# Patient Record
Sex: FEMALE | Race: WHITE | NOT HISPANIC OR LATINO | URBAN - METROPOLITAN AREA
[De-identification: names, ages, dates, MRNs, and addresses within clinical notes are randomized per-mention and may not be internally consistent; named-entity substitution may affect disease eponyms.]

---

## 2018-04-03 ENCOUNTER — EMERGENCY (EMERGENCY)
Facility: HOSPITAL | Age: 83
LOS: 1 days | Discharge: HOME | End: 2018-04-03
Attending: EMERGENCY MEDICINE
Payer: COMMERCIAL

## 2018-04-03 VITALS
HEART RATE: 107 BPM | SYSTOLIC BLOOD PRESSURE: 179 MMHG | OXYGEN SATURATION: 96 % | WEIGHT: 145.95 LBS | DIASTOLIC BLOOD PRESSURE: 82 MMHG | RESPIRATION RATE: 18 BRPM | TEMPERATURE: 98 F

## 2018-04-03 DIAGNOSIS — Z96.643 PRESENCE OF ARTIFICIAL HIP JOINT, BILATERAL: Chronic | ICD-10-CM

## 2018-04-03 PROCEDURE — 99284 EMERGENCY DEPT VISIT MOD MDM: CPT | Mod: 25

## 2018-04-03 RX ORDER — ACETAMINOPHEN 500 MG
975 TABLET ORAL ONCE
Qty: 0 | Refills: 0 | Status: COMPLETED | OUTPATIENT
Start: 2018-04-03 | End: 2018-04-03

## 2018-04-03 NOTE — ED PROVIDER NOTE - ENMT, MLM
+small bump posterior scalp; Airway patent, Nasal mucosa clear. Mouth with normal mucosa. Throat has no vesicles, no oropharyngeal exudates and uvula is midline.

## 2018-04-03 NOTE — ED ADULT TRIAGE NOTE - CHIEF COMPLAINT QUOTE
patient biba from home states that she was closing the door in the bathroom when she slipped on the floor and fell, states that she landed on her right side, has pain to her right arm no obvious deformities, patient denies any LOC or syncopizing. states that she did hit her head not on any blood thinners, patient alert and oriented x4

## 2018-04-03 NOTE — ED PROVIDER NOTE - OBJECTIVE STATEMENT
89 yo F s/p fall onto the right side c/o right arm pain.  was attempting to close the bathroom door and slipped and fell onto her right side.  +head trauma but denies LOC, dizziness, change in vision, chest pain, sob.  s/p bilateral hip replacements but denies hip pain.  arm pain is throbbing/spastic, intermittent pain that worsens with movement.

## 2018-04-03 NOTE — ED ADULT NURSE NOTE - OBJECTIVE STATEMENT
pt reports slip and fall upon entering the bathroom. pt reports "I missed a step and fell." pt denies dizziness, cp, sob, palpitations before fall. reports she was generally felling fine before fall. - loc. - blood thinners. pt reports fell onto rt side, + head trauma. - obvious external signs of trauma. reports rt upper arm pain, occiput of head and neck. a and o x3. ang, pain worse to rt arm with movement. breathing even and unlabored. sitting calm in bed. will continue to monitor.

## 2018-04-03 NOTE — ED PROVIDER NOTE - MUSCULOSKELETAL, MLM
Spine appears normal, range of motion is not limited, +right bicep tenderness and pain with resistant elbow flexion

## 2018-04-04 VITALS
DIASTOLIC BLOOD PRESSURE: 98 MMHG | RESPIRATION RATE: 20 BRPM | SYSTOLIC BLOOD PRESSURE: 176 MMHG | HEART RATE: 94 BPM | OXYGEN SATURATION: 96 % | TEMPERATURE: 98 F

## 2018-04-04 PROCEDURE — 82962 GLUCOSE BLOOD TEST: CPT

## 2018-04-04 PROCEDURE — 72170 X-RAY EXAM OF PELVIS: CPT

## 2018-04-04 PROCEDURE — 73060 X-RAY EXAM OF HUMERUS: CPT | Mod: 26,RT

## 2018-04-04 PROCEDURE — 70450 CT HEAD/BRAIN W/O DYE: CPT | Mod: 26

## 2018-04-04 PROCEDURE — 73060 X-RAY EXAM OF HUMERUS: CPT

## 2018-04-04 PROCEDURE — 70450 CT HEAD/BRAIN W/O DYE: CPT

## 2018-04-04 PROCEDURE — 99284 EMERGENCY DEPT VISIT MOD MDM: CPT | Mod: 25

## 2018-04-04 PROCEDURE — 72170 X-RAY EXAM OF PELVIS: CPT | Mod: 26

## 2018-04-04 RX ADMIN — Medication 975 MILLIGRAM(S): at 00:21

## 2018-08-27 ENCOUNTER — EMERGENCY (EMERGENCY)
Facility: HOSPITAL | Age: 83
LOS: 1 days | Discharge: HOME | End: 2018-08-27
Attending: EMERGENCY MEDICINE
Payer: COMMERCIAL

## 2018-08-27 VITALS
WEIGHT: 145.06 LBS | DIASTOLIC BLOOD PRESSURE: 78 MMHG | RESPIRATION RATE: 16 BRPM | TEMPERATURE: 98 F | HEART RATE: 102 BPM | OXYGEN SATURATION: 99 % | HEIGHT: 62 IN | SYSTOLIC BLOOD PRESSURE: 178 MMHG

## 2018-08-27 DIAGNOSIS — Z96.643 PRESENCE OF ARTIFICIAL HIP JOINT, BILATERAL: Chronic | ICD-10-CM

## 2018-08-27 PROBLEM — I10 ESSENTIAL (PRIMARY) HYPERTENSION: Chronic | Status: ACTIVE | Noted: 2018-04-03

## 2018-08-27 PROCEDURE — 99283 EMERGENCY DEPT VISIT LOW MDM: CPT

## 2018-08-27 PROCEDURE — 99284 EMERGENCY DEPT VISIT MOD MDM: CPT

## 2018-08-27 PROCEDURE — 73502 X-RAY EXAM HIP UNI 2-3 VIEWS: CPT

## 2018-08-27 PROCEDURE — 72100 X-RAY EXAM L-S SPINE 2/3 VWS: CPT | Mod: 26

## 2018-08-27 PROCEDURE — 73502 X-RAY EXAM HIP UNI 2-3 VIEWS: CPT | Mod: 26,RT

## 2018-08-27 PROCEDURE — 72100 X-RAY EXAM L-S SPINE 2/3 VWS: CPT

## 2018-08-27 RX ORDER — CYCLOBENZAPRINE HYDROCHLORIDE 10 MG/1
1 TABLET, FILM COATED ORAL
Qty: 15 | Refills: 0 | OUTPATIENT
Start: 2018-08-27 | End: 2018-08-31

## 2018-08-27 RX ORDER — CYCLOBENZAPRINE HYDROCHLORIDE 10 MG/1
5 TABLET, FILM COATED ORAL ONCE
Qty: 0 | Refills: 0 | Status: COMPLETED | OUTPATIENT
Start: 2018-08-27 | End: 2018-08-27

## 2018-08-27 RX ORDER — IBUPROFEN 200 MG
1 TABLET ORAL
Qty: 28 | Refills: 0 | OUTPATIENT
Start: 2018-08-27 | End: 2018-09-02

## 2018-08-27 RX ORDER — KETOROLAC TROMETHAMINE 30 MG/ML
15 SYRINGE (ML) INJECTION ONCE
Qty: 0 | Refills: 0 | Status: DISCONTINUED | OUTPATIENT
Start: 2018-08-27 | End: 2018-08-27

## 2018-08-27 RX ORDER — LIDOCAINE 4 G/100G
1 CREAM TOPICAL ONCE
Qty: 0 | Refills: 0 | Status: COMPLETED | OUTPATIENT
Start: 2018-08-27 | End: 2018-08-27

## 2018-08-27 RX ADMIN — LIDOCAINE 1 PATCH: 4 CREAM TOPICAL at 19:14

## 2018-08-27 RX ADMIN — CYCLOBENZAPRINE HYDROCHLORIDE 5 MILLIGRAM(S): 10 TABLET, FILM COATED ORAL at 19:13

## 2018-08-27 NOTE — ED ADULT TRIAGE NOTE - CHIEF COMPLAINT QUOTE
Patient is awake and oriented times 3, complains of hip pain s/p bending over to pick something up off the floor

## 2018-08-27 NOTE — ED PROVIDER NOTE - OBJECTIVE STATEMENT
89 year old female with PMH sciatica, HTn, DM presents with R hip and back pain. Pt states that she bent over to pick something up and he had a sudden onset in her R back, buttock, and posterior hip. No numbness, tingling, weakness, bowel/bladder retention/incontinence. She was unable to stand up due to pain so she called 911. She never fell, and reports now that the pain has improved.

## 2018-09-07 ENCOUNTER — INPATIENT (INPATIENT)
Facility: HOSPITAL | Age: 83
LOS: 11 days | Discharge: NURSING, REHAB OR LONG-TERM CARE | DRG: 62 | End: 2018-09-19
Attending: HOSPITALIST | Admitting: INTERNAL MEDICINE
Payer: COMMERCIAL

## 2018-09-07 VITALS
RESPIRATION RATE: 30 BRPM | OXYGEN SATURATION: 97 % | HEART RATE: 91 BPM | SYSTOLIC BLOOD PRESSURE: 168 MMHG | DIASTOLIC BLOOD PRESSURE: 84 MMHG

## 2018-09-07 DIAGNOSIS — I63.9 CEREBRAL INFARCTION, UNSPECIFIED: ICD-10-CM

## 2018-09-07 DIAGNOSIS — R79.89 OTHER SPECIFIED ABNORMAL FINDINGS OF BLOOD CHEMISTRY: ICD-10-CM

## 2018-09-07 DIAGNOSIS — M19.90 UNSPECIFIED OSTEOARTHRITIS, UNSPECIFIED SITE: ICD-10-CM

## 2018-09-07 DIAGNOSIS — Z96.643 PRESENCE OF ARTIFICIAL HIP JOINT, BILATERAL: Chronic | ICD-10-CM

## 2018-09-07 DIAGNOSIS — N17.9 ACUTE KIDNEY FAILURE, UNSPECIFIED: ICD-10-CM

## 2018-09-07 DIAGNOSIS — R74.8 ABNORMAL LEVELS OF OTHER SERUM ENZYMES: ICD-10-CM

## 2018-09-07 DIAGNOSIS — I10 ESSENTIAL (PRIMARY) HYPERTENSION: ICD-10-CM

## 2018-09-07 DIAGNOSIS — E11.9 TYPE 2 DIABETES MELLITUS WITHOUT COMPLICATIONS: ICD-10-CM

## 2018-09-07 DIAGNOSIS — M54.30 SCIATICA, UNSPECIFIED SIDE: ICD-10-CM

## 2018-09-07 DIAGNOSIS — Z29.9 ENCOUNTER FOR PROPHYLACTIC MEASURES, UNSPECIFIED: ICD-10-CM

## 2018-09-07 LAB
ALBUMIN SERPL ELPH-MCNC: 2.9 G/DL — LOW (ref 3.3–5)
ALP SERPL-CCNC: 78 U/L — SIGNIFICANT CHANGE UP (ref 40–120)
ALT FLD-CCNC: 19 U/L DA — SIGNIFICANT CHANGE UP (ref 10–45)
ANION GAP SERPL CALC-SCNC: 8 MMOL/L — SIGNIFICANT CHANGE UP (ref 5–17)
APTT BLD: 30.4 SEC — SIGNIFICANT CHANGE UP (ref 27.5–37.4)
AST SERPL-CCNC: 24 U/L — SIGNIFICANT CHANGE UP (ref 10–40)
BASOPHILS # BLD AUTO: 0.1 K/UL — SIGNIFICANT CHANGE UP (ref 0–0.2)
BASOPHILS NFR BLD AUTO: 1.4 % — SIGNIFICANT CHANGE UP (ref 0–2)
BILIRUB SERPL-MCNC: 1.2 MG/DL — SIGNIFICANT CHANGE UP (ref 0.2–1.2)
BUN SERPL-MCNC: 28 MG/DL — HIGH (ref 7–23)
CALCIUM SERPL-MCNC: 9.7 MG/DL — SIGNIFICANT CHANGE UP (ref 8.4–10.5)
CHLORIDE SERPL-SCNC: 106 MMOL/L — SIGNIFICANT CHANGE UP (ref 96–108)
CO2 SERPL-SCNC: 28 MMOL/L — SIGNIFICANT CHANGE UP (ref 22–31)
CREAT SERPL-MCNC: 1.52 MG/DL — HIGH (ref 0.5–1.3)
EOSINOPHIL # BLD AUTO: 0.3 K/UL — SIGNIFICANT CHANGE UP (ref 0–0.5)
EOSINOPHIL NFR BLD AUTO: 3.6 % — SIGNIFICANT CHANGE UP (ref 0–6)
GLUCOSE BLDC GLUCOMTR-MCNC: 110 MG/DL — HIGH (ref 70–99)
GLUCOSE BLDC GLUCOMTR-MCNC: 113 MG/DL — HIGH (ref 70–99)
GLUCOSE SERPL-MCNC: 201 MG/DL — HIGH (ref 70–99)
HCT VFR BLD CALC: 45.5 % — HIGH (ref 34.5–45)
HGB BLD-MCNC: 15.1 G/DL — SIGNIFICANT CHANGE UP (ref 11.5–15.5)
INR BLD: 1.03 RATIO — SIGNIFICANT CHANGE UP (ref 0.88–1.16)
LYMPHOCYTES # BLD AUTO: 2.2 K/UL — SIGNIFICANT CHANGE UP (ref 1–3.3)
LYMPHOCYTES # BLD AUTO: 30.4 % — SIGNIFICANT CHANGE UP (ref 13–44)
MCHC RBC-ENTMCNC: 30.3 PG — SIGNIFICANT CHANGE UP (ref 27–34)
MCHC RBC-ENTMCNC: 33.2 GM/DL — SIGNIFICANT CHANGE UP (ref 32–36)
MCV RBC AUTO: 91.3 FL — SIGNIFICANT CHANGE UP (ref 80–100)
MONOCYTES # BLD AUTO: 0.6 K/UL — SIGNIFICANT CHANGE UP (ref 0–0.9)
MONOCYTES NFR BLD AUTO: 9 % — SIGNIFICANT CHANGE UP (ref 1–9)
NEUTROPHILS # BLD AUTO: 4 K/UL — SIGNIFICANT CHANGE UP (ref 1.8–7.4)
NEUTROPHILS NFR BLD AUTO: 55.6 % — SIGNIFICANT CHANGE UP (ref 43–77)
PLATELET # BLD AUTO: 186 K/UL — SIGNIFICANT CHANGE UP (ref 150–400)
POTASSIUM SERPL-MCNC: 4.2 MMOL/L — SIGNIFICANT CHANGE UP (ref 3.5–5.3)
POTASSIUM SERPL-SCNC: 4.2 MMOL/L — SIGNIFICANT CHANGE UP (ref 3.5–5.3)
PROT SERPL-MCNC: 8.2 G/DL — SIGNIFICANT CHANGE UP (ref 6–8.3)
PROTHROM AB SERPL-ACNC: 11.4 SEC — SIGNIFICANT CHANGE UP (ref 9.8–12.7)
RBC # BLD: 4.99 M/UL — SIGNIFICANT CHANGE UP (ref 3.8–5.2)
RBC # FLD: 12.1 % — SIGNIFICANT CHANGE UP (ref 10.3–14.5)
SODIUM SERPL-SCNC: 142 MMOL/L — SIGNIFICANT CHANGE UP (ref 135–145)
TROPONIN I SERPL-MCNC: 0.19 NG/ML — HIGH (ref 0.02–0.06)
TROPONIN I SERPL-MCNC: 0.21 NG/ML — HIGH (ref 0.02–0.06)
WBC # BLD: 7.1 K/UL — SIGNIFICANT CHANGE UP (ref 3.8–10.5)
WBC # FLD AUTO: 7.1 K/UL — SIGNIFICANT CHANGE UP (ref 3.8–10.5)

## 2018-09-07 PROCEDURE — 70450 CT HEAD/BRAIN W/O DYE: CPT | Mod: 26

## 2018-09-07 PROCEDURE — 99291 CRITICAL CARE FIRST HOUR: CPT

## 2018-09-07 PROCEDURE — 71045 X-RAY EXAM CHEST 1 VIEW: CPT | Mod: 26

## 2018-09-07 PROCEDURE — 93010 ELECTROCARDIOGRAM REPORT: CPT

## 2018-09-07 RX ORDER — HYDRALAZINE HCL 50 MG
10 TABLET ORAL EVERY 6 HOURS
Qty: 0 | Refills: 0 | Status: DISCONTINUED | OUTPATIENT
Start: 2018-09-07 | End: 2018-09-09

## 2018-09-07 RX ORDER — DEXTROSE 50 % IN WATER 50 %
25 SYRINGE (ML) INTRAVENOUS ONCE
Qty: 0 | Refills: 0 | Status: DISCONTINUED | OUTPATIENT
Start: 2018-09-07 | End: 2018-09-12

## 2018-09-07 RX ORDER — PANTOPRAZOLE SODIUM 20 MG/1
40 TABLET, DELAYED RELEASE ORAL DAILY
Qty: 0 | Refills: 0 | Status: DISCONTINUED | OUTPATIENT
Start: 2018-09-07 | End: 2018-09-09

## 2018-09-07 RX ORDER — INFLUENZA VIRUS VACCINE 15; 15; 15; 15 UG/.5ML; UG/.5ML; UG/.5ML; UG/.5ML
0.5 SUSPENSION INTRAMUSCULAR ONCE
Qty: 0 | Refills: 0 | Status: COMPLETED | OUTPATIENT
Start: 2018-09-07 | End: 2018-09-07

## 2018-09-07 RX ORDER — DEXTROSE 50 % IN WATER 50 %
15 SYRINGE (ML) INTRAVENOUS ONCE
Qty: 0 | Refills: 0 | Status: DISCONTINUED | OUTPATIENT
Start: 2018-09-07 | End: 2018-09-12

## 2018-09-07 RX ORDER — DEXTROSE 50 % IN WATER 50 %
12.5 SYRINGE (ML) INTRAVENOUS ONCE
Qty: 0 | Refills: 0 | Status: DISCONTINUED | OUTPATIENT
Start: 2018-09-07 | End: 2018-09-12

## 2018-09-07 RX ORDER — ALTEPLASE 100 MG
54 KIT INTRAVENOUS ONCE
Qty: 0 | Refills: 0 | Status: COMPLETED | OUTPATIENT
Start: 2018-09-07 | End: 2018-09-07

## 2018-09-07 RX ORDER — ALTEPLASE 100 MG
6 KIT INTRAVENOUS ONCE
Qty: 0 | Refills: 0 | Status: COMPLETED | OUTPATIENT
Start: 2018-09-07 | End: 2018-09-07

## 2018-09-07 RX ORDER — SODIUM CHLORIDE 9 MG/ML
1000 INJECTION, SOLUTION INTRAVENOUS
Qty: 0 | Refills: 0 | Status: DISCONTINUED | OUTPATIENT
Start: 2018-09-07 | End: 2018-09-09

## 2018-09-07 RX ORDER — INSULIN LISPRO 100/ML
VIAL (ML) SUBCUTANEOUS EVERY 6 HOURS
Qty: 0 | Refills: 0 | Status: DISCONTINUED | OUTPATIENT
Start: 2018-09-07 | End: 2018-09-12

## 2018-09-07 RX ORDER — GLUCAGON INJECTION, SOLUTION 0.5 MG/.1ML
1 INJECTION, SOLUTION SUBCUTANEOUS ONCE
Qty: 0 | Refills: 0 | Status: DISCONTINUED | OUTPATIENT
Start: 2018-09-07 | End: 2018-09-12

## 2018-09-07 RX ORDER — SODIUM CHLORIDE 9 MG/ML
1000 INJECTION, SOLUTION INTRAVENOUS
Qty: 0 | Refills: 0 | Status: DISCONTINUED | OUTPATIENT
Start: 2018-09-07 | End: 2018-09-12

## 2018-09-07 RX ADMIN — ALTEPLASE 54 MILLIGRAM(S): KIT at 15:09

## 2018-09-07 RX ADMIN — ALTEPLASE 360 MILLIGRAM(S): KIT at 15:08

## 2018-09-07 RX ADMIN — ALTEPLASE 6 MILLIGRAM(S): KIT at 15:09

## 2018-09-07 NOTE — H&P ADULT - PROBLEM SELECTOR PLAN 2
Uncertain cause, EKG sinus with no ST changes and good R wave progression.  Trend troponin, will check echocardiogram in AM, serial EKG's.  At this time no ASA or statin therapies due to TPA and NPO status, will try to maintain SBP >140 so no beta blocker at this time.

## 2018-09-07 NOTE — H&P ADULT - HISTORY OF PRESENT ILLNESS
89 year old female with PMH HTN, DM2, dyslipidemia, OA had been in her usual state of health, was getting into the car when noted by family to suddenly become aphasic with pronounced weakness on the right side.  Brought immediately to ED at [facility], CT head negative for acute hemorrhage given TPA and transferred to ICU for further care.   Denies CP, SOB, abdominal pain, lightheadedness, dizziness, visual changes, syncope, headache, tremors, paresthesias anesthesias.

## 2018-09-07 NOTE — ED PROVIDER NOTE - PROGRESS NOTE DETAILS
Jaguar: TeleStroke evaluated pt Dr. Rai- will give TPA because the benefits out way the risks. Spoke with TeleICU and IVANA beard will come down and evaluate the pt to go to ICU.

## 2018-09-07 NOTE — H&P ADULT - PROBLEM SELECTOR PLAN 3
Uncertain if CKD due to diabetic nephropathy or new finding.  Gentle hydration with LR, monitor urine output, electrolytes and creatinine.

## 2018-09-07 NOTE — ED ADULT NURSE NOTE - NSIMPLEMENTINTERV_GEN_ALL_ED
Implemented All Fall with Harm Risk Interventions:  [place] to call system. Call bell, personal items and telephone within reach. Instruct patient to call for assistance. Room bathroom lighting operational. Non-slip footwear when patient is off stretcher. Physically safe environment: no spills, clutter or unnecessary equipment. Stretcher in lowest position, wheels locked, appropriate side rails in place. Provide visual cue, wrist band, yellow gown, etc. Monitor gait and stability. Monitor for mental status changes and reorient to person, place, and time. Review medications for side effects contributing to fall risk. Reinforce activity limits and safety measures with patient and family. Provide visual clues: red socks.

## 2018-09-07 NOTE — ED PROVIDER NOTE - CRANIAL NERVE AND PUPILLARY EXAM
extra-ocular movements intact/tongue is midline/peripheral vision intact/right upper and lower ext drift. right facial droop

## 2018-09-07 NOTE — ED PROVIDER NOTE - CRITICAL CARE PROVIDED
interpretation of diagnostic studies/consultation with other physicians/direct patient care (not related to procedure)/documentation/consult w/ pt's family directly relating to pts condition/additional history taking

## 2018-09-07 NOTE — ED PROVIDER NOTE - ATTENDING CONTRIBUTION TO CARE
Dr. Childs: I performed a face to face bedside interview with patient regarding history of present illness, review of symptoms and past medical history. I completed an independent physical exam.  I have discussed patient's plan of care with PA.   I agree with note as stated above, having amended the EMR as needed to reflect my findings.   This includes HISTORY OF PRESENT ILLNESS, HIV, PAST MEDICAL/SURGICAL/FAMILY/SOCIAL HISTORY, ALLERGIES AND HOME MEDICATIONS, REVIEW OF SYSTEMS, PHYSICAL EXAM, and any PROGRESS NOTES during the time I functioned as the attending physician for this patient.    89F h/o HTN and DM p/w 15 mins of aphasia witnessed by family. No h/o CVA. Pt's aphasia started improving in the ED but still had a degree of expressive aphasia. No other complaints.    On exam pt had RUE drift, flattening of right nasolabial fold and expressive aphasia, rest of exam nml.    Had detailed discussion with family, neuro Dr. Libman and tPA given. ICU IVANA Matos at bedside.

## 2018-09-07 NOTE — ED PROVIDER NOTE - OBJECTIVE STATEMENT
pt 89 yo f hx DM was getting into her car 15 min PTA at 1410 and pt became aphasic and weak falling to the right side. witnessed by her family

## 2018-09-07 NOTE — STROKE CODE NOTE - SUBJECTIVE
89-year-old right-handed white lady first evaluated by telestroke at [facility] on 9/7/18 with acute aphasia. On 9/7/18, she was last known normal at around 2:10 PM when she had a witnessed onset of inability to generate any speech. There might have been associated weakness as she slumped over to one side but this is uncertain. At baseline, she lives with her daughter, but is largely independent in basic activities of daily living, and has only mild memory deficits. ROS otherwise negative. Exam. NIHSS = 5. Alert and attentive; speech is markedly hypophonic; overall fluent but with frequent word finding hesitancy; mild right facial palsy; ? mild dysarthria; right side-drift of arm and leg with decreased range of motion at right shoulder (chronic shoulder pathology); remainder of neurologic exam was nonfocal. CT head (9/7/18) to my eye showed moderate-severe periventricular chronic ischemic changes and was otherwise unremarkable.

## 2018-09-07 NOTE — H&P ADULT - NSHPSOCIALHISTORY_GEN_ALL_CORE
Denies past and present tobacco use  Occasionally drinks 1 scotch, only at family holidays  No history of substance abuse or dependence  Lives with daughter, uses walker and cane to ambulate

## 2018-09-07 NOTE — PROGRESS NOTE ADULT - PROBLEM SELECTOR PLAN 1
Etiology unclear at this time, although there is report that patient's BP has been difficult to control recently. Now s/p tPA. q 1 hour neuro checks for now. Will continue to keep SBP < 180, IV hydralazine PRN ordered. NPO for 12 hours- will look to start high dose statin and outpatient antihypertensive regimen when cleared for oral intake. TTE and carotid dopplers ordered. Needs repeat CT head in 24 hours. Start ASA and DVT prophylaxis if scan is without ICH. Will check HgBA1c and lipid panel with morning labs. PT consult placed.

## 2018-09-07 NOTE — ED PROVIDER NOTE - PHYSICAL EXAMINATION
on arrival pt was not able to speak or follow commands. after CT pt was starting to speak and follow commands

## 2018-09-07 NOTE — PATIENT PROFILE ADULT. - VISION (WITH CORRECTIVE LENSES IF THE PATIENT USUALLY WEARS THEM):
Partially impaired: cannot see medication labels or newsprint, but can see obstacles in path, and the surrounding layout; can count fingers at arm's length/B/L Cataract surgery in past

## 2018-09-07 NOTE — H&P ADULT - PROBLEM SELECTOR PLAN 8
SCD's only after TPA for DVT ppx  Protonix IV for GI ppx while NPO  NPO until speech and swallow evaluation  Bedrest  Case discussed with Dr. Duval, OttoU

## 2018-09-07 NOTE — PATIENT PROFILE ADULT. - NSNUTRITIONRISK_GI_A_CORE
Protocol For Photochemotherapy: Triamcinolone Ointment And Nbuvb: The patient received Photochemotherapy: Triamcinolone and NBUVB (triamcinolone ointment applied to all lesions prior to phototherapy). Protocol For Photochemotherapy For Severe Photoresponsive Dermatoses: Puva: The patient received Photochemotherapy for severe photoresponsive dermatoses: PUVA requiring at least 4 to 8 hours of care under direct physician supervision. Protocol: Photochemotherapy: Baby Oil and NBUVB Protocol For Photochemotherapy: Petrolatum And Broad Band Uvb: The patient received Photochemotherapy: Petrolatum and Broad Band UVB. Name Of Supervising Technician: kavita Protocol For Protocol For Photochemotherapy For Severe Photoresponsive Dermatoses: Bath Puva: The patient received Photochemotherapy for severe photoresponsive dermatoses: Bath PUVA requiring at least 4 to 8 hours of care under direct physician supervision. Protocol For Photochemotherapy For Severe Photoresponsive Dermatoses: Tar And Nbuvb (Goeckerman Treatment): The patient received Photochemotherapy for severe photoresponsive dermatoses: Tar and NBUVB (Goeckerman treatment) requiring at least 4 to 8 hours of care under direct physician supervision. Protocol For Photochemotherapy For Severe Photoresponsive Dermatoses: Petrolatum And Nbuvb: The patient received Photochemotherapy for severe photoresponsive dermatoses: Petrolatum and NBUVB requiring at least 4 to 8 hours of care under direct physician supervision. Protocol For Bath Puva: The patient received Bath PUVA. Protocol For Broad Band Uvb: The patient received Broad Band UVB. Protocol For Nb Uva: The patient received NB UVA. Total Body Energy: 2020mj Comments On Previous Treatment: Increase 50mj hold dose @2500mj. Protocol For Photochemotherapy For Severe Photoresponsive Dermatoses: Petrolatum And Broad Band Uvb: The patient received Photochemotherapyfor severe photoresponsive dermatoses: Petrolatum and Broad Band UVB requiring at least 4 to 8 hours of care under direct physician supervision. Total Body Time: 3:30sec Protocol For Photochemotherapy: Tar And Nbuvb (Goeckerman Treatment): The patient received Photochemotherapy: Tar and NBUVB (Goeckerman treatment). Protocol For Uva: The patient received UVA. Protocol For Photochemotherapy: Mineral Oil And Broad Band Uvb: The patient received Photochemotherapy: Mineral Oil and Broad Band UVB. Post-Care Instructions: I reviewed with the patient in detail post-care instructions. Patient is to wear sun protection. Patients may expect sunburn like redness, discomfort and scabbing. Protocol For Nbuvb: The patient received NBUVB. Consent: Written consent obtained. The risks were reviewed with the patient including but not limited to: burn, pigmentary changes, pain, blistering, scabbing, redness, increased risk of skin cancers, and the remote possibility of scarring. Treatment Number: [id number] [place] Protocol For Photochemotherapy: Baby Oil And Nbuvb: The patient received Photochemotherapy: Baby Oil and NBUVB (baby oil applied to all lesions prior to phototherapy). Protocol For Photochemotherapy: Tar And Broad Band Uvb (Goeckerman Treatment): The patient received Photochemotherapy: Tar and Broad Band UVB (Goeckerman treatment). Detail Level: Generalized Protocol For Photochemotherapy: Mineral Oil And Nbuvb: The patient received Photochemotherapy: Mineral Oil and NBUVB (mineral oil applied to all lesions prior to phototherapy). Protocol For Photochemotherapy: Petrolatum And Nbuvb: The patient received Photochemotherapy: Petrolatum and NBUVB (petrolatum applied to all lesions prior to phototherapy). Protocol For Photochemotherapy For Severe Photoresponsive Dermatoses: Tar And Broad Band Uvb (Goeckerman Treatment): The patient received Photochemotherapy for severe photoresponsive dermatoses: Tar and Broad Band UVB (Goeckerman treatment) requiring at least 4 to 8 hours of care under direct physician supervision. Skin Type: III Protocol For Puva: The patient received PUVA. Protocol For Uva1: The patient received UVA1. No indicators present

## 2018-09-07 NOTE — H&P ADULT - PROBLEM SELECTOR PLAN 1
With no evidence of hemorrhage on CT head, status post TPA.  Continue close neuro monitoring in ICU, for echocardiogram and carotid sonogram in AM.  Target -180.  Check lipid profile and hemoglobin A1c in AM, follow up CT in 24 hours.  Maintain NPO until speech and swallow evaluation, start evaluation for PT/OT in AM.  Start ASA and statin therapy when tolerating PO.

## 2018-09-07 NOTE — STROKE CODE NOTE - OBJECTIVE
Impression. On 9/7/18 she developed sudden probable severe motor dysphasia, consistent with left hemispheric dysfunction, probably infarction of unknown cause. She has significantly improved, but with residual mild aphasia and mild right-sided motor deficits, which together could possibly result in some degree of increased disability for her. Despite rapid improvement, therefore, I believe that the benefits still outweigh the risks of IV TPA. I discussed these considerations with her daughter and she agrees to proceed with treatment. Her deficits are too mild for endovascular thrombectomy. Suggest. IV TPA; further management as per Edson Tanner neurology.

## 2018-09-07 NOTE — H&P ADULT - NSHPLABSRESULTS_GEN_ALL_CORE
EXAM:  CT BRAIN STROKE PROTOCOL    PROCEDURE DATE:  09/07/2018        INTERPRETATION:  CT HEAD WITHOUT CONTRAST  INDICATION: 89 years old. Female. unable to speak. aphasia  COMPARISON: No prior study is available for comparison.  TECHNIQUE:Noncontrast axial CT head was obtained from the skull base to   vertex.    FINDINGS:  No acute intracranial hemorrhage, mass effect or midline shift.  No CT evidence of acute large territory vascular infarct.  The ventricles and cortical sulci are prominent reflecting parenchymal   volume loss.  Patchy hypodensities in the periventricular and subcortical white matter   are nonspecific, but likely sequela of small vessel ischemic disease.  Intracranial atherosclerotic calcifications are present.    Multiple age-indeterminate lacunar infarcts in the bilateral basal   ganglia, thalami, and caudate nuclei.    Small old infarct in the right superior cerebellar hemisphere.    Visualized paranasal sinuses are well-aerated. Partial opacification of   the bilateral mastoid air cells, right more than left. The native ocular   lenses are surgically absent.  No displaced calvarial fracture.    IMPRESSION:  No acute intracranial hemorrhage or mass effect.   Multiple age-indeterminate lacunar infarcts in the   bilateral basal ganglia, thalami, and caudate nuclei.    Further evaluation with MRI may be performed as clinically indicated.    Findings were discussed with Dr. Childs 9/7/2018 2:48 PM with readback.    ASHWINI ALBERTO M.D., ATTENDING RADIOLOGIST  This document has been electronically signed. Sep  7 2018  2:55PM

## 2018-09-07 NOTE — H&P ADULT - ASSESSMENT
89 year old female with PMH HTN, DM2, dyslipidemia, OA with acute weakness and aphasia suspect of acute CVA status post TPA infusion, noted to also have elevated troponin and creatinine.

## 2018-09-07 NOTE — H&P ADULT - NSHPPHYSICALEXAM_GEN_ALL_CORE
Vital Signs Last 24 Hrs  T(F): 99.1 (07 Sep 2018 16:30), Max: 99.1 (07 Sep 2018 16:30)  HR: 92 (07 Sep 2018 16:30) (80 - 92)  BP: 150/82 (07 Sep 2018 16:30) (124/107 - 168/84)  BP(mean): 103 (07 Sep 2018 16:30) (103 - 103)  RR: 26 (07 Sep 2018 16:30) (16 - 30)  SpO2: 96% RA (07 Sep 2018 16:30) (95% - 98%)    Physical Exam  Gen:  WN/WD Female resting in bed, NAD  ENT:  Atraumatic with flattening of right nasolabial fold.  Pupils surgical, EOM's intact.  Thorax:  Symmetric, no retractions  Lung:  CTA b/l  CV:  S1, S2. RRR  Abd:  Soft, NT/ND.  BS normoactive, no masses to palp  Extrem:  No C/C/E, DP/radial pulses +2  Neuro:  Nod appropriately to questioning.  Moderate expressive aphasia noted.  Strength 4/5 in RUE, otherwise 5/5 throughout.  Right pronator drift noted.  Point to point intact bilaterally.

## 2018-09-07 NOTE — PROGRESS NOTE ADULT - SUBJECTIVE AND OBJECTIVE BOX
Patient is a 89y old  Female who presents with a chief complaint of Aphasia and weakness (07 Sep 2018 16:36)      BRIEF HOSPITAL COURSE: 89 y/o F with a h/o HTN, DM2, dyslipidemia, OA had been in her usual state of health, was getting into the car when noted by family to suddenly become aphasic with pronounced weakness on the right side.  Brought immediately to ED at [facility], CT head negative for acute hemorrhage, given TPA and transferred to ICU for further care. Hemodynamically stable.    Events last 24 hours: Some improvement in neuro exam, still has moderate expressive aphasia which causes frustration. Right sided extremities somewhat stronger, drift improving. BP remains stable.    PAST MEDICAL & SURGICAL HISTORY:  Dyslipidemia  Osteoarthritis  Sciatica  Diabetes: Type 2, &quot;many years&quot;  HTN (hypertension)  H/O bilateral hip replacements: 2000&#x27;s at [facility], no complications      Review of Systems:  Unable to obtain secondary to expressive aphasia.      Medications:    hydrALAZINE Injectable 10 milliGRAM(s) IV Push every 6 hours PRN  pantoprazole  Injectable 40 milliGRAM(s) IV Push daily  dextrose 40% Gel 15 Gram(s) Oral once PRN  dextrose 50% Injectable 12.5 Gram(s) IV Push once  dextrose 50% Injectable 25 Gram(s) IV Push once  dextrose 50% Injectable 25 Gram(s) IV Push once  glucagon  Injectable 1 milliGRAM(s) IntraMuscular once PRN  insulin lispro (HumaLOG) corrective regimen sliding scale   SubCutaneous every 6 hours  dextrose 5%. 1000 milliLiter(s) IV Continuous <Continuous>  lactated ringers. 1000 milliLiter(s) IV Continuous <Continuous>        ICU Vital Signs Last 24 Hrs  T(C): 37.3 (07 Sep 2018 16:30), Max: 37.3 (07 Sep 2018 16:30)  T(F): 99.1 (07 Sep 2018 16:30), Max: 99.1 (07 Sep 2018 16:30)  HR: 84 (07 Sep 2018 18:30) (76 - 92)  BP: 162/79 (07 Sep 2018 18:30) (124/107 - 168/84)  BP(mean): 103 (07 Sep 2018 18:30) (102 - 109)  ABP: --  ABP(mean): --  RR: 16 (07 Sep 2018 18:30) (16 - 30)  SpO2: 98% (07 Sep 2018 18:30) (95% - 99%)          I&O's Detail    07 Sep 2018 07:01  -  07 Sep 2018 20:42  --------------------------------------------------------  IN:    lactated ringers.: 150 mL  Total IN: 150 mL    OUT:    Voided: 150 mL  Total OUT: 150 mL    Total NET: 0 mL            LABS:                        15.1   7.1   )-----------( 186      ( 07 Sep 2018 14:30 )             45.5     09-07    142  |  106  |  28<H>  ----------------------------<  201<H>  4.2   |  28  |  1.52<H>    Ca    9.7      07 Sep 2018 14:30    TPro  8.2  /  Alb  2.9<L>  /  TBili  1.2  /  DBili  x   /  AST  24  /  ALT  19  /  AlkPhos  78  09-07      CARDIAC MARKERS ( 07 Sep 2018 14:30 )  .208 ng/mL / x     / x     / x     / x          CAPILLARY BLOOD GLUCOSE      POCT Blood Glucose.: 113 mg/dL (07 Sep 2018 16:55)    PT/INR - ( 07 Sep 2018 14:30 )   PT: 11.4 sec;   INR: 1.03 ratio         PTT - ( 07 Sep 2018 14:30 )  PTT:30.4 sec    CULTURES:      Physical Examination:    General: No acute distress.  Alert, interactive, frustrated    HEENT: Pupils surgical. EOMI,  Symmetric.    PULM: Clear to auscultation bilaterally, no significant sputum production    CVS: Regular rate and rhythm, no murmurs, rubs, or gallops    ABD: Soft, nondistended, nontender, normoactive bowel sounds, no masses    EXT: No edema, nontender    SKIN: Warm and well perfused, no rashes noted.    NEURO: nods/shakes head appropriately to questions, able to say some single words, follows commands, strength 4/5 RUE, 5/5 all other extremities, right flattened nasolabial fold      RADIOLOGY:     < from: CT Brain Stroke Protocol (09.07.18 @ 14:32) >  FINDINGS:  No acute intracranial hemorrhage, mass effect or midline shift.  No CT evidence of acute large territory vascular infarct.  The ventricles and cortical sulci are prominent reflecting parenchymal   volume loss.  Patchy hypodensities in the periventricular and subcortical white matter   are nonspecific, but likely sequela of small vessel ischemic disease.  Intracranial atherosclerotic calcifications are present.    Multiple age-indeterminate lacunar infarcts in the bilateral basal   ganglia, thalami, and caudate nuclei.    Small old infarct in the right superior cerebellar hemisphere.    Visualized paranasal sinuses are well-aerated. Partial opacification of   the bilateral mastoid air cells, right more than left. The native ocular   lenses are surgically absent.  No displaced calvarial fracture.    IMPRESSION:  No acute intracranial hemorrhage or mass effect.     Multiple age-indeterminate lacunar infarcts in the bilateral basal   ganglia, thalami, and caudate nuclei.        CRITICAL CARE TIME SPENT: 37 mins  Evaluating/treating patient, reviewing data/labs/imaging, discussing case with multidisciplinary team, discussing plan/goals of care with patient/family. Non-inclusive of procedure time. Patient is a 89y old  Female who presents with a chief complaint of Aphasia and weakness (07 Sep 2018 16:36)      BRIEF HOSPITAL COURSE: 89 y/o F with a h/o HTN, DM2, dyslipidemia, OA, had been in her usual state of health, was getting into the car when noted by family to suddenly become aphasic with pronounced weakness on the right side.  Brought immediately to ED at [facility], CT head negative for acute hemorrhage, given TPA and transferred to ICU for further care. Hemodynamically stable.    Events last 24 hours: Some improvement in neuro exam, still has moderate expressive aphasia which causes frustration. Right sided extremities somewhat stronger, drift improving. BP remains stable.    PAST MEDICAL & SURGICAL HISTORY:  Dyslipidemia  Osteoarthritis  Sciatica  Diabetes: Type 2, &quot;many years&quot;  HTN (hypertension)  H/O bilateral hip replacements: 2000&#x27;s at [facility], no complications      Review of Systems:  Unable to obtain secondary to expressive aphasia.      Medications:    hydrALAZINE Injectable 10 milliGRAM(s) IV Push every 6 hours PRN  pantoprazole  Injectable 40 milliGRAM(s) IV Push daily  dextrose 40% Gel 15 Gram(s) Oral once PRN  dextrose 50% Injectable 12.5 Gram(s) IV Push once  dextrose 50% Injectable 25 Gram(s) IV Push once  dextrose 50% Injectable 25 Gram(s) IV Push once  glucagon  Injectable 1 milliGRAM(s) IntraMuscular once PRN  insulin lispro (HumaLOG) corrective regimen sliding scale   SubCutaneous every 6 hours  dextrose 5%. 1000 milliLiter(s) IV Continuous <Continuous>  lactated ringers. 1000 milliLiter(s) IV Continuous <Continuous>        ICU Vital Signs Last 24 Hrs  T(C): 37.3 (07 Sep 2018 16:30), Max: 37.3 (07 Sep 2018 16:30)  T(F): 99.1 (07 Sep 2018 16:30), Max: 99.1 (07 Sep 2018 16:30)  HR: 84 (07 Sep 2018 18:30) (76 - 92)  BP: 162/79 (07 Sep 2018 18:30) (124/107 - 168/84)  BP(mean): 103 (07 Sep 2018 18:30) (102 - 109)  ABP: --  ABP(mean): --  RR: 16 (07 Sep 2018 18:30) (16 - 30)  SpO2: 98% (07 Sep 2018 18:30) (95% - 99%)          I&O's Detail    07 Sep 2018 07:01  -  07 Sep 2018 20:42  --------------------------------------------------------  IN:    lactated ringers.: 150 mL  Total IN: 150 mL    OUT:    Voided: 150 mL  Total OUT: 150 mL    Total NET: 0 mL            LABS:                        15.1   7.1   )-----------( 186      ( 07 Sep 2018 14:30 )             45.5     09-07    142  |  106  |  28<H>  ----------------------------<  201<H>  4.2   |  28  |  1.52<H>    Ca    9.7      07 Sep 2018 14:30    TPro  8.2  /  Alb  2.9<L>  /  TBili  1.2  /  DBili  x   /  AST  24  /  ALT  19  /  AlkPhos  78  09-07      CARDIAC MARKERS ( 07 Sep 2018 14:30 )  .208 ng/mL / x     / x     / x     / x          CAPILLARY BLOOD GLUCOSE      POCT Blood Glucose.: 113 mg/dL (07 Sep 2018 16:55)    PT/INR - ( 07 Sep 2018 14:30 )   PT: 11.4 sec;   INR: 1.03 ratio         PTT - ( 07 Sep 2018 14:30 )  PTT:30.4 sec    CULTURES:      Physical Examination:    General: No acute distress.  Alert, interactive, frustrated    HEENT: Pupils surgical. EOMI,  Symmetric.    PULM: Clear to auscultation bilaterally, no significant sputum production    CVS: Regular rate and rhythm, no murmurs, rubs, or gallops    ABD: Soft, nondistended, nontender, normoactive bowel sounds, no masses    EXT: No edema, nontender    SKIN: Warm and well perfused, no rashes noted.    NEURO: nods/shakes head appropriately to questions, able to say some single words, follows commands, strength 4/5 RUE, 5/5 all other extremities, right flattened nasolabial fold      RADIOLOGY:     < from: CT Brain Stroke Protocol (09.07.18 @ 14:32) >  FINDINGS:  No acute intracranial hemorrhage, mass effect or midline shift.  No CT evidence of acute large territory vascular infarct.  The ventricles and cortical sulci are prominent reflecting parenchymal   volume loss.  Patchy hypodensities in the periventricular and subcortical white matter   are nonspecific, but likely sequela of small vessel ischemic disease.  Intracranial atherosclerotic calcifications are present.    Multiple age-indeterminate lacunar infarcts in the bilateral basal   ganglia, thalami, and caudate nuclei.    Small old infarct in the right superior cerebellar hemisphere.    Visualized paranasal sinuses are well-aerated. Partial opacification of   the bilateral mastoid air cells, right more than left. The native ocular   lenses are surgically absent.  No displaced calvarial fracture.    IMPRESSION:  No acute intracranial hemorrhage or mass effect.     Multiple age-indeterminate lacunar infarcts in the bilateral basal   ganglia, thalami, and caudate nuclei.        CRITICAL CARE TIME SPENT: 37 mins  Evaluating/treating patient, reviewing data/labs/imaging, discussing case with multidisciplinary team, discussing plan/goals of care with patient/family. Non-inclusive of procedure time.

## 2018-09-08 DIAGNOSIS — E11.9 TYPE 2 DIABETES MELLITUS WITHOUT COMPLICATIONS: ICD-10-CM

## 2018-09-08 LAB
ANION GAP SERPL CALC-SCNC: 7 MMOL/L — SIGNIFICANT CHANGE UP (ref 5–17)
BUN SERPL-MCNC: 20 MG/DL — SIGNIFICANT CHANGE UP (ref 7–23)
CALCIUM SERPL-MCNC: 9 MG/DL — SIGNIFICANT CHANGE UP (ref 8.4–10.5)
CHLORIDE SERPL-SCNC: 108 MMOL/L — SIGNIFICANT CHANGE UP (ref 96–108)
CHOLEST SERPL-MCNC: 161 MG/DL — SIGNIFICANT CHANGE UP (ref 10–199)
CO2 SERPL-SCNC: 29 MMOL/L — SIGNIFICANT CHANGE UP (ref 22–31)
CREAT SERPL-MCNC: 1.19 MG/DL — SIGNIFICANT CHANGE UP (ref 0.5–1.3)
GLUCOSE BLDC GLUCOMTR-MCNC: 105 MG/DL — HIGH (ref 70–99)
GLUCOSE BLDC GLUCOMTR-MCNC: 125 MG/DL — HIGH (ref 70–99)
GLUCOSE BLDC GLUCOMTR-MCNC: 155 MG/DL — HIGH (ref 70–99)
GLUCOSE SERPL-MCNC: 102 MG/DL — HIGH (ref 70–99)
HBA1C BLD-MCNC: 6.3 % — HIGH (ref 4–5.6)
HCT VFR BLD CALC: 40.9 % — SIGNIFICANT CHANGE UP (ref 34.5–45)
HDLC SERPL-MCNC: 33 MG/DL — LOW
HGB BLD-MCNC: 14.4 G/DL — SIGNIFICANT CHANGE UP (ref 11.5–15.5)
LIPID PNL WITH DIRECT LDL SERPL: 99 MG/DL — SIGNIFICANT CHANGE UP
MAGNESIUM SERPL-MCNC: 1.9 MG/DL — SIGNIFICANT CHANGE UP (ref 1.6–2.6)
MCHC RBC-ENTMCNC: 31.7 PG — SIGNIFICANT CHANGE UP (ref 27–34)
MCHC RBC-ENTMCNC: 35.2 GM/DL — SIGNIFICANT CHANGE UP (ref 32–36)
MCV RBC AUTO: 89.9 FL — SIGNIFICANT CHANGE UP (ref 80–100)
PHOSPHATE SERPL-MCNC: 3.5 MG/DL — SIGNIFICANT CHANGE UP (ref 2.5–4.5)
PLATELET # BLD AUTO: 161 K/UL — SIGNIFICANT CHANGE UP (ref 150–400)
POTASSIUM SERPL-MCNC: 3.9 MMOL/L — SIGNIFICANT CHANGE UP (ref 3.5–5.3)
POTASSIUM SERPL-SCNC: 3.9 MMOL/L — SIGNIFICANT CHANGE UP (ref 3.5–5.3)
RBC # BLD: 4.54 M/UL — SIGNIFICANT CHANGE UP (ref 3.8–5.2)
RBC # FLD: 11.7 % — SIGNIFICANT CHANGE UP (ref 10.3–14.5)
SODIUM SERPL-SCNC: 144 MMOL/L — SIGNIFICANT CHANGE UP (ref 135–145)
TOTAL CHOLESTEROL/HDL RATIO MEASUREMENT: 4.9 RATIO — SIGNIFICANT CHANGE UP (ref 3.3–7.1)
TRIGL SERPL-MCNC: 147 MG/DL — SIGNIFICANT CHANGE UP (ref 10–149)
TROPONIN I SERPL-MCNC: 0.17 NG/ML — HIGH (ref 0.02–0.06)
WBC # BLD: 7 K/UL — SIGNIFICANT CHANGE UP (ref 3.8–10.5)
WBC # FLD AUTO: 7 K/UL — SIGNIFICANT CHANGE UP (ref 3.8–10.5)

## 2018-09-08 PROCEDURE — 70450 CT HEAD/BRAIN W/O DYE: CPT | Mod: 26,76

## 2018-09-08 PROCEDURE — 93880 EXTRACRANIAL BILAT STUDY: CPT | Mod: 26

## 2018-09-08 PROCEDURE — 93306 TTE W/DOPPLER COMPLETE: CPT | Mod: 26

## 2018-09-08 RX ORDER — ENOXAPARIN SODIUM 100 MG/ML
30 INJECTION SUBCUTANEOUS DAILY
Qty: 0 | Refills: 0 | Status: DISCONTINUED | OUTPATIENT
Start: 2018-09-08 | End: 2018-09-10

## 2018-09-08 RX ORDER — ASPIRIN/CALCIUM CARB/MAGNESIUM 324 MG
81 TABLET ORAL DAILY
Qty: 0 | Refills: 0 | Status: DISCONTINUED | OUTPATIENT
Start: 2018-09-08 | End: 2018-09-08

## 2018-09-08 RX ORDER — ONDANSETRON 8 MG/1
4 TABLET, FILM COATED ORAL EVERY 6 HOURS
Qty: 0 | Refills: 0 | Status: DISCONTINUED | OUTPATIENT
Start: 2018-09-08 | End: 2018-09-19

## 2018-09-08 RX ORDER — ATORVASTATIN CALCIUM 80 MG/1
40 TABLET, FILM COATED ORAL AT BEDTIME
Qty: 0 | Refills: 0 | Status: DISCONTINUED | OUTPATIENT
Start: 2018-09-08 | End: 2018-09-08

## 2018-09-08 RX ADMIN — SODIUM CHLORIDE 50 MILLILITER(S): 9 INJECTION, SOLUTION INTRAVENOUS at 17:46

## 2018-09-08 RX ADMIN — PANTOPRAZOLE SODIUM 40 MILLIGRAM(S): 20 TABLET, DELAYED RELEASE ORAL at 11:25

## 2018-09-08 RX ADMIN — Medication 2: at 11:31

## 2018-09-08 RX ADMIN — ONDANSETRON 4 MILLIGRAM(S): 8 TABLET, FILM COATED ORAL at 12:54

## 2018-09-08 NOTE — DIETITIAN INITIAL EVALUATION ADULT. - OTHER INFO
Pt seen for ICU length of stay initial assessment. Pt currently NPO, reports appetite and requests food. Pt with dentures, denies difficulty chewing/swallowing. Pt denies nausea, vomiting, diarrhea, or constipation, reports last BM PTA. Denies weight changes PTA, states  pounds "forever". Weight as per flow sheets (09/08) 144.8 pounds - weight likely to be stable at this time.

## 2018-09-08 NOTE — PROGRESS NOTE ADULT - PROBLEM SELECTOR PLAN 1
Etiology unclear at this time, although there is report that patient's BP has been difficult to control recently. Now s/p tPA. Routine neuro checks, no longer requiring q 1 hour. TTE and carotid dopplers appreciated- mostly unremarkable. 24 hour repeat CT head unremarkable- will start ASA and DVT prophylaxis. HgBA1c and lipid panel appreciated. Will start statin. Pureed diet started by daytime team.

## 2018-09-08 NOTE — SWALLOW BEDSIDE ASSESSMENT ADULT - H & P REVIEW
yes/89 year old female with PMH HTN, DM2, dyslipidemia, OA had been in her usual state of health, was getting into the car when noted by family to suddenly become aphasic with pronounced weakness on the right side.  Brought immediately to ED at [facility], CT head negative for acute hemorrhage given TPA and transferred to ICU for further care.   Denies CP, SOB, abdominal pain, lightheadedness, dizziness, visual changes, syncope, headache, tremors, paresthesias anesthesias.

## 2018-09-08 NOTE — SWALLOW BEDSIDE ASSESSMENT ADULT - COMMENTS
Chest XRay: unremarkable WBC: 7  Head CT: No acute intracranial hemorrhage or mass effect. Multiple age-indeterminate lacunar infarcts in the bilateral basal ganglia, thalami, and caudate nuclei. Further evaluation with MRI may be performed as clinically indicated. Adequate acceptance, slight bolus hold, suspected premature spillage/delayed swallow, laryngeal elevation and excursion upon palpation, increased work of breathing when following solid trials. Limited oral cavity opening, weak utensil stripping, increased time for bolus manipulation, laryngeal movement upon palpation, increased work of breathing following subsequent trials. Required cues for mastication, essentially no mastication upon palpation, laryngeal movement upon palpation, increased work of breathing following trials.

## 2018-09-08 NOTE — SWALLOW BEDSIDE ASSESSMENT ADULT - SWALLOW EVAL: FUNCTIONAL LEVEL AT TIME OF EVAL
Patient essentially required cues to manage bolus, suspected premature spillage across all liquids, increased work of breathing following all trials toward end of evaluation.  Suspect PO is too premature at this time in the setting of suspected acute CVA.  SLP will continue to follow

## 2018-09-08 NOTE — PROGRESS NOTE ADULT - SUBJECTIVE AND OBJECTIVE BOX
Follow-up ICU Progress Note    Still aphasic but has better strength in extremities.     Medications:  MEDICATIONS  (STANDING):  dextrose 5%. 1000 milliLiter(s) (50 mL/Hr) IV Continuous <Continuous>  dextrose 50% Injectable 12.5 Gram(s) IV Push once  dextrose 50% Injectable 25 Gram(s) IV Push once  dextrose 50% Injectable 25 Gram(s) IV Push once  insulin lispro (HumaLOG) corrective regimen sliding scale   SubCutaneous every 6 hours  lactated ringers. 1000 milliLiter(s) (50 mL/Hr) IV Continuous <Continuous>  pantoprazole  Injectable 40 milliGRAM(s) IV Push daily    MEDICATIONS  (PRN):  dextrose 40% Gel 15 Gram(s) Oral once PRN Blood Glucose LESS THAN 70 milliGRAM(s)/deciliter  glucagon  Injectable 1 milliGRAM(s) IntraMuscular once PRN Glucose LESS THAN 70 milligrams/deciliter  hydrALAZINE Injectable 10 milliGRAM(s) IV Push every 6 hours PRN SBP > 180    Vital Signs Last 24 Hrs  T(C): 36.7 (08 Sep 2018 06:00), Max: 37.3 (07 Sep 2018 16:30)  T(F): 98.1 (08 Sep 2018 06:00), Max: 99.1 (07 Sep 2018 16:30)  HR: 80 (08 Sep 2018 08:00) (68 - 103)  BP: 160/69 (08 Sep 2018 08:00) (117/60 - 170/82)  BP(mean): 96 (08 Sep 2018 08:00) (79 - 120)  RR: 19 (08 Sep 2018 08:00) (16 - 30)  SpO2: 98% (08 Sep 2018 08:00) (95% - 99%)    09-07 @ 07:01  -  09-08 @ 07:00  --------------------------------------------------------  IN: 400 mL / OUT: 950 mL / NET: -550 mL    LABS:                        14.4   7.0   )-----------( 161      ( 08 Sep 2018 05:30 )             40.9     09-08    144  |  108  |  20  ----------------------------<  102<H>  3.9   |  29  |  1.19    Ca    9.0      08 Sep 2018 05:30  Phos  3.5     09-08  Mg     1.9     09-08    TPro  8.2  /  Alb  2.9<L>  /  TBili  1.2  /  DBili  x   /  AST  24  /  ALT  19  /  AlkPhos  78  09-07      CARDIAC MARKERS ( 08 Sep 2018 07:00 )  .167 ng/mL / x     / x     / x     / x      CARDIAC MARKERS ( 07 Sep 2018 21:32 )  .194 ng/mL / x     / x     / x     / x      CARDIAC MARKERS ( 07 Sep 2018 14:30 )  .208 ng/mL / x     / x     / x     / x        CAPILLARY BLOOD GLUCOSE      POCT Blood Glucose.: 105 mg/dL (08 Sep 2018 06:54)    PT/INR - ( 07 Sep 2018 14:30 )   PT: 11.4 sec;   INR: 1.03 ratio         PTT - ( 07 Sep 2018 14:30 )  PTT:30.4 sec    CULTURES: (if applicable)    Physical Examination:  PULM: Clear to auscultation bilaterally, no significant sputum production  CVS: S1, S2 heard    RADIOLOGY REVIEWED  CXR:     CT chest:    TTE:

## 2018-09-08 NOTE — PROGRESS NOTE ADULT - ASSESSMENT
89 y/o F with a h/o HTN, DM2, dyslipidemia, OA, with acute CVA, LENNIE, elevated trops. 89 y/o F with a h/o HTN, DM2, dyslipidemia, OA, with acute CVA, LENNIE, elevated trops.    ***UPDATE***  Patient developed worsening AMS and RUE hemiplegia. Hemodynamically stable. Will send for stat CT head to r/o ICH in stroke bed. Diet changed to NPO. PO meds d/c'd.

## 2018-09-08 NOTE — SWALLOW BEDSIDE ASSESSMENT ADULT - NS SPL SWALLOW CLINIC TRIAL FT
Adequate acceptance, anterior loss of bolus from right side of oral cavity, slight bolus hold, suspected pharyngeal swallow delay, consistent audible swallow, laryngeal movement upon palpation appeared decreased, wet vocal quality following.

## 2018-09-08 NOTE — SWALLOW BEDSIDE ASSESSMENT ADULT - ADDITIONAL RECOMMENDATIONS
1. NPO, consider short term alternate means of nutrition   2. Aspiration precautions   3. Meds crushed in applesauce if no alternate means available   4. SLP will continue to follow

## 2018-09-08 NOTE — DIETITIAN INITIAL EVALUATION ADULT. - NS AS NUTRI INTERV MEALS SNACK
Recommend Consistent Carbohydrate + DASH/TLC diet when medically feasible. Defer diet/fluid consistencies to medical team/SLP recommendations. Recommend Consistent Carbohydrate no snacks + DASH/TLC diet when medically feasible. Defer diet/fluid consistencies to medical team/SLP recommendations.

## 2018-09-08 NOTE — PROGRESS NOTE ADULT - ATTENDING COMMENTS
Analgesia/Sedation:  HOB elevation: yes  Nutrition: s/s eval  DVT prophylaxis: PAS  Stress Ulcer prophylaxis: protonix  Glycemic Control:  Restraints were deemed necessary to prevent removal of life-sustaining devices [   ] YES   [    ]  NO    LINES/TUBES/CATHETERS    [place]                           Indication  CVC      Day                Location                      Indication    Activity bedrest  Disposition full code/ICU monitoring    Care during this interval was provided by me. I have reviewed this patient's   available data, including medical history, events of note, physical examination and test  results, and have overseen the activities of other members of the care team under my direct  supervision (e.g. physician assistants, nurse practitioners).

## 2018-09-08 NOTE — DIETITIAN INITIAL EVALUATION ADULT. - NS AS NUTRI INTERV ED CONTENT
Provided brief education on T2DM nutrition therapy. Stressed the importance of a balanced meal to maintain blood glucose. Encouraged vegetables consumption. Discussed how to manage hypoglycemia. Pt and daughter knowledgeable about nutrition therapy and amenable for education. Dietitian remains available.

## 2018-09-08 NOTE — SWALLOW BEDSIDE ASSESSMENT ADULT - SLP GENERAL OBSERVATIONS
Patient sitting upright in bed, sleeping but easily aroused to auditory stimulation and able to participate in evaluation.

## 2018-09-08 NOTE — DIETITIAN INITIAL EVALUATION ADULT. - ADHERENCE
good/Pt reports not following any type of diet or restriction at home. Reports taking vitamin E PTA. Reports monitoring BG 1xday with ranges between  mg/dl and reports taking Glimepiride PTA. HbA1c as per chart (09/08) 6.3%.

## 2018-09-08 NOTE — SWALLOW BEDSIDE ASSESSMENT ADULT - PHARYNGEAL PHASE
Wet vocal quality post oral intake/Delayed pharyngeal swallow/suspected delayed pharyngeal swallow suspected delayed swallow Within functional limits increased work of breathing following

## 2018-09-08 NOTE — PROGRESS NOTE ADULT - SUBJECTIVE AND OBJECTIVE BOX
Patient is a 89y old  Female who presents with a chief complaint of Aphasia and weakness (08 Sep 2018 09:28)      BRIEF HOSPITAL COURSE: 89 y/o F with a h/o HTN, DM2, dyslipidemia, OA, had been in her usual state of health, was getting into the car when noted by family to suddenly become aphasic with pronounced weakness on the right side.  Brought immediately to ED at [facility], CT head negative for acute hemorrhage, given TPA and transferred to ICU for further care. Hemodynamically stable.    Events last 24 hours: Remains with expressive aphasia and associated frustration. Right sided extremities' strength improving. Hemodynamically stable.      PAST MEDICAL & SURGICAL HISTORY:  Dyslipidemia  Osteoarthritis  Sciatica  Diabetes: Type 2, &quot;many years&quot;  HTN (hypertension)  HTN (hypertension)  DM (diabetes mellitus)  H/O bilateral hip replacements: 2000&#x27;s at [facility], no complications  S/P hip replacement, bilateral      Review of Systems:  CONSTITUTIONAL: No fever, chills, or fatigue  EYES: No eye pain, visual disturbances, or discharge  ENMT:  No difficulty hearing, tinnitus, vertigo; No sinus or throat pain  NECK: No pain or stiffness  RESPIRATORY: No cough, wheezing, chills or hemoptysis; No shortness of breath  CARDIOVASCULAR: No chest pain, palpitations, dizziness, or leg swelling  GASTROINTESTINAL: No abdominal or epigastric pain. No nausea, vomiting, or hematemesis; No diarrhea or constipation. No melena or hematochezia.  GENITOURINARY: No dysuria, frequency, hematuria, or incontinence  NEUROLOGICAL: No headaches, memory loss, loss of strength, numbness, or tremors  SKIN: No itching, burning, rashes, or lesions   MUSCULOSKELETAL: No joint pain or swelling; No muscle, back, or extremity pain  PSYCHIATRIC: No depression, anxiety, mood swings, or difficulty sleeping      Medications:    hydrALAZINE Injectable 10 milliGRAM(s) IV Push every 6 hours PRN  ondansetron Injectable 4 milliGRAM(s) IV Push every 6 hours PRN  pantoprazole  Injectable 40 milliGRAM(s) IV Push daily  dextrose 40% Gel 15 Gram(s) Oral once PRN  dextrose 50% Injectable 12.5 Gram(s) IV Push once  dextrose 50% Injectable 25 Gram(s) IV Push once  dextrose 50% Injectable 25 Gram(s) IV Push once  glucagon  Injectable 1 milliGRAM(s) IntraMuscular once PRN  insulin lispro (HumaLOG) corrective regimen sliding scale   SubCutaneous every 6 hours  dextrose 5%. 1000 milliLiter(s) IV Continuous <Continuous>  lactated ringers. 1000 milliLiter(s) IV Continuous <Continuous>      ICU Vital Signs Last 24 Hrs  T(C): 36.8 (08 Sep 2018 17:00), Max: 36.9 (07 Sep 2018 21:00)  T(F): 98.3 (08 Sep 2018 17:00), Max: 98.4 (07 Sep 2018 21:00)  HR: 95 (08 Sep 2018 18:00) (68 - 103)  BP: 146/98 (08 Sep 2018 18:00) (106/71 - 170/82)  BP(mean): 115 (08 Sep 2018 18:00) (76 - 120)  ABP: --  ABP(mean): --  RR: 18 (08 Sep 2018 18:00) (13 - 28)  SpO2: 94% (08 Sep 2018 18:00) (94% - 100%)          I&O's Detail    07 Sep 2018 07:01  -  08 Sep 2018 07:00  --------------------------------------------------------  IN:    lactated ringers.: 400 mL  Total IN: 400 mL    OUT:    Voided: 950 mL  Total OUT: 950 mL    Total NET: -550 mL      08 Sep 2018 07:01  -  08 Sep 2018 20:00  --------------------------------------------------------  IN:    lactated ringers.: 550 mL  Total IN: 550 mL    OUT:    Voided: 975 mL  Total OUT: 975 mL    Total NET: -425 mL            LABS:                        14.4   7.0   )-----------( 161      ( 08 Sep 2018 05:30 )             40.9     09-08    144  |  108  |  20  ----------------------------<  102<H>  3.9   |  29  |  1.19    Ca    9.0      08 Sep 2018 05:30  Phos  3.5     09-08  Mg     1.9     09-08    TPro  8.2  /  Alb  2.9<L>  /  TBili  1.2  /  DBili  x   /  AST  24  /  ALT  19  /  AlkPhos  78  09-07      CARDIAC MARKERS ( 08 Sep 2018 07:00 )  .167 ng/mL / x     / x     / x     / x      CARDIAC MARKERS ( 07 Sep 2018 21:32 )  .194 ng/mL / x     / x     / x     / x      CARDIAC MARKERS ( 07 Sep 2018 14:30 )  .208 ng/mL / x     / x     / x     / x          CAPILLARY BLOOD GLUCOSE      POCT Blood Glucose.: 125 mg/dL (08 Sep 2018 17:44)    PT/INR - ( 07 Sep 2018 14:30 )   PT: 11.4 sec;   INR: 1.03 ratio         PTT - ( 07 Sep 2018 14:30 )  PTT:30.4 sec    CULTURES:      Physical Examination:    General: No acute distress.  Alert, interactive, frustrated    HEENT: Pupils surgical. EOMI,  Symmetric.    PULM: Clear to auscultation bilaterally, no significant sputum production    CVS: Regular rate and rhythm, no murmurs, rubs, or gallops    ABD: Soft, nondistended, nontender, normoactive bowel sounds, no masses    EXT: No edema, nontender    SKIN: Warm and well perfused, no rashes noted.    NEURO: nods/shakes head appropriately to questions, able to say some single words, follows commands, strength 4/5 RUE, 5/5 all other extremities, right flattened nasolabial fold      RADIOLOGY:     < from: CT Head No Cont (09.08.18 @ 14:36) >   FINDINGS:      No significant change is noted. Right cerebellar and bilateral basal   ganglia lacunar infarcts are redemonstrated along with moderate   periventricular white matter low density that may be related to chronic   small vessel ischemic change. There is no evidence of any mass effect or   extra-axial collection. No abnormal intracranial calcifications are seen.    Bony calvarium, visualized mastoids, sinuses and orbits are unremarkable.      IMPRESSION:     No evidence of intracranial hemorrhage or any acute territorial infarct   at this time.      < from: US Duplex Carotid Arteries Complete, Bilateral (09.08.18 @ 15:54) >    IMPRESSION:     No evidence of a stenotic lesion.

## 2018-09-08 NOTE — DIETITIAN INITIAL EVALUATION ADULT. - ENERGY NEEDS
Ht: 62 inches Wt: 144.8 pounds BMI: 26.4 kg/m2 IBW: 110 (+/-10%) 131.6 %IBW  Pertinent information: Pt 89 y/o F with PMH: HTN, T2DM, HLD, OA, admitted with aphasia, weakness, suspected CVA, LENNIE.   No noted edema as per flow sheets. Skin: no noted pressure injuries as per documentation.

## 2018-09-09 LAB
ANION GAP SERPL CALC-SCNC: 10 MMOL/L — SIGNIFICANT CHANGE UP (ref 5–17)
BUN SERPL-MCNC: 20 MG/DL — SIGNIFICANT CHANGE UP (ref 7–23)
CALCIUM SERPL-MCNC: 9.3 MG/DL — SIGNIFICANT CHANGE UP (ref 8.4–10.5)
CHLORIDE SERPL-SCNC: 105 MMOL/L — SIGNIFICANT CHANGE UP (ref 96–108)
CO2 SERPL-SCNC: 25 MMOL/L — SIGNIFICANT CHANGE UP (ref 22–31)
CREAT SERPL-MCNC: 1.23 MG/DL — SIGNIFICANT CHANGE UP (ref 0.5–1.3)
GLUCOSE BLDC GLUCOMTR-MCNC: 107 MG/DL — HIGH (ref 70–99)
GLUCOSE BLDC GLUCOMTR-MCNC: 112 MG/DL — HIGH (ref 70–99)
GLUCOSE BLDC GLUCOMTR-MCNC: 119 MG/DL — HIGH (ref 70–99)
GLUCOSE BLDC GLUCOMTR-MCNC: 136 MG/DL — HIGH (ref 70–99)
GLUCOSE SERPL-MCNC: 103 MG/DL — HIGH (ref 70–99)
HCT VFR BLD CALC: 41.9 % — SIGNIFICANT CHANGE UP (ref 34.5–45)
HGB BLD-MCNC: 14 G/DL — SIGNIFICANT CHANGE UP (ref 11.5–15.5)
MAGNESIUM SERPL-MCNC: 1.7 MG/DL — SIGNIFICANT CHANGE UP (ref 1.6–2.6)
MCHC RBC-ENTMCNC: 30.6 PG — SIGNIFICANT CHANGE UP (ref 27–34)
MCHC RBC-ENTMCNC: 33.3 GM/DL — SIGNIFICANT CHANGE UP (ref 32–36)
MCV RBC AUTO: 91.8 FL — SIGNIFICANT CHANGE UP (ref 80–100)
PHOSPHATE SERPL-MCNC: 3.6 MG/DL — SIGNIFICANT CHANGE UP (ref 2.5–4.5)
PLATELET # BLD AUTO: 168 K/UL — SIGNIFICANT CHANGE UP (ref 150–400)
POTASSIUM SERPL-MCNC: 3.6 MMOL/L — SIGNIFICANT CHANGE UP (ref 3.5–5.3)
POTASSIUM SERPL-SCNC: 3.6 MMOL/L — SIGNIFICANT CHANGE UP (ref 3.5–5.3)
RBC # BLD: 4.56 M/UL — SIGNIFICANT CHANGE UP (ref 3.8–5.2)
RBC # FLD: 11.8 % — SIGNIFICANT CHANGE UP (ref 10.3–14.5)
SODIUM SERPL-SCNC: 140 MMOL/L — SIGNIFICANT CHANGE UP (ref 135–145)
TROPONIN I SERPL-MCNC: 0.19 NG/ML — HIGH (ref 0.02–0.06)
WBC # BLD: 7.4 K/UL — SIGNIFICANT CHANGE UP (ref 3.8–10.5)
WBC # FLD AUTO: 7.4 K/UL — SIGNIFICANT CHANGE UP (ref 3.8–10.5)

## 2018-09-09 PROCEDURE — 71045 X-RAY EXAM CHEST 1 VIEW: CPT | Mod: 26

## 2018-09-09 PROCEDURE — 70450 CT HEAD/BRAIN W/O DYE: CPT | Mod: 26

## 2018-09-09 RX ORDER — POTASSIUM CHLORIDE 20 MEQ
10 PACKET (EA) ORAL
Qty: 0 | Refills: 0 | Status: COMPLETED | OUTPATIENT
Start: 2018-09-09 | End: 2018-09-09

## 2018-09-09 RX ORDER — ASPIRIN/CALCIUM CARB/MAGNESIUM 324 MG
325 TABLET ORAL DAILY
Qty: 0 | Refills: 0 | Status: DISCONTINUED | OUTPATIENT
Start: 2018-09-09 | End: 2018-09-09

## 2018-09-09 RX ORDER — LISINOPRIL 2.5 MG/1
20 TABLET ORAL DAILY
Qty: 0 | Refills: 0 | Status: DISCONTINUED | OUTPATIENT
Start: 2018-09-09 | End: 2018-09-14

## 2018-09-09 RX ORDER — OXYBUTYNIN CHLORIDE 5 MG
5 TABLET ORAL
Qty: 0 | Refills: 0 | Status: DISCONTINUED | OUTPATIENT
Start: 2018-09-09 | End: 2018-09-19

## 2018-09-09 RX ORDER — ASPIRIN/CALCIUM CARB/MAGNESIUM 324 MG
81 TABLET ORAL DAILY
Qty: 0 | Refills: 0 | Status: DISCONTINUED | OUTPATIENT
Start: 2018-09-09 | End: 2018-09-09

## 2018-09-09 RX ORDER — ATORVASTATIN CALCIUM 80 MG/1
40 TABLET, FILM COATED ORAL AT BEDTIME
Qty: 0 | Refills: 0 | Status: DISCONTINUED | OUTPATIENT
Start: 2018-09-09 | End: 2018-09-19

## 2018-09-09 RX ORDER — SODIUM CHLORIDE 9 MG/ML
1000 INJECTION, SOLUTION INTRAVENOUS
Qty: 0 | Refills: 0 | Status: DISCONTINUED | OUTPATIENT
Start: 2018-09-09 | End: 2018-09-10

## 2018-09-09 RX ORDER — METOPROLOL TARTRATE 50 MG
25 TABLET ORAL EVERY 12 HOURS
Qty: 0 | Refills: 0 | Status: DISCONTINUED | OUTPATIENT
Start: 2018-09-09 | End: 2018-09-19

## 2018-09-09 RX ORDER — MAGNESIUM SULFATE 500 MG/ML
2 VIAL (ML) INJECTION ONCE
Qty: 0 | Refills: 0 | Status: DISCONTINUED | OUTPATIENT
Start: 2018-09-09 | End: 2018-09-09

## 2018-09-09 RX ORDER — ASPIRIN/CALCIUM CARB/MAGNESIUM 324 MG
325 TABLET ORAL DAILY
Qty: 0 | Refills: 0 | Status: DISCONTINUED | OUTPATIENT
Start: 2018-09-09 | End: 2018-09-19

## 2018-09-09 RX ORDER — PANTOPRAZOLE SODIUM 20 MG/1
40 TABLET, DELAYED RELEASE ORAL DAILY
Qty: 0 | Refills: 0 | Status: DISCONTINUED | OUTPATIENT
Start: 2018-09-09 | End: 2018-09-19

## 2018-09-09 RX ORDER — MAGNESIUM SULFATE 500 MG/ML
1 VIAL (ML) INJECTION
Qty: 0 | Refills: 0 | Status: COMPLETED | OUTPATIENT
Start: 2018-09-09 | End: 2018-09-09

## 2018-09-09 RX ORDER — AMLODIPINE BESYLATE 2.5 MG/1
5 TABLET ORAL DAILY
Qty: 0 | Refills: 0 | Status: DISCONTINUED | OUTPATIENT
Start: 2018-09-09 | End: 2018-09-19

## 2018-09-09 RX ADMIN — ATORVASTATIN CALCIUM 40 MILLIGRAM(S): 80 TABLET, FILM COATED ORAL at 21:07

## 2018-09-09 RX ADMIN — PANTOPRAZOLE SODIUM 40 MILLIGRAM(S): 20 TABLET, DELAYED RELEASE ORAL at 11:57

## 2018-09-09 RX ADMIN — Medication 100 MILLIEQUIVALENT(S): at 12:14

## 2018-09-09 RX ADMIN — Medication 100 GRAM(S): at 10:02

## 2018-09-09 RX ADMIN — Medication 325 MILLIGRAM(S): at 17:29

## 2018-09-09 RX ADMIN — Medication 100 MILLIEQUIVALENT(S): at 10:02

## 2018-09-09 RX ADMIN — ENOXAPARIN SODIUM 30 MILLIGRAM(S): 100 INJECTION SUBCUTANEOUS at 11:57

## 2018-09-09 RX ADMIN — Medication 100 GRAM(S): at 11:13

## 2018-09-09 RX ADMIN — Medication 25 MILLIGRAM(S): at 17:29

## 2018-09-09 RX ADMIN — SODIUM CHLORIDE 50 MILLILITER(S): 9 INJECTION, SOLUTION INTRAVENOUS at 21:07

## 2018-09-09 RX ADMIN — Medication 100 MILLIEQUIVALENT(S): at 11:10

## 2018-09-09 RX ADMIN — Medication 81 MILLIGRAM(S): at 14:41

## 2018-09-09 NOTE — PROGRESS NOTE ADULT - SUBJECTIVE AND OBJECTIVE BOX
Follow-up ICU Progress Note    New weakness that has recurred. New CT head- no new change.     Medications:  MEDICATIONS  (STANDING):  dextrose 5%. 1000 milliLiter(s) (50 mL/Hr) IV Continuous <Continuous>  dextrose 50% Injectable 12.5 Gram(s) IV Push once  dextrose 50% Injectable 25 Gram(s) IV Push once  dextrose 50% Injectable 25 Gram(s) IV Push once  enoxaparin Injectable 30 milliGRAM(s) SubCutaneous daily  insulin lispro (HumaLOG) corrective regimen sliding scale   SubCutaneous every 6 hours  lactated ringers. 1000 milliLiter(s) (50 mL/Hr) IV Continuous <Continuous>  pantoprazole  Injectable 40 milliGRAM(s) IV Push daily    MEDICATIONS  (PRN):  dextrose 40% Gel 15 Gram(s) Oral once PRN Blood Glucose LESS THAN 70 milliGRAM(s)/deciliter  glucagon  Injectable 1 milliGRAM(s) IntraMuscular once PRN Glucose LESS THAN 70 milligrams/deciliter  hydrALAZINE Injectable 10 milliGRAM(s) IV Push every 6 hours PRN SBP > 180  ondansetron Injectable 4 milliGRAM(s) IV Push every 6 hours PRN Nausea and/or Vomiting    Vital Signs Last 24 Hrs  T(C): 36.6 (09 Sep 2018 06:00), Max: 37.2 (08 Sep 2018 22:00)  T(F): 97.8 (09 Sep 2018 06:00), Max: 98.9 (08 Sep 2018 22:00)  HR: 76 (09 Sep 2018 08:00) (71 - 95)  BP: 134/64 (09 Sep 2018 08:00) (106/71 - 170/73)  BP(mean): 86 (09 Sep 2018 08:00) (69 - 140)  RR: 15 (09 Sep 2018 08:00) (13 - 28)  SpO2: 99% (09 Sep 2018 08:00) (93% - 100%)    09-08 @ 07:01  -  09-09 @ 07:00  --------------------------------------------------------  IN: 1200 mL / OUT: 1125 mL / NET: 75 mL    LABS:                        14.0   7.4   )-----------( 168      ( 09 Sep 2018 05:30 )             41.9     09-09    140  |  105  |  20  ----------------------------<  103<H>  3.6   |  25  |  1.23    Ca    9.3      09 Sep 2018 05:30  Phos  3.6     09-09  Mg     1.7     09-09    TPro  8.2  /  Alb  2.9<L>  /  TBili  1.2  /  DBili  x   /  AST  24  /  ALT  19  /  AlkPhos  78  09-07      CARDIAC MARKERS ( 09 Sep 2018 05:30 )  .194 ng/mL / x     / x     / x     / x      CARDIAC MARKERS ( 08 Sep 2018 07:00 )  .167 ng/mL / x     / x     / x     / x      CARDIAC MARKERS ( 07 Sep 2018 21:32 )  .194 ng/mL / x     / x     / x     / x      CARDIAC MARKERS ( 07 Sep 2018 14:30 )  .208 ng/mL / x     / x     / x     / x        CAPILLARY BLOOD GLUCOSE    POCT Blood Glucose.: 107 mg/dL (09 Sep 2018 05:24)    PT/INR - ( 07 Sep 2018 14:30 )   PT: 11.4 sec;   INR: 1.03 ratio         PTT - ( 07 Sep 2018 14:30 )  PTT:30.4 sec      CULTURES: (if applicable)    Physical Examination:  PULM: Clear to auscultation bilaterally, no significant sputum production  CVS: S1, S2 heard    RADIOLOGY REVIEWED  CXR:     CT chest:    TTE:

## 2018-09-09 NOTE — PROGRESS NOTE ADULT - ATTENDING COMMENTS
Analgesia/Sedation:  HOB elevation: yes  Nutrition: NGT  DVT prophylaxis: PAS  Stress Ulcer prophylaxis: protonix  Glycemic Control:  Restraints were deemed necessary to prevent removal of life-sustaining devices [   ] YES   [    ]  NO    LINES/TUBES/CATHETERS    [place]                           Indication  CVC      Day                Location                      Indication    Activity bedrest  Disposition DNR/ICU monitoring    Care during this interval was provided by me. I have reviewed this patient's   available data, including medical history, events of note, physical examination and test  results, and have overseen the activities of other members of the care team under my direct  supervision (e.g. physician assistants, nurse practitioners).

## 2018-09-09 NOTE — CONSULT NOTE ADULT - ASSESSMENT
89 year old woman admitted 9/7/18 with sudden onset of inability to speak and walk. Patient was with family when got into a car. While in the car suddenly was unable to speak. She went immediately with family to the ER and was unable to get out of the car. In the ER, Telestroke was consulted and recommendation by Dr. Libman for TPA which was given. She initially slightly improved with regard to speaking 1 word and slightly increased strength on the right. Today she was noted to have decreased strength on the right.     1) Agree with  mg daily  2) Repeat CT Head today evaluate hemorrhage low suspicion  3) Carotid Duplex without stenosis  4) Echocardiogram without thrombus  5) MRI Head and MRA Head and Neck evaluate cerebral infarct and stenosis.  6 As per Critical Care .    Discussed with IVANA [place].

## 2018-09-09 NOTE — CONSULT NOTE ADULT - SUBJECTIVE AND OBJECTIVE BOX
Patient is a 89 year old woman admitted 9/7/18 with sudden onset of inability to speak and walk. Patient was with family when got into a car. While in the car suddenly was unable to speak. She went immediately with family to the ER and was unable to get out of the car. In the ER, Telestroke was consulted and recommendation by Dr. Libman for TPA which was given. She initially slightly improved with regard to speaking 1 word and slightly increased strength on the right. Today she was noted to have decreased strength on the right.     PMH: HTN, DM, Hyperlipidemia, S/P bilateral hip replacements, OA    SH: Allergy-none. No smoking history. ETOH-occasional    PFMH: Unknown    Exam: Awake, turns head to examiner, attempts to vocalize to questions but no sounds, follows simple commands           Pupils 2mm reactive, EOM intact,            CN II-XII intact except right VII           Motor tone and strength-right upper extremity- 0/5                                                 right lower extremity- 3+/5                                                  left upper and lower extremity 5/5          Uncooperative with rest of exam                          14.0   7.4   )-----------( 168      ( 09 Sep 2018 05:30 )             41.9     Prothrombin Time and INR, Plasma (09.07.18 @ 14:30)    Prothrombin Time, Plasma: 11.4 sec    INR: 1.03     PTT 30.4    Troponin I, Serum: .194: The new reference range for Troponin-I performed on the Siemens EXL  system is 0.017-0.056 ng/mL which includes the 99th percentile of a  healthy reference population. Studies have shown that elevated troponin  levels above the cutoff are associated with an increased risk for adverse  cardiac events, with the risk increasing as troponin levels increase.  As  per a joint committee of the American College of Cardiology and   Society of Cardiology, diagnosis of classic MI is based upon the  detection of a rise or fall of cardiac troponin values, with at least one  value above the 99th percentile upper reference limit, in the appropriate  clinical context.  · Troponin I (ng/mL) Interpretation  · 0.017-0.056  Normal range (includes the 99th percentile of a healthy  reference population)  · >0.056  Elevated troponin level indicating increased risk  · Note: Troponin-I and Troponin T cannot be used interchangeably in  serial measurements.  Minimally elevated Troponin results should be  interpreted in the context of clinical findings and risk factors. ng/mL (09.09.18 @ 05:30)                09-09    140  |  105  |  20  ----------------------------<  103<H>  3.6   |  25  |  1.23    Ca    9.3      09 Sep 2018 05:30  Phos  3.6     09-09  Mg     1.7     09-09  Lipid Profile in AM (09.08.18 @ 05:30)    Total Cholesterol/HDL Ratio Measurement: 4.9 RATIO    Cholesterol, Serum: 161 mg/dL    Triglycerides, Serum: 147 mg/dL    HDL Cholesterol, Serum: 33: HDL Levels >/= 60 mg/dL are considered beneficial and a "negative" risk  factor.  Effective 08/15/2018: New reference range and interpretive comment. mg/dL    Direct LDL: 99: LDL Cholesterol --- Interpretive Comment (for adults 18 and over)  Optimal LDL Level may vary based on clinical situation  Below 70                  Ideal for people at very high risk of heart  disease  Below 100                Ideal for people at risk of heart disease  100 - 129                   [place]  130 - 159                   Borderline high  160 - 189                   High  190 and Above          Very high mg/dL        < from: CT Brain Stroke Protocol (09.07.18 @ 14:32) >    FINDINGS:  No acute intracranial hemorrhage, mass effect or midline shift.  No CT evidence of acute large territory vascular infarct.  The ventricles and cortical sulci are prominent reflecting parenchymal   volume loss.  Patchy hypodensities in the periventricular and subcortical white matter   are nonspecific, but likely sequela of small vessel ischemic disease.  Intracranial atherosclerotic calcifications are present.    Multiple age-indeterminate lacunar infarcts in the bilateral basal   ganglia, thalami, and caudate nuclei.    Small old infarct in the right superior cerebellar hemisphere.    Visualized paranasal sinuses are well-aerated. Partial opacification of   the bilateral mastoid air cells, right more than left. The native ocular   lenses are surgically absent.  No displaced calvarial fracture.    IMPRESSION:  No acute intracranial hemorrhage or mass effect.     Multiple age-indeterminate lacunar infarcts in the bilateral basal   ganglia, thalami, and caudate nuclei.    Further evaluation with MRI may be performed as clinically indicated.    Findings were discussed with Dr. Childs 9/7/2018 2:48 PM with readback.        < from: CT Head No Cont (09.08.18 @ 22:36) >     FINDINGS:      No significant change can be identified in comparison to the prior   study. Right cerebellar, bilateral basal ganglia lacunar infarcts and   moderate probable white matter chronic small vessel ischemic change are   again visualized. Ventricles and cortical sulci are unremarkable for   patient's age. There is no evidence of any mass effect or extra-axial   collection. No abnormal intracranial calcifications are seen. Bony   calvarium, visualized mastoids, sinuses and orbits are unremarkable.     IMPRESSION:     No evidence of intracranial hemorrhage or any acute territorial infarct   at this time.      < from: US Duplex Carotid Arteries Complete, Bilateral (09.08.18 @ 15:54) >     FINDINGS:      There is evidence of mild to moderate atherosclerotic plaque involving   the mid right internal carotid and bilateral carotid bifurcation. No   abnormal flow velocities or velocity ratios however are demonstrated to  suggest any possibility of a hemodynamically significant stenosis. The   direction of flow in vertebral arteries bilaterally is normal.    A table of spectral Doppler velocities and velocity ratios is included   with sonographic images.      IMPRESSION:     No evidence of a stenotic lesion.    < from: TTE Echo Complete w/Doppler (09.08.18 @ 09:00) >    Summary:   1. Left ventricular ejection fraction, by visual estimation, is55 to   60%.   2. Normal global left ventricular systolic function.   3. Spectral Doppler shows pseudonormal pattern of left ventricular   myocardial filling (Grade II diastolic dysfunction).   4. There is mild septal left ventricular hypertrophy.   5. Normal right ventricular size and function.   6. There is no evidence of pericardial effusion.   7. Mild mitral annular calcification.   8. Mild tricuspid regurgitation.   9. Moderate aortic valve stenosis.  10. Structurally normal pulmonic valve, with normal leaflet excursion.  11. LA volume Index is 37.6 ml/m² ml/m2.      < end of copied text >  )

## 2018-09-09 NOTE — PROGRESS NOTE ADULT - SUBJECTIVE AND OBJECTIVE BOX
Patient is a 89y old  Female who presents with a chief complaint of Aphasia and weakness (09 Sep 2018 08:56)      BRIEF HOSPITAL COURSE: 89 y/o F with a h/o HTN, DM2, dyslipidemia, OA, had been in her usual state of health, was getting into the car when noted by family to suddenly become aphasic with pronounced weakness on the right side.  Brought immediately to ED at [facility], CT head negative for acute hemorrhage, given TPA and transferred to ICU for further care. Hemodynamically stable.    Events last 24 hours: Now with worsened mental status and RUE hemiplegia. Expressive aphasia persists- can manage one word here and there. CT head neg for acute events x3. NGT placed today for feeds/meds.     PAST MEDICAL & SURGICAL HISTORY:  Dyslipidemia  Osteoarthritis  Sciatica  Diabetes: Type 2, &quot;many years&quot;  HTN (hypertension)  HTN (hypertension)  DM (diabetes mellitus)  H/O bilateral hip replacements: 2000&#x27;s at [facility], no complications  S/P hip replacement, bilateral      Review of Systems:  Unable to obtain secondary to AMS and aphasia.      Medications:    amLODIPine   Tablet 5 milliGRAM(s) Oral daily  lisinopril 20 milliGRAM(s) Oral daily  metoprolol tartrate 25 milliGRAM(s) Oral every 12 hours  aspirin 325 milliGRAM(s) Oral daily  ondansetron Injectable 4 milliGRAM(s) IV Push every 6 hours PRN  enoxaparin Injectable 30 milliGRAM(s) SubCutaneous daily  pantoprazole   Suspension 40 milliGRAM(s) Oral daily  oxybutynin 5 milliGRAM(s) Oral two times a day  atorvastatin 40 milliGRAM(s) Oral at bedtime  dextrose 40% Gel 15 Gram(s) Oral once PRN  dextrose 50% Injectable 12.5 Gram(s) IV Push once  dextrose 50% Injectable 25 Gram(s) IV Push once  dextrose 50% Injectable 25 Gram(s) IV Push once  glucagon  Injectable 1 milliGRAM(s) IntraMuscular once PRN  insulin lispro (HumaLOG) corrective regimen sliding scale   SubCutaneous every 6 hours  dextrose 5%. 1000 milliLiter(s) IV Continuous <Continuous>        ICU Vital Signs Last 24 Hrs  T(C): 36.9 (09 Sep 2018 18:00), Max: 37.2 (08 Sep 2018 22:00)  T(F): 98.5 (09 Sep 2018 18:00), Max: 98.9 (08 Sep 2018 22:00)  HR: 84 (09 Sep 2018 19:00) (73 - 110)  BP: 158/71 (09 Sep 2018 19:00) (110/62 - 168/69)  BP(mean): 98 (09 Sep 2018 19:00) (69 - 140)  ABP: --  ABP(mean): --  RR: 23 (09 Sep 2018 19:00) (14 - 24)  SpO2: 92% (09 Sep 2018 19:00) (92% - 100%)          I&O's Detail    08 Sep 2018 07:01  -  09 Sep 2018 07:00  --------------------------------------------------------  IN:    lactated ringers.: 1200 mL  Total IN: 1200 mL    OUT:    Voided: 1125 mL  Total OUT: 1125 mL    Total NET: 75 mL      09 Sep 2018 07:01  -  09 Sep 2018 19:26  --------------------------------------------------------  IN:    Glucerna: 120 mL    lactated ringers.: 350 mL    Solution: 200 mL    Solution: 300 mL  Total IN: 970 mL    OUT:  Total OUT: 0 mL    Total NET: 970 mL            LABS:                        14.0   7.4   )-----------( 168      ( 09 Sep 2018 05:30 )             41.9     09-09    140  |  105  |  20  ----------------------------<  103<H>  3.6   |  25  |  1.23    Ca    9.3      09 Sep 2018 05:30  Phos  3.6     09-09  Mg     1.7     09-09        CARDIAC MARKERS ( 09 Sep 2018 05:30 )  .194 ng/mL / x     / x     / x     / x      CARDIAC MARKERS ( 08 Sep 2018 07:00 )  .167 ng/mL / x     / x     / x     / x      CARDIAC MARKERS ( 07 Sep 2018 21:32 )  .194 ng/mL / x     / x     / x     / x          CAPILLARY BLOOD GLUCOSE      POCT Blood Glucose.: 119 mg/dL (09 Sep 2018 17:22)        CULTURES:      Physical Examination:    General: No acute distress.  Alert, lethargic    HEENT: Pupils surgical. EOMI,  Symmetric.    PULM: Clear to auscultation bilaterally, no significant sputum production    CVS: Regular rate and rhythm, no murmurs, rubs, or gallops    ABD: Soft, nondistended, nontender, normoactive bowel sounds, no masses    EXT: No edema, nontender    SKIN: Warm and well perfused, no rashes noted.    NEURO: expressive aphasia- one word here and there, lethargy, follows commands, RUE hemiplegia, 5/5 strength all other extremities, right flattened nasolabial fold      RADIOLOGY:     < from: CT Head No Cont (09.09.18 @ 18:20) >  FINDINGS:    Again noted are chronic lacunar infarcts on the right cerebellum,   bilateral basal ganglia and thalami.    There is volume loss and atherosclerosis. White matter hypodensities   noted compatible with at least moderate chronic microvascular ischemic   change in this age group. There is no midline shift, mass effect, extra   axial collection, intracranial hemorrhage, or ventriculomegaly.    The calvarium is intact. The visualized paranasal sinuses are aerated.   The mastoid air cells are clear aside from a trace right mastoid tip   effusion.    IMPRESSION:    No acute hemorrhage or mass effect. Chronic changes noted.

## 2018-09-09 NOTE — PROGRESS NOTE ADULT - PROBLEM SELECTOR PLAN 1
Etiology unclear at this time, although there is report that patient's BP has been difficult to control recently. Now s/p tPA. Routine neuro checks. TTE and carotid dopplers appreciated- mostly unremarkable. Continue ASA, statin, and DVT prophylaxis. Now with worsened mental status and RUE hemiplegia, should have MRI. NGT placed today as patient unable to swallow.

## 2018-09-10 PROBLEM — M54.30 SCIATICA, UNSPECIFIED SIDE: Chronic | Status: ACTIVE | Noted: 2018-09-07

## 2018-09-10 PROBLEM — M19.90 UNSPECIFIED OSTEOARTHRITIS, UNSPECIFIED SITE: Chronic | Status: ACTIVE | Noted: 2018-09-07

## 2018-09-10 PROBLEM — E78.5 HYPERLIPIDEMIA, UNSPECIFIED: Chronic | Status: ACTIVE | Noted: 2018-09-07

## 2018-09-10 PROBLEM — I10 ESSENTIAL (PRIMARY) HYPERTENSION: Chronic | Status: ACTIVE | Noted: 2018-09-07

## 2018-09-10 PROBLEM — Z00.00 ENCOUNTER FOR PREVENTIVE HEALTH EXAMINATION: Status: ACTIVE | Noted: 2018-09-10

## 2018-09-10 LAB
ANION GAP SERPL CALC-SCNC: 9 MMOL/L — SIGNIFICANT CHANGE UP (ref 5–17)
BUN SERPL-MCNC: 17 MG/DL — SIGNIFICANT CHANGE UP (ref 7–23)
CALCIUM SERPL-MCNC: 9.1 MG/DL — SIGNIFICANT CHANGE UP (ref 8.4–10.5)
CHLORIDE SERPL-SCNC: 102 MMOL/L — SIGNIFICANT CHANGE UP (ref 96–108)
CO2 SERPL-SCNC: 27 MMOL/L — SIGNIFICANT CHANGE UP (ref 22–31)
CREAT SERPL-MCNC: 1.14 MG/DL — SIGNIFICANT CHANGE UP (ref 0.5–1.3)
GLUCOSE BLDC GLUCOMTR-MCNC: 113 MG/DL — HIGH (ref 70–99)
GLUCOSE BLDC GLUCOMTR-MCNC: 124 MG/DL — HIGH (ref 70–99)
GLUCOSE BLDC GLUCOMTR-MCNC: 124 MG/DL — HIGH (ref 70–99)
GLUCOSE BLDC GLUCOMTR-MCNC: 126 MG/DL — HIGH (ref 70–99)
GLUCOSE BLDC GLUCOMTR-MCNC: 127 MG/DL — HIGH (ref 70–99)
GLUCOSE SERPL-MCNC: 126 MG/DL — HIGH (ref 70–99)
HCT VFR BLD CALC: 39.6 % — SIGNIFICANT CHANGE UP (ref 34.5–45)
HGB BLD-MCNC: 13.8 G/DL — SIGNIFICANT CHANGE UP (ref 11.5–15.5)
MAGNESIUM SERPL-MCNC: 1.9 MG/DL — SIGNIFICANT CHANGE UP (ref 1.6–2.6)
MCHC RBC-ENTMCNC: 31.9 PG — SIGNIFICANT CHANGE UP (ref 27–34)
MCHC RBC-ENTMCNC: 35 GM/DL — SIGNIFICANT CHANGE UP (ref 32–36)
MCV RBC AUTO: 91.2 FL — SIGNIFICANT CHANGE UP (ref 80–100)
PHOSPHATE SERPL-MCNC: 2.3 MG/DL — LOW (ref 2.5–4.5)
PLATELET # BLD AUTO: 149 K/UL — LOW (ref 150–400)
POTASSIUM SERPL-MCNC: 3.6 MMOL/L — SIGNIFICANT CHANGE UP (ref 3.5–5.3)
POTASSIUM SERPL-SCNC: 3.6 MMOL/L — SIGNIFICANT CHANGE UP (ref 3.5–5.3)
RBC # BLD: 4.34 M/UL — SIGNIFICANT CHANGE UP (ref 3.8–5.2)
RBC # FLD: 11.4 % — SIGNIFICANT CHANGE UP (ref 10.3–14.5)
SODIUM SERPL-SCNC: 138 MMOL/L — SIGNIFICANT CHANGE UP (ref 135–145)
WBC # BLD: 7.7 K/UL — SIGNIFICANT CHANGE UP (ref 3.8–10.5)
WBC # FLD AUTO: 7.7 K/UL — SIGNIFICANT CHANGE UP (ref 3.8–10.5)

## 2018-09-10 RX ORDER — HALOPERIDOL DECANOATE 100 MG/ML
2.5 INJECTION INTRAMUSCULAR ONCE
Qty: 0 | Refills: 0 | Status: COMPLETED | OUTPATIENT
Start: 2018-09-10 | End: 2018-09-10

## 2018-09-10 RX ORDER — SODIUM CHLORIDE 9 MG/ML
1000 INJECTION, SOLUTION INTRAVENOUS
Qty: 0 | Refills: 0 | Status: DISCONTINUED | OUTPATIENT
Start: 2018-09-10 | End: 2018-09-19

## 2018-09-10 RX ORDER — ENOXAPARIN SODIUM 100 MG/ML
40 INJECTION SUBCUTANEOUS DAILY
Qty: 0 | Refills: 0 | Status: DISCONTINUED | OUTPATIENT
Start: 2018-09-10 | End: 2018-09-11

## 2018-09-10 RX ADMIN — LISINOPRIL 20 MILLIGRAM(S): 2.5 TABLET ORAL at 05:13

## 2018-09-10 RX ADMIN — AMLODIPINE BESYLATE 5 MILLIGRAM(S): 2.5 TABLET ORAL at 05:14

## 2018-09-10 RX ADMIN — Medication 5 MILLIGRAM(S): at 17:37

## 2018-09-10 RX ADMIN — Medication 25 MILLIGRAM(S): at 17:37

## 2018-09-10 RX ADMIN — HALOPERIDOL DECANOATE 2.5 MILLIGRAM(S): 100 INJECTION INTRAMUSCULAR at 04:05

## 2018-09-10 RX ADMIN — Medication 325 MILLIGRAM(S): at 17:36

## 2018-09-10 RX ADMIN — ENOXAPARIN SODIUM 40 MILLIGRAM(S): 100 INJECTION SUBCUTANEOUS at 11:51

## 2018-09-10 RX ADMIN — Medication 5 MILLIGRAM(S): at 05:13

## 2018-09-10 RX ADMIN — Medication 25 MILLIGRAM(S): at 05:13

## 2018-09-10 NOTE — PHYSICAL THERAPY INITIAL EVALUATION ADULT - ADDITIONAL COMMENTS
Pt lives in house with her daughter, no stairs.  Pt uses SAC or RW at home and is independent with all ADLs and ambulation.

## 2018-09-10 NOTE — PROGRESS NOTE ADULT - SUBJECTIVE AND OBJECTIVE BOX
Patient is a 89 year old woman admitted 9/7/18 with sudden onset of inability to speak and walk. Patient was with family when got into a car. While in the car suddenly was unable to speak. She went immediately with family to the ER and was unable to get out of the car. In the ER, Telestroke was consulted and recommendation by Dr. Libman for TPA which was given. She initially slightly improved with regard to speaking 1 word and slightly increased strength on the right. Yesterday she was noted to have decreased strength on the right. Daughter and nurse state she was awake most of the evening attentive to family. She fell asleep late this morning. She was able to say 1 word, today, no.    PMH: HTN, DM, Hyperlipidemia, S/P bilateral hip replacements, OA          Exam: Laying in bed with eyes closed. To prompting and voice opens eyes, Only vocalizes groans. Follows simple commands.           Pupils 2mm reactive, EOM intact,            CN II-XII intact except right VII           Motor tone and strength-right upper extremity- 0/5                                                 right lower extremity- elevates above the bed                                                  left upper and lower extremity moves well and elevates above the bed          Uncooperative with rest of exam      < from: CT Head No Cont (09.09.18 @ 18:20) >    FINDINGS:    Again noted are chronic lacunar infarcts on the right cerebellum,   bilateral basal ganglia and thalami.    There is volume loss and atherosclerosis. White matter hypodensities   noted compatible with at least moderate chronic microvascular ischemic   change in this age group. There is no midline shift, mass effect, extra   axial collection, intracranial hemorrhage, or ventriculomegaly.    The calvarium is intact. The visualized paranasal sinuses are aerated.   The mastoid air cells are clear aside from a trace right mastoid tip   effusion.    IMPRESSION:    No acute hemorrhage or mass effect. Chronic changes noted    MRI would more sensitive for the detection of an acute infarct on this   background if there are no contraindications.

## 2018-09-10 NOTE — PROGRESS NOTE ADULT - ASSESSMENT
89 year old woman admitted 9/7/18 with sudden onset of inability to speak and walk. Patient was with family when got into a car. While in the car suddenly was unable to speak. She went immediately with family to the ER and was unable to get out of the car. In the ER, Telestroke was consulted and recommendation by Dr. Libman for TPA which was given. She initially slightly improved with regard to speaking 1 word and slightly increased strength on the right. Yesterday she was noted to have decreased strength on the right. She was noted today to say 1 word.     1) Continue  mg daily  2) Repeat CT Head yesterday did not show  hemorrhage , chronic small vessel ischemic changes noted  3) MRI Head and MRA Head and Neck evaluate cerebral infarct and stenosis.  4) As per Critical Care .    Discussed with IVANA Camarillo.

## 2018-09-10 NOTE — PROGRESS NOTE ADULT - ATTENDING COMMENTS
89 y/o F with a h/o HTN, DM2, dyslipidemia, OA, with acute CVA, LENNIE, elevated troponins. At this time patient has residual aphasia and right upper extremity weakness.     - Cont. Aspirin and Statin  - PT/OT eval  - Aspiration precautions  - Repeat swallow eval today  - CT head, Carotid dopplers and Echo noted  - Cont BP meds, as BP is acceptable at this time  - Glucose control with fingerstick monitoring   - Gentle IV hydration while NPO as LENNIE is resolving  - DVT prophylaxis  - Code status DNR    Care during this interval was provided by me. I have reviewed this patient's available data, including medical history, events of note, physical examination and test results, and have overseen the activities of other members of the care team under my direct supervision (e.g. physician assistants, nurse practitioners).

## 2018-09-10 NOTE — PHYSICAL THERAPY INITIAL EVALUATION ADULT - RANGE OF MOTION EXAMINATION, REHAB EVAL
right UE PROM WNL/Left LE ROM was WNL (within normal limits)/Left UE ROM was WNL (within normal limits)/Right LE ROM was WNL (within normal limits)

## 2018-09-10 NOTE — PROGRESS NOTE ADULT - SUBJECTIVE AND OBJECTIVE BOX
Follow-up Critical Care Progress Note  Chief Complaint : Cerebral infarction    Remains aphasic. NGT pulled out by patient yesterday. Still with right upper extremity hemiparesis and right facial droop.      Allergies :No Known Allergies      PAST MEDICAL & SURGICAL HISTORY:  Dyslipidemia  Osteoarthritis  Sciatica  Diabetes: Type 2, &quot;many years&quot;  HTN (hypertension)  HTN (hypertension)  DM (diabetes mellitus)  H/O bilateral hip replacements: [facility]&#x27;s at [facility], no complications  S/P hip replacement, bilateral      Medications:  MEDICATIONS  (STANDING):  amLODIPine   Tablet 5 milliGRAM(s) Oral daily  aspirin 325 milliGRAM(s) Oral daily  atorvastatin 40 milliGRAM(s) Oral at bedtime  dextrose 5%. 1000 milliLiter(s) (50 mL/Hr) IV Continuous <Continuous>  dextrose 50% Injectable 12.5 Gram(s) IV Push once  dextrose 50% Injectable 25 Gram(s) IV Push once  dextrose 50% Injectable 25 Gram(s) IV Push once  enoxaparin Injectable 40 milliGRAM(s) SubCutaneous daily  insulin lispro (HumaLOG) corrective regimen sliding scale   SubCutaneous every 6 hours  lactated ringers. 1000 milliLiter(s) (50 mL/Hr) IV Continuous <Continuous>  lisinopril 20 milliGRAM(s) Oral daily  metoprolol tartrate 25 milliGRAM(s) Oral every 12 hours  oxybutynin 5 milliGRAM(s) Oral two times a day  pantoprazole   Suspension 40 milliGRAM(s) Oral daily    MEDICATIONS  (PRN):  dextrose 40% Gel 15 Gram(s) Oral once PRN Blood Glucose LESS THAN 70 milliGRAM(s)/deciliter  glucagon  Injectable 1 milliGRAM(s) IntraMuscular once PRN Glucose LESS THAN 70 milligrams/deciliter  ondansetron Injectable 4 milliGRAM(s) IV Push every 6 hours PRN Nausea and/or Vomiting      LABS:                        13.8   7.7   )-----------( 149      ( 10 Sep 2018 05:20 )             39.6     09-10    138  |  102  |  17  ----------------------------<  126<H>  3.6   |  27  |  1.14    Ca    9.1      10 Sep 2018 05:20  Phos  2.3     09-10  Mg     1.9     09-10    CARDIAC MARKERS ( 09 Sep 2018 05:30 )  .194 ng/mL / x     / x     / x     / x        CULTURES: (if applicable)    CAPILLARY BLOOD GLUCOSE    POCT Blood Glucose.: 124 mg/dL (10 Sep 2018 05:17)    ECHO:       VITALS:  T(C): 37 (09-10-18 @ 06:00), Max: 37.1 (09-09-18 @ 21:00)  T(F): 98.6 (09-10-18 @ 06:00), Max: 98.8 (09-09-18 @ 21:00)  HR: 94 (09-10-18 @ 07:00) (76 - 110)  BP: 159/73 (09-10-18 @ 07:00) (110/62 - 168/98)  BP(mean): 98 (09-10-18 @ 07:00) (66 - 125)  ABP: --  ABP(mean): --  RR: 20 (09-10-18 @ 07:00) (15 - 24)  SpO2: 98% (09-10-18 @ 07:00) (90% - 99%)  CVP(mm Hg): --  CVP(cm H2O): --    Ins and Outs     09-09-18 @ 07:01  -  09-10-18 @ 07:00  --------------------------------------------------------  IN: 1590 mL / OUT: 0 mL / NET: 1590 mL        Height (cm): 157.48 (09-08-18 @ 13:00)  Weight (kg): 65.5 (09-07-18 @ 15:00)  BMI (kg/m2): 26.4 (09-08-18 @ 13:00)            Physical Examination:  GENERAL:               Alert, Oriented, No acute distress.    HEENT:                    Pupils equal, reactive to light.  Symmetric. No JVD, Moist MM, right facial droop  PULM:                     Bilateral air entry, Clear to auscultation bilaterally, no significant sputum production  CVS:                         S1, S2,  No Murmur  ABD:                        Soft, nondistended, nontender  EXT:                         No edema  Vascular:                Warm Extremities  SKIN:                       Warm and well perfused, no rashes noted.   NEURO:                  Awake and follows commands. Right upper extremity hemiparesis. Left upper extremity strenght 4/5, bilateral lower extremity strength 5/5  PSYC:                      Calm

## 2018-09-11 ENCOUNTER — APPOINTMENT (OUTPATIENT)
Dept: MRI IMAGING | Facility: HOSPITAL | Age: 83
End: 2018-09-11

## 2018-09-11 DIAGNOSIS — I24.8 OTHER FORMS OF ACUTE ISCHEMIC HEART DISEASE: ICD-10-CM

## 2018-09-11 DIAGNOSIS — R47.01 APHASIA: ICD-10-CM

## 2018-09-11 LAB
ANION GAP SERPL CALC-SCNC: 10 MMOL/L — SIGNIFICANT CHANGE UP (ref 5–17)
ANION GAP SERPL CALC-SCNC: 7 MMOL/L — SIGNIFICANT CHANGE UP (ref 5–17)
BUN SERPL-MCNC: 15 MG/DL — SIGNIFICANT CHANGE UP (ref 7–23)
BUN SERPL-MCNC: 15 MG/DL — SIGNIFICANT CHANGE UP (ref 7–23)
CALCIUM SERPL-MCNC: 8 MG/DL — LOW (ref 8.4–10.5)
CALCIUM SERPL-MCNC: 9.3 MG/DL — SIGNIFICANT CHANGE UP (ref 8.4–10.5)
CHLORIDE SERPL-SCNC: 100 MMOL/L — SIGNIFICANT CHANGE UP (ref 96–108)
CHLORIDE SERPL-SCNC: 103 MMOL/L — SIGNIFICANT CHANGE UP (ref 96–108)
CO2 SERPL-SCNC: 27 MMOL/L — SIGNIFICANT CHANGE UP (ref 22–31)
CO2 SERPL-SCNC: 29 MMOL/L — SIGNIFICANT CHANGE UP (ref 22–31)
CREAT SERPL-MCNC: 1.08 MG/DL — SIGNIFICANT CHANGE UP (ref 0.5–1.3)
CREAT SERPL-MCNC: 1.11 MG/DL — SIGNIFICANT CHANGE UP (ref 0.5–1.3)
GLUCOSE BLDC GLUCOMTR-MCNC: 125 MG/DL — HIGH (ref 70–99)
GLUCOSE BLDC GLUCOMTR-MCNC: 136 MG/DL — HIGH (ref 70–99)
GLUCOSE BLDC GLUCOMTR-MCNC: 142 MG/DL — HIGH (ref 70–99)
GLUCOSE BLDC GLUCOMTR-MCNC: 160 MG/DL — HIGH (ref 70–99)
GLUCOSE SERPL-MCNC: 131 MG/DL — HIGH (ref 70–99)
GLUCOSE SERPL-MCNC: 519 MG/DL — CRITICAL HIGH (ref 70–99)
HCT VFR BLD CALC: 38 % — SIGNIFICANT CHANGE UP (ref 34.5–45)
HGB BLD-MCNC: 13.1 G/DL — SIGNIFICANT CHANGE UP (ref 11.5–15.5)
MAGNESIUM SERPL-MCNC: 1.5 MG/DL — LOW (ref 1.6–2.6)
MCHC RBC-ENTMCNC: 31.4 PG — SIGNIFICANT CHANGE UP (ref 27–34)
MCHC RBC-ENTMCNC: 34.6 GM/DL — SIGNIFICANT CHANGE UP (ref 32–36)
MCV RBC AUTO: 90.8 FL — SIGNIFICANT CHANGE UP (ref 80–100)
PHOSPHATE SERPL-MCNC: 2.3 MG/DL — LOW (ref 2.5–4.5)
PLATELET # BLD AUTO: 143 K/UL — LOW (ref 150–400)
POTASSIUM SERPL-MCNC: 3.1 MMOL/L — LOW (ref 3.5–5.3)
POTASSIUM SERPL-MCNC: 3.7 MMOL/L — SIGNIFICANT CHANGE UP (ref 3.5–5.3)
POTASSIUM SERPL-SCNC: 3.1 MMOL/L — LOW (ref 3.5–5.3)
POTASSIUM SERPL-SCNC: 3.7 MMOL/L — SIGNIFICANT CHANGE UP (ref 3.5–5.3)
RBC # BLD: 4.19 M/UL — SIGNIFICANT CHANGE UP (ref 3.8–5.2)
RBC # FLD: 11.5 % — SIGNIFICANT CHANGE UP (ref 10.3–14.5)
SODIUM SERPL-SCNC: 136 MMOL/L — SIGNIFICANT CHANGE UP (ref 135–145)
SODIUM SERPL-SCNC: 140 MMOL/L — SIGNIFICANT CHANGE UP (ref 135–145)
WBC # BLD: 6 K/UL — SIGNIFICANT CHANGE UP (ref 3.8–10.5)
WBC # FLD AUTO: 6 K/UL — SIGNIFICANT CHANGE UP (ref 3.8–10.5)

## 2018-09-11 PROCEDURE — 70547 MR ANGIOGRAPHY NECK W/O DYE: CPT | Mod: 26

## 2018-09-11 PROCEDURE — 70551 MRI BRAIN STEM W/O DYE: CPT | Mod: 26

## 2018-09-11 PROCEDURE — 99222 1ST HOSP IP/OBS MODERATE 55: CPT

## 2018-09-11 PROCEDURE — 70544 MR ANGIOGRAPHY HEAD W/O DYE: CPT | Mod: 26,59

## 2018-09-11 RX ORDER — MAGNESIUM SULFATE 500 MG/ML
1 VIAL (ML) INJECTION ONCE
Qty: 0 | Refills: 0 | Status: COMPLETED | OUTPATIENT
Start: 2018-09-11 | End: 2018-09-11

## 2018-09-11 RX ORDER — ENOXAPARIN SODIUM 100 MG/ML
30 INJECTION SUBCUTANEOUS DAILY
Qty: 0 | Refills: 0 | Status: DISCONTINUED | OUTPATIENT
Start: 2018-09-11 | End: 2018-09-19

## 2018-09-11 RX ORDER — POTASSIUM CHLORIDE 20 MEQ
10 PACKET (EA) ORAL
Qty: 0 | Refills: 0 | Status: COMPLETED | OUTPATIENT
Start: 2018-09-11 | End: 2018-09-11

## 2018-09-11 RX ADMIN — Medication 100 GRAM(S): at 10:20

## 2018-09-11 RX ADMIN — PANTOPRAZOLE SODIUM 40 MILLIGRAM(S): 20 TABLET, DELAYED RELEASE ORAL at 12:18

## 2018-09-11 RX ADMIN — Medication 5 MILLIGRAM(S): at 06:07

## 2018-09-11 RX ADMIN — ENOXAPARIN SODIUM 30 MILLIGRAM(S): 100 INJECTION SUBCUTANEOUS at 12:17

## 2018-09-11 RX ADMIN — Medication 25 MILLIGRAM(S): at 06:07

## 2018-09-11 RX ADMIN — Medication 25 MILLIGRAM(S): at 17:50

## 2018-09-11 RX ADMIN — Medication 100 MILLIEQUIVALENT(S): at 10:20

## 2018-09-11 RX ADMIN — SODIUM CHLORIDE 50 MILLILITER(S): 9 INJECTION, SOLUTION INTRAVENOUS at 10:19

## 2018-09-11 RX ADMIN — Medication 2: at 12:16

## 2018-09-11 RX ADMIN — LISINOPRIL 20 MILLIGRAM(S): 2.5 TABLET ORAL at 06:07

## 2018-09-11 RX ADMIN — Medication 100 MILLIEQUIVALENT(S): at 12:15

## 2018-09-11 RX ADMIN — ATORVASTATIN CALCIUM 40 MILLIGRAM(S): 80 TABLET, FILM COATED ORAL at 23:01

## 2018-09-11 RX ADMIN — Medication 325 MILLIGRAM(S): at 12:18

## 2018-09-11 RX ADMIN — AMLODIPINE BESYLATE 5 MILLIGRAM(S): 2.5 TABLET ORAL at 06:07

## 2018-09-11 RX ADMIN — Medication 5 MILLIGRAM(S): at 17:51

## 2018-09-11 RX ADMIN — Medication 100 MILLIEQUIVALENT(S): at 12:54

## 2018-09-11 NOTE — PROGRESS NOTE ADULT - ASSESSMENT
89 year old woman admitted 9/7/18 with sudden onset of inability to speak and walk. Patient was with family when got into a car. While in the car suddenly was unable to speak. She went immediately with family to the ER and was unable to get out of the car. In the ER, Telestroke was consulted and recommendation by Dr. Libman for TPA which was given. She initially slightly improved with regard to speaking 1 word and slightly increased strength on the right. Yesterday she was noted to have decreased strength on the right. She was noted today to say 1 word. Today she has improved with ability to move the right arm and walk with a walker with assistance.    1) Continue  mg daily  2) MRI Head revealed as above acute infarct centered on left centrum semiovale.  MRA Head and Neck did not reveal a  stenosis.  3) As per Medicine      Discussed with Dr. Virgil Escobedo.

## 2018-09-11 NOTE — PROGRESS NOTE ADULT - SUBJECTIVE AND OBJECTIVE BOX
Patient is a 89y old  Female who presents with a chief complaint of Aphasia and weakness (10 Sep 2018 08:57)    Patient seen and examined at bedside. lying in recliner chair. Aphasic but able to follow simple commands. Denies pain or discomfort    ALLERGIES:  No Known Allergies    MEDICATIONS  (STANDING):  amLODIPine   Tablet 5 milliGRAM(s) Oral daily  aspirin 325 milliGRAM(s) Oral daily  atorvastatin 40 milliGRAM(s) Oral at bedtime  dextrose 5% + sodium chloride 0.45%. 1000 milliLiter(s) (50 mL/Hr) IV Continuous <Continuous>  dextrose 5%. 1000 milliLiter(s) (50 mL/Hr) IV Continuous <Continuous>  dextrose 50% Injectable 12.5 Gram(s) IV Push once  dextrose 50% Injectable 25 Gram(s) IV Push once  dextrose 50% Injectable 25 Gram(s) IV Push once  enoxaparin Injectable 30 milliGRAM(s) SubCutaneous daily  insulin lispro (HumaLOG) corrective regimen sliding scale   SubCutaneous every 6 hours  lisinopril 20 milliGRAM(s) Oral daily  metoprolol tartrate 25 milliGRAM(s) Oral every 12 hours  oxybutynin 5 milliGRAM(s) Oral two times a day  pantoprazole   Suspension 40 milliGRAM(s) Oral daily  potassium chloride  10 mEq/100 mL IVPB 10 milliEquivalent(s) IV Intermittent every 1 hour    MEDICATIONS  (PRN):  dextrose 40% Gel 15 Gram(s) Oral once PRN Blood Glucose LESS THAN 70 milliGRAM(s)/deciliter  glucagon  Injectable 1 milliGRAM(s) IntraMuscular once PRN Glucose LESS THAN 70 milligrams/deciliter  ondansetron Injectable 4 milliGRAM(s) IV Push every 6 hours PRN Nausea and/or Vomiting    Vital Signs Last 24 Hrs  T(F): 98.2 (11 Sep 2018 05:45), Max: 98.9 (10 Sep 2018 20:22)  HR: 82 (11 Sep 2018 10:41) (80 - 91)  BP: 160/77 (11 Sep 2018 10:41) (146/60 - 163/76)  RR: 14 (11 Sep 2018 10:41) (14 - 18)  SpO2: 97% (11 Sep 2018 10:41) (97% - 99%)  I&O's Summary    10 Sep 2018 07:01  -  11 Sep 2018 07:00  --------------------------------------------------------  IN: 100 mL / OUT: 0 mL / NET: 100 mL      BMI (kg/m2): 26.7 (09-11-18 @ 05:45)  PHYSICAL EXAM:  General: NAD, A/O x 2  Neck: Supple, No JVD  Lungs: Clear to auscultation bilaterally  Cardio: RRR, S1/S2, No murmurs  Abdomen: Soft, Nontender, Nondistended; Bowel sounds present  Extremities: No calf tenderness, No pitting edema.   Right facial droop, right upper extremity hemiparesis     LABS:                        13.1   6.0   )-----------( 143      ( 11 Sep 2018 06:20 )             38.0       09-11    136  |  100  |  15  ----------------------------<  519  3.1   |  29  |  1.11    Ca    8.0      11 Sep 2018 06:20  Phos  2.3     09-11  Mg     1.5     09-11       eGFR if Non African American: 43 mL/min/1.73M2 (09-11-18 @ 06:20)  eGFR if African American: 50 mL/min/1.73M2 (09-11-18 @ 06:20)         CARDIAC MARKERS ( 09 Sep 2018 05:30 )  .194 ng/mL / x     / x     / x     / x          09-08 Chol 161 mg/dL LDL 99 mg/dL HDL 33 mg/dL Trig 147 mg/dL    CAPILLARY BLOOD GLUCOSE  POCT Blood Glucose.: 136 mg/dL (11 Sep 2018 07:02)  POCT Blood Glucose.: 127 mg/dL (10 Sep 2018 23:11)  POCT Blood Glucose.: 124 mg/dL (10 Sep 2018 17:35)  POCT Blood Glucose.: 126 mg/dL (10 Sep 2018 11:51)    09-08 [id number] 6.3    RADIOLOGY & ADDITIONAL TESTS:  < from: CT Head No Cont (09.09.18 @ 18:20) >  No acute hemorrhage or mass effect. Chronic changes noted      Care Discussed with Consultants/Other Providers: Dr. Austin Patient is a 89y old  Female who presents with a chief complaint of Aphasia and weakness (10 Sep 2018 08:57)    Patient seen and examined at bedside. lying in recliner chair. Aphasic but able to follow simple commands. Denies pain or discomfort    ALLERGIES:  No Known Allergies    MEDICATIONS  (STANDING):  amLODIPine   Tablet 5 milliGRAM(s) Oral daily  aspirin 325 milliGRAM(s) Oral daily  atorvastatin 40 milliGRAM(s) Oral at bedtime  dextrose 5% + sodium chloride 0.45%. 1000 milliLiter(s) (50 mL/Hr) IV Continuous <Continuous>  dextrose 5%. 1000 milliLiter(s) (50 mL/Hr) IV Continuous <Continuous>  dextrose 50% Injectable 12.5 Gram(s) IV Push once  dextrose 50% Injectable 25 Gram(s) IV Push once  dextrose 50% Injectable 25 Gram(s) IV Push once  enoxaparin Injectable 30 milliGRAM(s) SubCutaneous daily  insulin lispro (HumaLOG) corrective regimen sliding scale   SubCutaneous every 6 hours  lisinopril 20 milliGRAM(s) Oral daily  metoprolol tartrate 25 milliGRAM(s) Oral every 12 hours  oxybutynin 5 milliGRAM(s) Oral two times a day  pantoprazole   Suspension 40 milliGRAM(s) Oral daily  potassium chloride  10 mEq/100 mL IVPB 10 milliEquivalent(s) IV Intermittent every 1 hour    MEDICATIONS  (PRN):  dextrose 40% Gel 15 Gram(s) Oral once PRN Blood Glucose LESS THAN 70 milliGRAM(s)/deciliter  glucagon  Injectable 1 milliGRAM(s) IntraMuscular once PRN Glucose LESS THAN 70 milligrams/deciliter  ondansetron Injectable 4 milliGRAM(s) IV Push every 6 hours PRN Nausea and/or Vomiting    Vital Signs Last 24 Hrs  T(F): 98.2 (11 Sep 2018 05:45), Max: 98.9 (10 Sep 2018 20:22)  HR: 82 (11 Sep 2018 10:41) (80 - 91)  BP: 160/77 (11 Sep 2018 10:41) (146/60 - 163/76)  RR: 14 (11 Sep 2018 10:41) (14 - 18)  SpO2: 97% (11 Sep 2018 10:41) (97% - 99%)  I&O's Summary    10 Sep 2018 07:01  -  11 Sep 2018 07:00  --------------------------------------------------------  IN: 100 mL / OUT: 0 mL / NET: 100 mL      BMI (kg/m2): 26.7 (09-11-18 @ 05:45)  PHYSICAL EXAM:  General: NAD, A/O x 2  Neck: Supple, No JVD  Lungs: Clear to auscultation bilaterally  Cardio: RRR, S1/S2, No murmurs  Abdomen: Soft, Nontender, Nondistended; Bowel sounds present  Extremities: No calf tenderness, No pitting edema.   Right facial droop, right upper extremity hemiparesis     LABS:                        13.1   6.0   )-----------( 143      ( 11 Sep 2018 06:20 )             38.0       09-11    136  |  100  |  15  ----------------------------<  519  3.1   |  29  |  1.11    Ca    8.0      11 Sep 2018 06:20  Phos  2.3     09-11  Mg     1.5     09-11       eGFR if Non African American: 43 mL/min/1.73M2 (09-11-18 @ 06:20)  eGFR if African American: 50 mL/min/1.73M2 (09-11-18 @ 06:20)         CARDIAC MARKERS ( 09 Sep 2018 05:30 )  .194 ng/mL / x     / x     / x     / x          09-08 Chol 161 mg/dL LDL 99 mg/dL HDL 33 mg/dL Trig 147 mg/dL    CAPILLARY BLOOD GLUCOSE  POCT Blood Glucose.: 136 mg/dL (11 Sep 2018 07:02)  POCT Blood Glucose.: 127 mg/dL (10 Sep 2018 23:11)  POCT Blood Glucose.: 124 mg/dL (10 Sep 2018 17:35)  POCT Blood Glucose.: 126 mg/dL (10 Sep 2018 11:51)    09-08 [id number] 6.3    RADIOLOGY & ADDITIONAL TESTS:  < from: CT Head No Cont (09.09.18 @ 18:20) >  No acute hemorrhage or mass effect. Chronic changes noted      Care Discussed with Consultants/Other Providers: Dr. Escobedo

## 2018-09-11 NOTE — PROGRESS NOTE ADULT - SUBJECTIVE AND OBJECTIVE BOX
Patient is a 89 year old woman admitted 9/7/18 with sudden onset of inability to speak and walk. Patient was with family when got into a car. While in the car suddenly was unable to speak. She went immediately with family to the ER and was unable to get out of the car. In the ER, Telestroke was consulted and recommendation by Dr. Libman for TPA which was given. She initially slightly improved with regard to speaking 1 word and slightly increased strength on the right. Yesterday she was noted to have decreased strength on the right but speaking 1 word , no. Today family and nurse state she has been moving the right arm. She has been walking with a walker with assistance. She speaks only 1 word , no. She has continued to follow commands.    PMH: HTN, DM, Hyperlipidemia, S/P bilateral hip replacements, OA      Exam: Sitting in bed. Helping with aides who are changing her gown. Attempts to vocalized in response to questions. Only able to vocalize, no.  Follows simple commands.           CN II-XII intact except right VII           Motor tone and strength-right upper extremity- lifts briefly in air                                                 right lower extremity- elevates above the bed                                                  left upper and lower extremity moves well and elevates above the bed          Uncooperative with rest of exam    < from: MR Head No Cont (09.11.18 @ 14:23) >    There is mild to moderate diffusion restriction centered in the left   centrum semiovale white matter measuring a maximum of 4 cm in axial   length. There is slight peripheral parietal subcortical and proximal   temporal lobe external capsule involvement. There is moderate underlying   chronic microvascular ischemic change without hemorrhage, cortical edema,   mass effect or hydrocephalus. There is mild frontoparietal cortical and   central volume loss. The orbital and sellar contents and cerebellar   tonsils are within normal limits.    IMPRESSION:    Moderately sized acute bland left-sided white matter watershed infarct    < from: MR Angio Head No Cont (09.11.18 @ 14:41) >    There is no flow disturbance that would imply stenosis or aneurysm on   either side. The anterior, posterior and middle cerebral circulation   appears roughly symmetric.    Impression:  Normal study.    < from: MR Angio Neck No Cont (09.11.18 @ 14:52) >    There is no flow signal disturbance to suggest hemodynamically   significant carotid or vertebral artery stenosis on either side. The   vertebral arteries are codominant    IMPRESSION:    Normal study

## 2018-09-11 NOTE — CONSULT NOTE ADULT - ASSESSMENT
This is a 89 y/o female with h/o This is a 89 y/o female with h/o HTN, DM type 2, HLD now with acute left parietal subcortical watershed infarct presenting with mixed aphasia dysphagia, left neglect and left hemiparesis with gait dysfunction.    Pt is a good acute rehab candidate with very supportive family. Will accept to BIU after all investigations and treatments are completed and patient is medically stable to begin rehab. Spoke with family at bedside, details given, all questions answered.

## 2018-09-11 NOTE — PROGRESS NOTE ADULT - ATTENDING COMMENTS
89 y/o F with a h/o HTN, DM2, dyslipidemia, OA, with acute CVA, LENNIE, elevated troponin. At this time patient has residual aphasia and right upper extremity weakness.     - Cont. Aspirin and Statin  - PT/OT eval, swallow eval   - Aspiration precautions  - CT head, Carotid dopplers and Echo noted  - Pending MRI  - Cont BP meds, as BP is acceptable at this time  - Glucose control with fingerstick monitoring   - Gentle IV hydration while NPO as LENNIE is resolving  - DVT prophylaxis  - Code status DNR  - Does not need ICU level of care at this time    Care during this interval was provided by me. I have reviewed this patient's available data, including medical history, events of note, physical examination and test results, and have overseen the activities of other members of the care team under my direct supervision (e.g. physician assistants, nurse practitioners).     I was physically present for the key portions of the evaluation and management (E/M) service provided.  I agree with the above history, physical, and plan which I have reviewed and edited where appropriate.

## 2018-09-11 NOTE — PROGRESS NOTE ADULT - ASSESSMENT
89 year female with PMH of HTN, DM2, dyslipidemia, OA> Admit with acute CVA, LENNIE, elevated trops.

## 2018-09-11 NOTE — CONSULT NOTE ADULT - ATTENDING COMMENTS
89 yo RHWF with multiple vascular risk factors present with acute left MCA infarct with mixed aphasia, right hemineglect, dysphagia, right facial weakness and right hemiparesis with possible sensory features. Patient meets the criteria for an admission to acute rehabilitation unit for functional recovery after stroke. I personally examined the patient, reviewed the chart and spoke with family members. All questions answered.  Will accept the patient to BIU when medically stable

## 2018-09-11 NOTE — PROGRESS NOTE ADULT - ATTENDING COMMENTS
I have personally seen and examined patient on the above date.  I discussed the case with Mary Leon NP and I agree with findings and plan as detailed per note above, which I have amended where appropriate.

## 2018-09-11 NOTE — PROGRESS NOTE ADULT - SUBJECTIVE AND OBJECTIVE BOX
Follow-up Critical Care Follow up Note  Chief Complaint : Cerebral infarction    Did say "no" today, though has right upper extremity paresis and aphasia for the most part. Able to tolerate medications by mouth with apple sauce.     Allergies :No Known Allergies    PAST MEDICAL & SURGICAL HISTORY:  Dyslipidemia  Osteoarthritis  Sciatica  Diabetes: Type 2, &quot;many years&quot;  HTN (hypertension)  HTN (hypertension)  DM (diabetes mellitus)  H/O bilateral hip replacements: 2000&#x27;s at [facility], no complications  S/P hip replacement, bilateral    Medications:  MEDICATIONS  (STANDING):  amLODIPine   Tablet 5 milliGRAM(s) Oral daily  aspirin 325 milliGRAM(s) Oral daily  atorvastatin 40 milliGRAM(s) Oral at bedtime  dextrose 5% + sodium chloride 0.45%. 1000 milliLiter(s) (50 mL/Hr) IV Continuous <Continuous>  dextrose 5%. 1000 milliLiter(s) (50 mL/Hr) IV Continuous <Continuous>  dextrose 50% Injectable 12.5 Gram(s) IV Push once  dextrose 50% Injectable 25 Gram(s) IV Push once  dextrose 50% Injectable 25 Gram(s) IV Push once  enoxaparin Injectable 30 milliGRAM(s) SubCutaneous daily  insulin lispro (HumaLOG) corrective regimen sliding scale   SubCutaneous every 6 hours  lisinopril 20 milliGRAM(s) Oral daily  metoprolol tartrate 25 milliGRAM(s) Oral every 12 hours  oxybutynin 5 milliGRAM(s) Oral two times a day  pantoprazole   Suspension 40 milliGRAM(s) Oral daily  potassium chloride  10 mEq/100 mL IVPB 10 milliEquivalent(s) IV Intermittent every 1 hour    MEDICATIONS  (PRN):  dextrose 40% Gel 15 Gram(s) Oral once PRN Blood Glucose LESS THAN 70 milliGRAM(s)/deciliter  glucagon  Injectable 1 milliGRAM(s) IntraMuscular once PRN Glucose LESS THAN 70 milligrams/deciliter  ondansetron Injectable 4 milliGRAM(s) IV Push every 6 hours PRN Nausea and/or Vomiting    LABS:                       13.1   6.0   )-----------( 143      ( 11 Sep 2018 06:20 )             38.0     09-11    136  |  100  |  15  ----------------------------<  519<HH>  3.1<L>   |  29  |  1.11    Ca    8.0<L>      11 Sep 2018 06:20  Phos  2.3     09-11  Mg     1.5     09-11    VITALS:  T(C): 36.8 (09-11-18 @ 05:45), Max: 37.2 (09-10-18 @ 20:22)  T(F): 98.2 (09-11-18 @ 05:45), Max: 98.9 (09-10-18 @ 20:22)  HR: 82 (09-11-18 @ 10:41) (80 - 91)  BP: 160/77 (09-11-18 @ 10:41) (146/60 - 163/76)  BP(mean): --  ABP: --  ABP(mean): --  RR: 14 (09-11-18 @ 10:41) (14 - 18)  SpO2: 97% (09-11-18 @ 10:41) (97% - 99%)  CVP(mm Hg): --  CVP(cm H2O): --    Ins and Outs     09-10-18 @ 07:01  -  09-11-18 @ 07:00  --------------------------------------------------------  IN: 100 mL / OUT: 0 mL / NET: 100 mL    Height (cm): 157.48 (09-08-18 @ 13:00)  Weight (kg): 66.3 (09-11-18 @ 05:45)  BMI (kg/m2): 26.7 (09-11-18 @ 05:45)    Physical Examination:  GENERAL:               Awake and follows commands   HEENT:                   Pupils equal, reactive to light. Symmetric. No JVD, Moist MM  PULM:                    Bilateral air entry, Clear to auscultation bilaterally, no significant sputum production, No Rales, No Rhonchi, No Wheezing  CVS:                       S1, S2,  No Murmur  ABD:                        Soft, nondistended, nontender  EXT:                         No edema  NEURO:                  Alert, right upper extremity strenght 2/5. Moves other three extremities spontaneously with 4/5 strength. Right facial droop  PSYC:                      Calm, + Insight.

## 2018-09-11 NOTE — CONSULT NOTE ADULT - SUBJECTIVE AND OBJECTIVE BOX
Patient is a 89 year old woman admitted 9/7/18 with sudden onset of inability to speak and walk. Patient was with family when got into a car. While in the car suddenly was unable to speak. She went immediately with family to the ER and was unable to get out of the car. In the ER, Telestroke was consulted and recommendation by Dr. Libman for TPA which was given. She initially slightly improved with regard to speaking 1 word and slightly increased strength on the right. Then was noted to have decreased strength on the right on 9/10. Per daughter and nurse state she was awake most of the evening attentive to family. She fell asleep late this morning. She was able to say 1 word, today ("No").      PAST MEDICAL & SURGICAL HISTORY:  Dyslipidemia  Osteoarthritis  Sciatica  Diabetes: Type 2, &quot;many years&quot;  HTN (hypertension)  HTN (hypertension)  DM (diabetes mellitus)  H/O bilateral hip replacements: 2000&#x27;s at [facility], no complications  S/P hip replacement, bilateral    Allergies: No Known Allergies    Social Hx:     TODAY'S SUBJECTIVE & REVIEW OF SYMPTOMS:  [   ] Constitutional WNL  [   ] Cardio WNL  [   ] Resp WNL  [   ] GI WNL  [   ] Heme WNL  [   ] Endo WNL  [   ] Skin WNL  [   ] MSK WNL  [   ] Neuro WNL  [   ] Cognitive WNL  [   ] Psych WNL      MEDICATIONS  (STANDING):  amLODIPine   Tablet 5 milliGRAM(s) Oral daily  aspirin 325 milliGRAM(s) Oral daily  atorvastatin 40 milliGRAM(s) Oral at bedtime  dextrose 5% + sodium chloride 0.45%. 1000 milliLiter(s) (50 mL/Hr) IV Continuous <Continuous>  dextrose 5%. 1000 milliLiter(s) (50 mL/Hr) IV Continuous <Continuous>  dextrose 50% Injectable 12.5 Gram(s) IV Push once  dextrose 50% Injectable 25 Gram(s) IV Push once  dextrose 50% Injectable 25 Gram(s) IV Push once  enoxaparin Injectable 30 milliGRAM(s) SubCutaneous daily  insulin lispro (HumaLOG) corrective regimen sliding scale   SubCutaneous every 6 hours  lisinopril 20 milliGRAM(s) Oral daily  metoprolol tartrate 25 milliGRAM(s) Oral every 12 hours  oxybutynin 5 milliGRAM(s) Oral two times a day  pantoprazole   Suspension 40 milliGRAM(s) Oral daily    MEDICATIONS  (PRN):  dextrose 40% Gel 15 Gram(s) Oral once PRN Blood Glucose LESS THAN 70 milliGRAM(s)/deciliter  glucagon  Injectable 1 milliGRAM(s) IntraMuscular once PRN Glucose LESS THAN 70 milligrams/deciliter  ondansetron Injectable 4 milliGRAM(s) IV Push every 6 hours PRN Nausea and/or Vomiting      < from: CT Head No Cont (09.09.18 @ 18:20) >  EXAM:  CT BRAIN      PROCEDURE DATE:  09/09/2018        INTERPRETATION:  9/9/2018 6:29 PM    CLINICAL HISTORY:  Right-sided weakness.    TECHNIQUE: Axial CT images are obtained from the cranial vertex to the   skullbase without the administration of IV contrast.    COMPARISON: Head CT 9/8/2018, 9/7/2018, 4/3/2018    FINDINGS:    Again noted are chronic lacunar infarcts on the right cerebellum,   bilateral basal ganglia and thalami.    There is volume loss and atherosclerosis. White matter hypodensities   noted compatible with at least moderate chronic microvascular ischemic   change in this age group. There is no midline shift, mass effect, extra   axial collection, intracranial hemorrhage, or ventriculomegaly.    The calvarium is intact. The visualized paranasal sinuses are aerated.   The mastoid air cells are clear aside from a trace right mastoid tip   effusion.    IMPRESSION:    No acute hemorrhage or mass effect. Chronic changes noted    MRI would more sensitive for the detection of an acute infarct on this   background if there are no contraindications.        < end of copied text >      < from: MR Angio Head No Cont (09.11.18 @ 14:41) >  EXAM:  MR ANGIO BRAIN      PROCEDURE DATE:  09/11/2018        INTERPRETATION:  MRA head    History: Left hemispheric CVA    Technique 3-D TOF with 3D MIPs    There is no flow disturbance that would imply stenosis or aneurysm on   either side. The anterior, posterior and middle cerebral circulation   appears roughly symmetric.    Impression:  Normal study.        < end of copied text >    < from: MR Head No Cont (09.11.18 @ 14:23) >    EXAM:  MR BRAIN      PROCEDURE DATE:  09/11/2018        INTERPRETATION:  MRI brain without contrast  Comparison CT performed 3 days prior  History aphasia, right-sided paresis    There is mild to moderate diffusion restriction centered in the left   centrum semiovale white matter measuring a maximum of 4 cm in axial   length. There is slight peripheral parietal subcortical and proximal   temporal lobe external capsule involvement. There is moderate underlying   chronic microvascular ischemic change without hemorrhage, cortical edema,   mass effect or hydrocephalus. There is mild frontoparietal cortical and   central volume loss. The orbital and sellar contents and cerebellar   tonsils are within normal limits.    IMPRESSION:    Moderately sized acute bland left-sided white matter watershed infarct    < end of copied text >        < from: CT Head No Cont (09.09.18 @ 18:20) >    FINDINGS:    Again noted are chronic lacunar infarcts on the right cerebellum,   bilateral basal ganglia and thalami.    There is volume loss and atherosclerosis. White matter hypodensities   noted compatible with at least moderate chronic microvascular ischemic   change in this age group. There is no midline shift, mass effect, extra   axial collection, intracranial hemorrhage, or ventriculomegaly.  The calvarium is intact. The visualized paranasal sinuses are aerated.   The mastoid air cells are clear aside from a trace right mastoid tip   effusion.    IMPRESSION:    No acute hemorrhage or mass effect. Chronic changes noted    MRI would more sensitive for the detection of an acute infarct on this   background if there are no contraindications.            Vital Signs Last 24 Hrs  T(C): 36.8 (11 Sep 2018 05:45), Max: 37.2 (10 Sep 2018 20:22)  T(F): 98.2 (11 Sep 2018 05:45), Max: 98.9 (10 Sep 2018 20:22)  HR: 82 (11 Sep 2018 10:41) (82 - 91)  BP: 160/77 (11 Sep 2018 10:41) (150/63 - 163/76)  RR: 14 (11 Sep 2018 10:41) (14 - 16)  SpO2: 97% (11 Sep 2018 10:41) (97% - 97%)        On Neurological Examination:  Mental Status - Patient is alert, awake, oriented X3. Speech is fluent, able to  name and repeat. Normal attention and concentration.  Follows commands well and able to answer questions appropriately. Mood and affect  normal.  Cranial Nerves - VFF, PERRL, EOMI, V1-V3 intact, no gross facial asymmetry, no dysarthria, no dysphagia,  Motor Exam -   Right upper  Left upper  Right lower  Left lower   Normal muscle bulk/tone  Sensory    Intact to light touch and pinprick bilaterally. Normal JPS, vibration.Normal cortical sensation  Coord: FTN intact bilaterally   No tremors  Gait -  normal , no ataxia    DTS Reflexes:           Toes are flexor                                GENERAL Exam:     Nontoxic , No Acute Distress   HEENT:  normocephalic, atraumatic	  LUNGS:	Clear bilaterally  No Wheeze  Decreased bilaterally	  HEART:	 Normal S1S2   No murmur RRR      	  GI/ ABDOMEN:  Soft  Non tender  EXTREMITIES:   No Edema  No Clubbing  No Cyanosis No Edema  MUSCULOSKELETAL: Normal Range of Motion	   SKIN:      Normal   No Ecchymosis Patient is a 89 year old woman admitted 9/7/18 with sudden onset of inability to speak and walk. Patient was with family when got into a car. While in the car suddenly was unable to speak. She went immediately with family to the ER and was unable to get out of the car. In the ER, Telestroke was consulted and recommendation by Dr. Libman for TPA which was given. She initially slightly improved with regard to speaking 1 word and slightly increased strength on the right. Then was noted to have decreased strength on the right on 9/10. Per daughter and nurse state she was awake most of the evening attentive to family. She fell asleep late this morning. She was able to say 1 word, today ("No").      PAST MEDICAL & SURGICAL HISTORY:  Dyslipidemia  Osteoarthritis  Sciatica  Diabetes: Type 2, &quot;many years&quot;  HTN (hypertension)  HTN (hypertension)  DM (diabetes mellitus)  H/O bilateral hip replacements: 2000&#x27;s at [facility], no complications  S/P hip replacement, bilateral    Allergies: No Known Allergies    Social Hx: Lives in  with daughter, 1 [address]HA[address], no stairs inside. Use of SC or RW for ambulation at home. Independent with all ADLs prior to hospitalization.       TODAY'S SUBJECTIVE & REVIEW OF SYMPTOMS:  Patient is able to point to Yes or No on white board. When asked if she has HA, dizziness, CP, SOB, nausea, abdominal pain-she points to NO.  Patient shrugs when asked if vision has changed.   Family at bedside reports that she had failed swallow test and ST to reevaluate tomorrow. Pt did have NGT which she removed. Patient currently has IVF running for hydration     MEDICATIONS  (STANDING):  amLODIPine   Tablet 5 milliGRAM(s) Oral daily  aspirin 325 milliGRAM(s) Oral daily  atorvastatin 40 milliGRAM(s) Oral at bedtime  dextrose 5% + sodium chloride 0.45%. 1000 milliLiter(s) (50 mL/Hr) IV Continuous <Continuous>  dextrose 5%. 1000 milliLiter(s) (50 mL/Hr) IV Continuous <Continuous>  dextrose 50% Injectable 12.5 Gram(s) IV Push once  dextrose 50% Injectable 25 Gram(s) IV Push once  dextrose 50% Injectable 25 Gram(s) IV Push once  enoxaparin Injectable 30 milliGRAM(s) SubCutaneous daily  insulin lispro (HumaLOG) corrective regimen sliding scale   SubCutaneous every 6 hours  lisinopril 20 milliGRAM(s) Oral daily  metoprolol tartrate 25 milliGRAM(s) Oral every 12 hours  oxybutynin 5 milliGRAM(s) Oral two times a day  pantoprazole   Suspension 40 milliGRAM(s) Oral daily    MEDICATIONS  (PRN):  dextrose 40% Gel 15 Gram(s) Oral once PRN Blood Glucose LESS THAN 70 milliGRAM(s)/deciliter  glucagon  Injectable 1 milliGRAM(s) IntraMuscular once PRN Glucose LESS THAN 70 milligrams/deciliter  ondansetron Injectable 4 milliGRAM(s) IV Push every 6 hours PRN Nausea and/or Vomiting      < from: CT Head No Cont (09.09.18 @ 18:20) >  EXAM:  CT BRAIN      PROCEDURE DATE:  09/09/2018        INTERPRETATION:  9/9/2018 6:29 PM    CLINICAL HISTORY:  Right-sided weakness.    TECHNIQUE: Axial CT images are obtained from the cranial vertex to the   skullbase without the administration of IV contrast.    COMPARISON: Head CT 9/8/2018, 9/7/2018, 4/3/2018    FINDINGS:    Again noted are chronic lacunar infarcts on the right cerebellum,   bilateral basal ganglia and thalami.    There is volume loss and atherosclerosis. White matter hypodensities   noted compatible with at least moderate chronic microvascular ischemic   change in this age group. There is no midline shift, mass effect, extra   axial collection, intracranial hemorrhage, or ventriculomegaly.    The calvarium is intact. The visualized paranasal sinuses are aerated.   The mastoid air cells are clear aside from a trace right mastoid tip   effusion.    IMPRESSION:    No acute hemorrhage or mass effect. Chronic changes noted    MRI would more sensitive for the detection of an acute infarct on this   background if there are no contraindications.        < end of copied text >      < from: MR Angio Head No Cont (09.11.18 @ 14:41) >  EXAM:  MR ANGIO BRAIN      PROCEDURE DATE:  09/11/2018        INTERPRETATION:  MRA head    History: Left hemispheric CVA    Technique 3-D TOF with 3D MIPs    There is no flow disturbance that would imply stenosis or aneurysm on   either side. The anterior, posterior and middle cerebral circulation   appears roughly symmetric.    Impression:  Normal study.        < end of copied text >    < from: MR Head No Cont (09.11.18 @ 14:23) >    EXAM:  MR BRAIN      PROCEDURE DATE:  09/11/2018        INTERPRETATION:  MRI brain without contrast  Comparison CT performed 3 days prior  History aphasia, right-sided paresis    There is mild to moderate diffusion restriction centered in the left   centrum semiovale white matter measuring a maximum of 4 cm in axial   length. There is slight peripheral parietal subcortical and proximal   temporal lobe external capsule involvement. There is moderate underlying   chronic microvascular ischemic change without hemorrhage, cortical edema,   mass effect or hydrocephalus. There is mild frontoparietal cortical and   central volume loss. The orbital and sellar contents and cerebellar   tonsils are within normal limits.    IMPRESSION:    Moderately sized acute bland left-sided white matter watershed infarct    < end of copied text >        < from: CT Head No Cont (09.09.18 @ 18:20) >    FINDINGS:    Again noted are chronic lacunar infarcts on the right cerebellum,   bilateral basal ganglia and thalami.    There is volume loss and atherosclerosis. White matter hypodensities   noted compatible with at least moderate chronic microvascular ischemic   change in this age group. There is no midline shift, mass effect, extra   axial collection, intracranial hemorrhage, or ventriculomegaly.  The calvarium is intact. The visualized paranasal sinuses are aerated.   The mastoid air cells are clear aside from a trace right mastoid tip   effusion.    IMPRESSION:    No acute hemorrhage or mass effect. Chronic changes noted    MRI would more sensitive for the detection of an acute infarct on this   background if there are no contraindications.            Vital Signs Last 24 Hrs  T(C): 36.8 (11 Sep 2018 05:45), Max: 37.2 (10 Sep 2018 20:22)  T(F): 98.2 (11 Sep 2018 05:45), Max: 98.9 (10 Sep 2018 20:22)  HR: 82 (11 Sep 2018 10:41) (82 - 91)  BP: 160/77 (11 Sep 2018 10:41) (150/63 - 163/76)  RR: 14 (11 Sep 2018 10:41) (14 - 16)  SpO2: 97% (11 Sep 2018 10:41) (97% - 97%)        On Neurological Examination:  Mental Status - Patient is alert, awake. Globally aphasic with singular words occasionally. Follows simple commands. Unable to repeat.   Cranial Nerves - left neglect with possible left visual field cut, PERRL,  dysphagia, central facial weakness  Motor Exam -   Right upper 3/5 shoulder and elbow strengths, 2/5 wrist and hand strengths  Left upper 4+/5 shoulder strengths, 5/5 elbow/wrist/hand strengths   Right lower 3/5 hip flexor, 4/5 knee and ankle strengths   Left lower 4+/5 hip flexor, 5/5 knee and ankle strengths   Sensory    Intact to light touch bilaterally.   Coord: Impaired on right due to weakness   Gait - unable to test   DTS Reflexes:    increased right        Toes are flexor on the left, extensor on the right                          GENERAL Exam:     Nontoxic , No Acute Distress   HEENT:  normocephalic, atraumatic	  LUNGS:	Clear bilaterally    HEART:	 Normal S1S2   No murmur RRR      	  GI/ ABDOMEN:  Soft  Non tender +BS  EXTREMITIES:   No Edema

## 2018-09-12 LAB
ANION GAP SERPL CALC-SCNC: 9 MMOL/L — SIGNIFICANT CHANGE UP (ref 5–17)
ANION GAP SERPL CALC-SCNC: 9 MMOL/L — SIGNIFICANT CHANGE UP (ref 5–17)
BUN SERPL-MCNC: 12 MG/DL — SIGNIFICANT CHANGE UP (ref 7–23)
BUN SERPL-MCNC: 13 MG/DL — SIGNIFICANT CHANGE UP (ref 7–23)
CALCIUM SERPL-MCNC: 9.2 MG/DL — SIGNIFICANT CHANGE UP (ref 8.4–10.5)
CALCIUM SERPL-MCNC: 9.7 MG/DL — SIGNIFICANT CHANGE UP (ref 8.4–10.5)
CHLORIDE SERPL-SCNC: 102 MMOL/L — SIGNIFICANT CHANGE UP (ref 96–108)
CHLORIDE SERPL-SCNC: 104 MMOL/L — SIGNIFICANT CHANGE UP (ref 96–108)
CO2 SERPL-SCNC: 27 MMOL/L — SIGNIFICANT CHANGE UP (ref 22–31)
CO2 SERPL-SCNC: 28 MMOL/L — SIGNIFICANT CHANGE UP (ref 22–31)
CREAT SERPL-MCNC: 1.01 MG/DL — SIGNIFICANT CHANGE UP (ref 0.5–1.3)
CREAT SERPL-MCNC: 1.13 MG/DL — SIGNIFICANT CHANGE UP (ref 0.5–1.3)
GLUCOSE BLDC GLUCOMTR-MCNC: 141 MG/DL — HIGH (ref 70–99)
GLUCOSE BLDC GLUCOMTR-MCNC: 151 MG/DL — HIGH (ref 70–99)
GLUCOSE BLDC GLUCOMTR-MCNC: 153 MG/DL — HIGH (ref 70–99)
GLUCOSE BLDC GLUCOMTR-MCNC: 168 MG/DL — HIGH (ref 70–99)
GLUCOSE SERPL-MCNC: 145 MG/DL — HIGH (ref 70–99)
GLUCOSE SERPL-MCNC: 171 MG/DL — HIGH (ref 70–99)
HCT VFR BLD CALC: 41.7 % — SIGNIFICANT CHANGE UP (ref 34.5–45)
HGB BLD-MCNC: 14.4 G/DL — SIGNIFICANT CHANGE UP (ref 11.5–15.5)
MAGNESIUM SERPL-MCNC: 1.9 MG/DL — SIGNIFICANT CHANGE UP (ref 1.6–2.6)
MCHC RBC-ENTMCNC: 30.9 PG — SIGNIFICANT CHANGE UP (ref 27–34)
MCHC RBC-ENTMCNC: 34.4 GM/DL — SIGNIFICANT CHANGE UP (ref 32–36)
MCV RBC AUTO: 89.8 FL — SIGNIFICANT CHANGE UP (ref 80–100)
PLATELET # BLD AUTO: 184 K/UL — SIGNIFICANT CHANGE UP (ref 150–400)
POTASSIUM SERPL-MCNC: 3.4 MMOL/L — LOW (ref 3.5–5.3)
POTASSIUM SERPL-MCNC: 3.8 MMOL/L — SIGNIFICANT CHANGE UP (ref 3.5–5.3)
POTASSIUM SERPL-SCNC: 3.4 MMOL/L — LOW (ref 3.5–5.3)
POTASSIUM SERPL-SCNC: 3.8 MMOL/L — SIGNIFICANT CHANGE UP (ref 3.5–5.3)
RBC # BLD: 4.65 M/UL — SIGNIFICANT CHANGE UP (ref 3.8–5.2)
RBC # FLD: 11.7 % — SIGNIFICANT CHANGE UP (ref 10.3–14.5)
SODIUM SERPL-SCNC: 139 MMOL/L — SIGNIFICANT CHANGE UP (ref 135–145)
SODIUM SERPL-SCNC: 140 MMOL/L — SIGNIFICANT CHANGE UP (ref 135–145)
WBC # BLD: 6.7 K/UL — SIGNIFICANT CHANGE UP (ref 3.8–10.5)
WBC # FLD AUTO: 6.7 K/UL — SIGNIFICANT CHANGE UP (ref 3.8–10.5)

## 2018-09-12 PROCEDURE — 99222 1ST HOSP IP/OBS MODERATE 55: CPT

## 2018-09-12 PROCEDURE — 99233 SBSQ HOSP IP/OBS HIGH 50: CPT

## 2018-09-12 PROCEDURE — 74230 X-RAY XM SWLNG FUNCJ C+: CPT | Mod: 26

## 2018-09-12 RX ORDER — DEXTROSE 50 % IN WATER 50 %
25 SYRINGE (ML) INTRAVENOUS ONCE
Qty: 0 | Refills: 0 | Status: DISCONTINUED | OUTPATIENT
Start: 2018-09-12 | End: 2018-09-12

## 2018-09-12 RX ORDER — DEXTROSE 50 % IN WATER 50 %
15 SYRINGE (ML) INTRAVENOUS ONCE
Qty: 0 | Refills: 0 | Status: DISCONTINUED | OUTPATIENT
Start: 2018-09-12 | End: 2018-09-12

## 2018-09-12 RX ORDER — INSULIN LISPRO 100/ML
VIAL (ML) SUBCUTANEOUS
Qty: 0 | Refills: 0 | Status: DISCONTINUED | OUTPATIENT
Start: 2018-09-12 | End: 2018-09-19

## 2018-09-12 RX ORDER — DEXTROSE 50 % IN WATER 50 %
12.5 SYRINGE (ML) INTRAVENOUS ONCE
Qty: 0 | Refills: 0 | Status: DISCONTINUED | OUTPATIENT
Start: 2018-09-12 | End: 2018-09-12

## 2018-09-12 RX ORDER — DEXTROSE 50 % IN WATER 50 %
25 SYRINGE (ML) INTRAVENOUS ONCE
Qty: 0 | Refills: 0 | Status: DISCONTINUED | OUTPATIENT
Start: 2018-09-12 | End: 2018-09-19

## 2018-09-12 RX ORDER — POTASSIUM CHLORIDE 20 MEQ
10 PACKET (EA) ORAL
Qty: 0 | Refills: 0 | Status: COMPLETED | OUTPATIENT
Start: 2018-09-12 | End: 2018-09-12

## 2018-09-12 RX ORDER — SODIUM CHLORIDE 9 MG/ML
1000 INJECTION, SOLUTION INTRAVENOUS
Qty: 0 | Refills: 0 | Status: DISCONTINUED | OUTPATIENT
Start: 2018-09-12 | End: 2018-09-12

## 2018-09-12 RX ORDER — BACITRACIN AND POLYMYXIN B SULFATE 500; 10000 [USP'U]/G; [USP'U]/G
1 OINTMENT TOPICAL EVERY 8 HOURS
Qty: 0 | Refills: 0 | Status: DISCONTINUED | OUTPATIENT
Start: 2018-09-12 | End: 2018-09-19

## 2018-09-12 RX ORDER — GLUCAGON INJECTION, SOLUTION 0.5 MG/.1ML
1 INJECTION, SOLUTION SUBCUTANEOUS ONCE
Qty: 0 | Refills: 0 | Status: DISCONTINUED | OUTPATIENT
Start: 2018-09-12 | End: 2018-09-12

## 2018-09-12 RX ORDER — DEXTROSE 50 % IN WATER 50 %
15 SYRINGE (ML) INTRAVENOUS ONCE
Qty: 0 | Refills: 0 | Status: DISCONTINUED | OUTPATIENT
Start: 2018-09-12 | End: 2018-09-19

## 2018-09-12 RX ORDER — GLUCAGON INJECTION, SOLUTION 0.5 MG/.1ML
1 INJECTION, SOLUTION SUBCUTANEOUS ONCE
Qty: 0 | Refills: 0 | Status: DISCONTINUED | OUTPATIENT
Start: 2018-09-12 | End: 2018-09-19

## 2018-09-12 RX ORDER — DEXTROSE 50 % IN WATER 50 %
12.5 SYRINGE (ML) INTRAVENOUS ONCE
Qty: 0 | Refills: 0 | Status: DISCONTINUED | OUTPATIENT
Start: 2018-09-12 | End: 2018-09-19

## 2018-09-12 RX ORDER — SODIUM CHLORIDE 9 MG/ML
1000 INJECTION, SOLUTION INTRAVENOUS
Qty: 0 | Refills: 0 | Status: DISCONTINUED | OUTPATIENT
Start: 2018-09-12 | End: 2018-09-19

## 2018-09-12 RX ORDER — INSULIN LISPRO 100/ML
VIAL (ML) SUBCUTANEOUS
Qty: 0 | Refills: 0 | Status: DISCONTINUED | OUTPATIENT
Start: 2018-09-12 | End: 2018-09-12

## 2018-09-12 RX ADMIN — Medication 25 MILLIGRAM(S): at 06:21

## 2018-09-12 RX ADMIN — PANTOPRAZOLE SODIUM 40 MILLIGRAM(S): 20 TABLET, DELAYED RELEASE ORAL at 12:18

## 2018-09-12 RX ADMIN — Medication 100 MILLIEQUIVALENT(S): at 09:20

## 2018-09-12 RX ADMIN — Medication 325 MILLIGRAM(S): at 12:18

## 2018-09-12 RX ADMIN — Medication 5 MILLIGRAM(S): at 06:21

## 2018-09-12 RX ADMIN — Medication 2: at 06:42

## 2018-09-12 RX ADMIN — LISINOPRIL 20 MILLIGRAM(S): 2.5 TABLET ORAL at 06:21

## 2018-09-12 RX ADMIN — AMLODIPINE BESYLATE 5 MILLIGRAM(S): 2.5 TABLET ORAL at 06:21

## 2018-09-12 RX ADMIN — Medication 25 MILLIGRAM(S): at 17:59

## 2018-09-12 RX ADMIN — Medication 2: at 12:17

## 2018-09-12 RX ADMIN — ENOXAPARIN SODIUM 30 MILLIGRAM(S): 100 INJECTION SUBCUTANEOUS at 12:18

## 2018-09-12 RX ADMIN — Medication 5 MILLIGRAM(S): at 17:59

## 2018-09-12 RX ADMIN — SODIUM CHLORIDE 50 MILLILITER(S): 9 INJECTION, SOLUTION INTRAVENOUS at 09:27

## 2018-09-12 RX ADMIN — BACITRACIN AND POLYMYXIN B SULFATE 1 APPLICATION(S): 500; 10000 OINTMENT TOPICAL at 16:42

## 2018-09-12 RX ADMIN — Medication 100 MILLIEQUIVALENT(S): at 13:11

## 2018-09-12 RX ADMIN — BACITRACIN AND POLYMYXIN B SULFATE 1 APPLICATION(S): 500; 10000 OINTMENT TOPICAL at 21:33

## 2018-09-12 RX ADMIN — ATORVASTATIN CALCIUM 40 MILLIGRAM(S): 80 TABLET, FILM COATED ORAL at 21:32

## 2018-09-12 NOTE — PROGRESS NOTE ADULT - SUBJECTIVE AND OBJECTIVE BOX
Patient is a 89 year old woman admitted 9/7/18 with sudden onset of inability to speak and walk. Patient was with family when got into a car. While in the car suddenly was unable to speak. She went immediately with family to the ER and was unable to get out of the car. In the ER, Telestroke was consulted and recommendation by Dr. Libman for TPA which was given. She initially slightly improved with regard to speaking 1 word and slightly increased strength on the right. Yesterday she was noted to have decreased strength on the right but speaking 1 word , no.  family and nurse state she has been moving the right arm. She has been walking with a walker with assistance. She speaks only 1 word , no. She has continued to follow commands. She has continued to improve and noted to be now moving the fingers of the right hand.    PMH: HTN, DM, Hyperlipidemia, S/P bilateral hip replacements, OA      Exam: Laying in bed. Attempts to vocalize in response to questions. Only able to vocalize, no.  Follows simple commands.           CN II-XII intact except right VII           Motor tone and strength-right upper extremity- lifts briefly in air, slightly extends all fingers of the right hand                                                 right lower extremity- elevates above the bed                                                  left upper and lower extremity moves well and elevates above the bed          Uncooperative with rest of exam

## 2018-09-12 NOTE — SWALLOW VFSS/MBS ASSESSMENT ADULT - RECOMMENDED FEEDING/EATING TECHNIQUES
position upright (90 degrees)/allow for swallow between intakes/alternate food with liquid/small sips/bites/maintain upright posture during/after eating for 30 mins/check mouth frequently for oral residue/pocketing/crush medication (when feasible)/oral hygiene/provide rest periods between swallows

## 2018-09-12 NOTE — PROGRESS NOTE ADULT - PROBLEM SELECTOR PLAN 1
S/P TPA  S/P MRI which confirmed moderate size   cont aspirin/statin  awaiting PMR evaluation    Neurology following  BP control

## 2018-09-12 NOTE — PROGRESS NOTE ADULT - ATTENDING COMMENTS
89 y/o F with a h/o HTN, DM2, dyslipidemia, OA, with acute CVA, LENNIE, elevated troponin. At this time patient has residual aphasia and right upper extremity weakness.     - Cont. Aspirin and Statin  - PT/OT eval, swallow eval   - Aspiration precautions  - CT head, Carotid dopplers and Echo noted  - MRI with moderately sized acute bland left-sided white matter watershed infarct  - Cont BP meds, as BP is acceptable at this time  - Glucose control with fingerstick monitoring   - Gentle IV hydration while NPO as LENNIE is resolving  - DVT prophylaxis  - Code status DNR  - Does not need ICU level of care at this time    Care during this interval was provided by me. I have reviewed this patient's available data, including medical history, events of note, physical examination and test results, and have overseen the activities of other members of the care team under my direct supervision (e.g. physician assistants, nurse practitioners).     I was physically present for the key portions of the evaluation and management (E/M) service provided.  I agree with the above history, physical, and plan which I have reviewed and edited where appropriate.

## 2018-09-12 NOTE — PROGRESS NOTE ADULT - SUBJECTIVE AND OBJECTIVE BOX
Patient is a 89y old  Female who presents with a chief complaint of Aphasia and weakness       Patient seen and examined at bedside. Lying in bed, following all commands, reports feeling well. Able to move right arm but not hand.. Still with right facial droop     ALLERGIES:  No Known Allergies    MEDICATIONS  (STANDING):  amLODIPine   Tablet 5 milliGRAM(s) Oral daily  aspirin 325 milliGRAM(s) Oral daily  atorvastatin 40 milliGRAM(s) Oral at bedtime  BACItracin/polymyxin B Ointment 1 Application(s) Topical every 8 hours  dextrose 5% + sodium chloride 0.45%. 1000 milliLiter(s) (50 mL/Hr) IV Continuous <Continuous>  dextrose 5%. 1000 milliLiter(s) (50 mL/Hr) IV Continuous <Continuous>  dextrose 50% Injectable 12.5 Gram(s) IV Push once  dextrose 50% Injectable 25 Gram(s) IV Push once  dextrose 50% Injectable 25 Gram(s) IV Push once  enoxaparin Injectable 30 milliGRAM(s) SubCutaneous daily  insulin lispro (HumaLOG) corrective regimen sliding scale   SubCutaneous every 6 hours  lisinopril 20 milliGRAM(s) Oral daily  metoprolol tartrate 25 milliGRAM(s) Oral every 12 hours  oxybutynin 5 milliGRAM(s) Oral two times a day  pantoprazole   Suspension 40 milliGRAM(s) Oral daily  potassium chloride  10 mEq/100 mL IVPB 10 milliEquivalent(s) IV Intermittent every 1 hour    MEDICATIONS  (PRN):  dextrose 40% Gel 15 Gram(s) Oral once PRN Blood Glucose LESS THAN 70 milliGRAM(s)/deciliter  glucagon  Injectable 1 milliGRAM(s) IntraMuscular once PRN Glucose LESS THAN 70 milligrams/deciliter  ondansetron Injectable 4 milliGRAM(s) IV Push every 6 hours PRN Nausea and/or Vomiting    Vital Signs Last 24 Hrs  T(F): 98 (12 Sep 2018 10:02), Max: 99 (11 Sep 2018 20:44)  HR: 80 (12 Sep 2018 10:02) (80 - 95)  BP: 147/76 (12 Sep 2018 10:02) (143/58 - 160/77)  RR: 14 (12 Sep 2018 10:02) (14 - 15)  SpO2: 98% (12 Sep 2018 10:02) (95% - 98%)  I&O's Summary    BMI (kg/m2): 26.7 (09-11-18 @ 05:45)  PHYSICAL EXAM:  General: NAD, A/O x 3  Neck: Supple, No JVD  Lungs: Clear to auscultation bilaterally  Cardio: RRR, S1/S2, No murmurs  Abdomen: Soft, Nontender, Nondistended; Bowel sounds present  Extremities: No calf tenderness, No pitting edema  right facial droop, right upper extremity hemiparesis         LABS:                        14.4   6.7   )-----------( 184      ( 12 Sep 2018 05:30 )             41.7       09-12    140  |  104  |  12  ----------------------------<  145  3.4   |  27  |  1.01    Ca    9.2      12 Sep 2018 05:30  Phos  2.3     09-11  Mg     1.5     09-11       eGFR if Non African American: 48 mL/min/1.73M2 (09-12-18 @ 05:30)  eGFR if African American: 56 mL/min/1.73M2 (09-12-18 @ 05:30)         09-08 Chol 161 mg/dL LDL 99 mg/dL HDL 33 mg/dL Trig 147 mg/dL    CAPILLARY BLOOD GLUCOSE      POCT Blood Glucose.: 151 mg/dL (12 Sep 2018 06:27)  POCT Blood Glucose.: 142 mg/dL (11 Sep 2018 23:57)  POCT Blood Glucose.: 125 mg/dL (11 Sep 2018 17:46)  POCT Blood Glucose.: 160 mg/dL (11 Sep 2018 11:35)    09-08 [id number] 6.3      RADIOLOGY & ADDITIONAL TESTS:  < from: MR Head No Cont (09.11.18 @ 14:23) >  Moderately sized acute bland left-sided white matter watershed infarct      Care Discussed with Consultants: Cardiologist/Other Providers: Dr. Hutchison

## 2018-09-12 NOTE — CONSULT NOTE ADULT - ASSESSMENT
89 year old woman admitted Sept 7 2018 with acute CVA, s/p TPA.  Elevated troponin noted on admission, "flat".  In absence of cardiac ischemic symptoms or acute EKG changes, this is of unclear significance.  Doubt ACS.  She has no known cardiac history. No history of CAD, MI, CHF or arrhythmia.  Echo demonstrates normal LV function.  The following is recommended    Plan  1. ASA, Plavix and statin  2. No objection to rehab/PT  3. Continue anti hypertensive  4. Will follow as needed.  5. Discussed with patient's son and Hospitalist

## 2018-09-12 NOTE — PROGRESS NOTE ADULT - ASSESSMENT
89 year old woman admitted 9/7/18 with sudden onset of inability to speak and walk. Patient was with family when got into a car. While in the car suddenly was unable to speak. She went immediately with family to the ER and was unable to get out of the car. In the ER, Telestroke was consulted and recommendation by Dr. Libman for TPA which was given. She initially slightly improved with regard to speaking 1 word and slightly increased strength on the right. Yesterday she was noted to have decreased strength on the right. She has continued to improve.    1) Continue  mg daily  2) MRI Head revealed as above acute infarct centered on left centrum semiovale.  MRA Head and Neck did not reveal a  stenosis.  3) As per Medicine  4) Speech, PT, OT to continue

## 2018-09-12 NOTE — PROGRESS NOTE ADULT - SUBJECTIVE AND OBJECTIVE BOX
Follow-up Critical Care Progress Note  Chief Complaint : Cerebral infarction    No distress. Denies any pain. Still with right upper arm hemiparesis.     Allergies :No Known Allergies      PAST MEDICAL & SURGICAL HISTORY:  Dyslipidemia  Osteoarthritis  Sciatica  Diabetes: Type 2, &quot;many years&quot;  HTN (hypertension)  HTN (hypertension)  DM (diabetes mellitus)  H/O bilateral hip replacements: 2000&#x27;s at [facility], no complications  S/P hip replacement, bilateral      Medications:  MEDICATIONS  (STANDING):  amLODIPine   Tablet 5 milliGRAM(s) Oral daily  aspirin 325 milliGRAM(s) Oral daily  atorvastatin 40 milliGRAM(s) Oral at bedtime  BACItracin/polymyxin B Ointment 1 Application(s) Topical every 8 hours  dextrose 5% + sodium chloride 0.45%. 1000 milliLiter(s) (50 mL/Hr) IV Continuous <Continuous>  dextrose 5%. 1000 milliLiter(s) (50 mL/Hr) IV Continuous <Continuous>  dextrose 50% Injectable 12.5 Gram(s) IV Push once  dextrose 50% Injectable 25 Gram(s) IV Push once  dextrose 50% Injectable 25 Gram(s) IV Push once  enoxaparin Injectable 30 milliGRAM(s) SubCutaneous daily  insulin lispro (HumaLOG) corrective regimen sliding scale   SubCutaneous every 6 hours  lisinopril 20 milliGRAM(s) Oral daily  metoprolol tartrate 25 milliGRAM(s) Oral every 12 hours  oxybutynin 5 milliGRAM(s) Oral two times a day  pantoprazole   Suspension 40 milliGRAM(s) Oral daily    MEDICATIONS  (PRN):  dextrose 40% Gel 15 Gram(s) Oral once PRN Blood Glucose LESS THAN 70 milliGRAM(s)/deciliter  glucagon  Injectable 1 milliGRAM(s) IntraMuscular once PRN Glucose LESS THAN 70 milligrams/deciliter  ondansetron Injectable 4 milliGRAM(s) IV Push every 6 hours PRN Nausea and/or Vomiting      LABS:                        14.4   6.7   )-----------( 184      ( 12 Sep 2018 05:30 )             41.7     09-12    139  |  102  |  13  ----------------------------<  171<H>  3.8   |  28  |  1.13    Ca    9.7      12 Sep 2018 13:00  Phos  2.3     09-11  Mg     1.9     09-12    VITALS:  T(C): 36.7 (09-12-18 @ 10:02), Max: 37.2 (09-11-18 @ 20:44)  T(F): 98 (09-12-18 @ 10:02), Max: 99 (09-11-18 @ 20:44)  HR: 94 (09-12-18 @ 11:00) (80 - 95)  BP: 146/79 (09-12-18 @ 11:00) (143/58 - 158/66)  BP(mean): --  ABP: --  ABP(mean): --  RR: 14 (09-12-18 @ 10:02) (14 - 15)  SpO2: 94% (09-12-18 @ 11:00) (94% - 98%)  CVP(mm Hg): --  CVP(cm H2O): --    Ins and Outs         Weight (kg): 66.3 (09-11-18 @ 05:45)    Physical Examination:  GENERAL:               Awake and follows commands   HEENT:                   Pupils equal, reactive to light. Symmetric. No JVD, Moist MM  PULM:                    Bilateral air entry, Clear to auscultation bilaterally, no significant sputum production, No Rales, No Rhonchi, No Wheezing  CVS:                       S1, S2,  No Murmur  ABD:                        Soft, nondistended, nontender  EXT:                         No edema  NEURO:                  Alert, right upper extremity strenght 2/5. Moves other three extremities spontaneously with 4/5 strength. Right facial droop  PSYC:                      Calm, + Insight.

## 2018-09-12 NOTE — PROGRESS NOTE ADULT - ATTENDING COMMENTS
Patient seen and examined  Left sided watershed infarct with aphasia, dysphagia, and right sided weakness  MBS performed - pureed diet, honey-thick liquids, via teaspoon, with assistance, as per speech therapist  Calorie count recommended to ensure adequate PO intake  Patient accepted to acute rehab - awaiting authorization from insurance  Family updated at bedside

## 2018-09-12 NOTE — PROGRESS NOTE ADULT - ASSESSMENT
89 year female with PMH of HTN, DM2, dyslipidemia, OA. Admit with acute CVA, LENNIE, elevated trops.

## 2018-09-12 NOTE — SWALLOW VFSS/MBS ASSESSMENT ADULT - DIAGNOSTIC IMPRESSIONS
Pt seen in radiology, seated at a 90 degree angle, lateral view with c-arm, while in bed.  Pt administered barium impregnated trials of puree thick and thin via tsp, nectar and honey thickened liquids via tsp and cup administration. 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    Pt presents with moderate oropharyngeal dysphagia marked by moderate anterior loss across consistencies due to dense right facial weakness, reduced labial seal and poor stripping of bolus from spoon, poor bolus formation and manipulation with significant lingual pumping, delayed AP transport and diffuse lingual stasis post swallow with puree thin due to poor lingual strenght/ROM/control and coordination. Premature loss of bolus to the hypopharynx due to poor lingual control.    Delayed swallow initiation at the level of the valleculae with puree thin/thick and honey thickened liquids, at the level of the a-e folds with nectar and thickened liquids with one instance at the level of the pyriforms, with nectar due to reduced sensation.  During the delay in swallow initiation, Pt with penetration of nectar with subsequent aspiration during the swallow with sensation with nectar thickened liquids. Poor expectorant value.  Mildly reduced hyolaryngeal elevation and moderately reduced excursion results in min vallecular residue and min-mod pyriform residue with puree thin. No penetration or aspiration observed with honey thickened liquids or puree consistencies.    Suspect Pt with level of fatigue, which may negatively impact ability to meet nutrition/hydration needs, therefore, recommend calorie count.

## 2018-09-12 NOTE — SWALLOW VFSS/MBS ASSESSMENT ADULT - COMMENTS
9/11/18 MRI; Moderate sized acute bland left-sided white matter watershed infarct.    9/12/18 WBC: 6.7  9/9/18 CXR: No gross change heart and lungs.  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

## 2018-09-12 NOTE — SWALLOW VFSS/MBS ASSESSMENT ADULT - ORAL PHASE
Delayed oral transit time/Reduced anterior - posterior transport/Residue in oral cavity/Uncontrolled bolus / spillover in hypopharynx/Uncontrolled bolus / spillover in josue-pharynx Delayed oral transit time/Residue in oral cavity/Incomplete tongue to palate contact/Uncontrolled bolus / spillover in josue-pharynx/Reduced anterior - posterior transport/Uncontrolled bolus / spillover in hypopharynx Delayed oral transit time/Reduced anterior - posterior transport/Laryngeal penetration before swallow - silent/Uncontrolled bolus / spillover in josue-pharynx/Uncontrolled bolus / spillover in hypopharynx Residue in oral cavity/Uncontrolled bolus / spillover in hypopharynx/Uncontrolled bolus / spillover in josue-pharynx/Reduced anterior - posterior transport/Delayed oral transit time/Incomplete tongue to palate contact Delayed oral transit time/Residue in oral cavity/Uncontrolled bolus / spillover in hypopharynx/Reduced anterior - posterior transport/Incomplete tongue to palate contact/Uncontrolled bolus / spillover in josue-pharynx

## 2018-09-12 NOTE — SWALLOW VFSS/MBS ASSESSMENT ADULT - SLP PERTINENT HISTORY OF CURRENT PROBLEM
89 year old female with PMH HTN, DM2, dyslipidemia, OA had been in her usual state of health, was getting into the car when noted by family to suddenly become aphasic with pronounced weakness on the right side.  Brought immediately to ED at [facility], CT head negative for acute hemorrhage given TPA and transferred to ICU for further care.   Denies CP, SOB, abdominal pain, lightheadedness, dizziness, visual changes, syncope, headache, tremors, paresthesias anesthesias.

## 2018-09-12 NOTE — CONSULT NOTE ADULT - SUBJECTIVE AND OBJECTIVE BOX
[facility] Consultation    CHIEF COMPLAINT: Patient is a 89y old  Female who presents with a chief complaint of Aphasia and weakness (12 Sep 2018 10:29)      HPI:  89 year old female with PMH HTN, DM2, dyslipidemia, OA had been in her usual state of health, was getting into the car when noted by family to suddenly become aphasic with pronounced weakness on the right side.  Brought immediately to ED at [facility], CT head negative for acute hemorrhage given TPA and transferred to ICU for further care.   Denies CP, SOB, abdominal pain, lightheadedness, dizziness, visual changes, syncope, headache, tremors, paresthesias anesthesias. (07 Sep 2018 16:36)  Called to see patient regarding elevated troponin noted during admission     PAST MEDICAL & SURGICAL HISTORY:  Dyslipidemia  Osteoarthritis  Sciatica  Diabetes: Type 2, &quot;many years&quot;  HTN (hypertension)  HTN (hypertension)  DM (diabetes mellitus)  H/O bilateral hip replacements: 2000&#x27;s at [facility], no complications  S/P hip replacement, bilateral      SOCIAL HISTORY:    FAMILY HISTORY: FAMILY HISTORY:  No pertinent family history in first degree relatives      MEDICATIONS  (STANDING):  amLODIPine   Tablet 5 milliGRAM(s) Oral daily  aspirin 325 milliGRAM(s) Oral daily  atorvastatin 40 milliGRAM(s) Oral at bedtime  BACItracin/polymyxin B Ointment 1 Application(s) Topical every 8 hours  dextrose 5% + sodium chloride 0.45%. 1000 milliLiter(s) (50 mL/Hr) IV Continuous <Continuous>  dextrose 5%. 1000 milliLiter(s) (50 mL/Hr) IV Continuous <Continuous>  dextrose 50% Injectable 12.5 Gram(s) IV Push once  dextrose 50% Injectable 25 Gram(s) IV Push once  dextrose 50% Injectable 25 Gram(s) IV Push once  enoxaparin Injectable 30 milliGRAM(s) SubCutaneous daily  insulin lispro (HumaLOG) corrective regimen sliding scale   SubCutaneous every 6 hours  lisinopril 20 milliGRAM(s) Oral daily  metoprolol tartrate 25 milliGRAM(s) Oral every 12 hours  oxybutynin 5 milliGRAM(s) Oral two times a day  pantoprazole   Suspension 40 milliGRAM(s) Oral daily  potassium chloride  10 mEq/100 mL IVPB 10 milliEquivalent(s) IV Intermittent every 1 hour    MEDICATIONS  (PRN):  dextrose 40% Gel 15 Gram(s) Oral once PRN Blood Glucose LESS THAN 70 milliGRAM(s)/deciliter  glucagon  Injectable 1 milliGRAM(s) IntraMuscular once PRN Glucose LESS THAN 70 milligrams/deciliter  ondansetron Injectable 4 milliGRAM(s) IV Push every 6 hours PRN Nausea and/or Vomiting      Allergies No Known Allergies    REVIEW OF SYSTEMS:  as per HPI.  her son reports no cardiac symptoms of chest pain, shortness of breath, palpitations, dizziness, light headedness, syncope or near syncope     VITAL SIGNS:   Vital Signs Last 24 Hrs  T(C): 36.7 (12 Sep 2018 10:02), Max: 37.2 (11 Sep 2018 20:44)  T(F): 98 (12 Sep 2018 10:02), Max: 99 (11 Sep 2018 20:44)  HR: 80 (12 Sep 2018 10:02) (80 - 95)  BP: 147/76 (12 Sep 2018 10:02) (143/58 - 158/66)  BP(mean): --  RR: 14 (12 Sep 2018 10:02) (14 - 15)  SpO2: 98% (12 Sep 2018 10:02) (95% - 98%)    I&O's Summary      PHYSICAL EXAM:    Constitutional: elderly woman aphasic   Eyes:  EOMI,  Pupils round, no lesions  ENMT: no exudate or erythema  Pulmonary: Non-labored, breath sounds are clear bilaterally, No wheezing, rales or rhonchi  Cardiovascular: PMI not palpable non-displaced Regular S1 and S2 II/VI systolic murmur   Gastrointestinal: Bowel Sounds present, soft, nontender.   Lymph: No peripheral edema. No cervical lymphadenopathy.  Neurological: awake, right sided weakness   Skin: No rashes. Changes of chronic venous stasis. No cyanosis.  Psych:  Mood & affect appropriate    LABS: All Labs Reviewed:                        14.4   6.7   )-----------( 184      ( 12 Sep 2018 05:30 )             41.7                         13.1   6.0   )-----------( 143      ( 11 Sep 2018 06:20 )             38.0                         13.8   7.7   )-----------( 149      ( 10 Sep 2018 05:20 )             39.6     12 Sep 2018 05:30    140    |  104    |  12     ----------------------------<  145    3.4     |  27     |  1.01   11 Sep 2018 17:30    140    |  103    |  15     ----------------------------<  131    3.7     |  27     |  1.08   11 Sep 2018 06:20    136    |  100    |  15     ----------------------------<  519    3.1     |  29     |  1.11     Ca    9.2        12 Sep 2018 05:30  Ca    9.3        11 Sep 2018 17:30  Ca    8.0        11 Sep 2018 06:20  Phos  2.3       11 Sep 2018 06:20  Phos  2.3       10 Sep 2018 05:20  Mg     1.5       11 Sep 2018 06:20  Mg     1.9       10 Sep 2018 05:20    < from: 12 Lead ECG (09.07.18 @ 14:37) >  Normal sinus rhythm  Low voltage QRS  Borderline ECG  No previous ECGs available    < end of copied text >      < from: TTE Echo Complete w/Doppler (09.08.18 @ 09:00) >  1. Left ventricular ejection fraction, by visual estimation, is55 to   60%.   2. Normal global left ventricular systolic function.   3. Spectral Doppler shows pseudonormal pattern of left ventricular   myocardial filling (Grade II diastolic dysfunction).   4. There is mild septal left ventricular hypertrophy.   5. Normal right ventricular size and function.   6. There is no evidence of pericardial effusion.   7. Mild mitral annular calcification.   8. Mild tricuspid regurgitation.   9. Moderate aortic valve stenosis.  10. Structurally normal pulmonic valve, with normal leaflet excursion.  11. LA volume Index is 37.6 ml/m² ml/m2.    < end of copied text >

## 2018-09-12 NOTE — SWALLOW VFSS/MBS ASSESSMENT ADULT - NS SWALLOW VFSS REC ASPIR MON
change of breathing pattern/oral hygiene/position upright (90Y)/cough/gurgly voice/pneumonia/throat clearing/upper respiratory infection/fever

## 2018-09-12 NOTE — SWALLOW VFSS/MBS ASSESSMENT ADULT - ORAL PREPARATORY PHASE
Anterior loss/Poor bolus formation More cohesive bolus with puree thick vs. puree thin/Poor bolus formation/Anterior loss

## 2018-09-12 NOTE — CHART NOTE - NSCHARTNOTEFT_GEN_A_CORE
NUTRITION FOLLOW UP    SOURCE: Medical Record    DIET: NPO (9/9)    Chart reviewed , Patient noted NPO x3 days secondary to patient pulled out NGT (9/9).    CURRENT WEIGHT: Weight (kg): 66.3 (09-11 @ 05:45) - asked for daily weights    PERTINENT MEDS:   Pertinent Medications: MEDICATIONS  (STANDING):  amLODIPine   Tablet 5 milliGRAM(s) Oral daily  aspirin 325 milliGRAM(s) Oral daily  atorvastatin 40 milliGRAM(s) Oral at bedtime  BACItracin/polymyxin B Ointment 1 Application(s) Topical every 8 hours  dextrose 5% + sodium chloride 0.45%. 1000 milliLiter(s) (50 mL/Hr) IV Continuous <Continuous>  dextrose 5%. 1000 milliLiter(s) (50 mL/Hr) IV Continuous <Continuous>  dextrose 50% Injectable 12.5 Gram(s) IV Push once  dextrose 50% Injectable 25 Gram(s) IV Push once  dextrose 50% Injectable 25 Gram(s) IV Push once  enoxaparin Injectable 30 milliGRAM(s) SubCutaneous daily  insulin lispro (HumaLOG) corrective regimen sliding scale   SubCutaneous every 6 hours  lisinopril 20 milliGRAM(s) Oral daily  metoprolol tartrate 25 milliGRAM(s) Oral every 12 hours  oxybutynin 5 milliGRAM(s) Oral two times a day  pantoprazole   Suspension 40 milliGRAM(s) Oral daily    MEDICATIONS  (PRN):  dextrose 40% Gel 15 Gram(s) Oral once PRN Blood Glucose LESS THAN 70 milliGRAM(s)/deciliter  glucagon  Injectable 1 milliGRAM(s) IntraMuscular once PRN Glucose LESS THAN 70 milligrams/deciliter  ondansetron Injectable 4 milliGRAM(s) IV Push every 6 hours PRN Nausea and/or Vomiting      PERTINENT LABS:  Date: 12 Sep 2018 05:30  Hemoglobin 14.4   Hematocrit 41.7     Date: 09-12  Sodium 139  Potassium 3.8  Glucose Serum 171<H>  BUN 13      Creatinine    ACCUCHECK  POCT Blood Glucose.: 168 mg/dL (12 Sep 2018 12:15)  POCT Blood Glucose.: 151 mg/dL (12 Sep 2018 06:27)  POCT Blood Glucose.: 142 mg/dL (11 Sep 2018 23:57)  POCT Blood Glucose.: 125 mg/dL (11 Sep 2018 17:46)      SKIN: surgical wound healing    ESTIMATED NEEDS:   [X] no change since previous assessment  [ ] recalculated:     PREVIOUS NUTRITION DIAGNOSIS: addressed    NUTRITION DIAGNOSIS is   [X] resolved, RD will follow as per nutrition department protocol.    NEW NUTRITION DIAGNOSIS: [X] not applicable    MONITORING AND EVALUATION:   Current diet order is appropriate and is well tolerated, but will monitor for any changes that may be needed    [X] PO intake [X] Tolerance to diet prescription [X] weights [X] follow up per protocol    NUTRITION RECOMMENDATIONS: NUTRITION FOLLOW UP    SOURCE: Medical Record    DIET: NPO (9/9)     Patient currently NPO x since (9/9) due to patient pulled out NGT , noted for MBS today.  no recent BM noted . Skin is intact. POCT noted hx of DM.    CURRENT WEIGHT:143.3/65kg , stable weight since admission.    PERTINENT MEDS:   Pertinent Medications: MEDICATIONS  (STANDING):  amLODIPine   Tablet 5 milliGRAM(s) Oral daily  aspirin 325 milliGRAM(s) Oral daily  atorvastatin 40 milliGRAM(s) Oral at bedtime  BACItracin/polymyxin B Ointment 1 Application(s) Topical every 8 hours  dextrose 5% + sodium chloride 0.45%. 1000 milliLiter(s) (50 mL/Hr) IV Continuous <Continuous>  dextrose 5%. 1000 milliLiter(s) (50 mL/Hr) IV Continuous <Continuous>  dextrose 50% Injectable 12.5 Gram(s) IV Push once  dextrose 50% Injectable 25 Gram(s) IV Push once  dextrose 50% Injectable 25 Gram(s) IV Push once  enoxaparin Injectable 30 milliGRAM(s) SubCutaneous daily  insulin lispro (HumaLOG) corrective regimen sliding scale   SubCutaneous every 6 hours  lisinopril 20 milliGRAM(s) Oral daily  metoprolol tartrate 25 milliGRAM(s) Oral every 12 hours  oxybutynin 5 milliGRAM(s) Oral two times a day  pantoprazole   Suspension 40 milliGRAM(s) Oral daily    MEDICATIONS  (PRN):  dextrose 40% Gel 15 Gram(s) Oral once PRN Blood Glucose LESS THAN 70 milliGRAM(s)/deciliter  glucagon  Injectable 1 milliGRAM(s) IntraMuscular once PRN Glucose LESS THAN 70 milligrams/deciliter  ondansetron Injectable 4 milliGRAM(s) IV Push every 6 hours PRN Nausea and/or Vomiting      PERTINENT LABS:  Date: 12 Sep 2018 05:30  Hemoglobin 14.4   Hematocrit 41.7     Date: 09-12  Sodium 139  Potassium 3.8  Glucose Serum 171<H>  BUN 13  Creatinine1.13    ACCUCHECK  POCT Blood Glucose.: 168 mg/dL (12 Sep 2018 12:15)  POCT Blood Glucose.: 151 mg/dL (12 Sep 2018 06:27)  POCT Blood Glucose.: 142 mg/dL (11 Sep 2018 23:57)  POCT Blood Glucose.: 125 mg/dL (11 Sep 2018 17:46)        ESTIMATED NEEDS:   [X] no change since previous assessment      PREVIOUS NUTRITION DIAGNOSIS: addressed    NUTRITION DIAGNOSIS is   [X] on going, due to dysphagia, RD will follow as per nutrition department protocol.        MONITORING AND EVALUATION:    Monitor NPO status , await results from MBS, follow labs , weights.  NUTRITION RECOMMENDATIONS: Follow clinical course.

## 2018-09-12 NOTE — OCCUPATIONAL THERAPY INITIAL EVALUATION ADULT - RANGE OF MOTION EXAMINATION, UPPER EXTREMITY
Left UE Active ROM was WFL (within functional limits)/Right UE Passive ROM was WNL (within normal limits)/Minimal functional use Right UE

## 2018-09-12 NOTE — OCCUPATIONAL THERAPY INITIAL EVALUATION ADULT - PERTINENT HX OF CURRENT PROBLEM, REHAB EVAL
Pt had onset aphasia/right side weakness. Present to [facility]-CT negative for acute hemorrhage, tPA administered. MRI consistent for watershed infarct.

## 2018-09-13 LAB
ANION GAP SERPL CALC-SCNC: 7 MMOL/L — SIGNIFICANT CHANGE UP (ref 5–17)
BUN SERPL-MCNC: 10 MG/DL — SIGNIFICANT CHANGE UP (ref 7–23)
CALCIUM SERPL-MCNC: 8.5 MG/DL — SIGNIFICANT CHANGE UP (ref 8.4–10.5)
CHLORIDE SERPL-SCNC: 102 MMOL/L — SIGNIFICANT CHANGE UP (ref 96–108)
CO2 SERPL-SCNC: 28 MMOL/L — SIGNIFICANT CHANGE UP (ref 22–31)
CREAT SERPL-MCNC: 1.13 MG/DL — SIGNIFICANT CHANGE UP (ref 0.5–1.3)
GLUCOSE BLDC GLUCOMTR-MCNC: 146 MG/DL — HIGH (ref 70–99)
GLUCOSE BLDC GLUCOMTR-MCNC: 150 MG/DL — HIGH (ref 70–99)
GLUCOSE BLDC GLUCOMTR-MCNC: 159 MG/DL — HIGH (ref 70–99)
GLUCOSE BLDC GLUCOMTR-MCNC: 167 MG/DL — HIGH (ref 70–99)
GLUCOSE SERPL-MCNC: 454 MG/DL — CRITICAL HIGH (ref 70–99)
HCT VFR BLD CALC: 39.7 % — SIGNIFICANT CHANGE UP (ref 34.5–45)
HGB BLD-MCNC: 13.7 G/DL — SIGNIFICANT CHANGE UP (ref 11.5–15.5)
MAGNESIUM SERPL-MCNC: 1.4 MG/DL — LOW (ref 1.6–2.6)
MCHC RBC-ENTMCNC: 31.4 PG — SIGNIFICANT CHANGE UP (ref 27–34)
MCHC RBC-ENTMCNC: 34.6 GM/DL — SIGNIFICANT CHANGE UP (ref 32–36)
MCV RBC AUTO: 90.7 FL — SIGNIFICANT CHANGE UP (ref 80–100)
PLATELET # BLD AUTO: 162 K/UL — SIGNIFICANT CHANGE UP (ref 150–400)
POTASSIUM SERPL-MCNC: 3 MMOL/L — LOW (ref 3.5–5.3)
POTASSIUM SERPL-SCNC: 3 MMOL/L — LOW (ref 3.5–5.3)
RBC # BLD: 4.38 M/UL — SIGNIFICANT CHANGE UP (ref 3.8–5.2)
RBC # FLD: 11.7 % — SIGNIFICANT CHANGE UP (ref 10.3–14.5)
SODIUM SERPL-SCNC: 137 MMOL/L — SIGNIFICANT CHANGE UP (ref 135–145)
WBC # BLD: 6.8 K/UL — SIGNIFICANT CHANGE UP (ref 3.8–10.5)
WBC # FLD AUTO: 6.8 K/UL — SIGNIFICANT CHANGE UP (ref 3.8–10.5)

## 2018-09-13 PROCEDURE — 99232 SBSQ HOSP IP/OBS MODERATE 35: CPT

## 2018-09-13 PROCEDURE — 71045 X-RAY EXAM CHEST 1 VIEW: CPT | Mod: 26

## 2018-09-13 PROCEDURE — 99233 SBSQ HOSP IP/OBS HIGH 50: CPT | Mod: GC

## 2018-09-13 RX ORDER — PANTOPRAZOLE SODIUM 20 MG/1
1 TABLET, DELAYED RELEASE ORAL
Qty: 0 | Refills: 0 | COMMUNITY
Start: 2018-09-13

## 2018-09-13 RX ORDER — MAGNESIUM OXIDE 400 MG ORAL TABLET 241.3 MG
400 TABLET ORAL
Qty: 0 | Refills: 0 | Status: DISCONTINUED | OUTPATIENT
Start: 2018-09-13 | End: 2018-09-19

## 2018-09-13 RX ORDER — MAGNESIUM OXIDE 400 MG ORAL TABLET 241.3 MG
1 TABLET ORAL
Qty: 0 | Refills: 0 | COMMUNITY
Start: 2018-09-13

## 2018-09-13 RX ORDER — METOPROLOL TARTRATE 50 MG
1 TABLET ORAL
Qty: 0 | Refills: 0 | COMMUNITY
Start: 2018-09-13

## 2018-09-13 RX ORDER — ATORVASTATIN CALCIUM 80 MG/1
1 TABLET, FILM COATED ORAL
Qty: 0 | Refills: 0 | COMMUNITY
Start: 2018-09-13

## 2018-09-13 RX ORDER — SIMVASTATIN 20 MG/1
1 TABLET, FILM COATED ORAL
Qty: 0 | Refills: 0 | COMMUNITY

## 2018-09-13 RX ORDER — POTASSIUM CHLORIDE 20 MEQ
40 PACKET (EA) ORAL ONCE
Qty: 0 | Refills: 0 | Status: COMPLETED | OUTPATIENT
Start: 2018-09-13 | End: 2018-09-13

## 2018-09-13 RX ORDER — MAGNESIUM SULFATE 500 MG/ML
1 VIAL (ML) INJECTION ONCE
Qty: 0 | Refills: 0 | Status: COMPLETED | OUTPATIENT
Start: 2018-09-13 | End: 2018-09-13

## 2018-09-13 RX ORDER — MELOXICAM 15 MG/1
1 TABLET ORAL
Qty: 0 | Refills: 0 | COMMUNITY

## 2018-09-13 RX ORDER — AMLODIPINE BESYLATE 2.5 MG/1
1 TABLET ORAL
Qty: 0 | Refills: 0 | COMMUNITY
Start: 2018-09-13

## 2018-09-13 RX ORDER — OXYBUTYNIN CHLORIDE 5 MG
1 TABLET ORAL
Qty: 0 | Refills: 0 | COMMUNITY
Start: 2018-09-13

## 2018-09-13 RX ORDER — OXYBUTYNIN CHLORIDE 5 MG
0 TABLET ORAL
Qty: 0 | Refills: 0 | COMMUNITY

## 2018-09-13 RX ORDER — LISINOPRIL 2.5 MG/1
1 TABLET ORAL
Qty: 0 | Refills: 0 | COMMUNITY
Start: 2018-09-13

## 2018-09-13 RX ORDER — RANITIDINE HYDROCHLORIDE 150 MG/1
0 TABLET, FILM COATED ORAL
Qty: 0 | Refills: 0 | COMMUNITY

## 2018-09-13 RX ORDER — AMLODIPINE BESYLATE AND BENAZEPRIL HYDROCHLORIDE 10; 20 MG/1; MG/1
1 CAPSULE ORAL
Qty: 0 | Refills: 0 | COMMUNITY

## 2018-09-13 RX ORDER — GLIMEPIRIDE 1 MG
1 TABLET ORAL
Qty: 0 | Refills: 0 | COMMUNITY

## 2018-09-13 RX ORDER — BACITRACIN AND POLYMYXIN B SULFATE 500; 10000 [USP'U]/G; [USP'U]/G
1 OINTMENT TOPICAL
Qty: 0 | Refills: 0 | COMMUNITY
Start: 2018-09-13

## 2018-09-13 RX ORDER — METOPROLOL TARTRATE 50 MG
1 TABLET ORAL
Qty: 0 | Refills: 0 | COMMUNITY

## 2018-09-13 RX ADMIN — Medication 40 MILLIEQUIVALENT(S): at 13:45

## 2018-09-13 RX ADMIN — Medication 325 MILLIGRAM(S): at 13:46

## 2018-09-13 RX ADMIN — Medication 2: at 08:07

## 2018-09-13 RX ADMIN — BACITRACIN AND POLYMYXIN B SULFATE 1 APPLICATION(S): 500; 10000 OINTMENT TOPICAL at 05:20

## 2018-09-13 RX ADMIN — BACITRACIN AND POLYMYXIN B SULFATE 1 APPLICATION(S): 500; 10000 OINTMENT TOPICAL at 21:17

## 2018-09-13 RX ADMIN — Medication 100 GRAM(S): at 17:07

## 2018-09-13 RX ADMIN — Medication 25 MILLIGRAM(S): at 05:20

## 2018-09-13 RX ADMIN — MAGNESIUM OXIDE 400 MG ORAL TABLET 400 MILLIGRAM(S): 241.3 TABLET ORAL at 14:54

## 2018-09-13 RX ADMIN — ATORVASTATIN CALCIUM 40 MILLIGRAM(S): 80 TABLET, FILM COATED ORAL at 21:17

## 2018-09-13 RX ADMIN — LISINOPRIL 20 MILLIGRAM(S): 2.5 TABLET ORAL at 05:20

## 2018-09-13 RX ADMIN — AMLODIPINE BESYLATE 5 MILLIGRAM(S): 2.5 TABLET ORAL at 05:20

## 2018-09-13 RX ADMIN — MAGNESIUM OXIDE 400 MG ORAL TABLET 400 MILLIGRAM(S): 241.3 TABLET ORAL at 18:10

## 2018-09-13 RX ADMIN — Medication 2: at 12:38

## 2018-09-13 RX ADMIN — Medication 5 MILLIGRAM(S): at 18:10

## 2018-09-13 RX ADMIN — Medication 25 MILLIGRAM(S): at 18:10

## 2018-09-13 RX ADMIN — PANTOPRAZOLE SODIUM 40 MILLIGRAM(S): 20 TABLET, DELAYED RELEASE ORAL at 14:54

## 2018-09-13 RX ADMIN — Medication 5 MILLIGRAM(S): at 05:20

## 2018-09-13 RX ADMIN — BACITRACIN AND POLYMYXIN B SULFATE 1 APPLICATION(S): 500; 10000 OINTMENT TOPICAL at 14:54

## 2018-09-13 RX ADMIN — ENOXAPARIN SODIUM 30 MILLIGRAM(S): 100 INJECTION SUBCUTANEOUS at 13:46

## 2018-09-13 NOTE — DISCHARGE NOTE ADULT - SECONDARY DIAGNOSIS.
Aphasia LENNIE (acute kidney injury) Type 2 diabetes mellitus without complication, without long-term current use of insulin Dyslipidemia Demand ischemia Osteoarthritis, unspecified osteoarthritis type, unspecified site

## 2018-09-13 NOTE — DISCHARGE NOTE ADULT - CARE PROVIDER_API CALL
ELDON,   Phone: (   )    -  Fax: (   )    - PMD,   Phone: (   )    -  Fax: (   )    -    Armando Feldman), Family Medicine; Wound Care  [address] [place] [address]  Phone: [phone]  Fax: [phone]

## 2018-09-13 NOTE — DISCHARGE NOTE ADULT - MEDICATION SUMMARY - MEDICATIONS TO STOP TAKING
I will STOP taking the medications listed below when I get home from the hospital:    simvastatin 20 mg oral tablet  -- 1 tab(s) by mouth once a day (at bedtime)    glimepiride 2 mg oral tablet  -- 1 tab(s) by mouth once a day    raNITIdine 150 mg oral capsule  -- orally once a day    amlodipine-benazepril 5 mg-20 mg oral capsule  -- 1 cap(s) by mouth once a day    meloxicam 7.5 mg oral tablet  -- 1 tab(s) by mouth once a day

## 2018-09-13 NOTE — DISCHARGE NOTE ADULT - PROVIDER TOKENS
FREE:[LAST:[PMD],PHONE:[(   )    -],FAX:[(   )    -]] FREE:[LAST:[PMD],PHONE:[(   )    -],FAX:[(   )    -]],TOKEN:'[id number]'

## 2018-09-13 NOTE — DISCHARGE NOTE ADULT - VISION (WITH CORRECTIVE LENSES IF THE PATIENT USUALLY WEARS THEM):
B/L Cataract surgery in past/Partially impaired: cannot see medication labels or newsprint, but can see obstacles in path, and the surrounding layout; can count fingers at arm's length

## 2018-09-13 NOTE — PROGRESS NOTE ADULT - SUBJECTIVE AND OBJECTIVE BOX
Follow up for stroke  SUBJ:    no complaints however still  aphasic and hemiparetic     PMH  Dyslipidemia  Osteoarthritis  Sciatica  Diabetes  HTN (hypertension)  HTN (hypertension)  DM (diabetes mellitus)      MEDICATIONS  (STANDING):  amLODIPine   Tablet 5 milliGRAM(s) Oral daily  aspirin 325 milliGRAM(s) Oral daily  atorvastatin 40 milliGRAM(s) Oral at bedtime  BACItracin/polymyxin B Ointment 1 Application(s) Topical every 8 hours  dextrose 5% + sodium chloride 0.45%. 1000 milliLiter(s) (50 mL/Hr) IV Continuous <Continuous>  dextrose 5%. 1000 milliLiter(s) (50 mL/Hr) IV Continuous <Continuous>  dextrose 50% Injectable 12.5 Gram(s) IV Push once  dextrose 50% Injectable 25 Gram(s) IV Push once  dextrose 50% Injectable 25 Gram(s) IV Push once  enoxaparin Injectable 30 milliGRAM(s) SubCutaneous daily  insulin lispro (HumaLOG) corrective regimen sliding scale   SubCutaneous three times a day before meals  lisinopril 20 milliGRAM(s) Oral daily  magnesium oxide 400 milliGRAM(s) Oral three times a day with meals  metoprolol tartrate 25 milliGRAM(s) Oral every 12 hours  oxybutynin 5 milliGRAM(s) Oral two times a day  pantoprazole   Suspension 40 milliGRAM(s) Oral daily  potassium chloride    Tablet ER 40 milliEquivalent(s) Oral once    MEDICATIONS  (PRN):  dextrose 40% Gel 15 Gram(s) Oral once PRN Blood Glucose LESS THAN 70 milliGRAM(s)/deciliter  glucagon  Injectable 1 milliGRAM(s) IntraMuscular once PRN Glucose LESS THAN 70 milligrams/deciliter  ondansetron Injectable 4 milliGRAM(s) IV Push every 6 hours PRN Nausea and/or Vomiting        PHYSICAL EXAM:  Vital Signs Last 24 Hrs  T(C): 37.2 (13 Sep 2018 09:13), Max: 37.2 (13 Sep 2018 09:13)  T(F): 98.9 (13 Sep 2018 09:13), Max: 98.9 (13 Sep 2018 09:13)  HR: 91 (13 Sep 2018 09:13) (65 - 92)  BP: 153/77 (13 Sep 2018 09:13) (147/82 - 180/86)  BP(mean): --  RR: 14 (13 Sep 2018 09:13) (14 - 16)  SpO2: 97% (13 Sep 2018 09:13) (95% - 99%)    GENERAL: NAD, well-groomed, well-developed elderly  HEAD:  Atraumatic, Normocephalic  EYES: EOMI, PERRLA, conjunctiva and sclera clear  ENT: Moist mucous membranes,  NECK: Supple, No JVD, no bruits  CHEST/LUNG: Clear to percussion bilaterally; No rales, rhonchi, wheezing, or rubs  HEART: Regular rate and rhythm; No murmurs, rubs, or gallops PMI non displaced.  ABDOMEN: Soft, Nontender, Nondistended; Bowel sounds present  EXTREMITIES:  2+ Peripheral Pulses, No clubbing, cyanosis, or edema  SKIN: No rashes or lesions  NERVOUS SYSTEM:  Cranial Nerves II-XII intact    TELEMETRY:    ECG:    < from: 12 Lead ECG (09.07.18 @ 14:37) >  Ventricular Rate 90 BPM    Atrial Rate 90 BPM    P-R Interval 146 ms    QRS Duration 74 ms    Q-T Interval 382 ms    QTC Calculation(Bezet) 467 ms    P Axis 80 degrees    R Axis 50 degrees    T Axis 67 degrees    Diagnosis Line Normal sinus rhythm  Low voltage QRS  Borderline ECG  No previous ECGs available  Confirmed by JAG HERNANDEZ, VIKTOR SPIVEY ([id number]) on 9/8/2018 8:35:52 AM    < end of copied text >    ECHO:  < from: TTE Echo Complete w/Doppler (09.08.18 @ 09:00) >  Summary:   1. Left ventricular ejection fraction, by visual estimation, is55 to   60%.   2. Normal global left ventricular systolic function.   3. Spectral Doppler shows pseudonormal pattern of left ventricular   myocardial filling (Grade II diastolic dysfunction).   4. There is mild septal left ventricular hypertrophy.   5. Normal right ventricular size and function.   6. There is no evidence of pericardial effusion.   7. Mild mitral annular calcification.   8. Mild tricuspid regurgitation.   9. Moderate aortic valve stenosis.  10. Structurally normal pulmonic valve, with normal leaflet excursion.  11. LA volume Index is 37.6 ml/m² ml/m2.    [id number] Viktor Mckeon MD, [facility] , Electronically signed on 9/8/2018 at   12:26:41 PM       *** Final ***    < end of copied text >      LABS:                        13.7   6.8   )-----------( 162      ( 13 Sep 2018 06:15 )             39.7     09-13    137  |  102  |  10  ----------------------------<  454<HH>  3.0<L>   |  28  |  1.13    Ca    8.5      13 Sep 2018 06:15  Mg     1.4     09-13      I&O's Summary    12 Sep 2018 07:01  -  13 Sep 2018 07:00  --------------------------------------------------------  IN: 1060 mL / OUT: 0 mL / NET: 1060 mL      BNP    RADIOLOGY & ADDITIONAL STUDIES:    < from: Xray Chest 1 View- PORTABLE-Routine (09.13.18 @ 08:41) >  EXAM:  XR CHEST PORTABLE ROUTINE 1V      PROCEDURE DATE:  09/13/2018        INTERPRETATION:  INDICATION: CVA; evaluate for aspiration.    PRIORS: September 9, 2018.    VIEWS: Portable AP radiography of the chest performed.    FINDINGS: Since prior evaluation an NGT has been removed. Heart size   appears within normal limits. No hilar or superior mediastinal   abnormalities are identified. There is no evidence for focal infiltrate,   lobar consolidation or pulmonary edema. No pleural effusion or  pneumothorax is demonstrated. No mediastinal shift is noted. No acute   osseous fractures are demonstrated.    IMPRESSION: No evidence for interval development of focal infiltrate or   lobar consolidation.      YFN REYES M.D., ATTENDING RADIOLOGIST  This document has been electronically signed. Sep 13 2018  9:49AM    < end of copied text >

## 2018-09-13 NOTE — DISCHARGE NOTE ADULT - CARE PLAN
Principal Discharge DX:	Cerebrovascular accident (CVA), unspecified mechanism  Goal:	to transfer to Acute REhab for PT/OT and speech therapy  Assessment and plan of treatment:	continue ASA, Statin  Secondary Diagnosis:	Aphasia  Secondary Diagnosis:	LENNIE (acute kidney injury)  Secondary Diagnosis:	Type 2 diabetes mellitus without complication, without long-term current use of insulin  Secondary Diagnosis:	Dyslipidemia  Secondary Diagnosis:	Demand ischemia  Secondary Diagnosis:	Osteoarthritis, unspecified osteoarthritis type, unspecified site

## 2018-09-13 NOTE — DISCHARGE NOTE ADULT - HOSPITAL COURSE
89 year female with PMH of HTN, DM2, dyslipidemia, OA> Admit with acute CVA, LENNIE, elevated trops.  < from: MR Angio Neck No Cont (09.11.18 @ 14:52) >  IMPRESSION:    Normal study    < end of copied text >  < from: MR Head No Cont (09.11.18 @ 14:23) >    IMPRESSION:    Moderately sized acute bland left-sided white matter watershed infarct            < end of copied text > 89 year female with PMH of HTN, DM2, dyslipidemia, OA> Admit with acute CVA, LENNIE, elevated trops.      < from: MR Angio Neck No Cont (09.11.18 @ 14:52) >  IMPRESSION:    Normal study    < end of copied text >  < from: MR Head No Cont (09.11.18 @ 14:23) >    IMPRESSION:    Moderately sized acute bland left-sided white matter watershed infarct        < end of copied text >    Hospitalization course prolonged because insurance would not authorize acute rehab stay until updated clinical information was provided. Patient stable for discharge to acute rehab pending auth from insurance company.

## 2018-09-13 NOTE — PROGRESS NOTE ADULT - ASSESSMENT
89 year female with PMH of HTN, DM2, dyslipidemia, OA. Admit with acute CVA, LENNIE, elevated trops.       Problem/Plan - 1:  ·  Problem: Acute CVA (cerebrovascular accident).  Plan: S/P TPA  S/P MRI which confirmed moderate size   cont aspirin/statin  Neurology note appreciated, pt. accepted to Acute Rehab awaiting auth and bed.     Problem/Plan - 2:  ·  Problem: LENNIE (acute kidney injury).  Plan: resolved.      Problem/Plan - 3:  ·  Problem: HTN (hypertension).  Plan: Continue with metoprolol, norvasc, lisinopril.      Problem/Plan - 4:  ·  Problem: Aphasia.  Plan: secondary to CVA  speech and OT ordered.      Problem/Plan - 5:  ·  Problem: Demand ischemia.  Plan: Cardiologist consulted

## 2018-09-13 NOTE — DISCHARGE NOTE ADULT - MEDICATION SUMMARY - MEDICATIONS TO TAKE
I will START or STAY ON the medications listed below when I get home from the hospital:    aspirin 325 mg oral tablet  -- 1 tab(s) by mouth once a day  -- Indication: For Acute CVA (cerebrovascular accident)    lisinopril 20 mg oral tablet  -- 1 tab(s) by mouth once a day  -- Indication: For HTN (hypertension)    atorvastatin 40 mg oral tablet  -- 1 tab(s) by mouth once a day (at bedtime)  -- Indication: For Dyslipidemia    metoprolol tartrate 25 mg oral tablet  -- 1 tab(s) by mouth every 12 hours  -- Indication: For HTN (hypertension)    amLODIPine 5 mg oral tablet  -- 1 tab(s) by mouth once a day  -- Indication: For HTN (hypertension)    bacitracin-polymyxin B 500 units-10,000 units/g topical ointment  -- 1 application on skin every 8 hours  -- Indication: For rash    magnesium oxide 400 mg (241.3 mg elemental magnesium) oral tablet  -- 1 tab(s) by mouth 3 times a day (with meals)  -- Indication: For magnesium supplement    pantoprazole 40 mg oral granule, delayed release  -- 1 tab(s) by mouth once a day (in the morning)  -- Indication: For GERD    oxybutynin 5 mg oral tablet  -- 1 tab(s) by mouth 2 times a day  -- Indication: For BLADDER I will START or STAY ON the medications listed below when I get home from the hospital:    aspirin 325 mg oral tablet  -- 1 tab(s) by mouth once a day  -- Indication: For Acute CVA (cerebrovascular accident)    lisinopril 40 mg oral tablet  -- 1 tab(s) by mouth once a day  -- Indication: For HTN (hypertension)    enoxaparin  -- 30 milligram(s) subcutaneous once a day  -- Indication: For DVT prophylaxis    atorvastatin 40 mg oral tablet  -- 1 tab(s) by mouth once a day (at bedtime)  -- Indication: For Dyslipidemia    metoprolol tartrate 25 mg oral tablet  -- 1 tab(s) by mouth every 12 hours  -- Indication: For HTN (hypertension)    amLODIPine 5 mg oral tablet  -- 1 tab(s) by mouth once a day  -- Indication: For HTN (hypertension)    bacitracin-polymyxin B 500 units-10,000 units/g topical ointment  -- 1 application on skin every 8 hours  -- Indication: For rash    senna oral tablet  -- 2 tab(s) by mouth once a day (at bedtime)  -- Indication: For Constipation    docusate sodium 100 mg oral capsule  -- 1 cap(s) by mouth 2 times a day  -- Indication: For Constipation    magnesium oxide 400 mg (241.3 mg elemental magnesium) oral tablet  -- 1 tab(s) by mouth 3 times a day (with meals)  -- Indication: For magnesium supplement - can stop when magnesium normalized    ciprofloxacin 0.3% ophthalmic solution  -- 1 drop(s) to each affected eye every 4 hours  -- Indication: For For conjunctivitis- Stop on 9/22    pantoprazole 40 mg oral granule, delayed release  -- 1 tab(s) by mouth once a day (in the morning)  -- Indication: For GERD    oxybutynin 5 mg oral tablet  -- 1 tab(s) by mouth 2 times a day  -- Indication: For BLADDER

## 2018-09-13 NOTE — PROGRESS NOTE ADULT - SUBJECTIVE AND OBJECTIVE BOX
Patient is a 89y old  Female who presents with a chief complaint of Aphasia and weakness (13 Sep 2018 14:44)  Patient seen and examined at bedside.    ALLERGIES:  No Known Allergies    MEDICATIONS  (STANDING):  amLODIPine   Tablet 5 milliGRAM(s) Oral daily  aspirin 325 milliGRAM(s) Oral daily  atorvastatin 40 milliGRAM(s) Oral at bedtime  BACItracin/polymyxin B Ointment 1 Application(s) Topical every 8 hours  dextrose 5% + sodium chloride 0.45%. 1000 milliLiter(s) (50 mL/Hr) IV Continuous <Continuous>  dextrose 5%. 1000 milliLiter(s) (50 mL/Hr) IV Continuous <Continuous>  dextrose 50% Injectable 12.5 Gram(s) IV Push once  dextrose 50% Injectable 25 Gram(s) IV Push once  dextrose 50% Injectable 25 Gram(s) IV Push once  enoxaparin Injectable 30 milliGRAM(s) SubCutaneous daily  insulin lispro (HumaLOG) corrective regimen sliding scale   SubCutaneous three times a day before meals  lisinopril 20 milliGRAM(s) Oral daily  magnesium oxide 400 milliGRAM(s) Oral three times a day with meals  metoprolol tartrate 25 milliGRAM(s) Oral every 12 hours  oxybutynin 5 milliGRAM(s) Oral two times a day  pantoprazole   Suspension 40 milliGRAM(s) Oral daily    MEDICATIONS  (PRN):  dextrose 40% Gel 15 Gram(s) Oral once PRN Blood Glucose LESS THAN 70 milliGRAM(s)/deciliter  glucagon  Injectable 1 milliGRAM(s) IntraMuscular once PRN Glucose LESS THAN 70 milligrams/deciliter  ondansetron Injectable 4 milliGRAM(s) IV Push every 6 hours PRN Nausea and/or Vomiting    Vital Signs Last 24 Hrs  T(F): 98.9 (13 Sep 2018 09:13), Max: 98.9 (13 Sep 2018 09:13)  HR: 91 (13 Sep 2018 09:13) (65 - 92)  BP: 153/77 (13 Sep 2018 09:13) (147/82 - 180/86)  RR: 14 (13 Sep 2018 09:13) (14 - 16)  SpO2: 97% (13 Sep 2018 09:13) (95% - 99%)  I&O's Summary    12 Sep 2018 07:01  -  13 Sep 2018 07:00  --------------------------------------------------------  IN: 1060 mL / OUT: 0 mL / NET: 1060 mL      PHYSICAL EXAM:  General: NAD, alert and awake.  Neck: Supple, No JVD  Lungs: Clear to auscultation bilaterally  Cardio: RRR, S1/S2, No murmurs  Abdomen: Soft, Nontender, Nondistended; Bowel sounds present  Extremities: No calf tenderness, No pitting edema    LABS:                        13.7   6.8   )-----------( 162      ( 13 Sep 2018 06:15 )             39.7     09-13    137  |  102  |  10  ----------------------------<  454  3.0   |  28  |  1.13    Ca    8.5      13 Sep 2018 06:15  Phos  2.3     09-11  Mg     1.4     09-13      eGFR if Non African American: 42 mL/min/1.73M2 (09-13-18 @ 06:15)  eGFR if African American: 49 mL/min/1.73M2 (09-13-18 @ 06:15)  09-08 Chol 161 mg/dL LDL 99 mg/dL HDL 33 mg/dL Trig 147 mg/dL        CAPILLARY BLOOD GLUCOSE      POCT Blood Glucose.: 159 mg/dL (13 Sep 2018 12:11)  POCT Blood Glucose.: 167 mg/dL (13 Sep 2018 07:55)  POCT Blood Glucose.: 153 mg/dL (12 Sep 2018 21:29)  POCT Blood Glucose.: 141 mg/dL (12 Sep 2018 16:46)    09-08 [id number] 6.3    RADIOLOGY & ADDITIONAL TESTS:    Care Discussed with Consultants/Other Providers: Patient is a 89y old  Female who presents with a chief complaint of Aphasia and weakness (13 Sep 2018 14:44)  Patient seen and examined at bedside.    ALLERGIES:  No Known Allergies    MEDICATIONS  (STANDING):  amLODIPine   Tablet 5 milliGRAM(s) Oral daily  aspirin 325 milliGRAM(s) Oral daily  atorvastatin 40 milliGRAM(s) Oral at bedtime  BACItracin/polymyxin B Ointment 1 Application(s) Topical every 8 hours  dextrose 5% + sodium chloride 0.45%. 1000 milliLiter(s) (50 mL/Hr) IV Continuous <Continuous>  dextrose 5%. 1000 milliLiter(s) (50 mL/Hr) IV Continuous <Continuous>  dextrose 50% Injectable 12.5 Gram(s) IV Push once  dextrose 50% Injectable 25 Gram(s) IV Push once  dextrose 50% Injectable 25 Gram(s) IV Push once  enoxaparin Injectable 30 milliGRAM(s) SubCutaneous daily  insulin lispro (HumaLOG) corrective regimen sliding scale   SubCutaneous three times a day before meals  lisinopril 20 milliGRAM(s) Oral daily  magnesium oxide 400 milliGRAM(s) Oral three times a day with meals  metoprolol tartrate 25 milliGRAM(s) Oral every 12 hours  oxybutynin 5 milliGRAM(s) Oral two times a day  pantoprazole   Suspension 40 milliGRAM(s) Oral daily    MEDICATIONS  (PRN):  dextrose 40% Gel 15 Gram(s) Oral once PRN Blood Glucose LESS THAN 70 milliGRAM(s)/deciliter  glucagon  Injectable 1 milliGRAM(s) IntraMuscular once PRN Glucose LESS THAN 70 milligrams/deciliter  ondansetron Injectable 4 milliGRAM(s) IV Push every 6 hours PRN Nausea and/or Vomiting    Vital Signs Last 24 Hrs  T(F): 98.9 (13 Sep 2018 09:13), Max: 98.9 (13 Sep 2018 09:13)  HR: 91 (13 Sep 2018 09:13) (65 - 92)  BP: 153/77 (13 Sep 2018 09:13) (147/82 - 180/86)  RR: 14 (13 Sep 2018 09:13) (14 - 16)  SpO2: 97% (13 Sep 2018 09:13) (95% - 99%)  I&O's Summary    12 Sep 2018 07:01  -  13 Sep 2018 07:00  --------------------------------------------------------  IN: 1060 mL / OUT: 0 mL / NET: 1060 mL      PHYSICAL EXAM:  General: NAD, alert and awake.  Neck: Supple, No JVD  Lungs: Clear to auscultation bilaterally  Cardio: RRR, S1/S2, No murmurs  Abdomen: Soft, Nontender, Nondistended; Bowel sounds present  Extremities: No calf tenderness, No pitting edema  Neuro:  right upper and lower ext weakness, +facial droop, +aphasia    LABS:                        13.7   6.8   )-----------( 162      ( 13 Sep 2018 06:15 )             39.7     09-13    137  |  102  |  10  ----------------------------<  454  3.0   |  28  |  1.13    Ca    8.5      13 Sep 2018 06:15  Phos  2.3     09-11  Mg     1.4     09-13      eGFR if Non African American: 42 mL/min/1.73M2 (09-13-18 @ 06:15)  eGFR if African American: 49 mL/min/1.73M2 (09-13-18 @ 06:15)  09-08 Chol 161 mg/dL LDL 99 mg/dL HDL 33 mg/dL Trig 147 mg/dL        CAPILLARY BLOOD GLUCOSE      POCT Blood Glucose.: 159 mg/dL (13 Sep 2018 12:11)  POCT Blood Glucose.: 167 mg/dL (13 Sep 2018 07:55)  POCT Blood Glucose.: 153 mg/dL (12 Sep 2018 21:29)  POCT Blood Glucose.: 141 mg/dL (12 Sep 2018 16:46)    09-08 [id number] 6.3    RADIOLOGY & ADDITIONAL TESTS:    Care Discussed with Consultants/Other Providers:

## 2018-09-13 NOTE — DISCHARGE NOTE ADULT - PATIENT PORTAL LINK FT
You can access the [facility], offered by [facility], by registering with the following website: [email or web address]

## 2018-09-13 NOTE — PROGRESS NOTE ADULT - ATTENDING COMMENTS
Patient seen and examined by me personally.  Patient is improving each day - still with dysphagia, dysarthria and right sided weakness  Dx with acute CVA  Awaiting insurance auth for transfer to acute rehab  D/W son at bedside Patient seen and examined by me personally.  Patient is improving each day - still with dysphagia, dysarthria and right sided weakness  Dx with acute CVA  Awaiting insurance auth for transfer to acute rehab  D/W son at bedside  Hypomagensemia and hypokalemia - replete

## 2018-09-13 NOTE — DISCHARGE NOTE ADULT - CARE PROVIDERS DIRECT ADDRESSES
,DirectAddress_Unknown ,DirectAddress_Unknown,[email or web address]

## 2018-09-13 NOTE — CHART NOTE - NSCHARTNOTEFT_GEN_A_CORE
Nutrition interim note     Pt previously NPO S/P MBS yesterday (09/12) - recommending pureed diet + honey thickened liquids. Will monitor PO intake and tolerance to diet prescription as per departmental protocol. Encourage PO intake as tolerated, obtain food preferences, provide feeding assistance as needed. Thank you.     RD remains available.   Katy Martin RD Nutrition interim note     Pt previously NPO S/P MBS yesterday (09/12) - recommending pureed diet + honey thickened liquids via [employer] ONLY. Will monitor PO intake and tolerance to diet prescription as per departmental protocol. Encourage PO intake as tolerated, obtain food preferences, provide feeding assistance as needed. Thank you.     RD remains available.   Katy Martin RD

## 2018-09-14 LAB
ANION GAP SERPL CALC-SCNC: 9 MMOL/L — SIGNIFICANT CHANGE UP (ref 5–17)
BUN SERPL-MCNC: 15 MG/DL — SIGNIFICANT CHANGE UP (ref 7–23)
CALCIUM SERPL-MCNC: 9.3 MG/DL — SIGNIFICANT CHANGE UP (ref 8.4–10.5)
CHLORIDE SERPL-SCNC: 105 MMOL/L — SIGNIFICANT CHANGE UP (ref 96–108)
CO2 SERPL-SCNC: 26 MMOL/L — SIGNIFICANT CHANGE UP (ref 22–31)
CREAT SERPL-MCNC: 1.17 MG/DL — SIGNIFICANT CHANGE UP (ref 0.5–1.3)
GLUCOSE BLDC GLUCOMTR-MCNC: 143 MG/DL — HIGH (ref 70–99)
GLUCOSE BLDC GLUCOMTR-MCNC: 158 MG/DL — HIGH (ref 70–99)
GLUCOSE BLDC GLUCOMTR-MCNC: 163 MG/DL — HIGH (ref 70–99)
GLUCOSE BLDC GLUCOMTR-MCNC: 166 MG/DL — HIGH (ref 70–99)
GLUCOSE SERPL-MCNC: 165 MG/DL — HIGH (ref 70–99)
HCT VFR BLD CALC: 42.3 % — SIGNIFICANT CHANGE UP (ref 34.5–45)
HGB BLD-MCNC: 14.2 G/DL — SIGNIFICANT CHANGE UP (ref 11.5–15.5)
MAGNESIUM SERPL-MCNC: 2 MG/DL — SIGNIFICANT CHANGE UP (ref 1.6–2.6)
MCHC RBC-ENTMCNC: 30.4 PG — SIGNIFICANT CHANGE UP (ref 27–34)
MCHC RBC-ENTMCNC: 33.7 GM/DL — SIGNIFICANT CHANGE UP (ref 32–36)
MCV RBC AUTO: 90.3 FL — SIGNIFICANT CHANGE UP (ref 80–100)
PLATELET # BLD AUTO: 196 K/UL — SIGNIFICANT CHANGE UP (ref 150–400)
POTASSIUM SERPL-MCNC: 3.8 MMOL/L — SIGNIFICANT CHANGE UP (ref 3.5–5.3)
POTASSIUM SERPL-SCNC: 3.8 MMOL/L — SIGNIFICANT CHANGE UP (ref 3.5–5.3)
RBC # BLD: 4.68 M/UL — SIGNIFICANT CHANGE UP (ref 3.8–5.2)
RBC # FLD: 11.8 % — SIGNIFICANT CHANGE UP (ref 10.3–14.5)
SODIUM SERPL-SCNC: 140 MMOL/L — SIGNIFICANT CHANGE UP (ref 135–145)
WBC # BLD: 6.6 K/UL — SIGNIFICANT CHANGE UP (ref 3.8–10.5)
WBC # FLD AUTO: 6.6 K/UL — SIGNIFICANT CHANGE UP (ref 3.8–10.5)

## 2018-09-14 PROCEDURE — 99233 SBSQ HOSP IP/OBS HIGH 50: CPT

## 2018-09-14 PROCEDURE — 99231 SBSQ HOSP IP/OBS SF/LOW 25: CPT

## 2018-09-14 RX ORDER — DOCUSATE SODIUM 100 MG
100 CAPSULE ORAL
Qty: 0 | Refills: 0 | Status: DISCONTINUED | OUTPATIENT
Start: 2018-09-14 | End: 2018-09-19

## 2018-09-14 RX ORDER — POLYETHYLENE GLYCOL 3350 17 G/17G
17 POWDER, FOR SOLUTION ORAL DAILY
Qty: 0 | Refills: 0 | Status: DISCONTINUED | OUTPATIENT
Start: 2018-09-14 | End: 2018-09-19

## 2018-09-14 RX ORDER — SENNA PLUS 8.6 MG/1
2 TABLET ORAL AT BEDTIME
Qty: 0 | Refills: 0 | Status: DISCONTINUED | OUTPATIENT
Start: 2018-09-14 | End: 2018-09-19

## 2018-09-14 RX ORDER — LISINOPRIL 2.5 MG/1
40 TABLET ORAL DAILY
Qty: 0 | Refills: 0 | Status: DISCONTINUED | OUTPATIENT
Start: 2018-09-15 | End: 2018-09-19

## 2018-09-14 RX ADMIN — SODIUM CHLORIDE 50 MILLILITER(S): 9 INJECTION, SOLUTION INTRAVENOUS at 11:53

## 2018-09-14 RX ADMIN — BACITRACIN AND POLYMYXIN B SULFATE 1 APPLICATION(S): 500; 10000 OINTMENT TOPICAL at 05:06

## 2018-09-14 RX ADMIN — Medication 5 MILLIGRAM(S): at 05:06

## 2018-09-14 RX ADMIN — AMLODIPINE BESYLATE 5 MILLIGRAM(S): 2.5 TABLET ORAL at 05:06

## 2018-09-14 RX ADMIN — Medication 100 MILLIGRAM(S): at 17:54

## 2018-09-14 RX ADMIN — MAGNESIUM OXIDE 400 MG ORAL TABLET 400 MILLIGRAM(S): 241.3 TABLET ORAL at 17:54

## 2018-09-14 RX ADMIN — ENOXAPARIN SODIUM 30 MILLIGRAM(S): 100 INJECTION SUBCUTANEOUS at 11:20

## 2018-09-14 RX ADMIN — Medication 25 MILLIGRAM(S): at 17:54

## 2018-09-14 RX ADMIN — Medication 2: at 11:54

## 2018-09-14 RX ADMIN — LISINOPRIL 20 MILLIGRAM(S): 2.5 TABLET ORAL at 05:06

## 2018-09-14 RX ADMIN — Medication 2: at 16:38

## 2018-09-14 RX ADMIN — Medication 2: at 07:53

## 2018-09-14 RX ADMIN — SENNA PLUS 2 TABLET(S): 8.6 TABLET ORAL at 22:26

## 2018-09-14 RX ADMIN — Medication 5 MILLIGRAM(S): at 17:54

## 2018-09-14 RX ADMIN — BACITRACIN AND POLYMYXIN B SULFATE 1 APPLICATION(S): 500; 10000 OINTMENT TOPICAL at 13:09

## 2018-09-14 RX ADMIN — PANTOPRAZOLE SODIUM 40 MILLIGRAM(S): 20 TABLET, DELAYED RELEASE ORAL at 13:13

## 2018-09-14 RX ADMIN — MAGNESIUM OXIDE 400 MG ORAL TABLET 400 MILLIGRAM(S): 241.3 TABLET ORAL at 11:55

## 2018-09-14 RX ADMIN — ATORVASTATIN CALCIUM 40 MILLIGRAM(S): 80 TABLET, FILM COATED ORAL at 22:25

## 2018-09-14 RX ADMIN — BACITRACIN AND POLYMYXIN B SULFATE 1 APPLICATION(S): 500; 10000 OINTMENT TOPICAL at 22:26

## 2018-09-14 RX ADMIN — Medication 25 MILLIGRAM(S): at 05:06

## 2018-09-14 RX ADMIN — POLYETHYLENE GLYCOL 3350 17 GRAM(S): 17 POWDER, FOR SOLUTION ORAL at 15:11

## 2018-09-14 RX ADMIN — Medication 325 MILLIGRAM(S): at 11:19

## 2018-09-14 RX ADMIN — MAGNESIUM OXIDE 400 MG ORAL TABLET 400 MILLIGRAM(S): 241.3 TABLET ORAL at 07:54

## 2018-09-14 NOTE — PROGRESS NOTE ADULT - ATTENDING COMMENTS
Patient seen and examined by me personally.  Patient is improving each day - still with dysphagia, dysarthria and right sided weakness  Dx with acute CVA  Awaiting insurance auth for transfer to acute rehab  D/W son at bedside  Hypomagensemia and hypokalemia - replete Awaiting insurance auth for transfer to acute rehab  D/W son at bedside

## 2018-09-14 NOTE — PROGRESS NOTE ADULT - ASSESSMENT
Elderly hypertensive patient s/p TPA for cva, elevated Tn secondary to CVA.    Would consider increase in antihypertensive therapy ( ACE).    Awaiting rehab.

## 2018-09-14 NOTE — PROGRESS NOTE ADULT - SUBJECTIVE AND OBJECTIVE BOX
Follow up for    CVA    SUBJ:   Aphasic    PMH  Dyslipidemia  Osteoarthritis  Sciatica  Diabetes  HTN (hypertension)  DM (diabetes mellitus)      MEDICATIONS  (STANDING):  amLODIPine   Tablet 5 milliGRAM(s) Oral daily  aspirin 325 milliGRAM(s) Oral daily  atorvastatin 40 milliGRAM(s) Oral at bedtime  BACItracin/polymyxin B Ointment 1 Application(s) Topical every 8 hours  dextrose 5% + sodium chloride 0.45%. 1000 milliLiter(s) (50 mL/Hr) IV Continuous <Continuous>  dextrose 5%. 1000 milliLiter(s) (50 mL/Hr) IV Continuous <Continuous>  dextrose 50% Injectable 12.5 Gram(s) IV Push once  dextrose 50% Injectable 25 Gram(s) IV Push once  dextrose 50% Injectable 25 Gram(s) IV Push once  docusate sodium 100 milliGRAM(s) Oral two times a day  enoxaparin Injectable 30 milliGRAM(s) SubCutaneous daily  insulin lispro (HumaLOG) corrective regimen sliding scale   SubCutaneous three times a day before meals  lisinopril 20 milliGRAM(s) Oral daily  magnesium oxide 400 milliGRAM(s) Oral three times a day with meals  metoprolol tartrate 25 milliGRAM(s) Oral every 12 hours  oxybutynin 5 milliGRAM(s) Oral two times a day  pantoprazole   Suspension 40 milliGRAM(s) Oral daily  polyethylene glycol 3350 17 Gram(s) Oral daily  senna 2 Tablet(s) Oral at bedtime    MEDICATIONS  (PRN):  dextrose 40% Gel 15 Gram(s) Oral once PRN Blood Glucose LESS THAN 70 milliGRAM(s)/deciliter  glucagon  Injectable 1 milliGRAM(s) IntraMuscular once PRN Glucose LESS THAN 70 milligrams/deciliter  ondansetron Injectable 4 milliGRAM(s) IV Push every 6 hours PRN Nausea and/or Vomiting        PHYSICAL EXAM:  Vital Signs Last 24 Hrs  T(C): 36.6 (14 Sep 2018 12:24), Max: 36.9 (13 Sep 2018 16:54)  T(F): 97.8 (14 Sep 2018 12:24), Max: 98.5 (13 Sep 2018 16:54)  HR: 74 (14 Sep 2018 12:24) (74 - 93)  BP: 147/64 (14 Sep 2018 12:24) (147/64 - 155/78)  BP(mean): --  RR: 15 (14 Sep 2018 12:24) (14 - 16)  SpO2: 94% (14 Sep 2018 12:24) (94% - 96%)    GENERAL: NAD, well-groomed, well-developed  HEAD:  Atraumatic, Normocephalic  EYES: EOMI, PERRLA, conjunctiva and sclera clear  ENT: Moist mucous membranes,  NECK: Supple, No JVD, no bruits  CHEST/LUNG: Clear to percussion and auscultation bilaterally; No rales, rhonchi, wheezing, or rubs  HEART: Regular rate and rhythm; No murmurs, rubs, or gallops PMI non displaced.  ABDOMEN: Soft, Nontender, Nondistended; Bowel sounds present  EXTREMITIES:   No clubbing, cyanosis, or edema  SKIN: No rashes or lesions  NERVOUS SYSTEM:  Alert aphasic        LABS:                        14.2   6.6   )-----------( 196      ( 14 Sep 2018 05:55 )             42.3     09-14    140  |  105  |  15  ----------------------------<  165<H>  3.8   |  26  |  1.17    Ca    9.3      14 Sep 2018 05:55  Mg     2.0     09-14              I&O's Summary    13 Sep 2018 07:01  -  14 Sep 2018 07:00  --------------------------------------------------------  IN: 1130 mL / OUT: 0 mL / NET: 1130 mL

## 2018-09-14 NOTE — PROGRESS NOTE ADULT - ASSESSMENT
89 year female with PMH of HTN, DM2, dyslipidemia, OA. Admit with acute CVA, LENNIE, elevated trops.       Problem/Plan - 1:  ·  Problem: Acute CVA (cerebrovascular accident).  Plan: S/P TPA  S/P MRI which confirmed moderate size   cont aspirin/statin  Neurology note appreciated, pt. accepted to [facility] awaiting auth and bed.     Problem/Plan - 2:  ·  Problem: LENNIE (acute kidney injury).  Plan: resolved.      Problem/Plan - 3:  ·  Problem: HTN (hypertension).  Plan: Continue with metoprolol, norvasc, lisinopril.      Problem/Plan - 4:  ·  Problem: Aphasia.  Plan: secondary to CVA  speech and OT ordered.      Problem/Plan - 5:  ·  Problem: Demand ischemia.  Plan: Cardiologist consulted 89 year female with PMH of HTN, DM2, dyslipidemia, OA. Admit with acute CVA, LENNIE, elevated trops.       Problem/Plan - 1:  ·  Problem: Acute CVA (cerebrovascular accident).  Plan: S/P TPA  S/P MRI which confirmed moderate size   cont aspirin/statin  Neurology note appreciated, pt. accepted to [facility] awaiting auth and bed.     Problem/Plan - 2:  ·  Problem: LENNIE (acute kidney injury).  Plan: resolved.      Problem/Plan - 3:  ·  Problem: HTN (hypertension).  Plan: Continue with metoprolol, norvasc, lisinopril.      Problem/Plan - 4:  ·  Problem: Aphasia.  Plan: secondary to CVA  speech and OT ordered.      Problem/Plan - 5:  ·  Problem: Demand ischemia.  Plan: Cardiologist consulted - no new cardiac recommendations

## 2018-09-14 NOTE — PROGRESS NOTE ADULT - SUBJECTIVE AND OBJECTIVE BOX
Patient is a 89y old  Female who presents with a chief complaint of Aphasia and weakness (13 Sep 2018 15:21)        MEDICATIONS  (STANDING):  amLODIPine   Tablet 5 milliGRAM(s) Oral daily  aspirin 325 milliGRAM(s) Oral daily  atorvastatin 40 milliGRAM(s) Oral at bedtime  BACItracin/polymyxin B Ointment 1 Application(s) Topical every 8 hours  dextrose 5% + sodium chloride 0.45%. 1000 milliLiter(s) (50 mL/Hr) IV Continuous <Continuous>  dextrose 5%. 1000 milliLiter(s) (50 mL/Hr) IV Continuous <Continuous>  dextrose 50% Injectable 12.5 Gram(s) IV Push once  dextrose 50% Injectable 25 Gram(s) IV Push once  dextrose 50% Injectable 25 Gram(s) IV Push once  enoxaparin Injectable 30 milliGRAM(s) SubCutaneous daily  insulin lispro (HumaLOG) corrective regimen sliding scale   SubCutaneous three times a day before meals  lisinopril 20 milliGRAM(s) Oral daily  magnesium oxide 400 milliGRAM(s) Oral three times a day with meals  metoprolol tartrate 25 milliGRAM(s) Oral every 12 hours  oxybutynin 5 milliGRAM(s) Oral two times a day  pantoprazole   Suspension 40 milliGRAM(s) Oral daily    MEDICATIONS  (PRN):  dextrose 40% Gel 15 Gram(s) Oral once PRN Blood Glucose LESS THAN 70 milliGRAM(s)/deciliter  glucagon  Injectable 1 milliGRAM(s) IntraMuscular once PRN Glucose LESS THAN 70 milligrams/deciliter  ondansetron Injectable 4 milliGRAM(s) IV Push every 6 hours PRN Nausea and/or Vomiting      REVIEW OF SYSTEMS:  CONSTITUTIONAL: No fever, weight loss, or fatigue  EYES: No eye pain, visual disturbances, or discharge  ENMT:  No difficulty hearing, tinnitus, vertigo; No sinus or throat pain  NECK: No pain or stiffness  RESPIRATORY: No cough, wheezing, chills or hemoptysis; No shortness of breath  CARDIOVASCULAR: No chest pain, palpitations, dizziness, or leg swelling  GASTROINTESTINAL: No abdominal or epigastric pain. No nausea, vomiting, or hematemesis; No diarrhea or constipation. No melena or hematochezia.  GENITOURINARY: No dysuria, frequency, hematuria, or incontinence  NEUROLOGICAL: No headaches, memory loss, loss of strength, numbness, or tremors  SKIN: No itching, burning, rashes, or lesions   LYMPH NODES: No enlarged glands  ENDOCRINE: No heat or cold intolerance; No hair loss  MUSCULOSKELETAL: No joint pain or swelling; No muscle, back, or extremity pain  PSYCHIATRIC: No depression, anxiety, mood swings, or difficulty sleeping  HEME/LYMPH: No easy bruising, or bleeding gums  ALLERY AND IMMUNOLOGIC: No hives or eczema    PHYSICAL EXAM:    T(C): 36.7 (09-14-18 @ 05:02), Max: 36.9 (09-13-18 @ 16:54)  HR: 93 (09-14-18 @ 05:02) (85 - 93)  BP: 147/72 (09-14-18 @ 05:02) (147/72 - 155/78)  RR: 16 (09-14-18 @ 05:02) (14 - 16)  SpO2: 96% (09-14-18 @ 05:02) (95% - 96%)  Wt(kg): --  I&O's Summary    13 Sep 2018 07:01  -  14 Sep 2018 07:00  --------------------------------------------------------  IN: 1130 mL / OUT: 0 mL / NET: 1130 mL        GENERAL: NAD, well-groomed, well-developed  HEAD:  Atraumatic, Normocephalic  EYES: EOMI, PERRL, conjunctiva and sclera clear  ENMT: No tonsillar erythema, exudates, or enlargement; Moist mucous membranes, Good dentition, No lesions  NECK: Supple, No JVD, Normal thyroid  NERVOUS SYSTEM:  Alert & Oriented X3, Good concentration; Motor Strength 5/5 B/L upper and lower extremities; DTRs 2+ intact and symmetric  CHEST/LUNG: Clear to ascultation bilaterally; No rales, rhonchi, wheezing, or rubs  HEART: Regular rate and rhythm; No murmurs, rubs, or gallops  ABDOMEN: Soft, Nontender, Nondistended; Bowel sounds present  EXTREMITIES:  2+ Peripheral Pulses, No clubbing, cyanosis, or edema  LYMPH: No lymphadenopathy noted  SKIN: No rashes or lesions    LABS:                        14.2   6.6   )-----------( 196      ( 14 Sep 2018 05:55 )             42.3     09-14    140  |  105  |  15  ----------------------------<  165<H>  3.8   |  26  |  1.17    Ca    9.3      14 Sep 2018 05:55  Mg     2.0     09-14          CAPILLARY BLOOD GLUCOSE      POCT Blood Glucose.: 158 mg/dL (14 Sep 2018 11:17)  POCT Blood Glucose.: 166 mg/dL (14 Sep 2018 07:34)  POCT Blood Glucose.: 150 mg/dL (13 Sep 2018 21:15)  POCT Blood Glucose.: 146 mg/dL (13 Sep 2018 16:53)  POCT Blood Glucose.: 159 mg/dL (13 Sep 2018 12:11)            RADIOLOGY & ADDITIONAL TESTS:    Imaging Personally Reviewed:  [x] YES  [ ] NO    Consultant(s) Notes Reviewed:  [x] YES  [ ] NO    Care Discussed with Consultants/Other Providers [x] YES  [ ] NO Patient is a 89 year old  Female who presents with a chief complaint of Aphasia and weakness (13 Sep 2018 15:21)        MEDICATIONS  (STANDING):  amLODIPine   Tablet 5 milliGRAM(s) Oral daily  aspirin 325 milliGRAM(s) Oral daily  atorvastatin 40 milliGRAM(s) Oral at bedtime  BACItracin/polymyxin B Ointment 1 Application(s) Topical every 8 hours  dextrose 5% + sodium chloride 0.45%. 1000 milliLiter(s) (50 mL/Hr) IV Continuous <Continuous>  dextrose 5%. 1000 milliLiter(s) (50 mL/Hr) IV Continuous <Continuous>  dextrose 50% Injectable 12.5 Gram(s) IV Push once  dextrose 50% Injectable 25 Gram(s) IV Push once  dextrose 50% Injectable 25 Gram(s) IV Push once  enoxaparin Injectable 30 milliGRAM(s) SubCutaneous daily  insulin lispro (HumaLOG) corrective regimen sliding scale   SubCutaneous three times a day before meals  lisinopril 20 milliGRAM(s) Oral daily  magnesium oxide 400 milliGRAM(s) Oral three times a day with meals  metoprolol tartrate 25 milliGRAM(s) Oral every 12 hours  oxybutynin 5 milliGRAM(s) Oral two times a day  pantoprazole   Suspension 40 milliGRAM(s) Oral daily    MEDICATIONS  (PRN):  dextrose 40% Gel 15 Gram(s) Oral once PRN Blood Glucose LESS THAN 70 milliGRAM(s)/deciliter  glucagon  Injectable 1 milliGRAM(s) IntraMuscular once PRN Glucose LESS THAN 70 milligrams/deciliter  ondansetron Injectable 4 milliGRAM(s) IV Push every 6 hours PRN Nausea and/or Vomiting      REVIEW OF SYSTEMS:  CONSTITUTIONAL: No fever, weight loss, or fatigue  EYES: No eye pain, visual disturbances, or discharge  ENMT:  No difficulty hearing, tinnitus, vertigo; No sinus or throat pain  NECK: No pain or stiffness  RESPIRATORY: No cough, wheezing, chills or hemoptysis; No shortness of breath  CARDIOVASCULAR: No chest pain, palpitations, dizziness, or leg swelling  GASTROINTESTINAL: No abdominal or epigastric pain. No nausea, vomiting, or hematemesis; No diarrhea or constipation. No melena or hematochezia.  GENITOURINARY: No dysuria, frequency, hematuria, or incontinence  NEUROLOGICAL: No headaches, memory loss, loss of strength, numbness, or tremors  SKIN: No itching, burning, rashes, or lesions   LYMPH NODES: No enlarged glands  ENDOCRINE: No heat or cold intolerance; No hair loss  MUSCULOSKELETAL: No joint pain or swelling; No muscle, back, or extremity pain  PSYCHIATRIC: No depression, anxiety, mood swings, or difficulty sleeping  HEME/LYMPH: No easy bruising, or bleeding gums  ALLERY AND IMMUNOLOGIC: No hives or eczema    PHYSICAL EXAM:    T(C): 36.7 (09-14-18 @ 05:02), Max: 36.9 (09-13-18 @ 16:54)  HR: 93 (09-14-18 @ 05:02) (85 - 93)  BP: 147/72 (09-14-18 @ 05:02) (147/72 - 155/78)  RR: 16 (09-14-18 @ 05:02) (14 - 16)  SpO2: 96% (09-14-18 @ 05:02) (95% - 96%)  Wt(kg): --  I&O's Summary    13 Sep 2018 07:01  -  14 Sep 2018 07:00  --------------------------------------------------------  IN: 1130 mL / OUT: 0 mL / NET: 1130 mL        GENERAL: NAD, well-groomed, well-developed  HEAD:  Atraumatic, Normocephalic  EYES: EOMI, PERRL, conjunctiva and sclera clear  ENMT: No tonsillar erythema, exudates, or enlargement; Moist mucous membranes, Good dentition, No lesions  NECK: Supple, No JVD, Normal thyroid  NERVOUS SYSTEM:  Alert & Oriented X3, Good concentration; Motor Strength 5/5 B/L upper and lower extremities; DTRs 2+ intact and symmetric  CHEST/LUNG: Clear to ascultation bilaterally; No rales, rhonchi, wheezing, or rubs  HEART: Regular rate and rhythm; No murmurs, rubs, or gallops  ABDOMEN: Soft, Nontender, Nondistended; Bowel sounds present  EXTREMITIES:  2+ Peripheral Pulses, No clubbing, cyanosis, or edema  LYMPH: No lymphadenopathy noted  SKIN: No rashes or lesions    LABS:                        14.2   6.6   )-----------( 196      ( 14 Sep 2018 05:55 )             42.3     09-14    140  |  105  |  15  ----------------------------<  165<H>  3.8   |  26  |  1.17    Ca    9.3      14 Sep 2018 05:55  Mg     2.0     09-14          CAPILLARY BLOOD GLUCOSE      POCT Blood Glucose.: 158 mg/dL (14 Sep 2018 11:17)  POCT Blood Glucose.: 166 mg/dL (14 Sep 2018 07:34)  POCT Blood Glucose.: 150 mg/dL (13 Sep 2018 21:15)  POCT Blood Glucose.: 146 mg/dL (13 Sep 2018 16:53)  POCT Blood Glucose.: 159 mg/dL (13 Sep 2018 12:11)            RADIOLOGY & ADDITIONAL TESTS:    Imaging Personally Reviewed:  [x] YES  [ ] NO    Consultant(s) Notes Reviewed:  [x] YES  [ ] NO    Care Discussed with Consultants/Other Providers [x] YES  [ ] NO Patient is a 89 year old  Female who presents with a chief complaint of Aphasia and weakness (13 Sep 2018 15:21)    Patient seen and examined, she is improving each day - still with dysphagia, dysarthria and right sided weakness. Diagnosed with acute CVA. Awaiting insurance auth for transfer to acute rehab    MEDICATIONS  (STANDING):  amLODIPine   Tablet 5 milliGRAM(s) Oral daily  aspirin 325 milliGRAM(s) Oral daily  atorvastatin 40 milliGRAM(s) Oral at bedtime  BACItracin/polymyxin B Ointment 1 Application(s) Topical every 8 hours  dextrose 5% + sodium chloride 0.45%. 1000 milliLiter(s) (50 mL/Hr) IV Continuous <Continuous>  dextrose 5%. 1000 milliLiter(s) (50 mL/Hr) IV Continuous <Continuous>  dextrose 50% Injectable 12.5 Gram(s) IV Push once  dextrose 50% Injectable 25 Gram(s) IV Push once  dextrose 50% Injectable 25 Gram(s) IV Push once  enoxaparin Injectable 30 milliGRAM(s) SubCutaneous daily  insulin lispro (HumaLOG) corrective regimen sliding scale   SubCutaneous three times a day before meals  lisinopril 20 milliGRAM(s) Oral daily  magnesium oxide 400 milliGRAM(s) Oral three times a day with meals  metoprolol tartrate 25 milliGRAM(s) Oral every 12 hours  oxybutynin 5 milliGRAM(s) Oral two times a day  pantoprazole   Suspension 40 milliGRAM(s) Oral daily    MEDICATIONS  (PRN):  dextrose 40% Gel 15 Gram(s) Oral once PRN Blood Glucose LESS THAN 70 milliGRAM(s)/deciliter  glucagon  Injectable 1 milliGRAM(s) IntraMuscular once PRN Glucose LESS THAN 70 milligrams/deciliter  ondansetron Injectable 4 milliGRAM(s) IV Push every 6 hours PRN Nausea and/or Vomiting      REVIEW OF SYSTEMS:  CONSTITUTIONAL: No fever, weight loss, or fatigue  EYES: No eye pain, visual disturbances, or discharge  ENMT:  No difficulty hearing, tinnitus, vertigo; No sinus or throat pain  NECK: No pain or stiffness  RESPIRATORY: No cough, wheezing, chills or hemoptysis; No shortness of breath  CARDIOVASCULAR: No chest pain, palpitations, dizziness, or leg swelling  GASTROINTESTINAL: No abdominal or epigastric pain. No nausea, vomiting, or hematemesis; No diarrhea or constipation. No melena or hematochezia.  GENITOURINARY: No dysuria, frequency, hematuria, or incontinence  NEUROLOGICAL: No headaches, memory loss, loss of strength, numbness, or tremors  SKIN: No itching, burning, rashes, or lesions   LYMPH NODES: No enlarged glands  ENDOCRINE: No heat or cold intolerance; No hair loss  MUSCULOSKELETAL: No joint pain or swelling; No muscle, back, or extremity pain  PSYCHIATRIC: No depression, anxiety, mood swings, or difficulty sleeping  HEME/LYMPH: No easy bruising, or bleeding gums  ALLERY AND IMMUNOLOGIC: No hives or eczema    PHYSICAL EXAM:    T(C): 36.7 (09-14-18 @ 05:02), Max: 36.9 (09-13-18 @ 16:54)  HR: 93 (09-14-18 @ 05:02) (85 - 93)  BP: 147/72 (09-14-18 @ 05:02) (147/72 - 155/78)  RR: 16 (09-14-18 @ 05:02) (14 - 16)  SpO2: 96% (09-14-18 @ 05:02) (95% - 96%)  Wt(kg): --  I&O's Summary    13 Sep 2018 07:01  -  14 Sep 2018 07:00  --------------------------------------------------------  IN: 1130 mL / OUT: 0 mL / NET: 1130 mL        GENERAL: NAD, well-groomed, well-developed  HEAD:  Atraumatic, Normocephalic  EYES: EOMI, PERRL, conjunctiva and sclera clear  ENMT: No tonsillar erythema, exudates, or enlargement; Moist mucous membranes, Good dentition, No lesions  NECK: Supple, No JVD, Normal thyroid  NERVOUS SYSTEM:  Alert & Oriented X3, Good concentration; Motor Strength 5/5 B/L upper and lower extremities; DTRs 2+ intact and symmetric  CHEST/LUNG: Clear to ascultation bilaterally; No rales, rhonchi, wheezing, or rubs  HEART: Regular rate and rhythm; No murmurs, rubs, or gallops  ABDOMEN: Soft, Nontender, Nondistended; Bowel sounds present  EXTREMITIES:  2+ Peripheral Pulses, No clubbing, cyanosis, or edema  LYMPH: No lymphadenopathy noted  SKIN: No rashes or lesions    LABS:                        14.2   6.6   )-----------( 196      ( 14 Sep 2018 05:55 )             42.3     09-14    140  |  105  |  15  ----------------------------<  165<H>  3.8   |  26  |  1.17    Ca    9.3      14 Sep 2018 05:55  Mg     2.0     09-14          CAPILLARY BLOOD GLUCOSE      POCT Blood Glucose.: 158 mg/dL (14 Sep 2018 11:17)  POCT Blood Glucose.: 166 mg/dL (14 Sep 2018 07:34)  POCT Blood Glucose.: 150 mg/dL (13 Sep 2018 21:15)  POCT Blood Glucose.: 146 mg/dL (13 Sep 2018 16:53)  POCT Blood Glucose.: 159 mg/dL (13 Sep 2018 12:11)            RADIOLOGY & ADDITIONAL TESTS:    Imaging Personally Reviewed:  [x] YES  [ ] NO    Consultant(s) Notes Reviewed:  [x] YES  [ ] NO    Care Discussed with Consultants/Other Providers [x] YES  [ ] NO Patient is a 89 year old  Female who presents with a chief complaint of Aphasia and weakness (13 Sep 2018 15:21)    Patient seen and examined, she is improving each day - still with dysphagia, dysarthria, and right sided weakness. Diagnosed with acute CVA. Awaiting insurance auth for transfer to acute rehab    MEDICATIONS  (STANDING):  amLODIPine   Tablet 5 milliGRAM(s) Oral daily  aspirin 325 milliGRAM(s) Oral daily  atorvastatin 40 milliGRAM(s) Oral at bedtime  BACItracin/polymyxin B Ointment 1 Application(s) Topical every 8 hours  dextrose 5% + sodium chloride 0.45%. 1000 milliLiter(s) (50 mL/Hr) IV Continuous <Continuous>  dextrose 5%. 1000 milliLiter(s) (50 mL/Hr) IV Continuous <Continuous>  dextrose 50% Injectable 12.5 Gram(s) IV Push once  dextrose 50% Injectable 25 Gram(s) IV Push once  dextrose 50% Injectable 25 Gram(s) IV Push once  enoxaparin Injectable 30 milliGRAM(s) SubCutaneous daily  insulin lispro (HumaLOG) corrective regimen sliding scale   SubCutaneous three times a day before meals  lisinopril 20 milliGRAM(s) Oral daily  magnesium oxide 400 milliGRAM(s) Oral three times a day with meals  metoprolol tartrate 25 milliGRAM(s) Oral every 12 hours  oxybutynin 5 milliGRAM(s) Oral two times a day  pantoprazole   Suspension 40 milliGRAM(s) Oral daily    MEDICATIONS  (PRN):  dextrose 40% Gel 15 Gram(s) Oral once PRN Blood Glucose LESS THAN 70 milliGRAM(s)/deciliter  glucagon  Injectable 1 milliGRAM(s) IntraMuscular once PRN Glucose LESS THAN 70 milligrams/deciliter  ondansetron Injectable 4 milliGRAM(s) IV Push every 6 hours PRN Nausea and/or Vomiting      REVIEW OF SYSTEMS:  Patient unable to provide due to dysarthria    PHYSICAL EXAM:  T(C): 36.7 (09-14-18 @ 05:02), Max: 36.9 (09-13-18 @ 16:54)  HR: 93 (09-14-18 @ 05:02) (85 - 93)  BP: 147/72 (09-14-18 @ 05:02) (147/72 - 155/78)  RR: 16 (09-14-18 @ 05:02) (14 - 16)  SpO2: 96% (09-14-18 @ 05:02) (95% - 96%)    I&O's Summary    13 Sep 2018 07:01  -  14 Sep 2018 07:00  --------------------------------------------------------  IN: 1130 mL / OUT: 0 mL / NET: 1130 mL        GENERAL: NAD, well-groomed, well-developed  HEAD:  Atraumatic, Normocephalic  EYES: EOMI, PERRL, conjunctiva and sclera clear  ENMT: Moist mucous membranes,   NECK: Supple, No JVD, Normal thyroid  NERVOUS SYSTEM:  Alert & awake, right upper and lower extremity weakness, +facial droop, +aphasia  CHEST/LUNG: Clear to ascultation bilaterally; No rales, rhonchi, wheezing, or rubs  HEART: Regular rate and rhythm; No murmurs, rubs, or gallops  ABDOMEN: Soft, Nontender, Nondistended; Bowel sounds present  EXTREMITIES:  2+ Peripheral Pulses, No clubbing, cyanosis, or edema  SKIN: No rashes or lesions      LABS:                        14.2   6.6   )-----------( 196      ( 14 Sep 2018 05:55 )             42.3     09-14    140  |  105  |  15  ----------------------------<  165<H>  3.8   |  26  |  1.17    Ca    9.3      14 Sep 2018 05:55  Mg     2.0     09-14          CAPILLARY BLOOD GLUCOSE  POCT Blood Glucose.: 158 mg/dL (14 Sep 2018 11:17)  POCT Blood Glucose.: 166 mg/dL (14 Sep 2018 07:34)  POCT Blood Glucose.: 150 mg/dL (13 Sep 2018 21:15)  POCT Blood Glucose.: 146 mg/dL (13 Sep 2018 16:53)  POCT Blood Glucose.: 159 mg/dL (13 Sep 2018 12:11)            RADIOLOGY & ADDITIONAL TESTS:    Imaging Personally Reviewed:  [x] YES  [ ] NO    Consultant(s) Notes Reviewed:  [x] YES  [ ] NO    Care Discussed with Consultants/Other Providers [x] YES  [ ] NO

## 2018-09-15 LAB
GLUCOSE BLDC GLUCOMTR-MCNC: 133 MG/DL — HIGH (ref 70–99)
GLUCOSE BLDC GLUCOMTR-MCNC: 152 MG/DL — HIGH (ref 70–99)
GLUCOSE BLDC GLUCOMTR-MCNC: 163 MG/DL — HIGH (ref 70–99)
GLUCOSE BLDC GLUCOMTR-MCNC: 169 MG/DL — HIGH (ref 70–99)

## 2018-09-15 PROCEDURE — 99233 SBSQ HOSP IP/OBS HIGH 50: CPT

## 2018-09-15 RX ORDER — CIPROFLOXACIN HCL 0.3 %
1 DROPS OPHTHALMIC (EYE) EVERY 4 HOURS
Qty: 0 | Refills: 0 | Status: DISCONTINUED | OUTPATIENT
Start: 2018-09-15 | End: 2018-09-19

## 2018-09-15 RX ADMIN — PANTOPRAZOLE SODIUM 40 MILLIGRAM(S): 20 TABLET, DELAYED RELEASE ORAL at 12:06

## 2018-09-15 RX ADMIN — LISINOPRIL 40 MILLIGRAM(S): 2.5 TABLET ORAL at 06:07

## 2018-09-15 RX ADMIN — Medication 5 MILLIGRAM(S): at 06:06

## 2018-09-15 RX ADMIN — BACITRACIN AND POLYMYXIN B SULFATE 1 APPLICATION(S): 500; 10000 OINTMENT TOPICAL at 06:09

## 2018-09-15 RX ADMIN — Medication 1 DROP(S): at 10:38

## 2018-09-15 RX ADMIN — Medication 2: at 12:05

## 2018-09-15 RX ADMIN — Medication 100 MILLIGRAM(S): at 17:45

## 2018-09-15 RX ADMIN — ATORVASTATIN CALCIUM 40 MILLIGRAM(S): 80 TABLET, FILM COATED ORAL at 22:27

## 2018-09-15 RX ADMIN — Medication 5 MILLIGRAM(S): at 17:45

## 2018-09-15 RX ADMIN — ENOXAPARIN SODIUM 30 MILLIGRAM(S): 100 INJECTION SUBCUTANEOUS at 12:04

## 2018-09-15 RX ADMIN — Medication 325 MILLIGRAM(S): at 12:06

## 2018-09-15 RX ADMIN — Medication 25 MILLIGRAM(S): at 17:45

## 2018-09-15 RX ADMIN — SENNA PLUS 2 TABLET(S): 8.6 TABLET ORAL at 22:27

## 2018-09-15 RX ADMIN — MAGNESIUM OXIDE 400 MG ORAL TABLET 400 MILLIGRAM(S): 241.3 TABLET ORAL at 08:34

## 2018-09-15 RX ADMIN — MAGNESIUM OXIDE 400 MG ORAL TABLET 400 MILLIGRAM(S): 241.3 TABLET ORAL at 17:45

## 2018-09-15 RX ADMIN — Medication 1 DROP(S): at 15:46

## 2018-09-15 RX ADMIN — Medication 2: at 07:57

## 2018-09-15 RX ADMIN — SODIUM CHLORIDE 50 MILLILITER(S): 9 INJECTION, SOLUTION INTRAVENOUS at 10:37

## 2018-09-15 RX ADMIN — Medication 1 DROP(S): at 22:27

## 2018-09-15 RX ADMIN — BACITRACIN AND POLYMYXIN B SULFATE 1 APPLICATION(S): 500; 10000 OINTMENT TOPICAL at 22:27

## 2018-09-15 RX ADMIN — AMLODIPINE BESYLATE 5 MILLIGRAM(S): 2.5 TABLET ORAL at 06:07

## 2018-09-15 RX ADMIN — Medication 1 DROP(S): at 17:45

## 2018-09-15 RX ADMIN — Medication 25 MILLIGRAM(S): at 06:07

## 2018-09-15 RX ADMIN — Medication 100 MILLIGRAM(S): at 06:07

## 2018-09-15 RX ADMIN — POLYETHYLENE GLYCOL 3350 17 GRAM(S): 17 POWDER, FOR SOLUTION ORAL at 12:06

## 2018-09-15 RX ADMIN — MAGNESIUM OXIDE 400 MG ORAL TABLET 400 MILLIGRAM(S): 241.3 TABLET ORAL at 12:06

## 2018-09-15 NOTE — PROGRESS NOTE ADULT - ASSESSMENT
89 year female with PMH of HTN, DM2, dyslipidemia, OA. Admit with acute CVA, LENNIE, elevated trops.       Problem/Plan - 1:  ·  Problem: Acute CVA (cerebrovascular accident).  Plan: S/P TPA  S/P MRI which confirmed moderate size   cont aspirin/statin  Neurology note appreciated, pt. accepted to Acute [facility]hab awaiting auth and bed.     Problem/Plan - 2:  ·  Problem: LENNIE (acute kidney injury).  Plan: resolved.      Problem/Plan - 3:  ·  Problem: HTN (hypertension).  Plan: Continue with metoprolol, norvasc, lisinopril.      Problem/Plan - 4:  ·  Problem: Aphasia.  Plan: secondary to CVA  speech, PT and OT      Problem/Plan - 5:  ·  Problem: Demand ischemia.  Plan: Cardiologist consulted - no new cardiac recommendations

## 2018-09-15 NOTE — PROGRESS NOTE ADULT - SUBJECTIVE AND OBJECTIVE BOX
Patient is a 89 year old  Female who presents with a chief complaint of CVA (14 Sep 2018 15:36)    Patient seen and examined, improving each day - still with dysphagia, dysarthria, and right sided weakness. Diagnosed with acute CVA. Awaiting insurance auth for transfer to acute rehab      MEDICATIONS  (STANDING):  amLODIPine   Tablet 5 milliGRAM(s) Oral daily  aspirin 325 milliGRAM(s) Oral daily  atorvastatin 40 milliGRAM(s) Oral at bedtime  BACItracin/polymyxin B Ointment 1 Application(s) Topical every 8 hours  ciprofloxacin  0.3% Ophthalmic Solution 1 Drop(s) Left EYE every 4 hours  dextrose 5% + sodium chloride 0.45%. 1000 milliLiter(s) (50 mL/Hr) IV Continuous <Continuous>  dextrose 5%. 1000 milliLiter(s) (50 mL/Hr) IV Continuous <Continuous>  dextrose 50% Injectable 12.5 Gram(s) IV Push once  dextrose 50% Injectable 25 Gram(s) IV Push once  dextrose 50% Injectable 25 Gram(s) IV Push once  docusate sodium 100 milliGRAM(s) Oral two times a day  enoxaparin Injectable 30 milliGRAM(s) SubCutaneous daily  insulin lispro (HumaLOG) corrective regimen sliding scale   SubCutaneous three times a day before meals  lisinopril 40 milliGRAM(s) Oral daily  magnesium oxide 400 milliGRAM(s) Oral three times a day with meals  metoprolol tartrate 25 milliGRAM(s) Oral every 12 hours  oxybutynin 5 milliGRAM(s) Oral two times a day  pantoprazole   Suspension 40 milliGRAM(s) Oral daily  polyethylene glycol 3350 17 Gram(s) Oral daily  senna 2 Tablet(s) Oral at bedtime    MEDICATIONS  (PRN):  dextrose 40% Gel 15 Gram(s) Oral once PRN Blood Glucose LESS THAN 70 milliGRAM(s)/deciliter  glucagon  Injectable 1 milliGRAM(s) IntraMuscular once PRN Glucose LESS THAN 70 milligrams/deciliter  ondansetron Injectable 4 milliGRAM(s) IV Push every 6 hours PRN Nausea and/or Vomiting      REVIEW OF SYSTEMS:  Patient unable to provide due to dysarthria    PHYSICAL EXAM:  T(C): 36.7 (09-15-18 @ 07:51), Max: 36.8 (09-14-18 @ 20:10)  HR: 82 (09-15-18 @ 07:51) (53 - 93)  BP: 153/76 (09-15-18 @ 07:51) (135/56 - 153/76)  RR: 16 (09-15-18 @ 07:51) (15 - 18)  SpO2: 93% (09-15-18 @ 04:35) (93% - 98%)    I&O's Summary    14 Sep 2018 07:01  -  15 Sep 2018 07:00  --------------------------------------------------------  IN: 1200 mL / OUT: 1150 mL / NET: 50 mL    15 Sep 2018 07:01  -  15 Sep 2018 08:44  --------------------------------------------------------  IN: 250 mL / OUT: 0 mL / NET: 250 mL      GENERAL: NAD, well-groomed, well-developed  HEAD:  Atraumatic, Normocephalic  EYES: EOMI, PERRL, conjunctiva and sclera clear of right eye, erythema left eye  ENMT: Moist mucous membranes,   NECK: Supple, No JVD, Normal thyroid  NERVOUS SYSTEM:  Alert & awake, right upper and lower extremity weakness, +facial droop, +aphasia  CHEST/LUNG: Clear to ascultation bilaterally; No rales, rhonchi, wheezing, or rubs  HEART: Regular rate and rhythm; No murmurs, rubs, or gallops  ABDOMEN: Soft, Nontender, Nondistended; Bowel sounds present  EXTREMITIES:  2+ Peripheral Pulses, No clubbing, cyanosis, or edema  SKIN: No rashes or lesions      LABS:                        14.2   6.6   )-----------( 196      ( 14 Sep 2018 05:55 )             42.3     09-14    140  |  105  |  15  ----------------------------<  165<H>  3.8   |  26  |  1.17    Ca    9.3      14 Sep 2018 05:55  Mg     2.0     09-14          CAPILLARY BLOOD GLUCOSE  POCT Blood Glucose.: 163 mg/dL (15 Sep 2018 07:50)  POCT Blood Glucose.: 143 mg/dL (14 Sep 2018 22:31)  POCT Blood Glucose.: 163 mg/dL (14 Sep 2018 16:23)  POCT Blood Glucose.: 158 mg/dL (14 Sep 2018 11:17)            RADIOLOGY & ADDITIONAL TESTS:    Imaging Personally Reviewed:  [x] YES  [ ] NO    Consultant(s) Notes Reviewed:  [x] YES  [ ] NO    Care Discussed with Consultants/Other Providers [x] YES  [ ] NO

## 2018-09-16 LAB
ALBUMIN SERPL ELPH-MCNC: 3 G/DL — LOW (ref 3.3–5)
ALP SERPL-CCNC: 64 U/L — SIGNIFICANT CHANGE UP (ref 40–120)
ALT FLD-CCNC: 30 U/L DA — SIGNIFICANT CHANGE UP (ref 10–45)
ANION GAP SERPL CALC-SCNC: 9 MMOL/L — SIGNIFICANT CHANGE UP (ref 5–17)
AST SERPL-CCNC: 30 U/L — SIGNIFICANT CHANGE UP (ref 10–40)
BASOPHILS # BLD AUTO: 0.1 K/UL — SIGNIFICANT CHANGE UP (ref 0–0.2)
BASOPHILS NFR BLD AUTO: 1.4 % — SIGNIFICANT CHANGE UP (ref 0–2)
BILIRUB SERPL-MCNC: 1.3 MG/DL — HIGH (ref 0.2–1.2)
BUN SERPL-MCNC: 16 MG/DL — SIGNIFICANT CHANGE UP (ref 7–23)
CALCIUM SERPL-MCNC: 9.1 MG/DL — SIGNIFICANT CHANGE UP (ref 8.4–10.5)
CHLORIDE SERPL-SCNC: 104 MMOL/L — SIGNIFICANT CHANGE UP (ref 96–108)
CO2 SERPL-SCNC: 27 MMOL/L — SIGNIFICANT CHANGE UP (ref 22–31)
CREAT SERPL-MCNC: 1.26 MG/DL — SIGNIFICANT CHANGE UP (ref 0.5–1.3)
EOSINOPHIL # BLD AUTO: 0.4 K/UL — SIGNIFICANT CHANGE UP (ref 0–0.5)
EOSINOPHIL NFR BLD AUTO: 6.6 % — HIGH (ref 0–6)
GLUCOSE BLDC GLUCOMTR-MCNC: 146 MG/DL — HIGH (ref 70–99)
GLUCOSE BLDC GLUCOMTR-MCNC: 181 MG/DL — HIGH (ref 70–99)
GLUCOSE BLDC GLUCOMTR-MCNC: 184 MG/DL — HIGH (ref 70–99)
GLUCOSE BLDC GLUCOMTR-MCNC: 196 MG/DL — HIGH (ref 70–99)
GLUCOSE SERPL-MCNC: 175 MG/DL — HIGH (ref 70–99)
HCT VFR BLD CALC: 39.9 % — SIGNIFICANT CHANGE UP (ref 34.5–45)
HGB BLD-MCNC: 13.4 G/DL — SIGNIFICANT CHANGE UP (ref 11.5–15.5)
LYMPHOCYTES # BLD AUTO: 1.8 K/UL — SIGNIFICANT CHANGE UP (ref 1–3.3)
LYMPHOCYTES # BLD AUTO: 26.2 % — SIGNIFICANT CHANGE UP (ref 13–44)
MCHC RBC-ENTMCNC: 30.4 PG — SIGNIFICANT CHANGE UP (ref 27–34)
MCHC RBC-ENTMCNC: 33.4 GM/DL — SIGNIFICANT CHANGE UP (ref 32–36)
MCV RBC AUTO: 91 FL — SIGNIFICANT CHANGE UP (ref 80–100)
MONOCYTES # BLD AUTO: 0.8 K/UL — SIGNIFICANT CHANGE UP (ref 0–0.9)
MONOCYTES NFR BLD AUTO: 12.4 % — HIGH (ref 1–9)
NEUTROPHILS # BLD AUTO: 3.6 K/UL — SIGNIFICANT CHANGE UP (ref 1.8–7.4)
NEUTROPHILS NFR BLD AUTO: 53.4 % — SIGNIFICANT CHANGE UP (ref 43–77)
PLATELET # BLD AUTO: 199 K/UL — SIGNIFICANT CHANGE UP (ref 150–400)
POTASSIUM SERPL-MCNC: 3.8 MMOL/L — SIGNIFICANT CHANGE UP (ref 3.5–5.3)
POTASSIUM SERPL-SCNC: 3.8 MMOL/L — SIGNIFICANT CHANGE UP (ref 3.5–5.3)
PROT SERPL-MCNC: 6.5 G/DL — SIGNIFICANT CHANGE UP (ref 6–8.3)
RBC # BLD: 4.39 M/UL — SIGNIFICANT CHANGE UP (ref 3.8–5.2)
RBC # FLD: 12.2 % — SIGNIFICANT CHANGE UP (ref 10.3–14.5)
SODIUM SERPL-SCNC: 140 MMOL/L — SIGNIFICANT CHANGE UP (ref 135–145)
WBC # BLD: 6.7 K/UL — SIGNIFICANT CHANGE UP (ref 3.8–10.5)
WBC # FLD AUTO: 6.7 K/UL — SIGNIFICANT CHANGE UP (ref 3.8–10.5)

## 2018-09-16 PROCEDURE — 99233 SBSQ HOSP IP/OBS HIGH 50: CPT

## 2018-09-16 RX ADMIN — POLYETHYLENE GLYCOL 3350 17 GRAM(S): 17 POWDER, FOR SOLUTION ORAL at 13:46

## 2018-09-16 RX ADMIN — Medication 1 DROP(S): at 18:05

## 2018-09-16 RX ADMIN — Medication 100 MILLIGRAM(S): at 18:07

## 2018-09-16 RX ADMIN — BACITRACIN AND POLYMYXIN B SULFATE 1 APPLICATION(S): 500; 10000 OINTMENT TOPICAL at 06:40

## 2018-09-16 RX ADMIN — AMLODIPINE BESYLATE 5 MILLIGRAM(S): 2.5 TABLET ORAL at 06:40

## 2018-09-16 RX ADMIN — Medication 5 MILLIGRAM(S): at 06:40

## 2018-09-16 RX ADMIN — PANTOPRAZOLE SODIUM 40 MILLIGRAM(S): 20 TABLET, DELAYED RELEASE ORAL at 13:40

## 2018-09-16 RX ADMIN — Medication 5 MILLIGRAM(S): at 18:06

## 2018-09-16 RX ADMIN — Medication 1 DROP(S): at 22:38

## 2018-09-16 RX ADMIN — Medication 2: at 16:47

## 2018-09-16 RX ADMIN — MAGNESIUM OXIDE 400 MG ORAL TABLET 400 MILLIGRAM(S): 241.3 TABLET ORAL at 18:07

## 2018-09-16 RX ADMIN — BACITRACIN AND POLYMYXIN B SULFATE 1 APPLICATION(S): 500; 10000 OINTMENT TOPICAL at 22:17

## 2018-09-16 RX ADMIN — Medication 25 MILLIGRAM(S): at 18:06

## 2018-09-16 RX ADMIN — Medication 1 DROP(S): at 10:46

## 2018-09-16 RX ADMIN — ATORVASTATIN CALCIUM 40 MILLIGRAM(S): 80 TABLET, FILM COATED ORAL at 22:16

## 2018-09-16 RX ADMIN — BACITRACIN AND POLYMYXIN B SULFATE 1 APPLICATION(S): 500; 10000 OINTMENT TOPICAL at 16:47

## 2018-09-16 RX ADMIN — Medication 100 MILLIGRAM(S): at 06:40

## 2018-09-16 RX ADMIN — Medication 2: at 07:46

## 2018-09-16 RX ADMIN — LISINOPRIL 40 MILLIGRAM(S): 2.5 TABLET ORAL at 06:40

## 2018-09-16 RX ADMIN — Medication 325 MILLIGRAM(S): at 13:45

## 2018-09-16 RX ADMIN — Medication 25 MILLIGRAM(S): at 06:40

## 2018-09-16 RX ADMIN — ENOXAPARIN SODIUM 30 MILLIGRAM(S): 100 INJECTION SUBCUTANEOUS at 13:43

## 2018-09-16 RX ADMIN — Medication 1 DROP(S): at 06:41

## 2018-09-16 RX ADMIN — Medication 1 DROP(S): at 16:29

## 2018-09-16 RX ADMIN — SENNA PLUS 2 TABLET(S): 8.6 TABLET ORAL at 22:16

## 2018-09-16 RX ADMIN — SODIUM CHLORIDE 50 MILLILITER(S): 9 INJECTION, SOLUTION INTRAVENOUS at 08:36

## 2018-09-16 RX ADMIN — MAGNESIUM OXIDE 400 MG ORAL TABLET 400 MILLIGRAM(S): 241.3 TABLET ORAL at 10:28

## 2018-09-16 RX ADMIN — MAGNESIUM OXIDE 400 MG ORAL TABLET 400 MILLIGRAM(S): 241.3 TABLET ORAL at 13:43

## 2018-09-16 NOTE — PROGRESS NOTE ADULT - ASSESSMENT
89 year female with PMH of HTN, DM2, dyslipidemia, OA. Admit with acute CVA, LENNIE, elevated trops.       Problem/Plan - 1:  ·  Problem: Acute CVA (cerebrovascular accident).  Plan: S/P TPA  S/P MRI which confirmed moderate size   cont aspirin/statin  Neurology note appreciated, pt. accepted to Acute Rehab awaiting auth and bed.     Problem/Plan - 2:  ·  Problem: LENNIE (acute kidney injury).  Plan: resolved.      Problem/Plan - 3:  ·  Problem: HTN (hypertension).  Plan: Continue with metoprolol, norvasc, lisinopril.      Problem/Plan - 4:  ·  Problem: Aphasia.  Plan: secondary to CVA  speech, PT and OT      Problem/Plan - 5:  ·  Problem: Demand ischemia.  Plan: Cardiologist consult - no new cardiac recommendations

## 2018-09-16 NOTE — PROGRESS NOTE ADULT - NSHPATTENDINGPLANDISCUSS_GEN_ALL_CORE
family at bedside
Dr. Rodriguez
son at bedside
IVANA Álvarez
IVANA Álvarez
Dr. Hutchison
Mary Leon, NP

## 2018-09-16 NOTE — PROGRESS NOTE ADULT - SUBJECTIVE AND OBJECTIVE BOX
Patient is a 89 year old  Female who presents with a chief complaint of Aphasia and weakness (15 Sep 2018 08:44)    Patient seen and examined, improving, but - still with dysphagia, dysarthria, and right sided weakness. Diagnosed with acute CVA. Awaiting insurance auth for transfer to acute rehab      MEDICATIONS  (STANDING):  amLODIPine   Tablet 5 milliGRAM(s) Oral daily  aspirin 325 milliGRAM(s) Oral daily  atorvastatin 40 milliGRAM(s) Oral at bedtime  BACItracin/polymyxin B Ointment 1 Application(s) Topical every 8 hours  ciprofloxacin  0.3% Ophthalmic Solution 1 Drop(s) Left EYE every 4 hours  dextrose 5% + sodium chloride 0.45%. 1000 milliLiter(s) (50 mL/Hr) IV Continuous <Continuous>  dextrose 5%. 1000 milliLiter(s) (50 mL/Hr) IV Continuous <Continuous>  dextrose 50% Injectable 12.5 Gram(s) IV Push once  dextrose 50% Injectable 25 Gram(s) IV Push once  dextrose 50% Injectable 25 Gram(s) IV Push once  docusate sodium 100 milliGRAM(s) Oral two times a day  enoxaparin Injectable 30 milliGRAM(s) SubCutaneous daily  insulin lispro (HumaLOG) corrective regimen sliding scale   SubCutaneous three times a day before meals  lisinopril 40 milliGRAM(s) Oral daily  magnesium oxide 400 milliGRAM(s) Oral three times a day with meals  metoprolol tartrate 25 milliGRAM(s) Oral every 12 hours  oxybutynin 5 milliGRAM(s) Oral two times a day  pantoprazole   Suspension 40 milliGRAM(s) Oral daily  polyethylene glycol 3350 17 Gram(s) Oral daily  senna 2 Tablet(s) Oral at bedtime    MEDICATIONS  (PRN):  dextrose 40% Gel 15 Gram(s) Oral once PRN Blood Glucose LESS THAN 70 milliGRAM(s)/deciliter  glucagon  Injectable 1 milliGRAM(s) IntraMuscular once PRN Glucose LESS THAN 70 milligrams/deciliter  ondansetron Injectable 4 milliGRAM(s) IV Push every 6 hours PRN Nausea and/or Vomiting      REVIEW OF SYSTEMS:  Patient unable to provide due to dysarthria      PHYSICAL EXAM:  T(C): 36.9 (09-16-18 @ 08:20), Max: 37.1 (09-16-18 @ 06:38)  HR: 77 (09-16-18 @ 08:20) (77 - 90)  BP: 144/62 (09-16-18 @ 08:20) (142/66 - 156/75)  RR: 15 (09-16-18 @ 08:20) (15 - 16)  SpO2: 93% (09-16-18 @ 08:20) (93% - 97%)    I&O's Summary    15 Sep 2018 07:01  -  16 Sep 2018 07:00  --------------------------------------------------------  IN: 650 mL / OUT: 0 mL / NET: 650 mL    16 Sep 2018 07:01  -  16 Sep 2018 11:16  --------------------------------------------------------  IN: 360 mL / OUT: 0 mL / NET: 360 mL      GENERAL: NAD, well-groomed, well-developed  HEAD:  Atraumatic, Normocephalic  EYES: EOMI, PERRL, conjunctiva and sclera clear of right eye, erythema left eye  ENMT: Moist mucous membranes,   NECK: Supple, No JVD, Normal thyroid  NERVOUS SYSTEM:  Alert & awake, right upper and lower extremity weakness, +facial droop, +aphasia  CHEST/LUNG: Clear to ascultation bilaterally; No rales, rhonchi, wheezing, or rubs  HEART: Regular rate and rhythm; No murmurs, rubs, or gallops  ABDOMEN: Soft, Nontender, Nondistended; Bowel sounds present  EXTREMITIES:  2+ Peripheral Pulses, No clubbing, cyanosis, or edema  SKIN: No rashes or lesions    LABS:                        13.4   6.7   )-----------( 199      ( 16 Sep 2018 06:30 )             39.9     09-16    140  |  104  |  16  ----------------------------<  175<H>  3.8   |  27  |  1.26    Ca    9.1      16 Sep 2018 06:30    TPro  6.5  /  Alb  3.0<L>  /  TBili  1.3<H>  /  DBili  x   /  AST  30  /  ALT  30  /  AlkPhos  64  09-16        CAPILLARY BLOOD GLUCOSE  POCT Blood Glucose.: 181 mg/dL (16 Sep 2018 07:25)  POCT Blood Glucose.: 169 mg/dL (15 Sep 2018 22:34)  POCT Blood Glucose.: 133 mg/dL (15 Sep 2018 16:47)  POCT Blood Glucose.: 152 mg/dL (15 Sep 2018 11:42)            RADIOLOGY & ADDITIONAL TESTS:    Imaging Personally Reviewed:  [x] YES  [ ] NO    Consultant(s) Notes Reviewed:  [x] YES  [ ] NO    Care Discussed with Consultants/Other Providers [x] YES  [ ] NO

## 2018-09-17 LAB
ALBUMIN SERPL ELPH-MCNC: 3 G/DL — LOW (ref 3.3–5)
ALP SERPL-CCNC: 66 U/L — SIGNIFICANT CHANGE UP (ref 40–120)
ALT FLD-CCNC: 27 U/L DA — SIGNIFICANT CHANGE UP (ref 10–45)
ANION GAP SERPL CALC-SCNC: 9 MMOL/L — SIGNIFICANT CHANGE UP (ref 5–17)
AST SERPL-CCNC: 29 U/L — SIGNIFICANT CHANGE UP (ref 10–40)
BASOPHILS # BLD AUTO: 0.1 K/UL — SIGNIFICANT CHANGE UP (ref 0–0.2)
BASOPHILS NFR BLD AUTO: 1.5 % — SIGNIFICANT CHANGE UP (ref 0–2)
BILIRUB SERPL-MCNC: 1.4 MG/DL — HIGH (ref 0.2–1.2)
BUN SERPL-MCNC: 19 MG/DL — SIGNIFICANT CHANGE UP (ref 7–23)
CALCIUM SERPL-MCNC: 9.5 MG/DL — SIGNIFICANT CHANGE UP (ref 8.4–10.5)
CHLORIDE SERPL-SCNC: 103 MMOL/L — SIGNIFICANT CHANGE UP (ref 96–108)
CO2 SERPL-SCNC: 28 MMOL/L — SIGNIFICANT CHANGE UP (ref 22–31)
CREAT SERPL-MCNC: 1.23 MG/DL — SIGNIFICANT CHANGE UP (ref 0.5–1.3)
EOSINOPHIL # BLD AUTO: 0.4 K/UL — SIGNIFICANT CHANGE UP (ref 0–0.5)
EOSINOPHIL NFR BLD AUTO: 5.7 % — SIGNIFICANT CHANGE UP (ref 0–6)
GLUCOSE BLDC GLUCOMTR-MCNC: 146 MG/DL — HIGH (ref 70–99)
GLUCOSE BLDC GLUCOMTR-MCNC: 146 MG/DL — HIGH (ref 70–99)
GLUCOSE BLDC GLUCOMTR-MCNC: 158 MG/DL — HIGH (ref 70–99)
GLUCOSE BLDC GLUCOMTR-MCNC: 163 MG/DL — HIGH (ref 70–99)
GLUCOSE BLDC GLUCOMTR-MCNC: 177 MG/DL — HIGH (ref 70–99)
GLUCOSE SERPL-MCNC: 168 MG/DL — HIGH (ref 70–99)
HCT VFR BLD CALC: 39.4 % — SIGNIFICANT CHANGE UP (ref 34.5–45)
HGB BLD-MCNC: 13.7 G/DL — SIGNIFICANT CHANGE UP (ref 11.5–15.5)
LYMPHOCYTES # BLD AUTO: 1.7 K/UL — SIGNIFICANT CHANGE UP (ref 1–3.3)
LYMPHOCYTES # BLD AUTO: 24.8 % — SIGNIFICANT CHANGE UP (ref 13–44)
MAGNESIUM SERPL-MCNC: 1.9 MG/DL — SIGNIFICANT CHANGE UP (ref 1.6–2.6)
MCHC RBC-ENTMCNC: 31.8 PG — SIGNIFICANT CHANGE UP (ref 27–34)
MCHC RBC-ENTMCNC: 34.7 GM/DL — SIGNIFICANT CHANGE UP (ref 32–36)
MCV RBC AUTO: 91.6 FL — SIGNIFICANT CHANGE UP (ref 80–100)
MONOCYTES # BLD AUTO: 1 K/UL — HIGH (ref 0–0.9)
MONOCYTES NFR BLD AUTO: 13.9 % — SIGNIFICANT CHANGE UP (ref 2–14)
NEUTROPHILS # BLD AUTO: 3.7 K/UL — SIGNIFICANT CHANGE UP (ref 1.8–7.4)
NEUTROPHILS NFR BLD AUTO: 54.2 % — SIGNIFICANT CHANGE UP (ref 43–77)
PHOSPHATE SERPL-MCNC: 3.5 MG/DL — SIGNIFICANT CHANGE UP (ref 2.5–4.5)
PLATELET # BLD AUTO: 193 K/UL — SIGNIFICANT CHANGE UP (ref 150–400)
POTASSIUM SERPL-MCNC: 3.7 MMOL/L — SIGNIFICANT CHANGE UP (ref 3.5–5.3)
POTASSIUM SERPL-SCNC: 3.7 MMOL/L — SIGNIFICANT CHANGE UP (ref 3.5–5.3)
PROT SERPL-MCNC: 6.9 G/DL — SIGNIFICANT CHANGE UP (ref 6–8.3)
RBC # BLD: 4.3 M/UL — SIGNIFICANT CHANGE UP (ref 3.8–5.2)
RBC # FLD: 11.7 % — SIGNIFICANT CHANGE UP (ref 10.3–14.5)
SODIUM SERPL-SCNC: 140 MMOL/L — SIGNIFICANT CHANGE UP (ref 135–145)
WBC # BLD: 6.9 K/UL — SIGNIFICANT CHANGE UP (ref 3.8–10.5)
WBC # FLD AUTO: 6.9 K/UL — SIGNIFICANT CHANGE UP (ref 3.8–10.5)

## 2018-09-17 PROCEDURE — 99232 SBSQ HOSP IP/OBS MODERATE 35: CPT

## 2018-09-17 PROCEDURE — 99233 SBSQ HOSP IP/OBS HIGH 50: CPT

## 2018-09-17 RX ADMIN — Medication 2: at 06:42

## 2018-09-17 RX ADMIN — AMLODIPINE BESYLATE 5 MILLIGRAM(S): 2.5 TABLET ORAL at 06:30

## 2018-09-17 RX ADMIN — ENOXAPARIN SODIUM 30 MILLIGRAM(S): 100 INJECTION SUBCUTANEOUS at 12:06

## 2018-09-17 RX ADMIN — Medication 1 DROP(S): at 17:38

## 2018-09-17 RX ADMIN — PANTOPRAZOLE SODIUM 40 MILLIGRAM(S): 20 TABLET, DELAYED RELEASE ORAL at 12:06

## 2018-09-17 RX ADMIN — Medication 100 MILLIGRAM(S): at 17:37

## 2018-09-17 RX ADMIN — Medication 5 MILLIGRAM(S): at 17:37

## 2018-09-17 RX ADMIN — Medication 25 MILLIGRAM(S): at 17:37

## 2018-09-17 RX ADMIN — BACITRACIN AND POLYMYXIN B SULFATE 1 APPLICATION(S): 500; 10000 OINTMENT TOPICAL at 06:30

## 2018-09-17 RX ADMIN — LISINOPRIL 40 MILLIGRAM(S): 2.5 TABLET ORAL at 06:30

## 2018-09-17 RX ADMIN — MAGNESIUM OXIDE 400 MG ORAL TABLET 400 MILLIGRAM(S): 241.3 TABLET ORAL at 12:06

## 2018-09-17 RX ADMIN — Medication 1 DROP(S): at 06:30

## 2018-09-17 RX ADMIN — Medication 2: at 12:05

## 2018-09-17 RX ADMIN — POLYETHYLENE GLYCOL 3350 17 GRAM(S): 17 POWDER, FOR SOLUTION ORAL at 12:06

## 2018-09-17 RX ADMIN — Medication 1 DROP(S): at 14:58

## 2018-09-17 RX ADMIN — MAGNESIUM OXIDE 400 MG ORAL TABLET 400 MILLIGRAM(S): 241.3 TABLET ORAL at 17:37

## 2018-09-17 RX ADMIN — Medication 100 MILLIGRAM(S): at 06:30

## 2018-09-17 RX ADMIN — Medication 25 MILLIGRAM(S): at 06:30

## 2018-09-17 RX ADMIN — BACITRACIN AND POLYMYXIN B SULFATE 1 APPLICATION(S): 500; 10000 OINTMENT TOPICAL at 14:58

## 2018-09-17 RX ADMIN — Medication 1 DROP(S): at 02:00

## 2018-09-17 RX ADMIN — Medication 1 DROP(S): at 10:54

## 2018-09-17 RX ADMIN — Medication 325 MILLIGRAM(S): at 12:06

## 2018-09-17 RX ADMIN — Medication 2: at 17:41

## 2018-09-17 RX ADMIN — Medication 5 MILLIGRAM(S): at 06:30

## 2018-09-17 NOTE — PROGRESS NOTE ADULT - ASSESSMENT
89 year female with PMH of HTN, DM2, dyslipidemia, OA. Admit with acute CVA, LENNIE, elevated trops.       Problem/Plan - 1:  ·  Problem: Acute CVA (cerebrovascular accident).  Plan: S/P TPA  S/P MRI which confirmed moderate size   cont aspirin/statin  Neurology note appreciated, pt. accepted to Acute Rehab awaiting auth and bed.  Discussed with insurance medical director, who wants further documentation  Patient CAN tolerate three hours a day of therapy, and is an ideal candidate for acute rehab  PT/OT/SLP     Problem/Plan - 2:  ·  Problem: Dysarthria: c/w speech therapy, actively being treated     Problem/Plan - 3:  ·  Problem: HTN (hypertension).  Plan: Continue with metoprolol, norvasc, lisinopril.        Problem/Plan - 4:  ·  Problem: Right arm paresis  c/w PT/OT  all secondary to #1 above    D/C to acute rehab when auth obtained from insurance company

## 2018-09-17 NOTE — PROGRESS NOTE ADULT - SUBJECTIVE AND OBJECTIVE BOX
Patient is a 89y old  Female who presents with a chief complaint of Aphasia and weakness (17 Sep 2018 13:50)      Patient seen and examined at bedside.    ALLERGIES:  No Known Allergies    MEDICATIONS  (STANDING):  amLODIPine   Tablet 5 milliGRAM(s) Oral daily  aspirin 325 milliGRAM(s) Oral daily  atorvastatin 40 milliGRAM(s) Oral at bedtime  BACItracin/polymyxin B Ointment 1 Application(s) Topical every 8 hours  ciprofloxacin  0.3% Ophthalmic Solution 1 Drop(s) Left EYE every 4 hours  dextrose 5% + sodium chloride 0.45%. 1000 milliLiter(s) (50 mL/Hr) IV Continuous <Continuous>  dextrose 5%. 1000 milliLiter(s) (50 mL/Hr) IV Continuous <Continuous>  dextrose 50% Injectable 12.5 Gram(s) IV Push once  dextrose 50% Injectable 25 Gram(s) IV Push once  dextrose 50% Injectable 25 Gram(s) IV Push once  docusate sodium 100 milliGRAM(s) Oral two times a day  enoxaparin Injectable 30 milliGRAM(s) SubCutaneous daily  insulin lispro (HumaLOG) corrective regimen sliding scale   SubCutaneous three times a day before meals  lisinopril 40 milliGRAM(s) Oral daily  magnesium oxide 400 milliGRAM(s) Oral three times a day with meals  metoprolol tartrate 25 milliGRAM(s) Oral every 12 hours  oxybutynin 5 milliGRAM(s) Oral two times a day  pantoprazole   Suspension 40 milliGRAM(s) Oral daily  polyethylene glycol 3350 17 Gram(s) Oral daily  senna 2 Tablet(s) Oral at bedtime    MEDICATIONS  (PRN):  dextrose 40% Gel 15 Gram(s) Oral once PRN Blood Glucose LESS THAN 70 milliGRAM(s)/deciliter  glucagon  Injectable 1 milliGRAM(s) IntraMuscular once PRN Glucose LESS THAN 70 milligrams/deciliter  ondansetron Injectable 4 milliGRAM(s) IV Push every 6 hours PRN Nausea and/or Vomiting    Vital Signs Last 24 Hrs  T(F): 98.2 (17 Sep 2018 12:11), Max: 99 (16 Sep 2018 22:00)  HR: 79 (17 Sep 2018 12:11) (72 - 87)  BP: 134/51 (17 Sep 2018 12:11) (125/52 - 180/64)  RR: 15 (17 Sep 2018 12:11) (12 - 15)  SpO2: 93% (17 Sep 2018 12:11) (92% - 96%)  I&O's Summary    16 Sep 2018 07:01  -  17 Sep 2018 07:00  --------------------------------------------------------  IN: 1080 mL / OUT: 0 mL / NET: 1080 mL    17 Sep 2018 07:01  -  17 Sep 2018 14:49  --------------------------------------------------------  IN: 200 mL / OUT: 0 mL / NET: 200 mL      PHYSICAL EXAM:  General: NAD  ENT: MMM  Neck: Supple, No JVD  Lungs: Clear to auscultation bilaterally  Cardio: RRR, S1/S2, No murmurs  Abdomen: Soft, Nontender, Nondistended; Bowel sounds present  Extremities: No calf tenderness  Neuro: Right arm paresis, dysarthric    LABS:                        13.7   6.9   )-----------( 193      ( 17 Sep 2018 06:19 )             39.4     09-17    140  |  103  |  19  ----------------------------<  168  3.7   |  28  |  1.23    Ca    9.5      17 Sep 2018 06:19  Phos  3.5     09-17  Mg     1.9     09-17    TPro  6.9  /  Alb  3.0  /  TBili  1.4  /  DBili  x   /  AST  29  /  ALT  27  /  AlkPhos  66  09-17    eGFR if Non African American: 38 mL/min/1.73M2 (09-17-18 @ 06:19)  eGFR if African American: 43 mL/min/1.73M2 (09-17-18 @ 06:19)            09-08 Chol 161 mg/dL LDL 99 mg/dL HDL 33 mg/dL Trig 147 mg/dL        CAPILLARY BLOOD GLUCOSE      POCT Blood Glucose.: 177 mg/dL (17 Sep 2018 11:44)  POCT Blood Glucose.: 146 mg/dL (17 Sep 2018 07:56)  POCT Blood Glucose.: 158 mg/dL (17 Sep 2018 06:25)  POCT Blood Glucose.: 146 mg/dL (16 Sep 2018 23:11)  POCT Blood Glucose.: 184 mg/dL (16 Sep 2018 16:37)    09-08 [id number] 6.3      Care Discussed with Consultants/Other Providers: Dr. Gould

## 2018-09-17 NOTE — CHART NOTE - NSCHARTNOTEFT_GEN_A_CORE
NUTRITION FOLLOW UP    SOURCE: Medical Record/ RN    DIET: Pureed with honey liquids    Patient currently receiving a pureed diet with honey liquids tolerating well. Patient currently consuming between 50-75% of meals as per I & O. No GI distress noted.     CURRENT WEIGHT: 144.4/65.5kg , patient with stable weight since her admission.    PERTINENT MEDS:   Pertinent Medications: MEDICATIONS  (STANDING):  amLODIPine   Tablet 5 milliGRAM(s) Oral daily  aspirin 325 milliGRAM(s) Oral daily  atorvastatin 40 milliGRAM(s) Oral at bedtime  BACItracin/polymyxin B Ointment 1 Application(s) Topical every 8 hours  ciprofloxacin  0.3% Ophthalmic Solution 1 Drop(s) Left EYE every 4 hours  dextrose 5% + sodium chloride 0.45%. 1000 milliLiter(s) (50 mL/Hr) IV Continuous <Continuous>  dextrose 5%. 1000 milliLiter(s) (50 mL/Hr) IV Continuous <Continuous>  dextrose 50% Injectable 12.5 Gram(s) IV Push once  dextrose 50% Injectable 25 Gram(s) IV Push once  dextrose 50% Injectable 25 Gram(s) IV Push once  docusate sodium 100 milliGRAM(s) Oral two times a day  enoxaparin Injectable 30 milliGRAM(s) SubCutaneous daily  insulin lispro (HumaLOG) corrective regimen sliding scale   SubCutaneous three times a day before meals  lisinopril 40 milliGRAM(s) Oral daily  magnesium oxide 400 milliGRAM(s) Oral three times a day with meals  metoprolol tartrate 25 milliGRAM(s) Oral every 12 hours  oxybutynin 5 milliGRAM(s) Oral two times a day  pantoprazole   Suspension 40 milliGRAM(s) Oral daily  polyethylene glycol 3350 17 Gram(s) Oral daily  senna 2 Tablet(s) Oral at bedtime    MEDICATIONS  (PRN):  dextrose 40% Gel 15 Gram(s) Oral once PRN Blood Glucose LESS THAN 70 milliGRAM(s)/deciliter  glucagon  Injectable 1 milliGRAM(s) IntraMuscular once PRN Glucose LESS THAN 70 milligrams/deciliter  ondansetron Injectable 4 milliGRAM(s) IV Push every 6 hours PRN Nausea and/or Vomiting      PERTINENT LABS:  Date: 17 Sep 2018 06:19  Hemoglobin 13.7   Hematocrit 39.4     Date: 09-17  Sodium 140  Potassium 3.7  Glucose Serum 168<H>  BUN 19      Creatinine    ACCUCHECK  POCT Blood Glucose.: 177 mg/dL (17 Sep 2018 11:44)  POCT Blood Glucose.: 146 mg/dL (17 Sep 2018 07:56)  POCT Blood Glucose.: 158 mg/dL (17 Sep 2018 06:25)  POCT Blood Glucose.: 146 mg/dL (16 Sep 2018 23:11)      SKIN: surgical wound healing    ESTIMATED NEEDS:   [X] no change since previous assessment  [ ] recalculated:     PREVIOUS NUTRITION DIAGNOSIS: addressed    NUTRITION DIAGNOSIS is   [X] resolved, RD will follow as per nutrition department protocol.    NEW NUTRITION DIAGNOSIS: [X] not applicable    MONITORING AND EVALUATION:   Current diet order is appropriate and is well tolerated, but will monitor for any changes that may be needed    [X] PO intake [X] Tolerance to diet prescription [X] weights [X] follow up per protocol    NUTRITION RECOMMENDATIONS: NUTRITION FOLLOW UP    SOURCE: Medical Record/ RN    DIET: Pureed with honey liquids    Patient currently receiving a pureed diet with honey liquids tolerating well. Patient currently consuming between 50-75% of meals as per I & O. No GI distress noted.   No BM noted spoke with RN , she reports patient provided with Miralax.      CURRENT WEIGHT: 144.4/65.5kg , patient with stable weight since her admission.    PERTINENT MEDS:   Pertinent Medications: MEDICATIONS  (STANDING):  amLODIPine   Tablet 5 milliGRAM(s) Oral daily  aspirin 325 milliGRAM(s) Oral daily  atorvastatin 40 milliGRAM(s) Oral at bedtime  BACItracin/polymyxin B Ointment 1 Application(s) Topical every 8 hours  ciprofloxacin  0.3% Ophthalmic Solution 1 Drop(s) Left EYE every 4 hours  dextrose 5% + sodium chloride 0.45%. 1000 milliLiter(s) (50 mL/Hr) IV Continuous <Continuous>  dextrose 5%. 1000 milliLiter(s) (50 mL/Hr) IV Continuous <Continuous>  dextrose 50% Injectable 12.5 Gram(s) IV Push once  dextrose 50% Injectable 25 Gram(s) IV Push once  dextrose 50% Injectable 25 Gram(s) IV Push once  docusate sodium 100 milliGRAM(s) Oral two times a day  enoxaparin Injectable 30 milliGRAM(s) SubCutaneous daily  insulin lispro (HumaLOG) corrective regimen sliding scale   SubCutaneous three times a day before meals  lisinopril 40 milliGRAM(s) Oral daily  magnesium oxide 400 milliGRAM(s) Oral three times a day with meals  metoprolol tartrate 25 milliGRAM(s) Oral every 12 hours  oxybutynin 5 milliGRAM(s) Oral two times a day  pantoprazole   Suspension 40 milliGRAM(s) Oral daily  polyethylene glycol 3350 17 Gram(s) Oral daily  senna 2 Tablet(s) Oral at bedtime    MEDICATIONS  (PRN):  dextrose 40% Gel 15 Gram(s) Oral once PRN Blood Glucose LESS THAN 70 milliGRAM(s)/deciliter  glucagon  Injectable 1 milliGRAM(s) IntraMuscular once PRN Glucose LESS THAN 70 milligrams/deciliter  ondansetron Injectable 4 milliGRAM(s) IV Push every 6 hours PRN Nausea and/or Vomiting      PERTINENT LABS:  Date: 17 Sep 2018 06:19  Hemoglobin 13.7   Hematocrit 39.4     Date: 09-17  Sodium 140  Potassium 3.7  Glucose Serum 168<H>  BUN 19  Creatinine 1.23    ACCUCHECK  POCT Blood Glucose.: 177 mg/dL (17 Sep 2018 11:44)  POCT Blood Glucose.: 146 mg/dL (17 Sep 2018 07:56)  POCT Blood Glucose.: 158 mg/dL (17 Sep 2018 06:25)  POCT Blood Glucose.: 146 mg/dL (16 Sep 2018 23:11)      SKIN: intact    ESTIMATED NEEDS:   [X] no change since previous assessment  [ ] recalculated:     PREVIOUS NUTRITION DIAGNOSIS: addressed    NUTRITION DIAGNOSIS is   [X] on going , patient remains on pureed diet with honey liquids., RD will follow as per nutrition department protocol. Will follow clinical course.        MONITORING AND EVALUATION:   Current diet order is appropriate and is well tolerated, but will monitor for any changes that may be needed    [X] PO intake [X] Tolerance to diet prescription [X] weights [X] follow up per protocol    NUTRITION RECOMMENDATIONS: Continue current diet regimen.

## 2018-09-17 NOTE — PROGRESS NOTE ADULT - SUBJECTIVE AND OBJECTIVE BOX
Patient is a 89y old  Female who presents with a chief complaint of Aphasia and weakness (16 Sep 2018 11:16)      HPI:  89 year old female with PMH HTN, DM2, dyslipidemia, OA had been in her usual state of health, was getting into the car when noted by family to suddenly become aphasic with pronounced weakness on the right side.  Brought immediately to ED at [facility], CT head negative for acute hemorrhage given TPA and transferred to ICU for further care.   Denies CP, SOB, abdominal pain, lightheadedness, dizziness, visual changes, syncope, headache, tremors, paresthesias anesthesias. (07 Sep 2018 16:36)      Today: much improved mental status, attempts to verbalize, follows all commands, improved right hemiparesis    PAST MEDICAL & SURGICAL HISTORY  Dyslipidemia  Osteoarthritis  Sciatica  Diabetes  HTN (hypertension)  HTN (hypertension)  DM (diabetes mellitus)  H/O bilateral hip replacements  S/P hip replacement, bilateral      SOCIAL HISTORY  Smoking - Denied  EtOH - Denied   Drugs - Denied    FUNCTIONAL HISTORY  Lives   Independent    CURRENT FUNCTIONAL STATUS      FAMILY HISTORY   No pertinent family history in first degree relatives      RECENT LABS/IMAGING  CBC Full  -  ( 17 Sep 2018 06:19 )  WBC Count : 6.9 K/uL  Hemoglobin : 13.7 g/dL  Hematocrit : 39.4 %  Platelet Count - Automated : 193 K/uL  Mean Cell Volume : 91.6 fl  Mean Cell Hemoglobin : 31.8 pg  Mean Cell Hemoglobin Concentration : 34.7 gm/dL  Auto Neutrophil # : 3.7 K/uL  Auto Lymphocyte # : 1.7 K/uL  Auto Monocyte # : 1.0 K/uL  Auto Eosinophil # : 0.4 K/uL  Auto Basophil # : 0.1 K/uL  Auto Neutrophil % : 54.2 %  Auto Lymphocyte % : 24.8 %  Auto Monocyte % : 13.9 %  Auto Eosinophil % : 5.7 %  Auto Basophil % : 1.5 %    09-17    140  |  103  |  19  ----------------------------<  168<H>  3.7   |  28  |  1.23    Ca    9.5      17 Sep 2018 06:19  Phos  3.5     09-17  Mg     1.9     09-17    TPro  6.9  /  Alb  3.0<L>  /  TBili  1.4<H>  /  DBili  x   /  AST  29  /  ALT  27  /  AlkPhos  66  09-17        VITALS  T(C): 36.8 (09-17-18 @ 12:11), Max: 37.2 (09-16-18 @ 22:00)  HR: 79 (09-17-18 @ 12:11) (72 - 87)  BP: 134/51 (09-17-18 @ 12:11) (125/52 - 180/64)  RR: 15 (09-17-18 @ 12:11) (12 - 15)  SpO2: 93% (09-17-18 @ 12:11) (92% - 96%)  Wt(kg): --    ALLERGIES  No Known Allergies      MEDICATIONS   amLODIPine   Tablet 5 milliGRAM(s) Oral daily  aspirin 325 milliGRAM(s) Oral daily  atorvastatin 40 milliGRAM(s) Oral at bedtime  BACItracin/polymyxin B Ointment 1 Application(s) Topical every 8 hours  ciprofloxacin  0.3% Ophthalmic Solution 1 Drop(s) Left EYE every 4 hours  dextrose 40% Gel 15 Gram(s) Oral once PRN  dextrose 5% + sodium chloride 0.45%. 1000 milliLiter(s) IV Continuous <Continuous>  dextrose 5%. 1000 milliLiter(s) IV Continuous <Continuous>  dextrose 50% Injectable 12.5 Gram(s) IV Push once  dextrose 50% Injectable 25 Gram(s) IV Push once  dextrose 50% Injectable 25 Gram(s) IV Push once  docusate sodium 100 milliGRAM(s) Oral two times a day  enoxaparin Injectable 30 milliGRAM(s) SubCutaneous daily  glucagon  Injectable 1 milliGRAM(s) IntraMuscular once PRN  insulin lispro (HumaLOG) corrective regimen sliding scale   SubCutaneous three times a day before meals  lisinopril 40 milliGRAM(s) Oral daily  magnesium oxide 400 milliGRAM(s) Oral three times a day with meals  metoprolol tartrate 25 milliGRAM(s) Oral every 12 hours  ondansetron Injectable 4 milliGRAM(s) IV Push every 6 hours PRN  oxybutynin 5 milliGRAM(s) Oral two times a day  pantoprazole   Suspension 40 milliGRAM(s) Oral daily  polyethylene glycol 3350 17 Gram(s) Oral daily  senna 2 Tablet(s) Oral at bedtime      ----------------------------------------------------------------------------------------  PHYSICAL EXAM  Constitutional - NAD, Comfortable  HEENT - NCAT, EOMI  Neck - Supple, No limited ROM  Chest - CTA bilaterally,  Cardiovascular - RRR, S1S2,  Abdomen - BS+, Soft, NTND  Extremities - No C/C/E, No calf tenderness   Neurologic Exam -                    Cognitive - Awake, Alert,, aphrasic mainly expressive, Right inattention        Cranial Nerves -  Flattened  right NLF, dysphagia     Motor - Right hemiparesis 2-3/5 UE, 3-4/5 LE         Reflexes - DTR brisker on the right, Psychiatric - Mood stable, Affect WNL,  ----------------------------------------------------------------------------------------  ASSESSMENT/PLAN   I was asked to reevaluate patient's neurorehabilitation potential after stroke.   Recommend ACUTE inpatient rehabilitation for the functional deficits consisting of 3 hours of therapy/day & 24 hour RN/daily PMR physician for comorbid medical management. Will continue to follow for ongoing rehab needs and recommendations. Observed in therapy, ambulates and transfers therapists in hallway now. Very motivated, supportive family

## 2018-09-18 DIAGNOSIS — E11.9 TYPE 2 DIABETES MELLITUS WITHOUT COMPLICATIONS: ICD-10-CM

## 2018-09-18 DIAGNOSIS — G83.21 MONOPLEGIA OF UPPER LIMB AFFECTING RIGHT DOMINANT SIDE: ICD-10-CM

## 2018-09-18 DIAGNOSIS — I69.391 DYSPHAGIA FOLLOWING CEREBRAL INFARCTION: ICD-10-CM

## 2018-09-18 DIAGNOSIS — R47.1 DYSARTHRIA AND ANARTHRIA: ICD-10-CM

## 2018-09-18 LAB
GLUCOSE BLDC GLUCOMTR-MCNC: 180 MG/DL — HIGH (ref 70–99)
GLUCOSE BLDC GLUCOMTR-MCNC: 195 MG/DL — HIGH (ref 70–99)
GLUCOSE BLDC GLUCOMTR-MCNC: 201 MG/DL — HIGH (ref 70–99)
GLUCOSE BLDC GLUCOMTR-MCNC: 213 MG/DL — HIGH (ref 70–99)

## 2018-09-18 PROCEDURE — 99239 HOSP IP/OBS DSCHRG MGMT >30: CPT

## 2018-09-18 RX ORDER — CIPROFLOXACIN HCL 0.3 %
1 DROPS OPHTHALMIC (EYE)
Qty: 0 | Refills: 0 | COMMUNITY
Start: 2018-09-18

## 2018-09-18 RX ORDER — DOCUSATE SODIUM 100 MG
1 CAPSULE ORAL
Qty: 0 | Refills: 0 | COMMUNITY
Start: 2018-09-18

## 2018-09-18 RX ORDER — ENOXAPARIN SODIUM 100 MG/ML
30 INJECTION SUBCUTANEOUS
Qty: 0 | Refills: 0 | COMMUNITY
Start: 2018-09-18

## 2018-09-18 RX ORDER — LISINOPRIL 2.5 MG/1
1 TABLET ORAL
Qty: 0 | Refills: 0 | COMMUNITY
Start: 2018-09-18

## 2018-09-18 RX ORDER — SENNA PLUS 8.6 MG/1
2 TABLET ORAL
Qty: 0 | Refills: 0 | COMMUNITY
Start: 2018-09-18

## 2018-09-18 RX ORDER — METOPROLOL TARTRATE 50 MG
1 TABLET ORAL
Qty: 0 | Refills: 0 | COMMUNITY
Start: 2018-09-18

## 2018-09-18 RX ADMIN — Medication 1 DROP(S): at 10:00

## 2018-09-18 RX ADMIN — Medication 1 DROP(S): at 02:48

## 2018-09-18 RX ADMIN — Medication 5 MILLIGRAM(S): at 05:56

## 2018-09-18 RX ADMIN — MAGNESIUM OXIDE 400 MG ORAL TABLET 400 MILLIGRAM(S): 241.3 TABLET ORAL at 07:53

## 2018-09-18 RX ADMIN — Medication 2: at 07:53

## 2018-09-18 RX ADMIN — Medication 25 MILLIGRAM(S): at 18:06

## 2018-09-18 RX ADMIN — ATORVASTATIN CALCIUM 40 MILLIGRAM(S): 80 TABLET, FILM COATED ORAL at 02:46

## 2018-09-18 RX ADMIN — ENOXAPARIN SODIUM 30 MILLIGRAM(S): 100 INJECTION SUBCUTANEOUS at 12:00

## 2018-09-18 RX ADMIN — SODIUM CHLORIDE 50 MILLILITER(S): 9 INJECTION, SOLUTION INTRAVENOUS at 05:54

## 2018-09-18 RX ADMIN — Medication 2: at 18:05

## 2018-09-18 RX ADMIN — ATORVASTATIN CALCIUM 40 MILLIGRAM(S): 80 TABLET, FILM COATED ORAL at 22:09

## 2018-09-18 RX ADMIN — AMLODIPINE BESYLATE 5 MILLIGRAM(S): 2.5 TABLET ORAL at 05:56

## 2018-09-18 RX ADMIN — Medication 1 DROP(S): at 18:04

## 2018-09-18 RX ADMIN — SODIUM CHLORIDE 50 MILLILITER(S): 9 INJECTION, SOLUTION INTRAVENOUS at 02:48

## 2018-09-18 RX ADMIN — Medication 25 MILLIGRAM(S): at 05:56

## 2018-09-18 RX ADMIN — Medication 4: at 11:30

## 2018-09-18 RX ADMIN — Medication 5 MILLIGRAM(S): at 18:05

## 2018-09-18 RX ADMIN — Medication 100 MILLIGRAM(S): at 18:04

## 2018-09-18 RX ADMIN — MAGNESIUM OXIDE 400 MG ORAL TABLET 400 MILLIGRAM(S): 241.3 TABLET ORAL at 12:00

## 2018-09-18 RX ADMIN — LISINOPRIL 40 MILLIGRAM(S): 2.5 TABLET ORAL at 05:56

## 2018-09-18 RX ADMIN — Medication 100 MILLIGRAM(S): at 05:56

## 2018-09-18 RX ADMIN — Medication 325 MILLIGRAM(S): at 12:00

## 2018-09-18 RX ADMIN — PANTOPRAZOLE SODIUM 40 MILLIGRAM(S): 20 TABLET, DELAYED RELEASE ORAL at 12:00

## 2018-09-18 RX ADMIN — SENNA PLUS 2 TABLET(S): 8.6 TABLET ORAL at 02:47

## 2018-09-18 RX ADMIN — Medication 1 DROP(S): at 22:09

## 2018-09-18 RX ADMIN — Medication 1 DROP(S): at 02:50

## 2018-09-18 RX ADMIN — Medication 1 DROP(S): at 05:54

## 2018-09-18 RX ADMIN — POLYETHYLENE GLYCOL 3350 17 GRAM(S): 17 POWDER, FOR SOLUTION ORAL at 12:00

## 2018-09-18 RX ADMIN — SENNA PLUS 2 TABLET(S): 8.6 TABLET ORAL at 22:08

## 2018-09-18 RX ADMIN — MAGNESIUM OXIDE 400 MG ORAL TABLET 400 MILLIGRAM(S): 241.3 TABLET ORAL at 18:04

## 2018-09-18 NOTE — PROGRESS NOTE ADULT - ASSESSMENT
89 year female with PMH of HTN, DM2, dyslipidemia, OA. Admit with acute CVA, LENNIE, elevated trops. Her only ongoing issue are the effects related to her recent stroke. Awaiting insurance authorization for acute rehab - updated clinical information sent to the medical director at rehab yesterday, who wanted documentation that she could tolerate three hours of therapy a day. Hope for D/C to acute rehab today (if auth obtained) 89 year female with PMH of HTN, DM2, dyslipidemia, OA. Admit with acute CVA, LENNIE, elevated trops. Her only ongoing issue are the effects related to her recent stroke. Awaiting insurance authorization for acute rehab - updated clinical information sent to the medical director at rehab yesterday, who wanted documentation that she could tolerate three hours of therapy a day. Hope for D/C to acute rehab today (if auth obtained)    TIME SPENT ON DISCHARGE 38 MINUTES

## 2018-09-18 NOTE — PROGRESS NOTE ADULT - SUBJECTIVE AND OBJECTIVE BOX
Patient is a 89y old  Female who presents with a chief complaint of Aphasia and weakness (17 Sep 2018 14:49)      Patient seen and examined at bedside.    ALLERGIES:  No Known Allergies    MEDICATIONS  (STANDING):  amLODIPine   Tablet 5 milliGRAM(s) Oral daily  aspirin 325 milliGRAM(s) Oral daily  atorvastatin 40 milliGRAM(s) Oral at bedtime  BACItracin/polymyxin B Ointment 1 Application(s) Topical every 8 hours  ciprofloxacin  0.3% Ophthalmic Solution 1 Drop(s) Left EYE every 4 hours  dextrose 5% + sodium chloride 0.45%. 1000 milliLiter(s) (50 mL/Hr) IV Continuous <Continuous>  dextrose 5%. 1000 milliLiter(s) (50 mL/Hr) IV Continuous <Continuous>  dextrose 50% Injectable 12.5 Gram(s) IV Push once  dextrose 50% Injectable 25 Gram(s) IV Push once  dextrose 50% Injectable 25 Gram(s) IV Push once  docusate sodium 100 milliGRAM(s) Oral two times a day  enoxaparin Injectable 30 milliGRAM(s) SubCutaneous daily  insulin lispro (HumaLOG) corrective regimen sliding scale   SubCutaneous three times a day before meals  lisinopril 40 milliGRAM(s) Oral daily  magnesium oxide 400 milliGRAM(s) Oral three times a day with meals  metoprolol tartrate 25 milliGRAM(s) Oral every 12 hours  oxybutynin 5 milliGRAM(s) Oral two times a day  pantoprazole   Suspension 40 milliGRAM(s) Oral daily  polyethylene glycol 3350 17 Gram(s) Oral daily  senna 2 Tablet(s) Oral at bedtime    MEDICATIONS  (PRN):  dextrose 40% Gel 15 Gram(s) Oral once PRN Blood Glucose LESS THAN 70 milliGRAM(s)/deciliter  glucagon  Injectable 1 milliGRAM(s) IntraMuscular once PRN Glucose LESS THAN 70 milligrams/deciliter  ondansetron Injectable 4 milliGRAM(s) IV Push every 6 hours PRN Nausea and/or Vomiting    Vital Signs Last 24 Hrs  T(F): 98.1 (18 Sep 2018 05:48), Max: 98.2 (17 Sep 2018 12:11)  HR: 77 (18 Sep 2018 05:48) (77 - 88)  BP: 160/61 (18 Sep 2018 05:48) (120/54 - 160/61)  RR: 15 (18 Sep 2018 05:48) (15 - 15)  SpO2: 95% (18 Sep 2018 05:48) (93% - 95%)  I&O's Summary    17 Sep 2018 07:01  -  18 Sep 2018 07:00  --------------------------------------------------------  IN: 1250 mL / OUT: 0 mL / NET: 1250 mL      PHYSICAL EXAM:  General: NAD  ENT: MMM  Neck: Supple, No JVD  Lungs: Clear to auscultation bilaterally  Cardio: RRR, S1/S2, No murmurs  Abdomen: Soft, Nontender, Nondistended; Bowel sounds present  Extremities: No calf tenderness  Neuro: Right arm paresis, dysarthric    LABS:                        13.7   6.9   )-----------( 193      ( 17 Sep 2018 06:19 )             39.4     09-17    140  |  103  |  19  ----------------------------<  168  3.7   |  28  |  1.23    Ca    9.5      17 Sep 2018 06:19  Phos  3.5     09-17  Mg     1.9     09-17    TPro  6.9  /  Alb  3.0  /  TBili  1.4  /  DBili  x   /  AST  29  /  ALT  27  /  AlkPhos  66  09-17    eGFR if Non African American: 38 mL/min/1.73M2 (09-17-18 @ 06:19)  eGFR if African American: 43 mL/min/1.73M2 (09-17-18 @ 06:19)            09-08 Chol 161 mg/dL LDL 99 mg/dL HDL 33 mg/dL Trig 147 mg/dL        CAPILLARY BLOOD GLUCOSE      POCT Blood Glucose.: 201 mg/dL (18 Sep 2018 11:27)  POCT Blood Glucose.: 195 mg/dL (18 Sep 2018 07:46)  POCT Blood Glucose.: 146 mg/dL (17 Sep 2018 21:52)  POCT Blood Glucose.: 163 mg/dL (17 Sep 2018 17:32)  POCT Blood Glucose.: 177 mg/dL (17 Sep 2018 11:44)    09-08 [id number] 6.3    Care Discussed with Consultants/Other Providers: Dr. Gould - neurologist

## 2018-09-18 NOTE — PROGRESS NOTE ADULT - PROBLEM SELECTOR PROBLEM 5
Diabetes
DM (diabetes mellitus)
DM (diabetes mellitus)
Demand ischemia
Diabetes
Diabetes
Dysphagia as late effect of cerebrovascular accident (CVA)
Diabetes
Demand ischemia

## 2018-09-18 NOTE — PROGRESS NOTE ADULT - PROBLEM SELECTOR PLAN 5
Continue to cover with ISS and accu-checks.
Cardiologist consulted  Repeat ECG and troponins today
Continue to cover with ISS and q 6 hour accu-checks. Tube feeds with Glucerna started today.
Cover with ISS and accu-checks.
Cover with ISS and accu-checks.
Dysphagia diet  SLP  Hopefully will be able to upgrade while at rehab
Cover with ISS and accu-checks.

## 2018-09-19 ENCOUNTER — INPATIENT (INPATIENT)
Facility: HOSPITAL | Age: 83
LOS: 15 days | Discharge: HOME HEALTH | DRG: 949 | End: 2018-10-05
Attending: PSYCHIATRY & NEUROLOGY | Admitting: PSYCHIATRY & NEUROLOGY
Payer: COMMERCIAL

## 2018-09-19 VITALS
OXYGEN SATURATION: 95 % | WEIGHT: 139.77 LBS | TEMPERATURE: 98 F | HEIGHT: 64 IN | DIASTOLIC BLOOD PRESSURE: 78 MMHG | RESPIRATION RATE: 15 BRPM | SYSTOLIC BLOOD PRESSURE: 131 MMHG | HEART RATE: 90 BPM

## 2018-09-19 VITALS
WEIGHT: 152.56 LBS | SYSTOLIC BLOOD PRESSURE: 134 MMHG | TEMPERATURE: 98 F | OXYGEN SATURATION: 95 % | HEART RATE: 81 BPM | RESPIRATION RATE: 15 BRPM | DIASTOLIC BLOOD PRESSURE: 58 MMHG

## 2018-09-19 DIAGNOSIS — E78.5 HYPERLIPIDEMIA, UNSPECIFIED: ICD-10-CM

## 2018-09-19 DIAGNOSIS — H10.9 UNSPECIFIED CONJUNCTIVITIS: ICD-10-CM

## 2018-09-19 DIAGNOSIS — Z96.643 PRESENCE OF ARTIFICIAL HIP JOINT, BILATERAL: Chronic | ICD-10-CM

## 2018-09-19 DIAGNOSIS — H53.40 UNSPECIFIED VISUAL FIELD DEFECTS: ICD-10-CM

## 2018-09-19 DIAGNOSIS — R27.9 UNSPECIFIED LACK OF COORDINATION: ICD-10-CM

## 2018-09-19 DIAGNOSIS — R21 RASH AND OTHER NONSPECIFIC SKIN ERUPTION: ICD-10-CM

## 2018-09-19 DIAGNOSIS — I63.9 CEREBRAL INFARCTION, UNSPECIFIED: ICD-10-CM

## 2018-09-19 DIAGNOSIS — M25.511 PAIN IN RIGHT SHOULDER: ICD-10-CM

## 2018-09-19 DIAGNOSIS — I69.392 FACIAL WEAKNESS FOLLOWING CEREBRAL INFARCTION: ICD-10-CM

## 2018-09-19 DIAGNOSIS — K59.00 CONSTIPATION, UNSPECIFIED: ICD-10-CM

## 2018-09-19 DIAGNOSIS — Z66 DO NOT RESUSCITATE: ICD-10-CM

## 2018-09-19 DIAGNOSIS — R41.4 NEUROLOGIC NEGLECT SYNDROME: ICD-10-CM

## 2018-09-19 DIAGNOSIS — H91.90 UNSPECIFIED HEARING LOSS, UNSPECIFIED EAR: ICD-10-CM

## 2018-09-19 DIAGNOSIS — E11.9 TYPE 2 DIABETES MELLITUS WITHOUT COMPLICATIONS: ICD-10-CM

## 2018-09-19 DIAGNOSIS — M54.30 SCIATICA, UNSPECIFIED SIDE: ICD-10-CM

## 2018-09-19 DIAGNOSIS — M15.9 POLYOSTEOARTHRITIS, UNSPECIFIED: ICD-10-CM

## 2018-09-19 DIAGNOSIS — I10 ESSENTIAL (PRIMARY) HYPERTENSION: ICD-10-CM

## 2018-09-19 DIAGNOSIS — R26.9 UNSPECIFIED ABNORMALITIES OF GAIT AND MOBILITY: ICD-10-CM

## 2018-09-19 DIAGNOSIS — R13.10 DYSPHAGIA, UNSPECIFIED: ICD-10-CM

## 2018-09-19 DIAGNOSIS — I69.322 DYSARTHRIA FOLLOWING CEREBRAL INFARCTION: ICD-10-CM

## 2018-09-19 DIAGNOSIS — I69.320 APHASIA FOLLOWING CEREBRAL INFARCTION: ICD-10-CM

## 2018-09-19 DIAGNOSIS — I69.391 DYSPHAGIA FOLLOWING CEREBRAL INFARCTION: ICD-10-CM

## 2018-09-19 DIAGNOSIS — I69.351 HEMIPLEGIA AND HEMIPARESIS FOLLOWING CEREBRAL INFARCTION AFFECTING RIGHT DOMINANT SIDE: ICD-10-CM

## 2018-09-19 DIAGNOSIS — Z79.82 LONG TERM (CURRENT) USE OF ASPIRIN: ICD-10-CM

## 2018-09-19 DIAGNOSIS — Z51.89 ENCOUNTER FOR OTHER SPECIFIED AFTERCARE: ICD-10-CM

## 2018-09-19 LAB
GLUCOSE BLDC GLUCOMTR-MCNC: 119 MG/DL — HIGH (ref 70–99)
GLUCOSE BLDC GLUCOMTR-MCNC: 182 MG/DL — HIGH (ref 70–99)
GLUCOSE BLDC GLUCOMTR-MCNC: 199 MG/DL — HIGH (ref 70–99)
GLUCOSE BLDC GLUCOMTR-MCNC: 205 MG/DL — HIGH (ref 70–99)

## 2018-09-19 PROCEDURE — 99223 1ST HOSP IP/OBS HIGH 75: CPT

## 2018-09-19 RX ORDER — ATORVASTATIN CALCIUM 80 MG/1
40 TABLET, FILM COATED ORAL AT BEDTIME
Qty: 0 | Refills: 0 | Status: DISCONTINUED | OUTPATIENT
Start: 2018-09-19 | End: 2018-10-05

## 2018-09-19 RX ORDER — BACITRACIN AND POLYMYXIN B SULFATE 500; 10000 [USP'U]/G; [USP'U]/G
1 OINTMENT TOPICAL EVERY 8 HOURS
Qty: 0 | Refills: 0 | Status: DISCONTINUED | OUTPATIENT
Start: 2018-09-19 | End: 2018-09-21

## 2018-09-19 RX ORDER — DEXTROSE 50 % IN WATER 50 %
15 SYRINGE (ML) INTRAVENOUS ONCE
Qty: 0 | Refills: 0 | Status: DISCONTINUED | OUTPATIENT
Start: 2018-09-19 | End: 2018-10-05

## 2018-09-19 RX ORDER — GLUCAGON INJECTION, SOLUTION 0.5 MG/.1ML
1 INJECTION, SOLUTION SUBCUTANEOUS ONCE
Qty: 0 | Refills: 0 | Status: DISCONTINUED | OUTPATIENT
Start: 2018-09-19 | End: 2018-10-05

## 2018-09-19 RX ORDER — INSULIN LISPRO 100/ML
VIAL (ML) SUBCUTANEOUS
Qty: 0 | Refills: 0 | Status: DISCONTINUED | OUTPATIENT
Start: 2018-09-19 | End: 2018-09-21

## 2018-09-19 RX ORDER — SENNA PLUS 8.6 MG/1
2 TABLET ORAL AT BEDTIME
Qty: 0 | Refills: 0 | Status: DISCONTINUED | OUTPATIENT
Start: 2018-09-19 | End: 2018-09-28

## 2018-09-19 RX ORDER — SENNA PLUS 8.6 MG/1
2 TABLET ORAL AT BEDTIME
Qty: 0 | Refills: 0 | Status: DISCONTINUED | OUTPATIENT
Start: 2018-09-19 | End: 2018-09-21

## 2018-09-19 RX ORDER — OXYBUTYNIN CHLORIDE 5 MG
5 TABLET ORAL
Qty: 0 | Refills: 0 | Status: DISCONTINUED | OUTPATIENT
Start: 2018-09-19 | End: 2018-10-05

## 2018-09-19 RX ORDER — ENOXAPARIN SODIUM 100 MG/ML
30 INJECTION SUBCUTANEOUS DAILY
Qty: 0 | Refills: 0 | Status: DISCONTINUED | OUTPATIENT
Start: 2018-09-19 | End: 2018-10-05

## 2018-09-19 RX ORDER — SODIUM CHLORIDE 9 MG/ML
1000 INJECTION, SOLUTION INTRAVENOUS
Qty: 0 | Refills: 0 | Status: DISCONTINUED | OUTPATIENT
Start: 2018-09-19 | End: 2018-10-05

## 2018-09-19 RX ORDER — NYSTATIN CREAM 100000 [USP'U]/G
1 CREAM TOPICAL THREE TIMES A DAY
Qty: 0 | Refills: 0 | Status: DISCONTINUED | OUTPATIENT
Start: 2018-09-19 | End: 2018-10-05

## 2018-09-19 RX ORDER — ACETAMINOPHEN 500 MG
650 TABLET ORAL EVERY 6 HOURS
Qty: 0 | Refills: 0 | Status: DISCONTINUED | OUTPATIENT
Start: 2018-09-19 | End: 2018-10-05

## 2018-09-19 RX ORDER — CIPROFLOXACIN HCL 0.3 %
1 DROPS OPHTHALMIC (EYE) EVERY 4 HOURS
Qty: 0 | Refills: 0 | Status: DISCONTINUED | OUTPATIENT
Start: 2018-09-19 | End: 2018-09-24

## 2018-09-19 RX ORDER — DOCUSATE SODIUM 100 MG
100 CAPSULE ORAL
Qty: 0 | Refills: 0 | Status: DISCONTINUED | OUTPATIENT
Start: 2018-09-19 | End: 2018-10-05

## 2018-09-19 RX ORDER — METOPROLOL TARTRATE 50 MG
25 TABLET ORAL EVERY 12 HOURS
Qty: 0 | Refills: 0 | Status: DISCONTINUED | OUTPATIENT
Start: 2018-09-19 | End: 2018-10-05

## 2018-09-19 RX ORDER — DEXTROSE 50 % IN WATER 50 %
12.5 SYRINGE (ML) INTRAVENOUS ONCE
Qty: 0 | Refills: 0 | Status: DISCONTINUED | OUTPATIENT
Start: 2018-09-19 | End: 2018-10-05

## 2018-09-19 RX ORDER — DOCUSATE SODIUM 100 MG
100 CAPSULE ORAL
Qty: 0 | Refills: 0 | Status: DISCONTINUED | OUTPATIENT
Start: 2018-09-19 | End: 2018-09-19

## 2018-09-19 RX ORDER — ASPIRIN/CALCIUM CARB/MAGNESIUM 324 MG
325 TABLET ORAL DAILY
Qty: 0 | Refills: 0 | Status: DISCONTINUED | OUTPATIENT
Start: 2018-09-19 | End: 2018-09-24

## 2018-09-19 RX ORDER — MAGNESIUM OXIDE 400 MG ORAL TABLET 241.3 MG
400 TABLET ORAL
Qty: 0 | Refills: 0 | Status: DISCONTINUED | OUTPATIENT
Start: 2018-09-19 | End: 2018-10-05

## 2018-09-19 RX ORDER — AMLODIPINE BESYLATE 2.5 MG/1
5 TABLET ORAL DAILY
Qty: 0 | Refills: 0 | Status: DISCONTINUED | OUTPATIENT
Start: 2018-09-19 | End: 2018-10-05

## 2018-09-19 RX ADMIN — Medication 100 MILLIGRAM(S): at 06:09

## 2018-09-19 RX ADMIN — Medication 1 DROP(S): at 10:35

## 2018-09-19 RX ADMIN — LISINOPRIL 40 MILLIGRAM(S): 2.5 TABLET ORAL at 06:09

## 2018-09-19 RX ADMIN — Medication 1 DROP(S): at 13:13

## 2018-09-19 RX ADMIN — BACITRACIN AND POLYMYXIN B SULFATE 1 APPLICATION(S): 500; 10000 OINTMENT TOPICAL at 13:13

## 2018-09-19 RX ADMIN — Medication 25 MILLIGRAM(S): at 06:09

## 2018-09-19 RX ADMIN — MAGNESIUM OXIDE 400 MG ORAL TABLET 400 MILLIGRAM(S): 241.3 TABLET ORAL at 17:31

## 2018-09-19 RX ADMIN — Medication 5 MILLIGRAM(S): at 17:31

## 2018-09-19 RX ADMIN — Medication 5 MILLIGRAM(S): at 06:09

## 2018-09-19 RX ADMIN — Medication 1 DROP(S): at 16:26

## 2018-09-19 RX ADMIN — Medication 1 DROP(S): at 06:09

## 2018-09-19 RX ADMIN — Medication 325 MILLIGRAM(S): at 11:34

## 2018-09-19 RX ADMIN — ATORVASTATIN CALCIUM 40 MILLIGRAM(S): 80 TABLET, FILM COATED ORAL at 21:22

## 2018-09-19 RX ADMIN — BACITRACIN AND POLYMYXIN B SULFATE 1 APPLICATION(S): 500; 10000 OINTMENT TOPICAL at 21:21

## 2018-09-19 RX ADMIN — PANTOPRAZOLE SODIUM 40 MILLIGRAM(S): 20 TABLET, DELAYED RELEASE ORAL at 11:36

## 2018-09-19 RX ADMIN — Medication 1 DROP(S): at 21:21

## 2018-09-19 RX ADMIN — Medication 2: at 08:15

## 2018-09-19 RX ADMIN — ENOXAPARIN SODIUM 30 MILLIGRAM(S): 100 INJECTION SUBCUTANEOUS at 11:34

## 2018-09-19 RX ADMIN — POLYETHYLENE GLYCOL 3350 17 GRAM(S): 17 POWDER, FOR SOLUTION ORAL at 11:34

## 2018-09-19 RX ADMIN — MAGNESIUM OXIDE 400 MG ORAL TABLET 400 MILLIGRAM(S): 241.3 TABLET ORAL at 11:36

## 2018-09-19 RX ADMIN — MAGNESIUM OXIDE 400 MG ORAL TABLET 400 MILLIGRAM(S): 241.3 TABLET ORAL at 08:15

## 2018-09-19 RX ADMIN — Medication 25 MILLIGRAM(S): at 17:31

## 2018-09-19 RX ADMIN — SENNA PLUS 2 TABLET(S): 8.6 TABLET ORAL at 21:22

## 2018-09-19 RX ADMIN — Medication 100 MILLIGRAM(S): at 17:31

## 2018-09-19 RX ADMIN — Medication 1 DROP(S): at 02:26

## 2018-09-19 RX ADMIN — Medication 4: at 11:21

## 2018-09-19 RX ADMIN — AMLODIPINE BESYLATE 5 MILLIGRAM(S): 2.5 TABLET ORAL at 06:09

## 2018-09-19 RX ADMIN — NYSTATIN CREAM 1 APPLICATION(S): 100000 CREAM TOPICAL at 21:22

## 2018-09-19 NOTE — PROGRESS NOTE ADULT - ASSESSMENT
89 year female with PMH of HTN, DM2, dyslipidemia, OA, p/w acute CVA, Herrera and troponemia. Pt. is stable for discharge  today to acute rehab, insurance authorization obtained.

## 2018-09-19 NOTE — PROGRESS NOTE ADULT - PROBLEM SELECTOR PROBLEM 2
LENNIE (acute kidney injury)
Dysarthria
Dysarthria
LENNIE (acute kidney injury)

## 2018-09-19 NOTE — PATIENT PROFILE ADULT. - PSH
H/O bilateral hip replacements  2000's at [facility], no complications  S/P hip replacement, bilateral

## 2018-09-19 NOTE — H&P ADULT - ASSESSMENT
89 yo female c hx HTN, s/p left sided CVA now with dysphagia, aphasia and right sided weakness.       REHABILITATION DIAGNOSIS/IMPAIRMENT GROUP CODE:  left sided cva c right hemiparesis, dysphagia and aphasia    COMORBIDITIES/COMPLICATING CONDITIONS IMPACTING REHABILITATION:  HEALTH ISSUES  Dyslipidemia  Osteoarthritis  Sciatica  Diabetes: Type 2, &quot;many years&quot;  HTN (hypertension)  HTN (hypertension)  DM (diabetes mellitus)  H/O bilateral hip replacements: 2000&#x27;s at [facility], no complications  S/P hip replacement, bilateral  incontinence    Based upon consideration of the patient's impairments, functional status, complicating conditions and any other contributing factors and after information garnered from the assessments of all therapy disciplines involved in treating the patient and other pertinent clinicians:    INTERDISCIPLINARY REHABILITATION INTERVENTIONS:    [ x ] Transfer Training  [ x  ] Bed Mobility  [ x  ] Therapeutic Exercise  [ x  ] Balance/Coordination Exercises  [ x  ] Locomotion retraining  [ x  ] Stairs  [ x  ] Functional Transfer Training  [ x  ] Bowel/Bladder program  [ x  ] Pain Management  [ x  ] Skin/Wound Care  [ x  ] Visual/Perceptual Training  [  x ] Therapeutic Recreation Activities  [ x  ] Neuromuscular Re-education  [ x  ] Activities of Daily Living  [ x  ] Speech Exercise  [ x  ] Swallowing Exercises  [ x  ] Vital Stim  [ x  ] Dietary Supplements  [ x  ] Calorie Count  [ x  ] Cognitive Exercises  [ x  ] Congnitive/Linguistic Treatment  [ x  ] Behavior Program  [ x  ] Neuropsych Therapy  [ x  ] Patient/Family Counseling  [ x  ] Family Training  [ x  ] Community Re-entry  [ x  ] Orthotic Evaluation  [   ] Prosthetic Eval/Training    MEDICAL PROGNOSIS:  fair    REHAB POTENTIAL:  good  EXPECTED DAILY THERAPY:         PT: 1h       OT:1h       ST: 1h       S&O: eval    EXPECTED INTENSITY OF PROGRAM:  3h per day    EXPECTED FREQUENCY OF PROGRAM:  daily    ESTIMATED LOS:  14 days    ESTIMATED DISPOSITION:  home c home care    INTERDISCIPLINARY FUNCTIONAL OUTCOMES?GOALS:         Gait/Mobility:       Transfers:       ADLs:       Functional Transfers:       Medication Management:       Communication:       Cognitive:       Dysphagia:       Bladder       Bowel:

## 2018-09-19 NOTE — PROGRESS NOTE ADULT - REASON FOR ADMISSION
Aphasia and weakness
CVA
Aphasia and weakness

## 2018-09-19 NOTE — H&P ADULT - NSHPLABSRESULTS_GEN_ALL_CORE
EXAM:  MR BRAIN      PROCEDURE DATE:  09/11/2018        INTERPRETATION:  MRI brain without contrast  Comparison CT performed 3 days prior  History aphasia, right-sided paresis    There is mild to moderate diffusion restriction centered in the left   centrum semiovale white matter measuring a maximum of 4 cm in axial   length. There is slight peripheral parietal subcortical and proximal   temporal lobe external capsule involvement. There is moderate underlying   chronic microvascular ischemic change without hemorrhage, cortical edema,   mass effect or hydrocephalus. There is mild frontoparietal cortical and   central volume loss. The orbital and sellar contents and cerebellar   tonsils are within normal limits.    IMPRESSION:    Moderately sized acute bland left-sided white matter watershed infarct                  FAWAD SCHNEIDER M.D., ATTENDING RADIOLOGIST  This document has been electronically signed. Sep 11 2018  2:26PM

## 2018-09-19 NOTE — PROGRESS NOTE ADULT - PROBLEM SELECTOR PLAN 4
BP well controlled. Will start outpatient antihypertensive regimen.
2/2 to CVA  Dysphagia diet; monitor for aspiration, elevate HOB
FS acceptable, one outlier reading > 200  Monitor FS, ISS
Mildly hypertensive. Will start outpatient antihypertensives now that NGT has been placed.
Will start outpatient antihypertensive regimen when cleared for oral intake.
Will start outpatient antihypertensive regimen when cleared for oral intake.
secondary to CVA  speech and OT ordered
Will start outpatient antihypertensive regimen when cleared for oral intake.
secondary to CVA

## 2018-09-19 NOTE — PROGRESS NOTE ADULT - PROBLEM SELECTOR PROBLEM 1
Acute CVA (cerebrovascular accident)
Cerebrovascular accident (CVA), unspecified mechanism
Cerebrovascular accident (CVA), unspecified mechanism
Acute CVA (cerebrovascular accident)

## 2018-09-19 NOTE — PROGRESS NOTE ADULT - PROBLEM SELECTOR PLAN 3
Trops downtrending. No evidence of ischemia on EKG, no reported chest pain. Continue to observe.
Continue with metoprolol, norvasc, lisinopril
Continue with metoprolol, norvasc, lisinopril
No evidence of ischemia on EKG, no reported chest pain. Continue to observe.
Secondary to #1  Treatment with PT/OT
Stable on current regimen; monitor FS
trend trops.
trend trops.
No evidence of ischemia on EKG, no reported chest pain. Will trend trops.

## 2018-09-19 NOTE — H&P ADULT - HISTORY OF PRESENT ILLNESS
89 year old female with PMH HTN, DM2, dyslipidemia, OA had been in her usual state of health, was getting into the car when noted by family to suddenly become aphasic with pronounced weakness on the right side.  Brought immediately to ED at [facility], CT head negative for acute hemorrhage given TPA and transferred to ICU for further care. LDL 99, A1C 6.3. 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   (CT head shows chronic lacunar infarcts on the right cerebellum, bilateral basal ganglia and thalami. There is volume loss and atherosclerosis.) 89 year old female with PMH HTN, DM2, dyslipidemia, OA had been in her usual state of health, was getting into the car when noted by family to suddenly become aphasic with pronounced weakness on the right side.  Brought immediately to ED at [facility], CT head negative for acute hemorrhage given TPA and transferred to ICU for further care. LDL 99, A1C 6.3. 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 Hospitalization complicated by constipation and 'pink eye'.   (CT head shows chronic lacunar infarcts on the right cerebellum, bilateral basal ganglia and thalami. There is volume loss and atherosclerosis.)

## 2018-09-19 NOTE — H&P ADULT - PSH
H/O bilateral hip replacements  2000's at [facility], no complications  S/P hip replacement, bilateral

## 2018-09-19 NOTE — PROGRESS NOTE ADULT - PROBLEM SELECTOR PLAN 2
Improved with IVF hydration. Likely dehydration component. Continue IVF. Trend BUN/Cr. Monitor lytes and UOP.
Actively being treated - SLP
Improved with IVF hydration. Likely dehydration component. Continue IVF. Trend BUN/Cr. Monitor lytes and UOP.
Speech therapy, stable for discharge to acute rehab
improved with IVF
improved with IVF
resolved
resolved
No reported h/o renal failure. Possible dehydration component. Continue IVF. Trend BUN/Cr. Monitor lytes and UOP. Avoid nephrotoxic agents.

## 2018-09-19 NOTE — PROGRESS NOTE ADULT - ATTENDING COMMENTS
I have personally seen and examined patient on the above date.  I discussed the case with keith riggs and I agree with findings and plan as detailed per note above, which I have amended where appropriate.    dc today to acute rehab  cw asa statin

## 2018-09-19 NOTE — PROGRESS NOTE ADULT - PROBLEM SELECTOR PROBLEM 4
HTN (hypertension)
Aphasia
Dysphagia as late effect of cerebrovascular accident (CVA)
HTN (hypertension)
Type 2 diabetes mellitus without complication, without long-term current use of insulin
HTN (hypertension)
Aphasia

## 2018-09-19 NOTE — H&P ADULT - NSHPREVIEWOFSYSTEMS_GEN_ALL_CORE
REVIEW OF SYSTEMS:  CONSTITUTIONAL: No fever, weight loss, or fatigue  EYES: No eye pain, visual disturbances, or discharge  ENMT:  No difficulty hearing, tinnitus, vertigo; No sinus or throat pain  NECK: No pain or stiffness  BREASTS: No pain, masses, or nipple discharge  RESPIRATORY: No cough, wheezing, chills or hemoptysis; No shortness of breath  CARDIOVASCULAR: No chest pain, palpitations, dizziness, or leg swelling  GASTROINTESTINAL: No abdominal or epigastric pain. No nausea, vomiting, or hematemesis; No diarrhea or constipation. No melena or hematochezia.  GENITOURINARY: No dysuria, frequency, hematuria, or incontinence  NEUROLOGICAL: No headaches, memory loss, loss of strength, numbness, or tremors  SKIN: No itching, burning, rashes, or lesions   ENDOCRINE: No heat or cold intolerance; No hair loss  MUSCULOSKELETAL: No joint pain or swelling; No muscle, back, or extremity pain  PSYCHIATRIC: No depression, anxiety, mood swings, or difficulty sleeping REVIEW OF SYSTEMS:  CONSTITUTIONAL: No fever, no weight loss  EYES: No eye pain, visual disturbances, +discharge-pink eye  ENMT:  +[place], tinnitus, vertigo; No sinus or throat pain  NECK: No pain or stiffness  RESPIRATORY: No cough, wheezing, chills or hemoptysis; No shortness of breath  CARDIOVASCULAR: No chest pain, palpitations, dizziness, or leg swelling  GASTROINTESTINAL: No abdominal or epigastric pain. No nausea, vomiting, or hematemesis; +constipation.   GENITOURINARY: No dysuria, frequency, hematuria, or incontinence  NEUROLOGICAL: No headaches, memory loss, numbness, or tremors, +loss of strength  SKIN: No itching, burning, or lesions, +groin rash  ENDOCRINE: No heat or cold intolerance; No hair loss  MUSCULOSKELETAL: No joint pain or swelling; No muscle, back, or extremity pain  PSYCHIATRIC: No depression, anxiety, mood swings, or difficulty sleeping

## 2018-09-19 NOTE — H&P ADULT - PROBLEM SELECTOR PLAN 1
Continue ASA/Lipitor c GI ppx. Monitor BP and FS. Start comprehensive PT/OT/SLP program. Aspiration and fall precautions.

## 2018-09-19 NOTE — PROGRESS NOTE ADULT - PROVIDER SPECIALTY LIST ADULT
Cardiology
Cardiology
Critical Care
Hospitalist
Neurology
Hospitalist
Critical Care

## 2018-09-19 NOTE — PROGRESS NOTE ADULT - SUBJECTIVE AND OBJECTIVE BOX
Patient is a 89y old  Female who presents with a chief complaint of Aphasia and weakness (18 Sep 2018 11:43)      Patient seen and examined at bedside, no events overnight, in no acute distress, going to acute rehab.     ALLERGIES:  No Known Allergies    MEDICATIONS:  amLODIPine   Tablet 5 milliGRAM(s) Oral daily  aspirin 325 milliGRAM(s) Oral daily  atorvastatin 40 milliGRAM(s) Oral at bedtime  BACItracin/polymyxin B Ointment 1 Application(s) Topical every 8 hours  ciprofloxacin  0.3% Ophthalmic Solution 1 Drop(s) Left EYE every 4 hours  dextrose 40% Gel 15 Gram(s) Oral once PRN  dextrose 5% + sodium chloride 0.45%. 1000 milliLiter(s) IV Continuous <Continuous>  dextrose 5%. 1000 milliLiter(s) IV Continuous <Continuous>  dextrose 50% Injectable 12.5 Gram(s) IV Push once  dextrose 50% Injectable 25 Gram(s) IV Push once  dextrose 50% Injectable 25 Gram(s) IV Push once  docusate sodium 100 milliGRAM(s) Oral two times a day  enoxaparin Injectable 30 milliGRAM(s) SubCutaneous daily  glucagon  Injectable 1 milliGRAM(s) IntraMuscular once PRN  insulin lispro (HumaLOG) corrective regimen sliding scale   SubCutaneous three times a day before meals  lisinopril 40 milliGRAM(s) Oral daily  magnesium oxide 400 milliGRAM(s) Oral three times a day with meals  metoprolol tartrate 25 milliGRAM(s) Oral every 12 hours  ondansetron Injectable 4 milliGRAM(s) IV Push every 6 hours PRN  oxybutynin 5 milliGRAM(s) Oral two times a day  pantoprazole   Suspension 40 milliGRAM(s) Oral daily  polyethylene glycol 3350 17 Gram(s) Oral daily  senna 2 Tablet(s) Oral at bedtime    Vital Signs Last 24 Hrs  T(C): 36.8 (19 Sep 2018 08:51), Max: 37 (18 Sep 2018 22:18)  T(F): 98.3 (19 Sep 2018 08:51), Max: 98.6 (18 Sep 2018 22:18)  HR: 81 (19 Sep 2018 08:51) (75 - 82)  BP: 134/58 (19 Sep 2018 08:51) (124/76 - 149/60)  BP(mean): --  RR: 15 (19 Sep 2018 08:51) (15 - 16)  SpO2: 95% (19 Sep 2018 08:51) (95% - 96%)  I&O's Summary    18 Sep 2018 07:01  -  19 Sep 2018 07:00  --------------------------------------------------------  IN: 350 mL / OUT: 0 mL / NET: 350 mL    19 Sep 2018 07:01  -  19 Sep 2018 10:26  --------------------------------------------------------  IN: 120 mL / OUT: 0 mL / NET: 120 mL          PAST MEDICAL & SURGICAL HISTORY:  Dyslipidemia  Osteoarthritis  Sciatica  Diabetes: Type 2, &quot;many years&quot;  HTN (hypertension)  HTN (hypertension)  DM (diabetes mellitus)  H/O bilateral hip replacements: 2000&#x27;s at [facility], no complications  S/P hip replacement, bilateral      Home Medications:  amLODIPine 5 mg oral tablet: 1 tab(s) orally once a day (13 Sep 2018 14:52)  aspirin 325 mg oral tablet: 1 tab(s) orally once a day (13 Sep 2018 14:52)  atorvastatin 40 mg oral tablet: 1 tab(s) orally once a day (at bedtime) (13 Sep 2018 14:52)  bacitracin-polymyxin B 500 units-10,000 units/g topical ointment: 1 application topically every 8 hours (13 Sep 2018 14:52)  ciprofloxacin 0.3% ophthalmic solution: 1 drop(s) to each affected eye every 4 hours (18 Sep 2018 11:53)  docusate sodium 100 mg oral capsule: 1 cap(s) orally 2 times a day (18 Sep 2018 11:53)  enoxaparin: 30 milligram(s) subcutaneous once a day (18 Sep 2018 11:56)  lisinopril 40 mg oral tablet: 1 tab(s) orally once a day (18 Sep 2018 11:53)  magnesium oxide 400 mg (241.3 mg elemental magnesium) oral tablet: 1 tab(s) orally 3 times a day (with meals) (13 Sep 2018 14:52)  metoprolol tartrate 25 mg oral tablet: 1 tab(s) orally every 12 hours (18 Sep 2018 11:56)  oxybutynin 5 mg oral tablet: 1 tab(s) orally 2 times a day (13 Sep 2018 14:52)  pantoprazole 40 mg oral granule, delayed release: 1 tab(s) orally once a day (in the morning) (13 Sep 2018 14:52)  senna oral tablet: 2 tab(s) orally once a day (at bedtime) (18 Sep 2018 11:53)      PHYSICAL EXAM:  General: NAD, A/O x3  ENT: MMM  Neck: Supple, No JVD  Lungs: Clear to percussion bilaterally  Cardio: RRR, S1/S2, No murmurs  Abdomen: Soft, Nontender, Nondistended; Bowel sounds present  Extremities: No clubbing, cyanosis, or edema    LABS:                        13.7   6.9   )-----------( 193      ( 17 Sep 2018 06:19 )             39.4     09-17    140  |  103  |  19  ----------------------------<  168  3.7   |  28  |  1.23    Ca    9.5      17 Sep 2018 06:19  Phos  3.5     09-17  Mg     1.9     09-17    TPro  6.9  /  Alb  3.0  /  TBili  1.4  /  DBili  x   /  AST  29  /  ALT  27  /  AlkPhos  66  09-17        09-08 Chol 161 mg/dL LDL 99 mg/dL HDL 33 mg/dL Trig 147 mg/dL        CAPILLARY BLOOD GLUCOSE      POCT Blood Glucose.: 199 mg/dL (19 Sep 2018 08:07)  POCT Blood Glucose.: 213 mg/dL (18 Sep 2018 22:17)  POCT Blood Glucose.: 180 mg/dL (18 Sep 2018 16:44)  POCT Blood Glucose.: 201 mg/dL (18 Sep 2018 11:27)    09-08 [id number] 6.3    RADIOLOGY & ADDITIONAL TESTS: < from: Xray Chest 1 View- PORTABLE-Routine (09.13.18 @ 08:41) >  IMPRESSION: No evidence for interval development of focal infiltrate or   lobar consolidation.    < end of copied text >      Care Discussed with Consultants/Other Providers: Hospitalist

## 2018-09-19 NOTE — PROGRESS NOTE ADULT - PROBLEM SELECTOR PLAN 1
s/p left sided infarct,  Cont. ASA, statin, BB and ACEI for BP control   stable for discharge to acute rehab

## 2018-09-19 NOTE — H&P ADULT - NSHPPHYSICALEXAM_GEN_ALL_CORE
Mental Status - Patient is alert, awake. Globally aphasic with singular words occasionally. Follows simple commands. Unable to repeat.   Cranial Nerves - left neglect/ possible left visual field cut, PERRL, dysphagia, central facial weakness  Motor Exam -   Right upper 3/5 shoulder and elbow strengths, 2/5 wrist and hand strengths  Left upper 4+/5 shoulder strengths, 5/5 elbow/wrist/hand strengths   Right lower 3/5 hip flexor, 4/5 knee and ankle strengths   Left lower 4+/5 hip flexor, 5/5 knee and ankle strengths   Sensory: Intact LT bilaterally.   Coord: Impaired on right due to weakness    DTS Reflexes: R>L     Toes are flexor on the left, extensor on the right                          GENERAL Exam: No Acute Distress   HEENT:  normocephalic, atraumatic	  LUNGS:	Clear bilaterally    HEART:	 Normal S1S2   No murmur RRR      	  GI/ ABDOMEN:  Soft  Non tender +BS  EXTREMITIES:   No Edema Mental Status - Patient is alert, awake. Globally aphasic with singular words. Follows simple commands. Repeats words. Dysarthric.   Cranial Nerves - left neglect/ left visual field cut, PERRL, dysphagia, central facial weakness, dysarthria.   Motor Exam -   Right upper 4/5 shoulder and elbow strengths, 3/5 distally  Left upper 4+/5 shoulder strengths, 5/5 elbow/wrist/hand strengths   Right lower 3/5 hip flexor, 4/5 knee and ankle strengths   Left lower 5/5 hip flexor, 5/5 knee and ankle strengths   Sensory: Intact LT bilaterally.   Coord: Impaired on right due to weakness    DTS Reflexes: R>L     Toes are flexor on the left, extensor on the right                            GENERAL Exam: No Acute Distress   HEENT:  normocephalic, atraumatic	  LUNGS:	Clear bilaterally    HEART:	 Normal S1S2   No murmur RRR      	  GI/ ABDOMEN:  Soft  Non tender +BS  EXTREMITIES:  R elbow-swelling/redness  SKIN: rash-groins, incontinence related

## 2018-09-19 NOTE — H&P ADULT - ATTENDING COMMENTS
89 yo RHWF with multiple vascular risk factors admitted with Left MCA infarct with aphasia, Right hemiparesis, hemisensory deficits, dysphagia, dysarthria and gait disorder on ASA/ Lipitor admitted to [facility] for functional rehabilitation. Transfer documentation reviewed, patient examined, detailed update given to the family. 89 yo RHWF with multiple vascular risk factors admitted with Left MCA infarct with aphasia, Right inattention, Right hemiparesis, hemisensory deficits, dysphagia, dysarthria and gait disorder on ASA/ Lipitor admitted to [facility] for functional rehabilitation. Transfer documentation reviewed, patient examined, detailed update given to the family.

## 2018-09-19 NOTE — H&P ADULT - PMH
Diabetes  Type 2, "many years"  DM (diabetes mellitus)    Dyslipidemia    HTN (hypertension)    HTN (hypertension)    Osteoarthritis    Sciatica

## 2018-09-19 NOTE — PATIENT PROFILE ADULT. - HEALTHCARE INFORMATION NEEDED, PROFILE
Hi    Please review the changes below:    Procedure: colon    Current date: 5/1/18  Current time: 1200  Current facility: [facility]    Rescheduled date: 5/15/18  Rescheduled time: 930  Rescheduled facility: [facility]  If facility changed, new case created?:       Thanks,  GI Surgery Scheduling Team   ask family

## 2018-09-19 NOTE — PROGRESS NOTE ADULT - PROBLEM SELECTOR PROBLEM 3
Elevated troponin
HTN (hypertension)
HTN (hypertension)
Paralysis of right upper extremity
Type 2 diabetes mellitus without complication, without long-term current use of insulin
Elevated troponin

## 2018-09-20 DIAGNOSIS — M15.9 POLYOSTEOARTHRITIS, UNSPECIFIED: ICD-10-CM

## 2018-09-20 DIAGNOSIS — E11.9 TYPE 2 DIABETES MELLITUS WITHOUT COMPLICATIONS: ICD-10-CM

## 2018-09-20 DIAGNOSIS — I63.8 OTHER CEREBRAL INFARCTION: ICD-10-CM

## 2018-09-20 DIAGNOSIS — I10 ESSENTIAL (PRIMARY) HYPERTENSION: ICD-10-CM

## 2018-09-20 DIAGNOSIS — N17.9 ACUTE KIDNEY FAILURE, UNSPECIFIED: ICD-10-CM

## 2018-09-20 LAB
ALBUMIN SERPL ELPH-MCNC: 2.8 G/DL — LOW (ref 3.3–5)
ALP SERPL-CCNC: 75 U/L — SIGNIFICANT CHANGE UP (ref 40–120)
ALT FLD-CCNC: 18 U/L DA — SIGNIFICANT CHANGE UP (ref 10–45)
ANION GAP SERPL CALC-SCNC: 9 MMOL/L — SIGNIFICANT CHANGE UP (ref 5–17)
AST SERPL-CCNC: 20 U/L — SIGNIFICANT CHANGE UP (ref 10–40)
BASOPHILS # BLD AUTO: 0.1 K/UL — SIGNIFICANT CHANGE UP (ref 0–0.2)
BASOPHILS NFR BLD AUTO: 1.5 % — SIGNIFICANT CHANGE UP (ref 0–2)
BILIRUB SERPL-MCNC: 1.1 MG/DL — SIGNIFICANT CHANGE UP (ref 0.2–1.2)
BUN SERPL-MCNC: 19 MG/DL — SIGNIFICANT CHANGE UP (ref 7–23)
CALCIUM SERPL-MCNC: 8.9 MG/DL — SIGNIFICANT CHANGE UP (ref 8.4–10.5)
CHLORIDE SERPL-SCNC: 103 MMOL/L — SIGNIFICANT CHANGE UP (ref 96–108)
CO2 SERPL-SCNC: 28 MMOL/L — SIGNIFICANT CHANGE UP (ref 22–31)
CREAT SERPL-MCNC: 1.38 MG/DL — HIGH (ref 0.5–1.3)
EOSINOPHIL # BLD AUTO: 0.4 K/UL — SIGNIFICANT CHANGE UP (ref 0–0.5)
EOSINOPHIL NFR BLD AUTO: 6 % — SIGNIFICANT CHANGE UP (ref 0–6)
GLUCOSE BLDC GLUCOMTR-MCNC: 151 MG/DL — HIGH (ref 70–99)
GLUCOSE BLDC GLUCOMTR-MCNC: 188 MG/DL — HIGH (ref 70–99)
GLUCOSE BLDC GLUCOMTR-MCNC: 191 MG/DL — HIGH (ref 70–99)
GLUCOSE BLDC GLUCOMTR-MCNC: 196 MG/DL — HIGH (ref 70–99)
GLUCOSE SERPL-MCNC: 158 MG/DL — HIGH (ref 70–99)
HCT VFR BLD CALC: 38.6 % — SIGNIFICANT CHANGE UP (ref 34.5–45)
HGB BLD-MCNC: 13.3 G/DL — SIGNIFICANT CHANGE UP (ref 11.5–15.5)
LYMPHOCYTES # BLD AUTO: 2.2 K/UL — SIGNIFICANT CHANGE UP (ref 1–3.3)
LYMPHOCYTES # BLD AUTO: 32.8 % — SIGNIFICANT CHANGE UP (ref 13–44)
MCHC RBC-ENTMCNC: 31.3 PG — SIGNIFICANT CHANGE UP (ref 27–34)
MCHC RBC-ENTMCNC: 34.4 GM/DL — SIGNIFICANT CHANGE UP (ref 32–36)
MCV RBC AUTO: 91 FL — SIGNIFICANT CHANGE UP (ref 80–100)
MONOCYTES # BLD AUTO: 1 K/UL — HIGH (ref 0–0.9)
MONOCYTES NFR BLD AUTO: 13.9 % — SIGNIFICANT CHANGE UP (ref 2–14)
NEUTROPHILS # BLD AUTO: 3.1 K/UL — SIGNIFICANT CHANGE UP (ref 1.8–7.4)
NEUTROPHILS NFR BLD AUTO: 45.8 % — SIGNIFICANT CHANGE UP (ref 43–77)
PLATELET # BLD AUTO: 207 K/UL — SIGNIFICANT CHANGE UP (ref 150–400)
POTASSIUM SERPL-MCNC: 3.7 MMOL/L — SIGNIFICANT CHANGE UP (ref 3.5–5.3)
POTASSIUM SERPL-SCNC: 3.7 MMOL/L — SIGNIFICANT CHANGE UP (ref 3.5–5.3)
PROT SERPL-MCNC: 6.4 G/DL — SIGNIFICANT CHANGE UP (ref 6–8.3)
RBC # BLD: 4.24 M/UL — SIGNIFICANT CHANGE UP (ref 3.8–5.2)
RBC # FLD: 11.9 % — SIGNIFICANT CHANGE UP (ref 10.3–14.5)
SODIUM SERPL-SCNC: 140 MMOL/L — SIGNIFICANT CHANGE UP (ref 135–145)
WBC # BLD: 6.8 K/UL — SIGNIFICANT CHANGE UP (ref 3.8–10.5)
WBC # FLD AUTO: 6.8 K/UL — SIGNIFICANT CHANGE UP (ref 3.8–10.5)

## 2018-09-20 PROCEDURE — 99232 SBSQ HOSP IP/OBS MODERATE 35: CPT

## 2018-09-20 PROCEDURE — 99223 1ST HOSP IP/OBS HIGH 75: CPT

## 2018-09-20 RX ORDER — LACTULOSE 10 G/15ML
20 SOLUTION ORAL ONCE
Qty: 0 | Refills: 0 | Status: COMPLETED | OUTPATIENT
Start: 2018-09-20 | End: 2018-09-20

## 2018-09-20 RX ADMIN — Medication 325 MILLIGRAM(S): at 11:42

## 2018-09-20 RX ADMIN — MAGNESIUM OXIDE 400 MG ORAL TABLET 400 MILLIGRAM(S): 241.3 TABLET ORAL at 18:09

## 2018-09-20 RX ADMIN — NYSTATIN CREAM 1 APPLICATION(S): 100000 CREAM TOPICAL at 06:33

## 2018-09-20 RX ADMIN — Medication 1 DROP(S): at 18:08

## 2018-09-20 RX ADMIN — Medication 100 MILLIGRAM(S): at 18:09

## 2018-09-20 RX ADMIN — Medication 5 MILLIGRAM(S): at 06:34

## 2018-09-20 RX ADMIN — ENOXAPARIN SODIUM 30 MILLIGRAM(S): 100 INJECTION SUBCUTANEOUS at 11:42

## 2018-09-20 RX ADMIN — BACITRACIN AND POLYMYXIN B SULFATE 1 APPLICATION(S): 500; 10000 OINTMENT TOPICAL at 13:37

## 2018-09-20 RX ADMIN — Medication 1 DROP(S): at 21:41

## 2018-09-20 RX ADMIN — Medication 2: at 18:04

## 2018-09-20 RX ADMIN — Medication 2: at 07:56

## 2018-09-20 RX ADMIN — Medication 25 MILLIGRAM(S): at 18:09

## 2018-09-20 RX ADMIN — Medication 1 DROP(S): at 10:17

## 2018-09-20 RX ADMIN — Medication 1 DROP(S): at 13:36

## 2018-09-20 RX ADMIN — ATORVASTATIN CALCIUM 40 MILLIGRAM(S): 80 TABLET, FILM COATED ORAL at 21:41

## 2018-09-20 RX ADMIN — BACITRACIN AND POLYMYXIN B SULFATE 1 APPLICATION(S): 500; 10000 OINTMENT TOPICAL at 21:41

## 2018-09-20 RX ADMIN — Medication 100 MILLIGRAM(S): at 06:34

## 2018-09-20 RX ADMIN — BACITRACIN AND POLYMYXIN B SULFATE 1 APPLICATION(S): 500; 10000 OINTMENT TOPICAL at 06:34

## 2018-09-20 RX ADMIN — MAGNESIUM OXIDE 400 MG ORAL TABLET 400 MILLIGRAM(S): 241.3 TABLET ORAL at 07:57

## 2018-09-20 RX ADMIN — AMLODIPINE BESYLATE 5 MILLIGRAM(S): 2.5 TABLET ORAL at 06:34

## 2018-09-20 RX ADMIN — Medication 1 DROP(S): at 06:33

## 2018-09-20 RX ADMIN — NYSTATIN CREAM 1 APPLICATION(S): 100000 CREAM TOPICAL at 13:37

## 2018-09-20 RX ADMIN — MAGNESIUM OXIDE 400 MG ORAL TABLET 400 MILLIGRAM(S): 241.3 TABLET ORAL at 11:42

## 2018-09-20 RX ADMIN — Medication 5 MILLIGRAM(S): at 18:09

## 2018-09-20 RX ADMIN — Medication 2: at 11:57

## 2018-09-20 RX ADMIN — Medication 25 MILLIGRAM(S): at 06:34

## 2018-09-20 RX ADMIN — NYSTATIN CREAM 1 APPLICATION(S): 100000 CREAM TOPICAL at 21:41

## 2018-09-20 NOTE — PROGRESS NOTE ADULT - SUBJECTIVE AND OBJECTIVE BOX
CHIEF COMPLAINT: Constipation. No new deficits.       HISTORY OF PRESENT ILLNESS  89 year old female with PMH HTN, DM2, dyslipidemia, OA had been in her usual state of health, was getting into the car when noted by family to suddenly become aphasic with pronounced weakness on the right side.  Brought immediately to ED at [facility], CT head negative for acute hemorrhage given TPA and transferred to ICU for further care. LDL 99, A1C 6.3. 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 Hospitalization complicated by constipation and 'pink eye'.   (CT head shows chronic lacunar infarcts on the right cerebellum, bilateral basal ganglia and thalami. There is volume loss and atherosclerosis.) (19 Sep 2018 13:21)      PAST MEDICAL & SURGICAL HISTORY:  Dyslipidemia  Osteoarthritis  Sciatica  Diabetes: Type 2, &quot;many years&quot;  HTN (hypertension)  HTN (hypertension)  DM (diabetes mellitus)  H/O bilateral hip replacements: 2000&#x27;s at [facility], no complications  S/P hip replacement, bilateral        REVIEW OF SYMPTOMS  [X] Constitutional WNL     [X] Cardio WNL            [X] Resp WNL           [X] GI WNL                          [X]  WNL                   [X] Heme WNL              [X] Endo WNL                     [X] Skin WNL                 [X] MSK WNL                    [X] Psych WNL      VITALS  Vital Signs Last 24 Hrs  T(C): 36.6 (20 Sep 2018 09:45), Max: 37.1 (19 Sep 2018 20:55)  T(F): 97.8 (20 Sep 2018 09:45), Max: 98.8 (19 Sep 2018 20:55)  HR: 93 (20 Sep 2018 09:45) (80 - 93)  BP: 138/81 (20 Sep 2018 09:45) (131/72 - 138/81)  BP(mean): --  RR: 14 (20 Sep 2018 09:45) (14 - 14)  SpO2: 95% (20 Sep 2018 09:45) (95% - 96%)      PHYSICAL EXAM  Constitutional - NAD, Comfortable  HEENT - NCAT, EOMI  Neck - Supple, No limited ROM  Chest - CTA bilaterally, No wheeze, No rhonchi, No crackles  Cardiovascular - RRR, S1S2, No murmurs  Abdomen - BS+, Soft, NTND  Extremities - No C/C/E, No calf tenderness   Skin-groin rash  Wounds-na      Mental Status - Patient is alert, awake. Globally aphasic with singular words. Follows simple commands. Repeats words. Dysarthric.   	Cranial Nerves - left neglect/ left visual field cut, PERRL, dysphagia, central facial weakness, dysarthria.   	Motor Exam -   	Right upper 4/5 shoulder and elbow strengths, 3/5 distally  	Left upper 4+/5 shoulder strengths, 5/5 elbow/wrist/hand strengths   	Right lower 3/5 hip flexor, 4/5 knee and ankle strengths   	Left lower 5/5 hip flexor, 5/5 knee and ankle strengths   	Sensory: Intact LT bilaterally.   	Coord: Impaired on right due to weakness   	 DTS Reflexes: R>L     	Toes are flexor on the left, extensor on the right            FUNCTIONAL PROGRESS  Gait - eval  ADLs -eval   Transfers -eval  Functional transfer - eval    RECENT LABS                        13.3   6.8   )-----------( 207      ( 20 Sep 2018 06:20 )             38.6     09-20    140  |  103  |  19  ----------------------------<  158<H>  3.7   |  28  |  1.38<H>    Ca    8.9      20 Sep 2018 06:20    TPro  6.4  /  Alb  2.8<L>  /  TBili  1.1  /  DBili  x   /  AST  20  /  ALT  18  /  AlkPhos  75  09-20      LIVER FUNCTIONS - ( 20 Sep 2018 06:20 )  Alb: 2.8 g/dL / Pro: 6.4 g/dL / ALK PHOS: 75 U/L / ALT: 18 U/L DA / AST: 20 U/L / GGT: x             Direct LDL: 99 mg/dL (09-08-18 @ 05:30)    Hemoglobin A1C, Whole Blood: 6.3 % (09-08-18 @ 05:30)              RADIOLOGY/OTHER RESULTS      CURRENT MEDICATIONS  MEDICATIONS  (STANDING):  amLODIPine   Tablet 5 milliGRAM(s) Oral daily  aspirin 325 milliGRAM(s) Oral daily  atorvastatin 40 milliGRAM(s) Oral at bedtime  BACItracin/polymyxin B Ointment 1 Application(s) Topical every 8 hours  ciprofloxacin  0.3% Ophthalmic Solution 1 Drop(s) Both EYES every 4 hours  dextrose 5%. 1000 milliLiter(s) (50 mL/Hr) IV Continuous <Continuous>  dextrose 50% Injectable 12.5 Gram(s) IV Push once  docusate sodium 100 milliGRAM(s) Oral two times a day  enoxaparin Injectable 30 milliGRAM(s) SubCutaneous daily  insulin lispro (HumaLOG) corrective regimen sliding scale   SubCutaneous three times a day before meals  lactulose Syrup 20 Gram(s) Oral once  magnesium oxide 400 milliGRAM(s) Oral three times a day with meals  metoprolol tartrate 25 milliGRAM(s) Oral every 12 hours  nystatin Powder 1 Application(s) Topical three times a day  oxybutynin 5 milliGRAM(s) Oral two times a day  senna 2 Tablet(s) Oral at bedtime    MEDICATIONS  (PRN):  acetaminophen   Tablet .. 650 milliGRAM(s) Oral every 6 hours PRN Moderate Pain (4 - 6)  bisacodyl Suppository 10 milliGRAM(s) Rectal daily PRN Constipation  dextrose 40% Gel 15 Gram(s) Oral once PRN Blood Glucose LESS THAN 70 milliGRAM(s)/deciliter  glucagon  Injectable 1 milliGRAM(s) IntraMuscular once PRN Glucose LESS THAN 70 milligrams/deciliter  senna 2 Tablet(s) Oral at bedtime PRN Constipation      ASSESSMENT & PLAN          GI/Bowel Management - Lactulose   Management - Toilet Q2  Skin - Turn Q2  Pain - Tylenol PRN  DVT PPX - Lovenox  Diet - advanced to soft c nectar    Continue comprehensive acute rehab program consisting of 3hrs/day of OT/PT and SLP.

## 2018-09-20 NOTE — PROGRESS NOTE ADULT - PROBLEM SELECTOR PLAN 5
Diet advanced to [facility] soft c nectar dysphagia diet, aspiration precautions.

## 2018-09-21 LAB
ANION GAP SERPL CALC-SCNC: 7 MMOL/L — SIGNIFICANT CHANGE UP (ref 5–17)
BUN SERPL-MCNC: 19 MG/DL — SIGNIFICANT CHANGE UP (ref 7–23)
CALCIUM SERPL-MCNC: 8.8 MG/DL — SIGNIFICANT CHANGE UP (ref 8.4–10.5)
CHLORIDE SERPL-SCNC: 101 MMOL/L — SIGNIFICANT CHANGE UP (ref 96–108)
CO2 SERPL-SCNC: 31 MMOL/L — SIGNIFICANT CHANGE UP (ref 22–31)
CREAT SERPL-MCNC: 1.26 MG/DL — SIGNIFICANT CHANGE UP (ref 0.5–1.3)
GLUCOSE BLDC GLUCOMTR-MCNC: 189 MG/DL — HIGH (ref 70–99)
GLUCOSE SERPL-MCNC: 158 MG/DL — HIGH (ref 70–99)
POTASSIUM SERPL-MCNC: 3.8 MMOL/L — SIGNIFICANT CHANGE UP (ref 3.5–5.3)
POTASSIUM SERPL-SCNC: 3.8 MMOL/L — SIGNIFICANT CHANGE UP (ref 3.5–5.3)
SODIUM SERPL-SCNC: 139 MMOL/L — SIGNIFICANT CHANGE UP (ref 135–145)

## 2018-09-21 PROCEDURE — 99232 SBSQ HOSP IP/OBS MODERATE 35: CPT

## 2018-09-21 RX ADMIN — NYSTATIN CREAM 1 APPLICATION(S): 100000 CREAM TOPICAL at 06:21

## 2018-09-21 RX ADMIN — Medication 325 MILLIGRAM(S): at 11:18

## 2018-09-21 RX ADMIN — AMLODIPINE BESYLATE 5 MILLIGRAM(S): 2.5 TABLET ORAL at 06:22

## 2018-09-21 RX ADMIN — Medication 25 MILLIGRAM(S): at 17:10

## 2018-09-21 RX ADMIN — Medication 1 DROP(S): at 10:09

## 2018-09-21 RX ADMIN — Medication 25 MILLIGRAM(S): at 06:22

## 2018-09-21 RX ADMIN — Medication 5 MILLIGRAM(S): at 17:10

## 2018-09-21 RX ADMIN — ENOXAPARIN SODIUM 30 MILLIGRAM(S): 100 INJECTION SUBCUTANEOUS at 11:18

## 2018-09-21 RX ADMIN — MAGNESIUM OXIDE 400 MG ORAL TABLET 400 MILLIGRAM(S): 241.3 TABLET ORAL at 07:38

## 2018-09-21 RX ADMIN — MAGNESIUM OXIDE 400 MG ORAL TABLET 400 MILLIGRAM(S): 241.3 TABLET ORAL at 17:10

## 2018-09-21 RX ADMIN — Medication 1 DROP(S): at 17:10

## 2018-09-21 RX ADMIN — Medication 1 DROP(S): at 21:21

## 2018-09-21 RX ADMIN — Medication 2: at 08:09

## 2018-09-21 RX ADMIN — MAGNESIUM OXIDE 400 MG ORAL TABLET 400 MILLIGRAM(S): 241.3 TABLET ORAL at 11:59

## 2018-09-21 RX ADMIN — Medication 1 DROP(S): at 14:13

## 2018-09-21 RX ADMIN — Medication 1 DROP(S): at 06:21

## 2018-09-21 RX ADMIN — BACITRACIN AND POLYMYXIN B SULFATE 1 APPLICATION(S): 500; 10000 OINTMENT TOPICAL at 06:22

## 2018-09-21 RX ADMIN — NYSTATIN CREAM 1 APPLICATION(S): 100000 CREAM TOPICAL at 21:22

## 2018-09-21 RX ADMIN — ATORVASTATIN CALCIUM 40 MILLIGRAM(S): 80 TABLET, FILM COATED ORAL at 21:22

## 2018-09-21 RX ADMIN — Medication 100 MILLIGRAM(S): at 06:22

## 2018-09-21 RX ADMIN — Medication 100 MILLIGRAM(S): at 17:10

## 2018-09-21 RX ADMIN — Medication 5 MILLIGRAM(S): at 06:22

## 2018-09-21 RX ADMIN — SENNA PLUS 2 TABLET(S): 8.6 TABLET ORAL at 21:22

## 2018-09-21 RX ADMIN — NYSTATIN CREAM 1 APPLICATION(S): 100000 CREAM TOPICAL at 14:13

## 2018-09-21 NOTE — CONSULT NOTE ADULT - ASSESSMENT
89 y/o F with PMH HTN, DM, HLD, OA admitted with acute CVA now with aphasia, functional and gait impairment
89 years old female, S/p acute CVA with R hemiparesis, aphasia, dysphagia. on puree diet, consuming only 50% to 75 %.  A1c 6.3, with glucose [id number] to 213 post prandial.  Pt. had good glycemic control .  suggest : no more sulfonyl urea drugs due to side effects more than benefit.  if needed in the future DPP4 agents like Tradjenta once a day can be used.  will hold off any agents now.  discussed with family & explained.

## 2018-09-21 NOTE — CONSULT NOTE ADULT - SUBJECTIVE AND OBJECTIVE BOX
89 year old female with PMH HTN, DM2, dyslipidemia, OA had been in her usual state of health, was getting into the car when noted by family to suddenly become aphasic with pronounced weakness on the right side.  Brought immediately to ED at [facility], CT head negative for acute hemorrhage given TPA and transferred to ICU for further care. LDL 99, A1C 6.3. 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 Hospitalization complicated by constipation and 'pink eye'.     Pt seen and examined at bedside with son. Pt is aphasic however shakes no to any complaints/pain. Son states his mother is doing much better.      PAST MEDICAL & SURGICAL HISTORY:  Dyslipidemia  Osteoarthritis  Sciatica  Diabetes: Type 2, &quot;many years&quot;  HTN (hypertension)  H/O bilateral hip replacements: 2000&#x27;s at [facility], no complications  S/P hip replacement, bilateral    Family hx reviewed and non contributory        Substance Use (street drugs): (  ) never used  (  ) other:  Tobacco Usage:  (x   ) never smoked   (   ) former smoker   (   ) current smoker  (     ) pack year  Alcohol Usage:  Sexual History:   Recent Travel:      Allergies    No Known Allergies    Intolerances      MEDICATIONS  (STANDING):  amLODIPine   Tablet 5 milliGRAM(s) Oral daily  aspirin 325 milliGRAM(s) Oral daily  atorvastatin 40 milliGRAM(s) Oral at bedtime  BACItracin/polymyxin B Ointment 1 Application(s) Topical every 8 hours  ciprofloxacin  0.3% Ophthalmic Solution 1 Drop(s) Both EYES every 4 hours  dextrose 5%. 1000 milliLiter(s) (50 mL/Hr) IV Continuous <Continuous>  dextrose 50% Injectable 12.5 Gram(s) IV Push once  docusate sodium 100 milliGRAM(s) Oral two times a day  enoxaparin Injectable 30 milliGRAM(s) SubCutaneous daily  insulin lispro (HumaLOG) corrective regimen sliding scale   SubCutaneous three times a day before meals  magnesium oxide 400 milliGRAM(s) Oral three times a day with meals  metoprolol tartrate 25 milliGRAM(s) Oral every 12 hours  nystatin Powder 1 Application(s) Topical three times a day  oxybutynin 5 milliGRAM(s) Oral two times a day  senna 2 Tablet(s) Oral at bedtime    MEDICATIONS  (PRN):  acetaminophen   Tablet .. 650 milliGRAM(s) Oral every 6 hours PRN Moderate Pain (4 - 6)  bisacodyl Suppository 10 milliGRAM(s) Rectal daily PRN Constipation  dextrose 40% Gel 15 Gram(s) Oral once PRN Blood Glucose LESS THAN 70 milliGRAM(s)/deciliter  glucagon  Injectable 1 milliGRAM(s) IntraMuscular once PRN Glucose LESS THAN 70 milligrams/deciliter  senna 2 Tablet(s) Oral at bedtime PRN Constipation        REVIEW OF SYSTEMS:  CONSTITUTIONAL: No fever, weight loss, or fatigue  RESPIRATORY: No cough, wheezing, chills or hemoptysis; No shortness of breath  CARDIOVASCULAR: No chest pain, palpitations, dizziness, or leg swelling  GASTROINTESTINAL: No abdominal or epigastric pain. No nausea, vomiting, or hematemesis; No diarrhea or constipation. No melena or hematochezia.  GENITOURINARY: No dysuria, frequency, hematuria, or incontinence  MUSCULOSKELETAL: No joint pain or swelling; No muscle, back, or extremity pain      ALL ROS REVIEWED AND NORMAL EXCEPT AS STATED ABOVE    Vital Signs Last 24 Hrs  T(C): 36.6 (20 Sep 2018 09:45), Max: 37.1 (19 Sep 2018 20:55)  T(F): 97.8 (20 Sep 2018 09:45), Max: 98.8 (19 Sep 2018 20:55)  HR: 93 (20 Sep 2018 09:45) (80 - 93)  BP: 138/81 (20 Sep 2018 09:45) (131/72 - 138/81)  BP(mean): --  RR: 14 (20 Sep 2018 09:45) (14 - 15)  SpO2: 95% (20 Sep 2018 09:45) (95% - 96%)    PHYSICAL EXAM:  GENERAL: NAD, aphasic   HEAD:  Atraumatic, Normocephalic  NERVOUS SYSTEM:  Alert, unable to discern orientation given aphasic   CHEST/LUNG: Clear lungs bilaterally; No rales, rhonchi, wheezing, or rubs  HEART: S1S2, RRR   ABDOMEN: Soft, Nontender, Nondistended; + Bowel sounds  EXTREMITIES:  2+ Peripheral Pulses, No clubbing, cyanosis, or edema          LABS:                        13.3   6.8   )-----------( 207      ( 20 Sep 2018 06:20 )             38.6     09-20    140  |  103  |  19  ----------------------------<  158<H>  3.7   |  28  |  1.38<H>    Ca    8.9      20 Sep 2018 06:20    TPro  6.4  /  Alb  2.8<L>  /  TBili  1.1  /  DBili  x   /  AST  20  /  ALT  18  /  AlkPhos  75  09-20         CAPILLARY BLOOD GLUCOSE      POCT Blood Glucose.: 188 mg/dL (20 Sep 2018 07:52)  POCT Blood Glucose.: 182 mg/dL (19 Sep 2018 21:27)  POCT Blood Glucose.: 119 mg/dL (19 Sep 2018 16:16)  POCT Blood Glucose.: 205 mg/dL (19 Sep 2018 11:19)            RADIOLOGY & ADDITIONAL TESTS:    < from: MR Head No Cont (09.11.18 @ 14:23) >  IMPRESSION:    Moderately sized acute bland left-sided white matter watershed infarct    < end of copied text >      Consultant(s) Notes Reviewed:  [x ] YES  [ ] NO  Care Discussed with Consultants/Other Providers [ x] YES  [ ] NO  Imaging Personally Reviewed:  [ ] YES  [ ] NO
HPI:  89 year old female with PMH HTN, DM2, dyslipidemia, OA had been in her usual state of health, was getting into the car when noted by family to suddenly become aphasic with pronounced weakness on the right side.  Brought immediately to ED at [facility], CT head negative for acute hemorrhage given TPA and transferred to ICU for further care. LDL 99, A1C 6.3. 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 Hospitalization complicated by constipation and 'pink eye'.   (CT head shows chronic lacunar infarcts on the right cerebellum, bilateral basal ganglia and thalami. There is volume loss and atherosclerosis.) (19 Sep 2018 13:21)      Admit Diagnosis  Cerebral infarction  Undefined      ENDOCRINE HPI: H/o t2 Dm for many years, was on Amaryl 2 mg daily with Metformin.   was not doing SMBG recently.    PAST MEDICAL & SURGICAL HISTORY:  Dyslipidemia  Osteoarthritis  Sciatica  Diabetes: Type 2,;  HTN (hypertension)    H/O bilateral hip replacements: 2000&#x27;s at [facility], no complications  S/P hip replacement, bilateral      FAMILY HISTORY:  No pertinent family history in first degree relatives      Social History:  smoking: no  alcohol: no  drugs: no  Outpatient Medications:    MEDICATIONS  (STANDING):  amLODIPine   Tablet 5 milliGRAM(s) Oral daily  aspirin 325 milliGRAM(s) Oral daily  atorvastatin 40 milliGRAM(s) Oral at bedtime  ciprofloxacin  0.3% Ophthalmic Solution 1 Drop(s) Both EYES every 4 hours  dextrose 5%. 1000 milliLiter(s) (50 mL/Hr) IV Continuous <Continuous>  dextrose 50% Injectable 12.5 Gram(s) IV Push once  docusate sodium 100 milliGRAM(s) Oral two times a day  enoxaparin Injectable 30 milliGRAM(s) SubCutaneous daily  insulin lispro (HumaLOG) corrective regimen sliding scale   SubCutaneous three times a day before meals  magnesium oxide 400 milliGRAM(s) Oral three times a day with meals  metoprolol tartrate 25 milliGRAM(s) Oral every 12 hours  nystatin Powder 1 Application(s) Topical three times a day  oxybutynin 5 milliGRAM(s) Oral two times a day  senna 2 Tablet(s) Oral at bedtime    MEDICATIONS  (PRN):  acetaminophen   Tablet .. 650 milliGRAM(s) Oral every 6 hours PRN Moderate Pain (4 - 6)  bisacodyl Suppository 10 milliGRAM(s) Rectal daily PRN Constipation  dextrose 40% Gel 15 Gram(s) Oral once PRN Blood Glucose LESS THAN 70 milliGRAM(s)/deciliter  glucagon  Injectable 1 milliGRAM(s) IntraMuscular once PRN Glucose LESS THAN 70 milligrams/deciliter      Allergies    No Known Allergies    Intolerances        Review of Systems:  Constitutional: No fever  Eyes: decrased  blurry vision  Neuro: aphasic  HEENT: No pain  Cardiovascular: No chest pain, palpitations  Respiratory: No SOB, no cough  GI: No nausea, vomiting, abdominal pain, dysphagia  : No dysuria  Skin: no rash  Psych:  depression  Endocrine: no polyuria, polydipsia  Hem/lymph: no swelling      ALL OTHER SYSTEMS REVIEWED AND NEGATIVE      PHYSICAL EXAM:  VITALS: T(C): 36.8 (09-21-18 @ 08:22)  T(F): 98.2 (09-21-18 @ 08:22), Max: 98.2 (09-21-18 @ 08:22)  HR: 85 (09-21-18 @ 08:22) (77 - 89)  BP: 118/72 (09-21-18 @ 08:22) (118/72 - 146/81)  RR:  (15 - 15)  SpO2:  (94% - 95%)  Wt(kg): --63 kg.    PHYSICAL EXAM:  GENERAL: NAD, well-groomed, well-developed  HEAD:  Atraumatic, Normocephalic  EYES: decrased vision  ENMT: No tonsillar erythema, exudates, or enlargement; Moist mucous membranes, Good dentition, No lesions  NECK: Supple, No JVD, Normal thyroid  NERVOUS SYSTEM:  Alert & aphasic.; Motor Strength R hemiparesis  CHEST/LUNG: Clear to percussion bilaterally; No rales, rhonchi, wheezing, or rubs  HEART: Regular rate and rhythm; No murmurs, rubs, or gallops  ABDOMEN: Soft, Nontender, Nondistended; Bowel sounds present  EXTREMITIES:  1= edema  LYMPH: No lymphadenopathy noted  SKIN: No rashes or lesions    POCT Blood Glucose.: 189 mg/dL (09-21-18 @ 07:32)  POCT Blood Glucose.: 151 mg/dL (09-20-18 @ 20:46)  POCT Blood Glucose.: 196 mg/dL (09-20-18 @ 17:48)  POCT Blood Glucose.: 191 mg/dL (09-20-18 @ 11:48)  POCT Blood Glucose.: 188 mg/dL (09-20-18 @ 07:52)  POCT Blood Glucose.: 182 mg/dL (09-19-18 @ 21:27)  POCT Blood Glucose.: 119 mg/dL (09-19-18 @ 16:16)  POCT Blood Glucose.: 205 mg/dL (09-19-18 @ 11:19)  POCT Blood Glucose.: 199 mg/dL (09-19-18 @ 08:07)  POCT Blood Glucose.: 213 mg/dL (09-18-18 @ 22:17)  POCT Blood Glucose.: 180 mg/dL (09-18-18 @ 16:44)                            13.3   6.8   )-----------( 207      ( 20 Sep 2018 06:20 )             38.6       09-21    139  |  101  |  19  ----------------------------<  158<H>  3.8   |  31  |  1.26    Ca    8.8      21 Sep 2018 06:05    TPro  6.4  /  Alb  2.8<L>  /  TBili  1.1  /  DBili  x   /  AST  20  /  ALT  18  /  AlkPhos  75  09-20      Thyroid Function Tests:      Hemoglobin A1C, Whole Blood: 6.3 % <H> [4.0 - 5.6] (09-08-18 @ 05:30)      09-08 Chol 161 LDL 99 HDL 33<L> Trig 147    Radiology:

## 2018-09-21 NOTE — CONSULT NOTE ADULT - REASON FOR ADMISSION
s/p left sided watershed cva c dysphagia, aphasia and right sided weakness.
s/p left sided watershed cva c dysphagia, aphasia and right sided weakness.

## 2018-09-21 NOTE — PROGRESS NOTE ADULT - SUBJECTIVE AND OBJECTIVE BOX
CHIEF COMPLAINT: No acute events overnight, no new weakness, large BM overnight.       HISTORY OF PRESENT ILLNESS  89 year old female with PMH HTN, DM2, dyslipidemia, OA had been in her usual state of health, was getting into the car when noted by family to suddenly become aphasic with pronounced weakness on the right side.  Brought immediately to ED at [facility], CT head negative for acute hemorrhage given TPA and transferred to ICU for further care. LDL 99, A1C 6.3. 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 Hospitalization complicated by constipation and 'pink eye'.   (CT head shows chronic lacunar infarcts on the right cerebellum, bilateral basal ganglia and thalami. There is volume loss and atherosclerosis.) (19 Sep 2018 13:21)      PAST MEDICAL & SURGICAL HISTORY:  Dyslipidemia  Osteoarthritis  Sciatica  Diabetes: Type 2  HTN (hypertension)  HTN (hypertension)  DM (diabetes mellitus)  H/O bilateral hip replacements: 2000&#x27;s at [facility], [place], operated by [facility], no complications  S/P hip replacement, bilateral          REVIEW OF SYMPTOMS  [X] Constitutional WNL     [X] Cardio WNL            [X] Resp WNL           [X] GI WNL                          [X]  WNL                   [X] Heme WNL                                  [X] Skin WNL                 [X] MSK WNL                   [X] Psych WNL      VITALS  Vital Signs Last 24 Hrs  T(C): 36.8 (21 Sep 2018 08:22), Max: 36.8 (21 Sep 2018 08:22)  T(F): 98.2 (21 Sep 2018 08:22), Max: 98.2 (21 Sep 2018 08:22)  HR: 85 (21 Sep 2018 08:22) (77 - 89)  BP: 118/72 (21 Sep 2018 08:22) (118/72 - 146/81)  BP(mean): --  RR: 15 (21 Sep 2018 08:22) (15 - 15)  SpO2: 94% (21 Sep 2018 08:22) (94% - 95%)      PHYSICAL EXAM  Constitutional - NAD, Comfortable  HEENT - NCAT, EOMI  Neck - Supple, No limited ROM  Chest - CTA bilaterally, No wheeze, No rhonchi, No crackles  Cardiovascular - RRR, S1S2, No murmurs  Abdomen - BS+, Soft, NTND  Extremities - No C/C/E, No calf tenderness   Skin-rash improved  Wounds-na      Neurologic Exam -                   Mental Status - Patient is alert, awake. Globally aphasic with singular words. Follows simple commands. Repeats words. Dysarthric.   	Cranial Nerves - left neglect/ left visual field cut, PERRL, dysphagia, central facial weakness, dysarthria.   	Motor Exam -   	Right upper 4/5 shoulder and elbow strengths, 3/5 distally  	Left upper 4+/5 shoulder strengths, 5/5 elbow/wrist/hand strengths   	Right lower 3/5 hip flexor, 4/5 knee and ankle strengths   	Left lower 5/5 hip flexor, 5/5 knee and ankle strengths   	Sensory: Intact LT bilaterally.   	Coord: Impaired on right due to weakness   	 DTS Reflexes: R>L     	Toes are flexor on the left, extensor on the right              FUNCTIONAL PROGRESS  Gait - mod c RW  ADLs - min A  Transfers -min A  Functional transfer - min A    RECENT LABS                        13.3   6.8   )-----------( 207      ( 20 Sep 2018 06:20 )             38.6     09-21    139  |  101  |  19  ----------------------------<  158<H>  3.8   |  31  |  1.26    Ca    8.8      21 Sep 2018 06:05    TPro  6.4  /  Alb  2.8<L>  /  TBili  1.1  /  DBili  x   /  AST  20  /  ALT  18  /  AlkPhos  75  09-20      LIVER FUNCTIONS - ( 20 Sep 2018 06:20 )  Alb: 2.8 g/dL / Pro: 6.4 g/dL / ALK PHOS: 75 U/L / ALT: 18 U/L DA / AST: 20 U/L / GGT: x             Direct LDL: 99 mg/dL (09-08-18 @ 05:30)    Hemoglobin A1C, Whole Blood: 6.3 % (09-08-18 @ 05:30)              RADIOLOGY/OTHER RESULTS      CURRENT MEDICATIONS  MEDICATIONS  (STANDING):  amLODIPine   Tablet 5 milliGRAM(s) Oral daily  aspirin 325 milliGRAM(s) Oral daily  atorvastatin 40 milliGRAM(s) Oral at bedtime  ciprofloxacin  0.3% Ophthalmic Solution 1 Drop(s) Both EYES every 4 hours  dextrose 5%. 1000 milliLiter(s) (50 mL/Hr) IV Continuous <Continuous>  dextrose 50% Injectable 12.5 Gram(s) IV Push once  docusate sodium 100 milliGRAM(s) Oral two times a day  enoxaparin Injectable 30 milliGRAM(s) SubCutaneous daily  insulin lispro (HumaLOG) corrective regimen sliding scale   SubCutaneous three times a day before meals  magnesium oxide 400 milliGRAM(s) Oral three times a day with meals  metoprolol tartrate 25 milliGRAM(s) Oral every 12 hours  nystatin Powder 1 Application(s) Topical three times a day  oxybutynin 5 milliGRAM(s) Oral two times a day  senna 2 Tablet(s) Oral at bedtime    MEDICATIONS  (PRN):  acetaminophen   Tablet .. 650 milliGRAM(s) Oral every 6 hours PRN Moderate Pain (4 - 6)  bisacodyl Suppository 10 milliGRAM(s) Rectal daily PRN Constipation  dextrose 40% Gel 15 Gram(s) Oral once PRN Blood Glucose LESS THAN 70 milliGRAM(s)/deciliter  glucagon  Injectable 1 milliGRAM(s) IntraMuscular once PRN Glucose LESS THAN 70 milligrams/deciliter      ASSESSMENT & PLAN          GI/Bowel Management -Colace/Miralax   Management - Toilet Q2  Skin - Turn Q2  Pain - Tylenol PRN  DVT PPX - Lovenox  Diet - advanced to soft c nectar    Continue comprehensive acute rehab program consisting of 3hrs/day of OT/PT and SLP.

## 2018-09-22 PROCEDURE — 99232 SBSQ HOSP IP/OBS MODERATE 35: CPT

## 2018-09-22 RX ADMIN — Medication 25 MILLIGRAM(S): at 06:27

## 2018-09-22 RX ADMIN — Medication 1 DROP(S): at 06:27

## 2018-09-22 RX ADMIN — NYSTATIN CREAM 1 APPLICATION(S): 100000 CREAM TOPICAL at 21:28

## 2018-09-22 RX ADMIN — SENNA PLUS 2 TABLET(S): 8.6 TABLET ORAL at 21:26

## 2018-09-22 RX ADMIN — Medication 1 DROP(S): at 09:12

## 2018-09-22 RX ADMIN — ATORVASTATIN CALCIUM 40 MILLIGRAM(S): 80 TABLET, FILM COATED ORAL at 21:26

## 2018-09-22 RX ADMIN — Medication 1 DROP(S): at 21:25

## 2018-09-22 RX ADMIN — Medication 1 DROP(S): at 17:22

## 2018-09-22 RX ADMIN — Medication 100 MILLIGRAM(S): at 06:27

## 2018-09-22 RX ADMIN — Medication 1 DROP(S): at 14:11

## 2018-09-22 RX ADMIN — ENOXAPARIN SODIUM 30 MILLIGRAM(S): 100 INJECTION SUBCUTANEOUS at 12:11

## 2018-09-22 RX ADMIN — MAGNESIUM OXIDE 400 MG ORAL TABLET 400 MILLIGRAM(S): 241.3 TABLET ORAL at 12:11

## 2018-09-22 RX ADMIN — Medication 100 MILLIGRAM(S): at 17:22

## 2018-09-22 RX ADMIN — Medication 5 MILLIGRAM(S): at 06:27

## 2018-09-22 RX ADMIN — MAGNESIUM OXIDE 400 MG ORAL TABLET 400 MILLIGRAM(S): 241.3 TABLET ORAL at 17:22

## 2018-09-22 RX ADMIN — Medication 5 MILLIGRAM(S): at 17:22

## 2018-09-22 RX ADMIN — Medication 25 MILLIGRAM(S): at 17:22

## 2018-09-22 RX ADMIN — AMLODIPINE BESYLATE 5 MILLIGRAM(S): 2.5 TABLET ORAL at 06:27

## 2018-09-22 RX ADMIN — MAGNESIUM OXIDE 400 MG ORAL TABLET 400 MILLIGRAM(S): 241.3 TABLET ORAL at 07:44

## 2018-09-22 RX ADMIN — NYSTATIN CREAM 1 APPLICATION(S): 100000 CREAM TOPICAL at 06:27

## 2018-09-22 RX ADMIN — NYSTATIN CREAM 1 APPLICATION(S): 100000 CREAM TOPICAL at 14:11

## 2018-09-22 RX ADMIN — Medication 325 MILLIGRAM(S): at 12:10

## 2018-09-22 NOTE — PROGRESS NOTE ADULT - ASSESSMENT
Imp: Patient with diagnosis of CVA admitted for comprehensive acute rehabilitation.    Plan:  - Continue PT/OT/SLP  - DVT prophylaxis- Lovenox  - Skin- Turn q2h, check skin daily  - CVS- Stable  - Patient is stable to continue current rehabilitation program.

## 2018-09-22 NOTE — PROGRESS NOTE ADULT - SUBJECTIVE AND OBJECTIVE BOX
Cc: Gait dysfunction, Aphasia    HPI: Patient with no new medical issues today.  Some frustration noted due to aphasia  Has been tolerating rehabilitation program.    acetaminophen   Tablet .. 650 milliGRAM(s) Oral every 6 hours PRN  amLODIPine   Tablet 5 milliGRAM(s) Oral daily  aspirin 325 milliGRAM(s) Oral daily  atorvastatin 40 milliGRAM(s) Oral at bedtime  bisacodyl Suppository 10 milliGRAM(s) Rectal daily PRN  ciprofloxacin  0.3% Ophthalmic Solution 1 Drop(s) Both EYES every 4 hours  dextrose 40% Gel 15 Gram(s) Oral once PRN  dextrose 5%. 1000 milliLiter(s) IV Continuous <Continuous>  dextrose 50% Injectable 12.5 Gram(s) IV Push once  docusate sodium 100 milliGRAM(s) Oral two times a day  enoxaparin Injectable 30 milliGRAM(s) SubCutaneous daily  glucagon  Injectable 1 milliGRAM(s) IntraMuscular once PRN  magnesium oxide 400 milliGRAM(s) Oral three times a day with meals  metoprolol tartrate 25 milliGRAM(s) Oral every 12 hours  nystatin Powder 1 Application(s) Topical three times a day  oxybutynin 5 milliGRAM(s) Oral two times a day  senna 2 Tablet(s) Oral at bedtime    T(C): 36.7 (09-22-18 @ 08:20), Max: 36.7 (09-22-18 @ 08:20)  HR: 82 (09-22-18 @ 08:20) (82 - 91)  BP: 139/76 (09-22-18 @ 08:20) (136/78 - 149/86)  RR: 15 (09-22-18 @ 08:20) (15 - 16)  SpO2: 94% (09-22-18 @ 08:20) (94% - 95%)    In NAD  HEENT- EOMI  Heart- RRR, S1S2  Lungs- CTA bl.  Abd- + BS, NT  Ext- No calf pain  Neuro- Exam unchanged

## 2018-09-23 PROCEDURE — 99232 SBSQ HOSP IP/OBS MODERATE 35: CPT

## 2018-09-23 RX ADMIN — MAGNESIUM OXIDE 400 MG ORAL TABLET 400 MILLIGRAM(S): 241.3 TABLET ORAL at 12:39

## 2018-09-23 RX ADMIN — Medication 25 MILLIGRAM(S): at 18:12

## 2018-09-23 RX ADMIN — Medication 1 DROP(S): at 21:36

## 2018-09-23 RX ADMIN — AMLODIPINE BESYLATE 5 MILLIGRAM(S): 2.5 TABLET ORAL at 05:28

## 2018-09-23 RX ADMIN — ENOXAPARIN SODIUM 30 MILLIGRAM(S): 100 INJECTION SUBCUTANEOUS at 12:39

## 2018-09-23 RX ADMIN — Medication 325 MILLIGRAM(S): at 12:39

## 2018-09-23 RX ADMIN — Medication 1 DROP(S): at 05:28

## 2018-09-23 RX ADMIN — Medication 100 MILLIGRAM(S): at 05:28

## 2018-09-23 RX ADMIN — MAGNESIUM OXIDE 400 MG ORAL TABLET 400 MILLIGRAM(S): 241.3 TABLET ORAL at 18:13

## 2018-09-23 RX ADMIN — NYSTATIN CREAM 1 APPLICATION(S): 100000 CREAM TOPICAL at 05:28

## 2018-09-23 RX ADMIN — NYSTATIN CREAM 1 APPLICATION(S): 100000 CREAM TOPICAL at 13:59

## 2018-09-23 RX ADMIN — NYSTATIN CREAM 1 APPLICATION(S): 100000 CREAM TOPICAL at 21:36

## 2018-09-23 RX ADMIN — Medication 1 DROP(S): at 13:59

## 2018-09-23 RX ADMIN — ATORVASTATIN CALCIUM 40 MILLIGRAM(S): 80 TABLET, FILM COATED ORAL at 21:36

## 2018-09-23 RX ADMIN — MAGNESIUM OXIDE 400 MG ORAL TABLET 400 MILLIGRAM(S): 241.3 TABLET ORAL at 08:00

## 2018-09-23 RX ADMIN — Medication 1 DROP(S): at 18:13

## 2018-09-23 RX ADMIN — Medication 5 MILLIGRAM(S): at 05:28

## 2018-09-23 RX ADMIN — Medication 100 MILLIGRAM(S): at 18:13

## 2018-09-23 RX ADMIN — SENNA PLUS 2 TABLET(S): 8.6 TABLET ORAL at 21:36

## 2018-09-23 RX ADMIN — Medication 5 MILLIGRAM(S): at 18:13

## 2018-09-23 RX ADMIN — Medication 1 DROP(S): at 10:21

## 2018-09-23 RX ADMIN — Medication 25 MILLIGRAM(S): at 05:28

## 2018-09-23 NOTE — PROGRESS NOTE ADULT - SUBJECTIVE AND OBJECTIVE BOX
Cc: Gait dysfunction, Aphasia    HPI: Patient with no new medical issues today.  Has been tolerating rehabilitation program.    MEDICATIONS  (STANDING):  amLODIPine   Tablet 5 milliGRAM(s) Oral daily  aspirin 325 milliGRAM(s) Oral daily  atorvastatin 40 milliGRAM(s) Oral at bedtime  ciprofloxacin  0.3% Ophthalmic Solution 1 Drop(s) Both EYES every 4 hours  dextrose 5%. 1000 milliLiter(s) (50 mL/Hr) IV Continuous <Continuous>  dextrose 50% Injectable 12.5 Gram(s) IV Push once  docusate sodium 100 milliGRAM(s) Oral two times a day  enoxaparin Injectable 30 milliGRAM(s) SubCutaneous daily  magnesium oxide 400 milliGRAM(s) Oral three times a day with meals  metoprolol tartrate 25 milliGRAM(s) Oral every 12 hours  nystatin Powder 1 Application(s) Topical three times a day  oxybutynin 5 milliGRAM(s) Oral two times a day  senna 2 Tablet(s) Oral at bedtime    MEDICATIONS  (PRN):  acetaminophen   Tablet .. 650 milliGRAM(s) Oral every 6 hours PRN Moderate Pain (4 - 6)  bisacodyl Suppository 10 milliGRAM(s) Rectal daily PRN Constipation  dextrose 40% Gel 15 Gram(s) Oral once PRN Blood Glucose LESS THAN 70 milliGRAM(s)/deciliter  glucagon  Injectable 1 milliGRAM(s) IntraMuscular once PRN Glucose LESS THAN 70 milligrams/deciliter    Vital Signs Last 24 Hrs  T(C): 36.7 (22 Sep 2018 21:29), Max: 36.7 (22 Sep 2018 08:20)  T(F): 98 (22 Sep 2018 21:29), Max: 98 (22 Sep 2018 08:20)  HR: 78 (22 Sep 2018 21:29) (78 - 82)  BP: 156/86 (23 Sep 2018 05:31) (139/76 - 156/86)  BP(mean): --  RR: 16 (22 Sep 2018 21:29) (15 - 16)  SpO2: 95% (22 Sep 2018 21:29) (94% - 95%)      In NAD  HEENT- EOMI  Heart- RRR, S1S2  Lungs- CTA bl.  Abd- + BS, NT  Ext- No calf pain  Neuro- Exam unchanged; gets a little tearful when tries to talk (baseline since CVA)

## 2018-09-24 LAB
ALBUMIN SERPL ELPH-MCNC: 2.9 G/DL — LOW (ref 3.3–5)
ALP SERPL-CCNC: 82 U/L — SIGNIFICANT CHANGE UP (ref 40–120)
ALT FLD-CCNC: 18 U/L DA — SIGNIFICANT CHANGE UP (ref 10–45)
ANION GAP SERPL CALC-SCNC: 7 MMOL/L — SIGNIFICANT CHANGE UP (ref 5–17)
AST SERPL-CCNC: 20 U/L — SIGNIFICANT CHANGE UP (ref 10–40)
BILIRUB SERPL-MCNC: 1 MG/DL — SIGNIFICANT CHANGE UP (ref 0.2–1.2)
BUN SERPL-MCNC: 24 MG/DL — HIGH (ref 7–23)
CALCIUM SERPL-MCNC: 9.1 MG/DL — SIGNIFICANT CHANGE UP (ref 8.4–10.5)
CHLORIDE SERPL-SCNC: 103 MMOL/L — SIGNIFICANT CHANGE UP (ref 96–108)
CO2 SERPL-SCNC: 31 MMOL/L — SIGNIFICANT CHANGE UP (ref 22–31)
CREAT SERPL-MCNC: 1.4 MG/DL — HIGH (ref 0.5–1.3)
GLUCOSE SERPL-MCNC: 156 MG/DL — HIGH (ref 70–99)
HCT VFR BLD CALC: 38.4 % — SIGNIFICANT CHANGE UP (ref 34.5–45)
HGB BLD-MCNC: 12.8 G/DL — SIGNIFICANT CHANGE UP (ref 11.5–15.5)
MCHC RBC-ENTMCNC: 30 PG — SIGNIFICANT CHANGE UP (ref 27–34)
MCHC RBC-ENTMCNC: 33.3 GM/DL — SIGNIFICANT CHANGE UP (ref 32–36)
MCV RBC AUTO: 90.3 FL — SIGNIFICANT CHANGE UP (ref 80–100)
PLATELET # BLD AUTO: 272 K/UL — SIGNIFICANT CHANGE UP (ref 150–400)
POTASSIUM SERPL-MCNC: 3.7 MMOL/L — SIGNIFICANT CHANGE UP (ref 3.5–5.3)
POTASSIUM SERPL-SCNC: 3.7 MMOL/L — SIGNIFICANT CHANGE UP (ref 3.5–5.3)
PROT SERPL-MCNC: 6.9 G/DL — SIGNIFICANT CHANGE UP (ref 6–8.3)
RBC # BLD: 4.26 M/UL — SIGNIFICANT CHANGE UP (ref 3.8–5.2)
RBC # FLD: 11.7 % — SIGNIFICANT CHANGE UP (ref 10.3–14.5)
SODIUM SERPL-SCNC: 141 MMOL/L — SIGNIFICANT CHANGE UP (ref 135–145)
WBC # BLD: 6.8 K/UL — SIGNIFICANT CHANGE UP (ref 3.8–10.5)
WBC # FLD AUTO: 6.8 K/UL — SIGNIFICANT CHANGE UP (ref 3.8–10.5)

## 2018-09-24 PROCEDURE — 99233 SBSQ HOSP IP/OBS HIGH 50: CPT

## 2018-09-24 RX ORDER — ASPIRIN/CALCIUM CARB/MAGNESIUM 324 MG
81 TABLET ORAL DAILY
Qty: 0 | Refills: 0 | Status: DISCONTINUED | OUTPATIENT
Start: 2018-09-24 | End: 2018-09-24

## 2018-09-24 RX ORDER — ASPIRIN/CALCIUM CARB/MAGNESIUM 324 MG
81 TABLET ORAL DAILY
Qty: 0 | Refills: 0 | Status: DISCONTINUED | OUTPATIENT
Start: 2018-09-25 | End: 2018-10-05

## 2018-09-24 RX ORDER — ESCITALOPRAM OXALATE 10 MG/1
5 TABLET, FILM COATED ORAL DAILY
Qty: 0 | Refills: 0 | Status: DISCONTINUED | OUTPATIENT
Start: 2018-09-24 | End: 2018-10-05

## 2018-09-24 RX ADMIN — Medication 1 DROP(S): at 11:25

## 2018-09-24 RX ADMIN — NYSTATIN CREAM 1 APPLICATION(S): 100000 CREAM TOPICAL at 21:22

## 2018-09-24 RX ADMIN — MAGNESIUM OXIDE 400 MG ORAL TABLET 400 MILLIGRAM(S): 241.3 TABLET ORAL at 11:29

## 2018-09-24 RX ADMIN — Medication 5 MILLIGRAM(S): at 05:49

## 2018-09-24 RX ADMIN — MAGNESIUM OXIDE 400 MG ORAL TABLET 400 MILLIGRAM(S): 241.3 TABLET ORAL at 11:32

## 2018-09-24 RX ADMIN — ESCITALOPRAM OXALATE 5 MILLIGRAM(S): 10 TABLET, FILM COATED ORAL at 12:34

## 2018-09-24 RX ADMIN — Medication 100 MILLIGRAM(S): at 18:19

## 2018-09-24 RX ADMIN — SENNA PLUS 2 TABLET(S): 8.6 TABLET ORAL at 21:22

## 2018-09-24 RX ADMIN — NYSTATIN CREAM 1 APPLICATION(S): 100000 CREAM TOPICAL at 05:49

## 2018-09-24 RX ADMIN — Medication 325 MILLIGRAM(S): at 11:29

## 2018-09-24 RX ADMIN — AMLODIPINE BESYLATE 5 MILLIGRAM(S): 2.5 TABLET ORAL at 05:49

## 2018-09-24 RX ADMIN — ATORVASTATIN CALCIUM 40 MILLIGRAM(S): 80 TABLET, FILM COATED ORAL at 21:22

## 2018-09-24 RX ADMIN — ENOXAPARIN SODIUM 30 MILLIGRAM(S): 100 INJECTION SUBCUTANEOUS at 11:28

## 2018-09-24 RX ADMIN — Medication 1 DROP(S): at 05:49

## 2018-09-24 RX ADMIN — Medication 100 MILLIGRAM(S): at 05:49

## 2018-09-24 RX ADMIN — Medication 25 MILLIGRAM(S): at 05:49

## 2018-09-24 RX ADMIN — Medication 5 MILLIGRAM(S): at 18:20

## 2018-09-24 RX ADMIN — Medication 25 MILLIGRAM(S): at 18:19

## 2018-09-24 RX ADMIN — MAGNESIUM OXIDE 400 MG ORAL TABLET 400 MILLIGRAM(S): 241.3 TABLET ORAL at 18:19

## 2018-09-24 NOTE — PROGRESS NOTE ADULT - PROBLEM SELECTOR PLAN 1
Continue ASA/Lipitor c GI ppx. Monitor BP. Continue comprehensive PT/OT/SLP program. Aspiration and fall precautions. Continue ASA/Lipitor c GI ppx. Monitor BP. Continue comprehensive PT/OT/SLP program. Aspiration and fall precautions. Lexapro added for mood. Continue ASA/Lipitor c GI ppx. Monitor BP. Continue comprehensive PT/OT/SLP program. Aspiration and fall precautions. Lexapro added for mood stabilization, motor recovery

## 2018-09-24 NOTE — PROGRESS NOTE ADULT - SUBJECTIVE AND OBJECTIVE BOX
CHIEF COMPLAINT: No events overnight, no new weakness.       HISTORY OF PRESENT ILLNESS  89 year old female with PMH HTN, DM2, dyslipidemia, OA had been in her usual state of health, was getting into the car when noted by family to suddenly become aphasic with pronounced weakness on the right side.  Brought immediately to ED at [facility], CT head negative for acute hemorrhage given TPA and transferred to ICU for further care. LDL 99, A1C 6.3. 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 Hospitalization complicated by constipation and 'pink eye'.   (CT head shows chronic lacunar infarcts on the right cerebellum, bilateral basal ganglia and thalami. There is volume loss and atherosclerosis.) (19 Sep 2018 13:21)      PAST MEDICAL & SURGICAL HISTORY:  Dyslipidemia  Osteoarthritis  Sciatica  Diabetes: Type 2  HTN (hypertension)  HTN (hypertension)  DM (diabetes mellitus)  H/O bilateral hip replacements: 2000&#x27;s at [facility], no complications  S/P hip replacement, bilateral   -      REVIEW OF SYMPTOMS  [X] Constitutional WNL     [X] Cardio WNL            [X] Resp WNL           [X] GI WNL                          [X]  WNL                   [X] Heme WNL              [X] Endo WNL                     [X] Skin WNL                 [X] MSK WNL                   [X] Psych WNL      VITALS  Vital Signs Last 24 Hrs  T(C): 36.9 (24 Sep 2018 08:14), Max: 36.9 (24 Sep 2018 08:14)  T(F): 98.4 (24 Sep 2018 08:14), Max: 98.4 (24 Sep 2018 08:14)  HR: 88 (24 Sep 2018 05:44) (81 - 88)  BP: 145/83 (24 Sep 2018 08:14) (130/74 - 145/83)  BP(mean): --  RR: 16 (24 Sep 2018 08:14) (16 - 16)  SpO2: 94% (24 Sep 2018 08:14) (94% - 95%)      PHYSICAL EXAM  Constitutional - NAD, Comfortable  HEENT - NCAT, EOMI  Neck - Supple, No limited ROM  Chest - CTA bilaterally, No wheeze, No rhonchi, No crackles  Cardiovascular - RRR, S1S2, No murmurs  Abdomen - BS+, Soft, NTND  Extremities - No C/C/E, No calf tenderness   Skin-no rash  Wounds-healed-elbow R      Neurologic Exam -                   Mental Status - Patient is alert, awake. Globally aphasic with singular words. Follows simple commands. Repeats words. Dysarthric.   	Cranial Nerves - left neglect/ left visual field cut, PERRL, dysphagia, central facial weakness, dysarthria.   	Motor Exam -   	Right upper 4/5 shoulder and elbow strengths, 3/5 distally  	Left upper 4+/5 shoulder strengths, 5/5 elbow/wrist/hand strengths   	Right lower 3/5 hip flexor, 4/5 knee and ankle strengths   	Left lower 5/5 hip flexor, 5/5 knee and ankle strengths   	Sensory: Intact LT bilaterally.   	Coord: Impaired on right due to weakness   	 DTS Reflexes: R>L     	Toes are flexor on the left, extensor on the right              FUNCTIONAL PROGRESS  Gait - mod c RW  ADLs - min A  Transfers -min A  Functional transfer - min A    RECENT LABS                        12.8   6.8   )-----------( 272      ( 24 Sep 2018 06:05 )             38.4     09-24    141  |  103  |  24<H>  ----------------------------<  156<H>  3.7   |  31  |  1.40<H>    Ca    9.1      24 Sep 2018 06:05    TPro  6.9  /  Alb  2.9<L>  /  TBili  1.0  /  DBili  x   /  AST  20  /  ALT  18  /  AlkPhos  82  09-24      LIVER FUNCTIONS - ( 24 Sep 2018 06:05 )  Alb: 2.9 g/dL / Pro: 6.9 g/dL / ALK PHOS: 82 U/L / ALT: 18 U/L DA / AST: 20 U/L / GGT: x             Direct LDL: 99 mg/dL (09-08-18 @ 05:30)    Hemoglobin A1C, Whole Blood: 6.3 % (09-08-18 @ 05:30)              RADIOLOGY/OTHER RESULTS      CURRENT MEDICATIONS  MEDICATIONS  (STANDING):  amLODIPine   Tablet 5 milliGRAM(s) Oral daily  aspirin 325 milliGRAM(s) Oral daily  atorvastatin 40 milliGRAM(s) Oral at bedtime  ciprofloxacin  0.3% Ophthalmic Solution 1 Drop(s) Both EYES every 4 hours  dextrose 5%. 1000 milliLiter(s) (50 mL/Hr) IV Continuous <Continuous>  dextrose 50% Injectable 12.5 Gram(s) IV Push once  docusate sodium 100 milliGRAM(s) Oral two times a day  enoxaparin Injectable 30 milliGRAM(s) SubCutaneous daily  magnesium oxide 400 milliGRAM(s) Oral three times a day with meals  metoprolol tartrate 25 milliGRAM(s) Oral every 12 hours  nystatin Powder 1 Application(s) Topical three times a day  oxybutynin 5 milliGRAM(s) Oral two times a day  senna 2 Tablet(s) Oral at bedtime    MEDICATIONS  (PRN):  acetaminophen   Tablet .. 650 milliGRAM(s) Oral every 6 hours PRN Moderate Pain (4 - 6)  bisacodyl Suppository 10 milliGRAM(s) Rectal daily PRN Constipation  dextrose 40% Gel 15 Gram(s) Oral once PRN Blood Glucose LESS THAN 70 milliGRAM(s)/deciliter  glucagon  Injectable 1 milliGRAM(s) IntraMuscular once PRN Glucose LESS THAN 70 milligrams/deciliter      ASSESSMENT & PLAN          GI/Bowel Management -Colace/Miralax   Management - Toilet Q2  Skin - Turn Q2  Pain - Tylenol PRN  DVT PPX - Lovenox  Diet - advanced to soft c nectar    Continue comprehensive acute rehab program consisting of 3hrs/day of OT/PT and SLP. CHIEF COMPLAINT: No events overnight, no new weakness. Very emotional, teary.       HISTORY OF PRESENT ILLNESS  89 year old female with PMH HTN, DM2, dyslipidemia, OA had been in her usual state of health, was getting into the car when noted by family to suddenly become aphasic with pronounced weakness on the right side.  Brought immediately to ED at [facility], CT head negative for acute hemorrhage given TPA and transferred to ICU for further care. LDL 99, A1C 6.3. 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 Hospitalization complicated by constipation and 'pink eye'.   (CT head shows chronic lacunar infarcts on the right cerebellum, bilateral basal ganglia and thalami. There is volume loss and atherosclerosis.) (19 Sep 2018 13:21)      PAST MEDICAL & SURGICAL HISTORY:  Dyslipidemia  Osteoarthritis  Sciatica  Diabetes: Type 2  HTN (hypertension)  HTN (hypertension)  DM (diabetes mellitus)  H/O bilateral hip replacements: 2000&#x27;s at [facility], no complications  S/P hip replacement, bilateral   -      REVIEW OF SYMPTOMS  [X] Constitutional WNL     [X] Cardio WNL            [X] Resp WNL           [X] GI WNL                          [X]  WNL                   [X] Heme WNL              [X] Endo WNL                     [X] Skin WNL                 [X] MSK WNL                   [X] Psych WNL      VITALS  Vital Signs Last 24 Hrs  T(C): 36.9 (24 Sep 2018 08:14), Max: 36.9 (24 Sep 2018 08:14)  T(F): 98.4 (24 Sep 2018 08:14), Max: 98.4 (24 Sep 2018 08:14)  HR: 88 (24 Sep 2018 05:44) (81 - 88)  BP: 145/83 (24 Sep 2018 08:14) (130/74 - 145/83)  BP(mean): --  RR: 16 (24 Sep 2018 08:14) (16 - 16)  SpO2: 94% (24 Sep 2018 08:14) (94% - 95%)      PHYSICAL EXAM  Constitutional - emotional  HEENT - NCAT, EOMI  Neck - Supple, No limited ROM  Chest - CTA bilaterally, No wheeze, No rhonchi, No crackles  Cardiovascular - RRR, S1S2, No murmurs  Abdomen - BS+, Soft, NTND  Extremities - No C/C/E, No calf tenderness   Skin-groin rash  Wounds-healed-elbow R      Neurologic Exam -                   Mental Status - Patient is alert, awake. Globally aphasic with singular words. Follows simple commands. Repeats words. Dysarthric.   	Cranial Nerves - left neglect/ left visual field cut, PERRL, dysphagia, central facial weakness, dysarthria.   	Motor Exam -   	Right upper 4/5 shoulder and elbow strengths, 3/5 distally  	Left upper 4+/5 shoulder strengths, 5/5 elbow/wrist/hand strengths   	Right lower 3/5 hip flexor, 4/5 knee and ankle strengths   	Left lower 5/5 hip flexor, 5/5 knee and ankle strengths   	Sensory: Intact LT bilaterally.   	Coord: Impaired on right due to weakness   	 DTS Reflexes: R>L     	Toes are flexor on the left, extensor on the right              FUNCTIONAL PROGRESS  Gait - mod/min c RW  ADLs - min A  Transfers -min A  Functional transfer - min A    RECENT LABS                        12.8   6.8   )-----------( 272      ( 24 Sep 2018 06:05 )             38.4     09-24    141  |  103  |  24<H>  ----------------------------<  156<H>  3.7   |  31  |  1.40<H>    Ca    9.1      24 Sep 2018 06:05    TPro  6.9  /  Alb  2.9<L>  /  TBili  1.0  /  DBili  x   /  AST  20  /  ALT  18  /  AlkPhos  82  09-24      LIVER FUNCTIONS - ( 24 Sep 2018 06:05 )  Alb: 2.9 g/dL / Pro: 6.9 g/dL / ALK PHOS: 82 U/L / ALT: 18 U/L DA / AST: 20 U/L / GGT: x             Direct LDL: 99 mg/dL (09-08-18 @ 05:30)    Hemoglobin A1C, Whole Blood: 6.3 % (09-08-18 @ 05:30)              RADIOLOGY/OTHER RESULTS      CURRENT MEDICATIONS  MEDICATIONS  (STANDING):  amLODIPine   Tablet 5 milliGRAM(s) Oral daily  aspirin 325 milliGRAM(s) Oral daily  atorvastatin 40 milliGRAM(s) Oral at bedtime  ciprofloxacin  0.3% Ophthalmic Solution 1 Drop(s) Both EYES every 4 hours  dextrose 5%. 1000 milliLiter(s) (50 mL/Hr) IV Continuous <Continuous>  dextrose 50% Injectable 12.5 Gram(s) IV Push once  docusate sodium 100 milliGRAM(s) Oral two times a day  enoxaparin Injectable 30 milliGRAM(s) SubCutaneous daily  magnesium oxide 400 milliGRAM(s) Oral three times a day with meals  metoprolol tartrate 25 milliGRAM(s) Oral every 12 hours  nystatin Powder 1 Application(s) Topical three times a day  oxybutynin 5 milliGRAM(s) Oral two times a day  senna 2 Tablet(s) Oral at bedtime    MEDICATIONS  (PRN):  acetaminophen   Tablet .. 650 milliGRAM(s) Oral every 6 hours PRN Moderate Pain (4 - 6)  bisacodyl Suppository 10 milliGRAM(s) Rectal daily PRN Constipation  dextrose 40% Gel 15 Gram(s) Oral once PRN Blood Glucose LESS THAN 70 milliGRAM(s)/deciliter  glucagon  Injectable 1 milliGRAM(s) IntraMuscular once PRN Glucose LESS THAN 70 milligrams/deciliter      ASSESSMENT & PLAN          GI/Bowel Management -Colace/Miralax   Management - Toilet Q2  Skin - Turn Q2  Pain - Tylenol PRN  DVT PPX - Lovenox  Diet - advanced to soft c nectar    Continue comprehensive acute rehab program consisting of 3hrs/day of OT/PT and SLP.

## 2018-09-24 NOTE — PROGRESS NOTE ADULT - PROBLEM SELECTOR PLAN 3
A1C 6.3. Endocrine in-no insulin coverage necessary. A1C 6.3. Endocrine is following -no insulin coverage necessary.

## 2018-09-24 NOTE — PROGRESS NOTE ADULT - PROBLEM SELECTOR PLAN 8
Creatinine 1.4today. Increase PO hydration, follow labs. Creatinine 1.4 today. Increase PO hydration, follow labs.

## 2018-09-25 PROCEDURE — 99232 SBSQ HOSP IP/OBS MODERATE 35: CPT

## 2018-09-25 RX ADMIN — NYSTATIN CREAM 1 APPLICATION(S): 100000 CREAM TOPICAL at 05:50

## 2018-09-25 RX ADMIN — Medication 25 MILLIGRAM(S): at 17:42

## 2018-09-25 RX ADMIN — SENNA PLUS 2 TABLET(S): 8.6 TABLET ORAL at 21:24

## 2018-09-25 RX ADMIN — Medication 5 MILLIGRAM(S): at 05:50

## 2018-09-25 RX ADMIN — ATORVASTATIN CALCIUM 40 MILLIGRAM(S): 80 TABLET, FILM COATED ORAL at 21:24

## 2018-09-25 RX ADMIN — Medication 100 MILLIGRAM(S): at 05:50

## 2018-09-25 RX ADMIN — NYSTATIN CREAM 1 APPLICATION(S): 100000 CREAM TOPICAL at 21:24

## 2018-09-25 RX ADMIN — Medication 25 MILLIGRAM(S): at 05:50

## 2018-09-25 RX ADMIN — MAGNESIUM OXIDE 400 MG ORAL TABLET 400 MILLIGRAM(S): 241.3 TABLET ORAL at 17:42

## 2018-09-25 RX ADMIN — Medication 5 MILLIGRAM(S): at 17:42

## 2018-09-25 RX ADMIN — Medication 81 MILLIGRAM(S): at 11:53

## 2018-09-25 RX ADMIN — MAGNESIUM OXIDE 400 MG ORAL TABLET 400 MILLIGRAM(S): 241.3 TABLET ORAL at 08:00

## 2018-09-25 RX ADMIN — ESCITALOPRAM OXALATE 5 MILLIGRAM(S): 10 TABLET, FILM COATED ORAL at 11:53

## 2018-09-25 RX ADMIN — ENOXAPARIN SODIUM 30 MILLIGRAM(S): 100 INJECTION SUBCUTANEOUS at 11:53

## 2018-09-25 RX ADMIN — Medication 100 MILLIGRAM(S): at 17:42

## 2018-09-25 RX ADMIN — AMLODIPINE BESYLATE 5 MILLIGRAM(S): 2.5 TABLET ORAL at 05:50

## 2018-09-25 RX ADMIN — MAGNESIUM OXIDE 400 MG ORAL TABLET 400 MILLIGRAM(S): 241.3 TABLET ORAL at 11:53

## 2018-09-25 NOTE — PROGRESS NOTE ADULT - PROBLEM SELECTOR PLAN 5
Diet advanced to [facility] soft c nectar dysphagia diet, aspiration precautions. Encourage fluids. Diet advanced to mechanical  soft c nectar dysphagia diet, aspiration precautions. Encourage fluids.

## 2018-09-25 NOTE — PROGRESS NOTE ADULT - SUBJECTIVE AND OBJECTIVE BOX
CHIEF COMPLAINT: No acute events overnight, slept well. NAD.       HISTORY OF PRESENT ILLNESS  89 year old female with PMH HTN, DM2, dyslipidemia, OA had been in her usual state of health, was getting into the car when noted by family to suddenly become aphasic with pronounced weakness on the right side.  Brought immediately to ED at [facility], CT head negative for acute hemorrhage given TPA and transferred to ICU for further care. LDL 99, A1C 6.3. 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 Hospitalization complicated by constipation and 'pink eye'.   (CT head shows chronic lacunar infarcts on the right cerebellum, bilateral basal ganglia and thalami. There is volume loss and atherosclerosis.) (19 Sep 2018 13:21)      PAST MEDICAL & SURGICAL HISTORY:  Dyslipidemia  Osteoarthritis  Sciatica  Diabetes: Type 2, &quot;many years&quot;  HTN (hypertension)  HTN (hypertension)  DM (diabetes mellitus)  H/O bilateral hip replacements: 2000&#x27;s at [facility], no complications  S/P hip replacement, bilateral       REVIEW OF SYMPTOMS  [X] Constitutional WNL     [X] Cardio WNL            [X] Resp WNL           [X] GI WNL                          [X]  WNL                   [X] Heme WNL              [X] Endo WNL                     [X] Skin WNL                 [X] MSK WNL                   [X] Psych WNL      VITALS  Vital Signs Last 24 Hrs  T(C): 36.6 (25 Sep 2018 08:05), Max: 36.7 (24 Sep 2018 21:17)  T(F): 97.8 (25 Sep 2018 08:05), Max: 98.1 (24 Sep 2018 21:17)  HR: 83 (25 Sep 2018 08:05) (75 - 83)  BP: 158/95 (25 Sep 2018 08:05) (136/69 - 158/95)  BP(mean): --  RR: 14 (25 Sep 2018 08:05) (14 - 15)  SpO2: 95% (25 Sep 2018 08:05) (95% - 96%)      PHYSICAL EXAM  Constitutional - NAD, Comfortable  HEENT - NCAT, EOMI  Neck - Supple, No limited ROM  Chest - CTA bilaterally, No wheeze, No rhonchi, No crackles  Cardiovascular - RRR, S1S2, No murmurs  Abdomen - BS+, Soft, NTND  Extremities - No C/C/E, No calf tenderness   Skin-no rash  Wounds-elbow R-healed.         Neurologic Exam -                   Mental Status - Patient is alert, awake. Globally aphasic-verbal output improving. Follows simple commands. Repeats words. Dysarthric.   	Cranial Nerves - left neglect/ left visual field cut, PERRL, dysphagia, central facial weakness, dysarthria.   	Motor Exam -   	Right upper 4/5 shoulder and elbow strengths, 3/5 distally  	Left upper 4+/5 shoulder strengths, 5/5 elbow/wrist/hand strengths   	Right lower 3/5 hip flexor, 4/5 knee and ankle strengths   	Left lower 5/5 hip flexor, 5/5 knee and ankle strengths   	Sensory: Intact LT bilaterally.   	Coord: Impaired on right due to weakness   	 DTS Reflexes: R>L     	Toes are flexor on the left, extensor on the right              FUNCTIONAL PROGRESS  Gait - mod/min c RW  ADLs - min A  Transfers -min A  Functional transfer - min A      RECENT LABS                        12.8   6.8   )-----------( 272      ( 24 Sep 2018 06:05 )             38.4     09-24    141  |  103  |  24<H>  ----------------------------<  156<H>  3.7   |  31  |  1.40<H>    Ca    9.1      24 Sep 2018 06:05    TPro  6.9  /  Alb  2.9<L>  /  TBili  1.0  /  DBili  x   /  AST  20  /  ALT  18  /  AlkPhos  82  09-24      LIVER FUNCTIONS - ( 24 Sep 2018 06:05 )  Alb: 2.9 g/dL / Pro: 6.9 g/dL / ALK PHOS: 82 U/L / ALT: 18 U/L DA / AST: 20 U/L / GGT: x             Direct LDL: 99 mg/dL (09-08-18 @ 05:30)    Hemoglobin A1C, Whole Blood: 6.3 % (09-08-18 @ 05:30)              RADIOLOGY/OTHER RESULTS      CURRENT MEDICATIONS  MEDICATIONS  (STANDING):  amLODIPine   Tablet 5 milliGRAM(s) Oral daily  aspirin  chewable 81 milliGRAM(s) Oral daily  atorvastatin 40 milliGRAM(s) Oral at bedtime  dextrose 5%. 1000 milliLiter(s) (50 mL/Hr) IV Continuous <Continuous>  dextrose 50% Injectable 12.5 Gram(s) IV Push once  docusate sodium 100 milliGRAM(s) Oral two times a day  enoxaparin Injectable 30 milliGRAM(s) SubCutaneous daily  escitalopram 5 milliGRAM(s) Oral daily  magnesium oxide 400 milliGRAM(s) Oral three times a day with meals  metoprolol tartrate 25 milliGRAM(s) Oral every 12 hours  nystatin Powder 1 Application(s) Topical three times a day  oxybutynin 5 milliGRAM(s) Oral two times a day  senna 2 Tablet(s) Oral at bedtime    MEDICATIONS  (PRN):  acetaminophen   Tablet .. 650 milliGRAM(s) Oral every 6 hours PRN Moderate Pain (4 - 6)  bisacodyl Suppository 10 milliGRAM(s) Rectal daily PRN Constipation  dextrose 40% Gel 15 Gram(s) Oral once PRN Blood Glucose LESS THAN 70 milliGRAM(s)/deciliter  glucagon  Injectable 1 milliGRAM(s) IntraMuscular once PRN Glucose LESS THAN 70 milligrams/deciliter      ASSESSMENT & PLAN          GI/Bowel Management -Colace/Miralax   Management - Toilet Q2  Skin - Turn Q2  Pain - Tylenol PRN  DVT PPX - Lovenox  Diet - advanced to soft c nectar      Continue comprehensive acute rehab program consisting of 3hrs/day of OT/PT and SLP.

## 2018-09-25 NOTE — PROGRESS NOTE ADULT - ASSESSMENT
89 yo female c hx HTN, s/p left sided CVA now with dysphagia, aphasia and right sided weakness. 89 yo female c hx HTN, s/p left MCA CVA now with dysphagia, aphasia and right sided weakness and gait disorder.

## 2018-09-25 NOTE — PROGRESS NOTE ADULT - PROBLEM SELECTOR PLAN 1
Continue ASA/Lipitor c GI ppx. Monitor BP. Continue comprehensive PT/OT/SLP program. Aspiration and fall precautions. Lexapro added for mood stabilization, motor recovery.

## 2018-09-26 LAB
ALBUMIN SERPL ELPH-MCNC: 3 G/DL — LOW (ref 3.3–5)
ALP SERPL-CCNC: 83 U/L — SIGNIFICANT CHANGE UP (ref 40–120)
ALT FLD-CCNC: 20 U/L DA — SIGNIFICANT CHANGE UP (ref 10–45)
ANION GAP SERPL CALC-SCNC: 5 MMOL/L — SIGNIFICANT CHANGE UP (ref 5–17)
AST SERPL-CCNC: 23 U/L — SIGNIFICANT CHANGE UP (ref 10–40)
BILIRUB SERPL-MCNC: 1 MG/DL — SIGNIFICANT CHANGE UP (ref 0.2–1.2)
BUN SERPL-MCNC: 22 MG/DL — SIGNIFICANT CHANGE UP (ref 7–23)
CALCIUM SERPL-MCNC: 9.1 MG/DL — SIGNIFICANT CHANGE UP (ref 8.4–10.5)
CHLORIDE SERPL-SCNC: 102 MMOL/L — SIGNIFICANT CHANGE UP (ref 96–108)
CO2 SERPL-SCNC: 32 MMOL/L — HIGH (ref 22–31)
CREAT SERPL-MCNC: 1.27 MG/DL — SIGNIFICANT CHANGE UP (ref 0.5–1.3)
GLUCOSE SERPL-MCNC: 162 MG/DL — HIGH (ref 70–99)
HCT VFR BLD CALC: 41 % — SIGNIFICANT CHANGE UP (ref 34.5–45)
HGB BLD-MCNC: 13.6 G/DL — SIGNIFICANT CHANGE UP (ref 11.5–15.5)
MCHC RBC-ENTMCNC: 29.9 PG — SIGNIFICANT CHANGE UP (ref 27–34)
MCHC RBC-ENTMCNC: 33.2 GM/DL — SIGNIFICANT CHANGE UP (ref 32–36)
MCV RBC AUTO: 90.1 FL — SIGNIFICANT CHANGE UP (ref 80–100)
PLATELET # BLD AUTO: 259 K/UL — SIGNIFICANT CHANGE UP (ref 150–400)
POTASSIUM SERPL-MCNC: 3.9 MMOL/L — SIGNIFICANT CHANGE UP (ref 3.5–5.3)
POTASSIUM SERPL-SCNC: 3.9 MMOL/L — SIGNIFICANT CHANGE UP (ref 3.5–5.3)
PROT SERPL-MCNC: 6.9 G/DL — SIGNIFICANT CHANGE UP (ref 6–8.3)
RBC # BLD: 4.55 M/UL — SIGNIFICANT CHANGE UP (ref 3.8–5.2)
RBC # FLD: 11.8 % — SIGNIFICANT CHANGE UP (ref 10.3–14.5)
SODIUM SERPL-SCNC: 139 MMOL/L — SIGNIFICANT CHANGE UP (ref 135–145)
WBC # BLD: 6.7 K/UL — SIGNIFICANT CHANGE UP (ref 3.8–10.5)
WBC # FLD AUTO: 6.7 K/UL — SIGNIFICANT CHANGE UP (ref 3.8–10.5)

## 2018-09-26 PROCEDURE — 99232 SBSQ HOSP IP/OBS MODERATE 35: CPT

## 2018-09-26 RX ADMIN — MAGNESIUM OXIDE 400 MG ORAL TABLET 400 MILLIGRAM(S): 241.3 TABLET ORAL at 08:34

## 2018-09-26 RX ADMIN — Medication 81 MILLIGRAM(S): at 12:20

## 2018-09-26 RX ADMIN — Medication 25 MILLIGRAM(S): at 18:13

## 2018-09-26 RX ADMIN — NYSTATIN CREAM 1 APPLICATION(S): 100000 CREAM TOPICAL at 21:11

## 2018-09-26 RX ADMIN — Medication 100 MILLIGRAM(S): at 18:13

## 2018-09-26 RX ADMIN — Medication 100 MILLIGRAM(S): at 05:32

## 2018-09-26 RX ADMIN — Medication 5 MILLIGRAM(S): at 05:32

## 2018-09-26 RX ADMIN — MAGNESIUM OXIDE 400 MG ORAL TABLET 400 MILLIGRAM(S): 241.3 TABLET ORAL at 18:13

## 2018-09-26 RX ADMIN — ESCITALOPRAM OXALATE 5 MILLIGRAM(S): 10 TABLET, FILM COATED ORAL at 12:20

## 2018-09-26 RX ADMIN — Medication 25 MILLIGRAM(S): at 05:32

## 2018-09-26 RX ADMIN — MAGNESIUM OXIDE 400 MG ORAL TABLET 400 MILLIGRAM(S): 241.3 TABLET ORAL at 12:20

## 2018-09-26 RX ADMIN — Medication 5 MILLIGRAM(S): at 18:13

## 2018-09-26 RX ADMIN — NYSTATIN CREAM 1 APPLICATION(S): 100000 CREAM TOPICAL at 05:32

## 2018-09-26 RX ADMIN — ATORVASTATIN CALCIUM 40 MILLIGRAM(S): 80 TABLET, FILM COATED ORAL at 21:11

## 2018-09-26 RX ADMIN — ENOXAPARIN SODIUM 30 MILLIGRAM(S): 100 INJECTION SUBCUTANEOUS at 12:20

## 2018-09-26 RX ADMIN — AMLODIPINE BESYLATE 5 MILLIGRAM(S): 2.5 TABLET ORAL at 05:32

## 2018-09-26 RX ADMIN — SENNA PLUS 2 TABLET(S): 8.6 TABLET ORAL at 21:11

## 2018-09-26 NOTE — DIETITIAN INITIAL EVALUATION ADULT. - OTHER INFO
Chart reveals good intake; 50-75 % meal completion. Tolerates  diet well.  Skin intact. NO N/V/DIARRHEA. Dysphagia noted.   Pt. aphasic.

## 2018-09-26 NOTE — PROGRESS NOTE ADULT - PROBLEM SELECTOR PLAN 5
Diet advanced to mechanical  soft c nectar dysphagia diet, aspiration precautions. Encourage fluids.

## 2018-09-26 NOTE — CHART NOTE - NSCHARTNOTEFT_GEN_A_CORE
REHABILITATION DIAGNOSIS/IMPAIRMENT GROUP CODE:  s/p left CVA c aphasia and right sided weakness    COMORBIDITIES/COMPLICATING CONDITIONS IMPACTING REHABILITATION:  HEALTH ISSUES - PROBLEM Dx:  LENNIE (acute kidney injury): LENNIE (acute kidney injury)  Osteoarthritis of multiple joints, unspecified osteoarthritis type: Osteoarthritis of multiple joints, unspecified osteoarthritis type  Essential hypertension: Essential hypertension  Type 2 diabetes mellitus without complication, with long-term current use of insulin: Type 2 diabetes mellitus without complication, with long-term current use of insulin  Cerebrovascular accident (CVA) due to other mechanism: Cerebrovascular accident (CVA) due to other mechanism  Conjunctivitis of left eye: Conjunctivitis of left eye  Constipation: Constipation  Dysphagia: Dysphagia  Dyslipidemia: Dyslipidemia  Diabetes: Diabetes  HTN (hypertension): HTN (hypertension)  CVA (cerebral vascular accident): CVA (cerebral vascular accident)        PAST MEDICAL & SURGICAL HISTORY:  Dyslipidemia  Osteoarthritis  Sciatica  Diabetes: Type 2,   HTN (hypertension)  HTN (hypertension)  DM (diabetes mellitus)  H/O bilateral hip replacements: 2000&#x27;s at [facility], no complications  S/P hip replacement, bilateral      Based upon consideration of the patient's impairments, functional status, complicating conditions and any other contributing factors and after information garnered from the assessments of all therapy disciplines involved in treating the patient and other pertinent clinicians:    INTERDISCIPLINARY REHABILITATION INTERVENTIONS:    [ x ] Transfer Training  [ x  ] Bed Mobility  [ x  ] Therapeutic Exercise  [ x  ] Balance/Coordination Exercises  [ x  ] Locomotion retraining  [ x  ] Stairs  [ x  ] Functional Transfer Training  [ x  ] Bowel/Bladder program  [ x  ] Pain Management  [ x  ] Skin/Wound Care  [ x  ] Visual/Perceptual Training  [ x  ] Therapeutic Recreation Activities  [ x  ] Neuromuscular Re-education  [ x  ] Activities of Daily Living  [ x  ] Speech Exercise  [ x  ] Swallowing Exercises  [ x  ] Vital Stim  [ x  ] Dietary Supplements  [   ] Calorie Count  [ x ] Cognitive Exercises  [ x  ] Congnitive/Linguistic Treatment  [   ] Behavior Program  [ x  ] Neuropsych Therapy  [ x  ] Patient/Family Counseling  [ x  ] Family Training  [ x  ] Community Re-entry  [ x  ] Orthotic Evaluation  [   ] Prosthetic Eval/Training    MEDICAL PROGNOSIS:  good    REHAB POTENTIAL:  good  EXPECTED DAILY THERAPY:         PT: 1h       OT: 1h       ST: 1h       S&O: eval    EXPECTED INTENSITY OF PROGRAM:  3h per day     EXPECTED FREQUENCY OF PROGRAM:  daily    ESTIMATED LOS:  14days    ESTIMATED DISPOSITION:  home c home care    INTERDISCIPLINARY FUNCTIONAL OUTCOMES?GOALS:         Gait/Mobility:       Transfers:       ADLs:       Functional Transfers:       Medication Management:       Communication:       Cognitive:       Dysphagia:       Bladder       Bowel:

## 2018-09-26 NOTE — PROGRESS NOTE ADULT - SUBJECTIVE AND OBJECTIVE BOX
CHIEF COMPLAINT: No new events, no new weakness, in NAD.       HISTORY OF PRESENT ILLNESS  89 year old female with PMH HTN, DM2, dyslipidemia, OA had been in her usual state of health, was getting into the car when noted by family to suddenly become aphasic with pronounced weakness on the right side.  Brought immediately to ED at [facility], CT head negative for acute hemorrhage given TPA and transferred to ICU for further care. LDL 99, A1C 6.3. 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 Hospitalization complicated by constipation and 'pink eye'.   (CT head shows chronic lacunar infarcts on the right cerebellum, bilateral basal ganglia and thalami. There is volume loss and atherosclerosis.) (19 Sep 2018 13:21)      PAST MEDICAL & SURGICAL HISTORY:  Dyslipidemia  Osteoarthritis  Sciatica  Diabetes: Type 2, &quot;many years&quot;  HTN (hypertension)  HTN (hypertension)  DM (diabetes mellitus)  H/O bilateral hip replacements: 2000&#x27;s at [facility], no complications  S/P hip replacement, bilateral       REVIEW OF SYMPTOMS  [X] Constitutional WNL     [X] Cardio WNL            [X] Resp WNL           [X] GI WNL                          [X]  WNL                   [X] Heme WNL              [X] Endo WNL                     [X] Skin WNL                 [X] MSK WNL                    [X] Psych WNL      VITALS  Vital Signs Last 24 Hrs  T(C): 36.9 (26 Sep 2018 08:36), Max: 37 (25 Sep 2018 20:29)  T(F): 98.5 (26 Sep 2018 08:36), Max: 98.6 (25 Sep 2018 20:29)  HR: 74 (26 Sep 2018 08:36) (74 - 80)  BP: 130/77 (26 Sep 2018 08:36) (130/77 - 154/79)  BP(mean): --  RR: 14 (26 Sep 2018 08:36) (14 - 14)  SpO2: 95% (26 Sep 2018 08:36) (95% - 95%)      PHYSICAL EXAM  Constitutional - NAD, Comfortable  HEENT - NCAT, EOMI  Neck - Supple, No limited ROM  Chest - CTA bilaterally, No wheeze, No rhonchi, No crackles  Cardiovascular - RRR, S1S2, No murmurs  Abdomen - BS+, Soft, NTND  Extremities - No C/C/E, No calf tenderness   Skin-no rash  Wounds-healed      Neurologic Exam -                   Mental Status - Patient is alert, awake. Globally aphasic-verbal output improving. Follows simple commands. Repeats words. Dysarthric.   	Cranial Nerves - left neglect/ left visual field cut, PERRL, dysphagia, central facial weakness, dysarthria.   	Motor Exam -   	Right upper 4/5 shoulder and elbow strengths, 3/5 distally  	Left upper 4+/5 shoulder strengths, 5/5 elbow/wrist/hand strengths   	Right lower 3/5 hip flexor, 4/5 knee and ankle strengths   	Left lower 5/5 hip flexor, 5/5 knee and ankle strengths   	Sensory: Intact LT bilaterally.   	Coord: Impaired on right due to weakness   	 DTS Reflexes: R>L     	Toes are flexor on the left, extensor on the right              FUNCTIONAL PROGRESS  Gait - mod/min c RW  ADLs - min A  Transfers -min A  Functional transfer - min A      RECENT LABS                        13.6   6.7   )-----------( 259      ( 26 Sep 2018 05:20 )             41.0     09-26    139  |  102  |  22  ----------------------------<  162<H>  3.9   |  32<H>  |  1.27    Ca    9.1      26 Sep 2018 05:20    TPro  6.9  /  Alb  3.0<L>  /  TBili  1.0  /  DBili  x   /  AST  23  /  ALT  20  /  AlkPhos  83  09-26      LIVER FUNCTIONS - ( 26 Sep 2018 05:20 )  Alb: 3.0 g/dL / Pro: 6.9 g/dL / ALK PHOS: 83 U/L / ALT: 20 U/L DA / AST: 23 U/L / GGT: x             Direct LDL: 99 mg/dL (09-08-18 @ 05:30)    Hemoglobin A1C, Whole Blood: 6.3 % (09-08-18 @ 05:30)              RADIOLOGY/OTHER RESULTS      CURRENT MEDICATIONS  MEDICATIONS  (STANDING):  amLODIPine   Tablet 5 milliGRAM(s) Oral daily  aspirin  chewable 81 milliGRAM(s) Oral daily  atorvastatin 40 milliGRAM(s) Oral at bedtime  dextrose 5%. 1000 milliLiter(s) (50 mL/Hr) IV Continuous <Continuous>  dextrose 50% Injectable 12.5 Gram(s) IV Push once  docusate sodium 100 milliGRAM(s) Oral two times a day  enoxaparin Injectable 30 milliGRAM(s) SubCutaneous daily  escitalopram 5 milliGRAM(s) Oral daily  magnesium oxide 400 milliGRAM(s) Oral three times a day with meals  metoprolol tartrate 25 milliGRAM(s) Oral every 12 hours  nystatin Powder 1 Application(s) Topical three times a day  oxybutynin 5 milliGRAM(s) Oral two times a day  senna 2 Tablet(s) Oral at bedtime    MEDICATIONS  (PRN):  acetaminophen   Tablet .. 650 milliGRAM(s) Oral every 6 hours PRN Moderate Pain (4 - 6)  bisacodyl Suppository 10 milliGRAM(s) Rectal daily PRN Constipation  dextrose 40% Gel 15 Gram(s) Oral once PRN Blood Glucose LESS THAN 70 milliGRAM(s)/deciliter  glucagon  Injectable 1 milliGRAM(s) IntraMuscular once PRN Glucose LESS THAN 70 milligrams/deciliter      ASSESSMENT & PLAN        GI/Bowel Management -Colace/Miralax   Management - Toilet Q2  Skin - Turn Q2  Pain - Tylenol PRN  DVT PPX - Lovenox  Diet - advanced to soft c nectar    Continue comprehensive acute rehab program consisting of 3hrs/day of OT/PT and SLP.

## 2018-09-26 NOTE — PROGRESS NOTE ADULT - ASSESSMENT
89 yo female c hx HTN, s/p left MCA CVA now with dysphagia, aphasia and right sided weakness and gait disorder.

## 2018-09-27 PROCEDURE — 99232 SBSQ HOSP IP/OBS MODERATE 35: CPT

## 2018-09-27 RX ADMIN — Medication 5 MILLIGRAM(S): at 05:47

## 2018-09-27 RX ADMIN — Medication 81 MILLIGRAM(S): at 11:16

## 2018-09-27 RX ADMIN — Medication 5 MILLIGRAM(S): at 17:25

## 2018-09-27 RX ADMIN — NYSTATIN CREAM 1 APPLICATION(S): 100000 CREAM TOPICAL at 13:05

## 2018-09-27 RX ADMIN — Medication 100 MILLIGRAM(S): at 05:47

## 2018-09-27 RX ADMIN — MAGNESIUM OXIDE 400 MG ORAL TABLET 400 MILLIGRAM(S): 241.3 TABLET ORAL at 08:35

## 2018-09-27 RX ADMIN — ATORVASTATIN CALCIUM 40 MILLIGRAM(S): 80 TABLET, FILM COATED ORAL at 21:23

## 2018-09-27 RX ADMIN — Medication 25 MILLIGRAM(S): at 05:48

## 2018-09-27 RX ADMIN — MAGNESIUM OXIDE 400 MG ORAL TABLET 400 MILLIGRAM(S): 241.3 TABLET ORAL at 17:25

## 2018-09-27 RX ADMIN — Medication 100 MILLIGRAM(S): at 17:25

## 2018-09-27 RX ADMIN — Medication 25 MILLIGRAM(S): at 17:25

## 2018-09-27 RX ADMIN — ENOXAPARIN SODIUM 30 MILLIGRAM(S): 100 INJECTION SUBCUTANEOUS at 11:16

## 2018-09-27 RX ADMIN — NYSTATIN CREAM 1 APPLICATION(S): 100000 CREAM TOPICAL at 21:23

## 2018-09-27 RX ADMIN — NYSTATIN CREAM 1 APPLICATION(S): 100000 CREAM TOPICAL at 05:48

## 2018-09-27 RX ADMIN — ESCITALOPRAM OXALATE 5 MILLIGRAM(S): 10 TABLET, FILM COATED ORAL at 11:16

## 2018-09-27 RX ADMIN — AMLODIPINE BESYLATE 5 MILLIGRAM(S): 2.5 TABLET ORAL at 05:48

## 2018-09-27 RX ADMIN — MAGNESIUM OXIDE 400 MG ORAL TABLET 400 MILLIGRAM(S): 241.3 TABLET ORAL at 12:12

## 2018-09-27 NOTE — PROGRESS NOTE ADULT - SUBJECTIVE AND OBJECTIVE BOX
CHIEF COMPLAINT: Emotional. Denies pain. Aphasia.       HISTORY OF PRESENT ILLNESS  89 year old female with PMH HTN, DM2, dyslipidemia, OA had been in her usual state of health, was getting into the car when noted by family to suddenly become aphasic with pronounced weakness on the right side.  Brought immediately to ED at [facility], CT head negative for acute hemorrhage given TPA and transferred to ICU for further care. LDL 99, A1C 6.3. 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 Hospitalization complicated by constipation and 'pink eye'.   (CT head shows chronic lacunar infarcts on the right cerebellum, bilateral basal ganglia and thalami. There is volume loss and atherosclerosis.) (19 Sep 2018 13:21)      PAST MEDICAL & SURGICAL HISTORY:  Dyslipidemia  Osteoarthritis  Sciatica  Diabetes: Type 2  HTN (hypertension)  HTN (hypertension)  DM (diabetes mellitus)  H/O bilateral hip replacements: 2000&#x27;s at [facility], no complications  S/P hip replacement, bilateral        REVIEW OF SYMPTOMS  [X] Constitutional WNL     [X] Cardio WNL            [X] Resp WNL           [X] GI WNL                          [X]  WNL                   [X] Heme WNL              [X] Endo WNL                     [X] Skin WNL                 [X] MSK WNL                VITALS  Vital Signs Last 24 Hrs  T(C): 36.9 (27 Sep 2018 08:44), Max: 36.9 (27 Sep 2018 08:44)  T(F): 98.5 (27 Sep 2018 08:44), Max: 98.5 (27 Sep 2018 08:44)  HR: 74 (27 Sep 2018 08:44) (74 - 90)  BP: 153/75 (27 Sep 2018 08:44) (123/80 - 167/76)  BP(mean): --  RR: 16 (27 Sep 2018 08:44) (16 - 16)  SpO2: 95% (27 Sep 2018 08:44) (94% - 95%)      PHYSICAL EXAM  Constitutional - NAD, Comfortable  HEENT - NCAT, EOMI  Neck - Supple, No limited ROM  Chest - CTA bilaterally, No wheeze, No rhonchi, No crackles  Cardiovascular - RRR, S1S2, No murmurs  Abdomen - BS+, Soft, NTND  Extremities - No C/C/E, No calf tenderness   Skin-no rash  Wounds-healed.     Neurologic Exam -                   Mental Status - Patient is alert, awake. Globally aphasic-verbal output improving. Follows simple commands. Repeats words. Dysarthric.   	Cranial Nerves - left neglect/ left visual field cut, PERRL, dysphagia, central facial weakness, dysarthria.   	Motor Exam -   	Right upper 4/5 shoulder and elbow strengths, 3/5 distally  	Left upper 4+/5 shoulder strengths, 5/5 elbow/wrist/hand strengths   	Right lower 3/5 hip flexor, 4/5 knee and ankle strengths   	Left lower 5/5 hip flexor, 5/5 knee and ankle strengths   	Sensory: Intact LT bilaterally.   	Coord: Impaired on right due to weakness   	 DTS Reflexes: R>L     	Toes are flexor on the left, extensor on the right              FUNCTIONAL PROGRESS  Gait - min c RW  ADLs - min A  Transfers -min A  Functional transfer - min A      RECENT LABS                        13.6   6.7   )-----------( 259      ( 26 Sep 2018 05:20 )             41.0     09-26    139  |  102  |  22  ----------------------------<  162<H>  3.9   |  32<H>  |  1.27    Ca    9.1      26 Sep 2018 05:20    TPro  6.9  /  Alb  3.0<L>  /  TBili  1.0  /  DBili  x   /  AST  23  /  ALT  20  /  AlkPhos  83  09-26      LIVER FUNCTIONS - ( 26 Sep 2018 05:20 )  Alb: 3.0 g/dL / Pro: 6.9 g/dL / ALK PHOS: 83 U/L / ALT: 20 U/L DA / AST: 23 U/L / GGT: x             Direct LDL: 99 mg/dL (09-08-18 @ 05:30)    Hemoglobin A1C, Whole Blood: 6.3 % (09-08-18 @ 05:30)              RADIOLOGY/OTHER RESULTS      CURRENT MEDICATIONS  MEDICATIONS  (STANDING):  amLODIPine   Tablet 5 milliGRAM(s) Oral daily  aspirin  chewable 81 milliGRAM(s) Oral daily  atorvastatin 40 milliGRAM(s) Oral at bedtime  dextrose 5%. 1000 milliLiter(s) (50 mL/Hr) IV Continuous <Continuous>  dextrose 50% Injectable 12.5 Gram(s) IV Push once  docusate sodium 100 milliGRAM(s) Oral two times a day  enoxaparin Injectable 30 milliGRAM(s) SubCutaneous daily  escitalopram 5 milliGRAM(s) Oral daily  magnesium oxide 400 milliGRAM(s) Oral three times a day with meals  metoprolol tartrate 25 milliGRAM(s) Oral every 12 hours  nystatin Powder 1 Application(s) Topical three times a day  oxybutynin 5 milliGRAM(s) Oral two times a day  senna 2 Tablet(s) Oral at bedtime    MEDICATIONS  (PRN):  acetaminophen   Tablet .. 650 milliGRAM(s) Oral every 6 hours PRN Moderate Pain (4 - 6)  bisacodyl Suppository 10 milliGRAM(s) Rectal daily PRN Constipation  dextrose 40% Gel 15 Gram(s) Oral once PRN Blood Glucose LESS THAN 70 milliGRAM(s)/deciliter  glucagon  Injectable 1 milliGRAM(s) IntraMuscular once PRN Glucose LESS THAN 70 milligrams/deciliter      ASSESSMENT & PLAN          GI/Bowel Management -Colace/Miralax   Management - Toilet Q2  Skin - Turn Q2  Pain - Tylenol PRN  DVT PPX - Lovenox  Diet - advanced to soft c nectar      Continue comprehensive acute rehab program consisting of 3hrs/day of OT/PT and SLP.

## 2018-09-28 LAB
ALBUMIN SERPL ELPH-MCNC: 3.1 G/DL — LOW (ref 3.3–5)
ALP SERPL-CCNC: 78 U/L — SIGNIFICANT CHANGE UP (ref 40–120)
ALT FLD-CCNC: 20 U/L DA — SIGNIFICANT CHANGE UP (ref 10–45)
ANION GAP SERPL CALC-SCNC: 7 MMOL/L — SIGNIFICANT CHANGE UP (ref 5–17)
AST SERPL-CCNC: 27 U/L — SIGNIFICANT CHANGE UP (ref 10–40)
BILIRUB SERPL-MCNC: 0.9 MG/DL — SIGNIFICANT CHANGE UP (ref 0.2–1.2)
BUN SERPL-MCNC: 22 MG/DL — SIGNIFICANT CHANGE UP (ref 7–23)
CALCIUM SERPL-MCNC: 9.3 MG/DL — SIGNIFICANT CHANGE UP (ref 8.4–10.5)
CHLORIDE SERPL-SCNC: 101 MMOL/L — SIGNIFICANT CHANGE UP (ref 96–108)
CO2 SERPL-SCNC: 30 MMOL/L — SIGNIFICANT CHANGE UP (ref 22–31)
CREAT SERPL-MCNC: 1.27 MG/DL — SIGNIFICANT CHANGE UP (ref 0.5–1.3)
GLUCOSE SERPL-MCNC: 155 MG/DL — HIGH (ref 70–99)
HCT VFR BLD CALC: 41 % — SIGNIFICANT CHANGE UP (ref 34.5–45)
HGB BLD-MCNC: 14.2 G/DL — SIGNIFICANT CHANGE UP (ref 11.5–15.5)
MCHC RBC-ENTMCNC: 31 PG — SIGNIFICANT CHANGE UP (ref 27–34)
MCHC RBC-ENTMCNC: 34.6 GM/DL — SIGNIFICANT CHANGE UP (ref 32–36)
MCV RBC AUTO: 89.6 FL — SIGNIFICANT CHANGE UP (ref 80–100)
PLATELET # BLD AUTO: 271 K/UL — SIGNIFICANT CHANGE UP (ref 150–400)
POTASSIUM SERPL-MCNC: 4.1 MMOL/L — SIGNIFICANT CHANGE UP (ref 3.5–5.3)
POTASSIUM SERPL-SCNC: 4.1 MMOL/L — SIGNIFICANT CHANGE UP (ref 3.5–5.3)
PROT SERPL-MCNC: 7.1 G/DL — SIGNIFICANT CHANGE UP (ref 6–8.3)
RBC # BLD: 4.58 M/UL — SIGNIFICANT CHANGE UP (ref 3.8–5.2)
RBC # FLD: 11.4 % — SIGNIFICANT CHANGE UP (ref 10.3–14.5)
SODIUM SERPL-SCNC: 138 MMOL/L — SIGNIFICANT CHANGE UP (ref 135–145)
WBC # BLD: 8.1 K/UL — SIGNIFICANT CHANGE UP (ref 3.8–10.5)
WBC # FLD AUTO: 8.1 K/UL — SIGNIFICANT CHANGE UP (ref 3.8–10.5)

## 2018-09-28 PROCEDURE — 99232 SBSQ HOSP IP/OBS MODERATE 35: CPT

## 2018-09-28 RX ORDER — LIDOCAINE 4 G/100G
1 CREAM TOPICAL DAILY
Qty: 0 | Refills: 0 | Status: DISCONTINUED | OUTPATIENT
Start: 2018-09-28 | End: 2018-10-05

## 2018-09-28 RX ADMIN — ATORVASTATIN CALCIUM 40 MILLIGRAM(S): 80 TABLET, FILM COATED ORAL at 22:11

## 2018-09-28 RX ADMIN — Medication 25 MILLIGRAM(S): at 17:18

## 2018-09-28 RX ADMIN — ESCITALOPRAM OXALATE 5 MILLIGRAM(S): 10 TABLET, FILM COATED ORAL at 11:55

## 2018-09-28 RX ADMIN — Medication 81 MILLIGRAM(S): at 11:54

## 2018-09-28 RX ADMIN — NYSTATIN CREAM 1 APPLICATION(S): 100000 CREAM TOPICAL at 13:07

## 2018-09-28 RX ADMIN — Medication 25 MILLIGRAM(S): at 05:29

## 2018-09-28 RX ADMIN — Medication 5 MILLIGRAM(S): at 17:19

## 2018-09-28 RX ADMIN — NYSTATIN CREAM 1 APPLICATION(S): 100000 CREAM TOPICAL at 05:28

## 2018-09-28 RX ADMIN — Medication 100 MILLIGRAM(S): at 05:29

## 2018-09-28 RX ADMIN — Medication 100 MILLIGRAM(S): at 17:18

## 2018-09-28 RX ADMIN — MAGNESIUM OXIDE 400 MG ORAL TABLET 400 MILLIGRAM(S): 241.3 TABLET ORAL at 17:18

## 2018-09-28 RX ADMIN — ENOXAPARIN SODIUM 30 MILLIGRAM(S): 100 INJECTION SUBCUTANEOUS at 11:55

## 2018-09-28 RX ADMIN — MAGNESIUM OXIDE 400 MG ORAL TABLET 400 MILLIGRAM(S): 241.3 TABLET ORAL at 11:55

## 2018-09-28 RX ADMIN — Medication 5 MILLIGRAM(S): at 05:29

## 2018-09-28 RX ADMIN — AMLODIPINE BESYLATE 5 MILLIGRAM(S): 2.5 TABLET ORAL at 05:29

## 2018-09-28 RX ADMIN — MAGNESIUM OXIDE 400 MG ORAL TABLET 400 MILLIGRAM(S): 241.3 TABLET ORAL at 07:58

## 2018-09-28 RX ADMIN — NYSTATIN CREAM 1 APPLICATION(S): 100000 CREAM TOPICAL at 22:12

## 2018-09-28 NOTE — PROGRESS NOTE ADULT - SUBJECTIVE AND OBJECTIVE BOX
CHIEF COMPLAINT: No acute events overnight, no new weakness. BM x3. Denies pain.       HISTORY OF PRESENT ILLNESS  89 year old female with PMH HTN, DM2, dyslipidemia, OA had been in her usual state of health, was getting into the car when noted by family to suddenly become aphasic with pronounced weakness on the right side.  Brought immediately to ED at [facility], CT head negative for acute hemorrhage given TPA and transferred to ICU for further care. LDL 99, A1C 6.3. 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 Hospitalization complicated by constipation and 'pink eye'.   (CT head shows chronic lacunar infarcts on the right cerebellum, bilateral basal ganglia and thalami. There is volume loss and atherosclerosis.) (19 Sep 2018 13:21)      PAST MEDICAL & SURGICAL HISTORY:  Dyslipidemia  Osteoarthritis  Sciatica  Diabetes: Type 2  HTN (hypertension)  HTN (hypertension)  DM (diabetes mellitus)  H/O bilateral hip replacements: 2000&#x27;s at [facility], no complications  S/P hip replacement, bilateral        REVIEW OF SYMPTOMS  [X] Constitutional WNL     [X] Cardio WNL            [X] Resp WNL           [X] GI WNL                          [X]  WNL                   [X] Heme WNL              [X] Endo WNL                     [X] Skin WNL                 [X] MSK WNL                VITALS  Vital Signs Last 24 Hrs  T(C): 36.9 (27 Sep 2018 21:24), Max: 36.9 (27 Sep 2018 08:44)  T(F): 98.5 (27 Sep 2018 21:24), Max: 98.5 (27 Sep 2018 08:44)  HR: 87 (28 Sep 2018 05:56) (74 - 89)  BP: 136/76 (28 Sep 2018 05:56) (136/76 - 164/84)  BP(mean): --  RR: 15 (28 Sep 2018 05:56) (15 - 16)  SpO2: 95% (28 Sep 2018 05:56) (95% - 95%)      PHYSICAL EXAM  Constitutional - Emotional  HEENT - NCAT, EOMI  Neck - Supple, No limited ROM  Chest - CTA bilaterally, No wheeze, No rhonchi, No crackles  Cardiovascular - RRR, S1S2, No murmurs  Abdomen - BS+, Soft, NTND  Extremities - No C/C/E, No calf tenderness   Skin-no rash  Wounds-healed    Neurologic Exam -                   Mental Status - Patient is alert, awake. Globally aphasic-verbal output improving. Follows simple commands. Repeats words. Dysarthric.   	Cranial Nerves - left neglect/ left visual field cut, PERRL, dysphagia, central facial weakness, dysarthria.   	Motor Exam -   	Right upper 4/5 shoulder and elbow strengths, 3/5 distally  	Left upper 4+/5 shoulder strengths, 5/5 elbow/wrist/hand strengths   	Right lower 3/5 hip flexor, 4/5 knee and ankle strengths   	Left lower 5/5 hip flexor, 5/5 knee and ankle strengths   	Sensory: Intact LT bilaterally.   	Coord: Impaired on right due to weakness   	 DTS Reflexes: R>L     	Toes are flexor on the left, extensor on the right              FUNCTIONAL PROGRESS  Gait - min c RW  ADLs - min A  Transfers -min A  Functional transfer - min A      RECENT LABS                        14.2   8.1   )-----------( 271      ( 28 Sep 2018 05:30 )             41.0     09-28    138  |  101  |  22  ----------------------------<  155<H>  4.1   |  30  |  1.27    Ca    9.3      28 Sep 2018 05:30    TPro  7.1  /  Alb  3.1<L>  /  TBili  0.9  /  DBili  x   /  AST  27  /  ALT  20  /  AlkPhos  78  09-28      LIVER FUNCTIONS - ( 28 Sep 2018 05:30 )  Alb: 3.1 g/dL / Pro: 7.1 g/dL / ALK PHOS: 78 U/L / ALT: 20 U/L DA / AST: 27 U/L / GGT: x             Direct LDL: 99 mg/dL (09-08-18 @ 05:30)    Hemoglobin A1C, Whole Blood: 6.3 % (09-08-18 @ 05:30)              RADIOLOGY/OTHER RESULTS      CURRENT MEDICATIONS  MEDICATIONS  (STANDING):  amLODIPine   Tablet 5 milliGRAM(s) Oral daily  aspirin  chewable 81 milliGRAM(s) Oral daily  atorvastatin 40 milliGRAM(s) Oral at bedtime  dextrose 5%. 1000 milliLiter(s) (50 mL/Hr) IV Continuous <Continuous>  dextrose 50% Injectable 12.5 Gram(s) IV Push once  docusate sodium 100 milliGRAM(s) Oral two times a day  enoxaparin Injectable 30 milliGRAM(s) SubCutaneous daily  escitalopram 5 milliGRAM(s) Oral daily  magnesium oxide 400 milliGRAM(s) Oral three times a day with meals  metoprolol tartrate 25 milliGRAM(s) Oral every 12 hours  nystatin Powder 1 Application(s) Topical three times a day  oxybutynin 5 milliGRAM(s) Oral two times a day  senna 2 Tablet(s) Oral at bedtime    MEDICATIONS  (PRN):  acetaminophen   Tablet .. 650 milliGRAM(s) Oral every 6 hours PRN Moderate Pain (4 - 6)  bisacodyl Suppository 10 milliGRAM(s) Rectal daily PRN Constipation  dextrose 40% Gel 15 Gram(s) Oral once PRN Blood Glucose LESS THAN 70 milliGRAM(s)/deciliter  glucagon  Injectable 1 milliGRAM(s) IntraMuscular once PRN Glucose LESS THAN 70 milligrams/deciliter      ASSESSMENT & PLAN        GI/Bowel Management -Colace/Miralax prn   Management - Toilet Q2  Skin - Turn Q2  Pain - Tylenol PRN  DVT PPX - Lovenox  Diet - advanced to soft c nectar      Continue comprehensive acute rehab program consisting of 3hrs/day of OT/PT and SLP. CHIEF COMPLAINT: No acute events overnight, no new weakness. BM x3- d/c senna. Denies pain.       HISTORY OF PRESENT ILLNESS  89 year old female with PMH HTN, DM2, dyslipidemia, OA had been in her usual state of health, was getting into the car when noted by family to suddenly become aphasic with pronounced weakness on the right side.  Brought immediately to ED at [facility], CT head negative for acute hemorrhage given TPA and transferred to ICU for further care. LDL 99, A1C 6.3. 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 Hospitalization complicated by constipation and 'pink eye'.   (CT head shows chronic lacunar infarcts on the right cerebellum, bilateral basal ganglia and thalami. There is volume loss and atherosclerosis.) (19 Sep 2018 13:21)      PAST MEDICAL & SURGICAL HISTORY:  Dyslipidemia  Osteoarthritis  Sciatica  Diabetes: Type 2  HTN (hypertension)  HTN (hypertension)  DM (diabetes mellitus)  H/O bilateral hip replacements: 2000&#x27;s at [facility], no complications  S/P hip replacement, bilateral        REVIEW OF SYMPTOMS  [X] Constitutional WNL     [X] Cardio WNL            [X] Resp WNL           [X] GI WNL                          [X]  WNL                   [X] Heme WNL              [X] Endo WNL                     [X] Skin WNL                 [X] MSK WNL                VITALS  Vital Signs Last 24 Hrs  T(C): 36.9 (27 Sep 2018 21:24), Max: 36.9 (27 Sep 2018 08:44)  T(F): 98.5 (27 Sep 2018 21:24), Max: 98.5 (27 Sep 2018 08:44)  HR: 87 (28 Sep 2018 05:56) (74 - 89)  BP: 136/76 (28 Sep 2018 05:56) (136/76 - 164/84)  BP(mean): --  RR: 15 (28 Sep 2018 05:56) (15 - 16)  SpO2: 95% (28 Sep 2018 05:56) (95% - 95%)      PHYSICAL EXAM  Constitutional - Emotional  HEENT - NCAT, EOMI  Neck - Supple, No limited ROM  Chest - CTA bilaterally, No wheeze, No rhonchi, No crackles  Cardiovascular - RRR, S1S2, No murmurs  Abdomen - BS+, Soft, NTND  Extremities - No C/C/E, No calf tenderness   Skin-no rash  Wounds-healed    Neurologic Exam -                   Mental Status - Patient is alert, awake. Globally aphasic-verbal output improving. Follows simple commands. Repeats words. Dysarthric.   	Cranial Nerves - left neglect/ left visual field cut, PERRL, dysphagia, central facial weakness, dysarthria.   	Motor Exam -   	Right upper 4/5 shoulder and elbow strengths, 3/5 distally  	Left upper 4+/5 shoulder strengths, 5/5 elbow/wrist/hand strengths   	Right lower 3/5 hip flexor, 4/5 knee and ankle strengths   	Left lower 5/5 hip flexor, 5/5 knee and ankle strengths   	Sensory: Intact LT bilaterally.   	Coord: Impaired on right due to weakness   	 DTS Reflexes: R>L     	Toes are flexor on the left, extensor on the right              FUNCTIONAL PROGRESS  Gait - min c RW  ADLs - min A  Transfers -min A  Functional transfer - min A      RECENT LABS                        14.2   8.1   )-----------( 271      ( 28 Sep 2018 05:30 )             41.0     09-28    138  |  101  |  22  ----------------------------<  155<H>  4.1   |  30  |  1.27    Ca    9.3      28 Sep 2018 05:30    TPro  7.1  /  Alb  3.1<L>  /  TBili  0.9  /  DBili  x   /  AST  27  /  ALT  20  /  AlkPhos  78  09-28      LIVER FUNCTIONS - ( 28 Sep 2018 05:30 )  Alb: 3.1 g/dL / Pro: 7.1 g/dL / ALK PHOS: 78 U/L / ALT: 20 U/L DA / AST: 27 U/L / GGT: x             Direct LDL: 99 mg/dL (09-08-18 @ 05:30)    Hemoglobin A1C, Whole Blood: 6.3 % (09-08-18 @ 05:30)              RADIOLOGY/OTHER RESULTS      CURRENT MEDICATIONS  MEDICATIONS  (STANDING):  amLODIPine   Tablet 5 milliGRAM(s) Oral daily  aspirin  chewable 81 milliGRAM(s) Oral daily  atorvastatin 40 milliGRAM(s) Oral at bedtime  dextrose 5%. 1000 milliLiter(s) (50 mL/Hr) IV Continuous <Continuous>  dextrose 50% Injectable 12.5 Gram(s) IV Push once  docusate sodium 100 milliGRAM(s) Oral two times a day  enoxaparin Injectable 30 milliGRAM(s) SubCutaneous daily  escitalopram 5 milliGRAM(s) Oral daily  magnesium oxide 400 milliGRAM(s) Oral three times a day with meals  metoprolol tartrate 25 milliGRAM(s) Oral every 12 hours  nystatin Powder 1 Application(s) Topical three times a day  oxybutynin 5 milliGRAM(s) Oral two times a day  senna 2 Tablet(s) Oral at bedtime    MEDICATIONS  (PRN):  acetaminophen   Tablet .. 650 milliGRAM(s) Oral every 6 hours PRN Moderate Pain (4 - 6)  bisacodyl Suppository 10 milliGRAM(s) Rectal daily PRN Constipation  dextrose 40% Gel 15 Gram(s) Oral once PRN Blood Glucose LESS THAN 70 milliGRAM(s)/deciliter  glucagon  Injectable 1 milliGRAM(s) IntraMuscular once PRN Glucose LESS THAN 70 milligrams/deciliter      ASSESSMENT & PLAN        GI/Bowel Management -Colace   Management - Toilet Q2  Skin - Turn Q2  Pain - Tylenol PRN  DVT PPX - Lovenox  Diet - advanced to soft c nectar      Continue comprehensive acute rehab program consisting of 3hrs/day of OT/PT and SLP.

## 2018-09-28 NOTE — PROGRESS NOTE ADULT - PROBLEM SELECTOR PLAN 5
Diet advanced to mechanical  soft c nectar/dysphagia diet, aspiration precautions. Encourage fluids.

## 2018-09-28 NOTE — PROGRESS NOTE ADULT - ASSESSMENT
89 yo female c hx HTN, s/p left MCA CVA now with dysphagia, aphasia and right sided weakness and gait disorder.

## 2018-09-29 PROCEDURE — 99232 SBSQ HOSP IP/OBS MODERATE 35: CPT

## 2018-09-29 RX ADMIN — Medication 5 MILLIGRAM(S): at 17:35

## 2018-09-29 RX ADMIN — Medication 100 MILLIGRAM(S): at 05:45

## 2018-09-29 RX ADMIN — ATORVASTATIN CALCIUM 40 MILLIGRAM(S): 80 TABLET, FILM COATED ORAL at 21:41

## 2018-09-29 RX ADMIN — MAGNESIUM OXIDE 400 MG ORAL TABLET 400 MILLIGRAM(S): 241.3 TABLET ORAL at 17:34

## 2018-09-29 RX ADMIN — ENOXAPARIN SODIUM 30 MILLIGRAM(S): 100 INJECTION SUBCUTANEOUS at 11:52

## 2018-09-29 RX ADMIN — Medication 100 MILLIGRAM(S): at 17:35

## 2018-09-29 RX ADMIN — MAGNESIUM OXIDE 400 MG ORAL TABLET 400 MILLIGRAM(S): 241.3 TABLET ORAL at 07:49

## 2018-09-29 RX ADMIN — LIDOCAINE 1 PATCH: 4 CREAM TOPICAL at 07:49

## 2018-09-29 RX ADMIN — Medication 5 MILLIGRAM(S): at 05:45

## 2018-09-29 RX ADMIN — LIDOCAINE 1 PATCH: 4 CREAM TOPICAL at 18:56

## 2018-09-29 RX ADMIN — NYSTATIN CREAM 1 APPLICATION(S): 100000 CREAM TOPICAL at 21:41

## 2018-09-29 RX ADMIN — ESCITALOPRAM OXALATE 5 MILLIGRAM(S): 10 TABLET, FILM COATED ORAL at 11:52

## 2018-09-29 RX ADMIN — NYSTATIN CREAM 1 APPLICATION(S): 100000 CREAM TOPICAL at 11:53

## 2018-09-29 RX ADMIN — Medication 81 MILLIGRAM(S): at 11:51

## 2018-09-29 RX ADMIN — Medication 25 MILLIGRAM(S): at 17:35

## 2018-09-29 RX ADMIN — MAGNESIUM OXIDE 400 MG ORAL TABLET 400 MILLIGRAM(S): 241.3 TABLET ORAL at 11:52

## 2018-09-29 RX ADMIN — AMLODIPINE BESYLATE 5 MILLIGRAM(S): 2.5 TABLET ORAL at 05:45

## 2018-09-29 RX ADMIN — Medication 25 MILLIGRAM(S): at 05:45

## 2018-09-29 RX ADMIN — NYSTATIN CREAM 1 APPLICATION(S): 100000 CREAM TOPICAL at 05:45

## 2018-09-29 NOTE — PROGRESS NOTE ADULT - SUBJECTIVE AND OBJECTIVE BOX
Subjective: No new complaints or overnight issues, wants to go home, daughter at bedside      REVIEW OF SYSTEMS  Pertinent in last 24 h: Neurological deficits    VITALS  T(C): 36.7 (09-29-18 @ 08:17), Max: 36.7 (09-28-18 @ 22:09)  HR: 82 (09-29-18 @ 08:17) (82 - 88)  BP: 130/80 (09-29-18 @ 08:17) (130/80 - 174/83)  RR: 15 (09-29-18 @ 08:17) (14 - 15)  SpO2: 94% (09-29-18 @ 08:17) (94% - 95%)  Wt(kg): --    Physical Exam:  Constitutional - Emotional  HEENT - NCAT, EOMI  Neck - Supple, No limited ROM  Chest - CTA bilaterally, Cardiovascular - RRR, S1S2, No murmurs  Abdomen - BS+, Soft, NTND  Extremities - No C/C/E, No calf tenderness   Skin-no rash  Wounds-healed    Neurologic Exam -                   Mental Status - Patient is alert, awake. Globally aphasic-verbal output improving. Follows simple commands. Repeats words. Dysarthric.   	Cranial Nerves - left neglect/ left visual field cut, PERRL, dysphagia, central facial weakness, dysarthria.   	Motor Exam - no new deficit  	-    RECENT LABS/IMAGING  CBC Full  -  ( 28 Sep 2018 05:30 )  WBC Count : 8.1 K/uL  Hemoglobin : 14.2 g/dL  Hematocrit : 41.0 %  Platelet Count - Automated : 271 K/uL  Mean Cell Volume : 89.6 fl  Mean Cell Hemoglobin : 31.0 pg  Mean Cell Hemoglobin Concentration : 34.6 gm/dL  Auto Neutrophil # : x  Auto Lymphocyte # : x  Auto Monocyte # : x  Auto Eosinophil # : x  Auto Basophil # : x  Auto Neutrophil % : x  Auto Lymphocyte % : x  Auto Monocyte % : x  Auto Eosinophil % : x  Auto Basophil % : x    09-28    138  |  101  |  22  ----------------------------<  155<H>  4.1   |  30  |  1.27    Ca    9.3      28 Sep 2018 05:30    TPro  7.1  /  Alb  3.1<L>  /  TBili  0.9  /  DBili  x   /  AST  27  /  ALT  20  /  AlkPhos  78  09-28            MEDICATIONS   acetaminophen   Tablet .. 650 milliGRAM(s) every 6 hours PRN  amLODIPine   Tablet 5 milliGRAM(s) daily  aspirin  chewable 81 milliGRAM(s) daily  atorvastatin 40 milliGRAM(s) at bedtime  bisacodyl Suppository 10 milliGRAM(s) daily PRN  dextrose 40% Gel 15 Gram(s) once PRN  dextrose 5%. 1000 milliLiter(s) <Continuous>  dextrose 50% Injectable 12.5 Gram(s) once  docusate sodium 100 milliGRAM(s) two times a day  enoxaparin Injectable 30 milliGRAM(s) daily  escitalopram 5 milliGRAM(s) daily  glucagon  Injectable 1 milliGRAM(s) once PRN  lidocaine   Patch 1 Patch daily  magnesium oxide 400 milliGRAM(s) three times a day with meals  metoprolol tartrate 25 milliGRAM(s) every 12 hours  nystatin Powder 1 Application(s) three times a day  oxybutynin 5 milliGRAM(s) two times a day      --------------------------------------------------------------------

## 2018-09-30 PROCEDURE — 99232 SBSQ HOSP IP/OBS MODERATE 35: CPT

## 2018-09-30 RX ADMIN — MAGNESIUM OXIDE 400 MG ORAL TABLET 400 MILLIGRAM(S): 241.3 TABLET ORAL at 09:57

## 2018-09-30 RX ADMIN — Medication 81 MILLIGRAM(S): at 12:00

## 2018-09-30 RX ADMIN — MAGNESIUM OXIDE 400 MG ORAL TABLET 400 MILLIGRAM(S): 241.3 TABLET ORAL at 12:00

## 2018-09-30 RX ADMIN — MAGNESIUM OXIDE 400 MG ORAL TABLET 400 MILLIGRAM(S): 241.3 TABLET ORAL at 17:38

## 2018-09-30 RX ADMIN — Medication 5 MILLIGRAM(S): at 17:38

## 2018-09-30 RX ADMIN — ESCITALOPRAM OXALATE 5 MILLIGRAM(S): 10 TABLET, FILM COATED ORAL at 11:59

## 2018-09-30 RX ADMIN — Medication 100 MILLIGRAM(S): at 05:45

## 2018-09-30 RX ADMIN — Medication 5 MILLIGRAM(S): at 05:45

## 2018-09-30 RX ADMIN — Medication 25 MILLIGRAM(S): at 05:45

## 2018-09-30 RX ADMIN — AMLODIPINE BESYLATE 5 MILLIGRAM(S): 2.5 TABLET ORAL at 05:45

## 2018-09-30 RX ADMIN — Medication 100 MILLIGRAM(S): at 17:38

## 2018-09-30 RX ADMIN — NYSTATIN CREAM 1 APPLICATION(S): 100000 CREAM TOPICAL at 05:46

## 2018-09-30 RX ADMIN — LIDOCAINE 1 PATCH: 4 CREAM TOPICAL at 10:53

## 2018-09-30 RX ADMIN — Medication 25 MILLIGRAM(S): at 17:38

## 2018-09-30 RX ADMIN — LIDOCAINE 1 PATCH: 4 CREAM TOPICAL at 21:51

## 2018-09-30 RX ADMIN — ENOXAPARIN SODIUM 30 MILLIGRAM(S): 100 INJECTION SUBCUTANEOUS at 11:59

## 2018-09-30 RX ADMIN — NYSTATIN CREAM 1 APPLICATION(S): 100000 CREAM TOPICAL at 21:52

## 2018-09-30 RX ADMIN — ATORVASTATIN CALCIUM 40 MILLIGRAM(S): 80 TABLET, FILM COATED ORAL at 21:52

## 2018-09-30 NOTE — PROGRESS NOTE ADULT - SUBJECTIVE AND OBJECTIVE BOX
Subjective: No new complaints or overnight issues      REVIEW OF SYSTEMS  Pertinent in last 24 h: Neurological deficits    VITALS  T(C): 36.9 (09-30-18 @ 07:20), Max: 36.9 (09-30-18 @ 07:20)  HR: 79 (09-30-18 @ 07:20) (77 - 89)  BP: 128/74 (09-30-18 @ 07:20) (128/74 - 152/84)  RR: 14 (09-30-18 @ 07:20) (14 - 14)  SpO2: 94% (09-30-18 @ 07:20) (94% - 95%)  Wt(kg): --    Physical Exam:  Constitutional - Emotional  HEENT - NCAT, EOMI  Neck - Supple, No limited ROM  Chest - CTA bilaterally, Cardiovascular - RRR, S1S2, No murmurs  Abdomen - BS+, Soft, NTND  Extremities - No C/C/E, No calf tenderness   Skin-no rash  Wounds-healed    Neurologic Exam -                   Mental Status - Patient is alert, awake. Globally aphasic-verbal output improving. Follows simple commands. Repeats words. Dysarthric.   	Cranial Nerves - left neglect/ left visual field cut, PERRL, dysphagia, central facial weakness, dysarthria.   	Motor Exam - no new deficit    RECENT LABS/IMAGING                  MEDICATIONS   acetaminophen   Tablet .. 650 milliGRAM(s) every 6 hours PRN  amLODIPine   Tablet 5 milliGRAM(s) daily  aspirin  chewable 81 milliGRAM(s) daily  atorvastatin 40 milliGRAM(s) at bedtime  bisacodyl Suppository 10 milliGRAM(s) daily PRN  dextrose 40% Gel 15 Gram(s) once PRN  dextrose 5%. 1000 milliLiter(s) <Continuous>  dextrose 50% Injectable 12.5 Gram(s) once  docusate sodium 100 milliGRAM(s) two times a day  enoxaparin Injectable 30 milliGRAM(s) daily  escitalopram 5 milliGRAM(s) daily  glucagon  Injectable 1 milliGRAM(s) once PRN  lidocaine   Patch 1 Patch daily  magnesium oxide 400 milliGRAM(s) three times a day with meals  metoprolol tartrate 25 milliGRAM(s) every 12 hours  nystatin Powder 1 Application(s) three times a day  oxybutynin 5 milliGRAM(s) two times a day      --------------------------------------------------------------------

## 2018-10-01 DIAGNOSIS — N18.3 CHRONIC KIDNEY DISEASE, STAGE 3 (MODERATE): ICD-10-CM

## 2018-10-01 LAB
ALBUMIN SERPL ELPH-MCNC: 3 G/DL — LOW (ref 3.3–5)
ALP SERPL-CCNC: 83 U/L — SIGNIFICANT CHANGE UP (ref 40–120)
ALT FLD-CCNC: 22 U/L DA — SIGNIFICANT CHANGE UP (ref 10–45)
ANION GAP SERPL CALC-SCNC: 4 MMOL/L — LOW (ref 5–17)
AST SERPL-CCNC: 23 U/L — SIGNIFICANT CHANGE UP (ref 10–40)
BILIRUB SERPL-MCNC: 1.1 MG/DL — SIGNIFICANT CHANGE UP (ref 0.2–1.2)
BUN SERPL-MCNC: 26 MG/DL — HIGH (ref 7–23)
CALCIUM SERPL-MCNC: 9.1 MG/DL — SIGNIFICANT CHANGE UP (ref 8.4–10.5)
CHLORIDE SERPL-SCNC: 100 MMOL/L — SIGNIFICANT CHANGE UP (ref 96–108)
CO2 SERPL-SCNC: 32 MMOL/L — HIGH (ref 22–31)
CREAT SERPL-MCNC: 1.35 MG/DL — HIGH (ref 0.5–1.3)
GLUCOSE SERPL-MCNC: 160 MG/DL — HIGH (ref 70–99)
HCT VFR BLD CALC: 39.1 % — SIGNIFICANT CHANGE UP (ref 34.5–45)
HGB BLD-MCNC: 13.4 G/DL — SIGNIFICANT CHANGE UP (ref 11.5–15.5)
MCHC RBC-ENTMCNC: 30.8 PG — SIGNIFICANT CHANGE UP (ref 27–34)
MCHC RBC-ENTMCNC: 34.2 GM/DL — SIGNIFICANT CHANGE UP (ref 32–36)
MCV RBC AUTO: 89.9 FL — SIGNIFICANT CHANGE UP (ref 80–100)
PLATELET # BLD AUTO: 265 K/UL — SIGNIFICANT CHANGE UP (ref 150–400)
POTASSIUM SERPL-MCNC: 4 MMOL/L — SIGNIFICANT CHANGE UP (ref 3.5–5.3)
POTASSIUM SERPL-SCNC: 4 MMOL/L — SIGNIFICANT CHANGE UP (ref 3.5–5.3)
PROT SERPL-MCNC: 6.9 G/DL — SIGNIFICANT CHANGE UP (ref 6–8.3)
RBC # BLD: 4.35 M/UL — SIGNIFICANT CHANGE UP (ref 3.8–5.2)
RBC # FLD: 11.4 % — SIGNIFICANT CHANGE UP (ref 10.3–14.5)
SODIUM SERPL-SCNC: 136 MMOL/L — SIGNIFICANT CHANGE UP (ref 135–145)
WBC # BLD: 7.4 K/UL — SIGNIFICANT CHANGE UP (ref 3.8–10.5)
WBC # FLD AUTO: 7.4 K/UL — SIGNIFICANT CHANGE UP (ref 3.8–10.5)

## 2018-10-01 PROCEDURE — 99233 SBSQ HOSP IP/OBS HIGH 50: CPT

## 2018-10-01 RX ADMIN — AMLODIPINE BESYLATE 5 MILLIGRAM(S): 2.5 TABLET ORAL at 05:18

## 2018-10-01 RX ADMIN — Medication 81 MILLIGRAM(S): at 12:58

## 2018-10-01 RX ADMIN — Medication 5 MILLIGRAM(S): at 17:11

## 2018-10-01 RX ADMIN — ATORVASTATIN CALCIUM 40 MILLIGRAM(S): 80 TABLET, FILM COATED ORAL at 21:02

## 2018-10-01 RX ADMIN — NYSTATIN CREAM 1 APPLICATION(S): 100000 CREAM TOPICAL at 17:10

## 2018-10-01 RX ADMIN — Medication 5 MILLIGRAM(S): at 05:18

## 2018-10-01 RX ADMIN — MAGNESIUM OXIDE 400 MG ORAL TABLET 400 MILLIGRAM(S): 241.3 TABLET ORAL at 12:59

## 2018-10-01 RX ADMIN — Medication 25 MILLIGRAM(S): at 05:18

## 2018-10-01 RX ADMIN — Medication 100 MILLIGRAM(S): at 17:09

## 2018-10-01 RX ADMIN — LIDOCAINE 1 PATCH: 4 CREAM TOPICAL at 20:24

## 2018-10-01 RX ADMIN — LIDOCAINE 1 PATCH: 4 CREAM TOPICAL at 08:48

## 2018-10-01 RX ADMIN — ENOXAPARIN SODIUM 30 MILLIGRAM(S): 100 INJECTION SUBCUTANEOUS at 12:58

## 2018-10-01 RX ADMIN — NYSTATIN CREAM 1 APPLICATION(S): 100000 CREAM TOPICAL at 05:19

## 2018-10-01 RX ADMIN — ESCITALOPRAM OXALATE 5 MILLIGRAM(S): 10 TABLET, FILM COATED ORAL at 12:59

## 2018-10-01 RX ADMIN — Medication 100 MILLIGRAM(S): at 05:18

## 2018-10-01 RX ADMIN — MAGNESIUM OXIDE 400 MG ORAL TABLET 400 MILLIGRAM(S): 241.3 TABLET ORAL at 08:48

## 2018-10-01 RX ADMIN — Medication 25 MILLIGRAM(S): at 17:10

## 2018-10-01 RX ADMIN — MAGNESIUM OXIDE 400 MG ORAL TABLET 400 MILLIGRAM(S): 241.3 TABLET ORAL at 17:09

## 2018-10-01 RX ADMIN — NYSTATIN CREAM 1 APPLICATION(S): 100000 CREAM TOPICAL at 21:02

## 2018-10-01 NOTE — PROGRESS NOTE ADULT - SUBJECTIVE AND OBJECTIVE BOX
Patient is a 89y old  Female who presents with a chief complaint of s/p left sided watershed cva c dysphagia, aphasia and right sided weakness. (01 Oct 2018 09:16)      Patient seen and examined at bedside. feels well, no complaints     ALLERGIES:  No Known Allergies    MEDICATIONS:  acetaminophen   Tablet .. 650 milliGRAM(s) Oral every 6 hours PRN  amLODIPine   Tablet 5 milliGRAM(s) Oral daily  aspirin  chewable 81 milliGRAM(s) Oral daily  bisacodyl Suppository 10 milliGRAM(s) Rectal daily PRN  dextrose 40% Gel 15 Gram(s) Oral once PRN  dextrose 5%. 1000 milliLiter(s) IV Continuous <Continuous>  dextrose 50% Injectable 12.5 Gram(s) IV Push once  escitalopram 5 milliGRAM(s) Oral daily  glucagon  Injectable 1 milliGRAM(s) IntraMuscular once PRN  lidocaine   Patch 1 Patch Transdermal daily  metoprolol tartrate 25 milliGRAM(s) Oral every 12 hours  nystatin Powder 1 Application(s) Topical three times a day    Vital Signs Last 24 Hrs  T(F): 98.2 (01 Oct 2018 07:58), Max: 98.6 (30 Sep 2018 22:50)  HR: 73 (01 Oct 2018 07:58) (72 - 93)  BP: 137/66 (01 Oct 2018 07:58) (135/77 - 165/72)  RR: 14 (01 Oct 2018 07:58) (14 - 14)  SpO2: 100% (01 Oct 2018 07:58) (95% - 100%)  I&O's Summary      PHYSICAL EXAM:  General: NAD, alert, aphasia   Neck: Supple, No JVD  Lungs: Clear to auscultation bilaterally  Cardio: RRR, S1/S2, No murmurs  Abdomen: Soft, Nontender, Nondistended; Bowel sounds present  Extremities: No clubbing, cyanosis, or edema    LABS:                        13.4   7.4   )-----------( 265      ( 01 Oct 2018 05:47 )             39.1     10-01    136  |  100  |  26  ----------------------------<  160  4.0   |  32  |  1.35    Ca    9.1      01 Oct 2018 05:47    TPro  6.9  /  Alb  3.0  /  TBili  1.1  /  DBili  x   /  AST  23  /  ALT  22  /  AlkPhos  83  10-01    eGFR if Non African American: 34 mL/min/1.73M2 (10-01-18 @ 05:47)  eGFR if : 39 mL/min/1.73M2 (10-01-18 @ 05:47)    09-08 Chol 161 mg/dL LDL 99 mg/dL HDL 33 mg/dL Trig 147 mg/dL    CAPILLARY BLOOD GLUCOSE    09-08 [id number] 6.3    RADIOLOGY & ADDITIONAL TESTS:    Care Discussed with Consultants/Other Providers:

## 2018-10-01 NOTE — PROGRESS NOTE ADULT - ASSESSMENT
89 y/o F with PMH HTN, DM, HLD, OA admitted with acute CVA now with aphasia, functional and gait impairment

## 2018-10-01 NOTE — PROGRESS NOTE ADULT - PROBLEM SELECTOR PLAN 1
Continue ASA/Lipitor c GI ppx. Monitor BP. Continue comprehensive PT/OT/SLP program. Aspiration and fall precautions. Lexapro added for mood stabilization, motor recovery. Improved.

## 2018-10-01 NOTE — PROGRESS NOTE ADULT - SUBJECTIVE AND OBJECTIVE BOX
CHIEF COMPLAINT: No acute events overnight, no new weakness, improved motor and verbal function.       HISTORY OF PRESENT ILLNESS  89 year old female with PMH HTN, DM2, dyslipidemia, OA had been in her usual state of health, was getting into the car when noted by family to suddenly become aphasic with pronounced weakness on the right side.  Brought immediately to ED at [facility], CT head negative for acute hemorrhage given TPA and transferred to ICU for further care. LDL 99, A1C 6.3. 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 Hospitalization complicated by constipation and 'pink eye'.   (CT head shows chronic lacunar infarcts on the right cerebellum, bilateral basal ganglia and thalami. There is volume loss and atherosclerosis.) (19 Sep 2018 13:21)      PAST MEDICAL & SURGICAL HISTORY:  Dyslipidemia  Osteoarthritis  Sciatica  Diabetes: Type 2, &quot;many years&quot;  HTN (hypertension)  HTN (hypertension)  DM (diabetes mellitus)  H/O bilateral hip replacements: [facility]&#x27;s at [facility], no complications  S/P hip replacement, bilateral       REVIEW OF SYMPTOMS  [X] Constitutional WNL     [X] Cardio WNL            [X] Resp WNL           [X] GI WNL                          [X]  WNL                   [X] Heme WNL              [X] Endo WNL                     [X] Skin WNL                 [X] MSK WNL                    [X] Psych WNL      VITALS  Vital Signs Last 24 Hrs  T(C): 36.8 (01 Oct 2018 07:58), Max: 37 (30 Sep 2018 22:50)  T(F): 98.2 (01 Oct 2018 07:58), Max: 98.6 (30 Sep 2018 22:50)  HR: 73 (01 Oct 2018 07:58) (72 - 93)  BP: 137/66 (01 Oct 2018 07:58) (135/77 - 165/72)  BP(mean): --  RR: 14 (01 Oct 2018 07:58) (14 - 14)  SpO2: 100% (01 Oct 2018 07:58) (95% - 100%)      PHYSICAL EXAM  Constitutional - NAD, Comfortable  HEENT - NCAT, EOMI  Neck - Supple, No limited ROM  Chest - CTA bilaterally, No wheeze, No rhonchi, No crackles  Cardiovascular - RRR, S1S2, No murmurs  Abdomen - BS+, Soft, NTND  Extremities - No C/C/E, No calf tenderness   Skin-no rash  Wounds-healed      Neurologic Exam -                   Mental Status - Patient is alert, awake. Globally aphasic-verbal output improving. Follows simple commands. Repeats words. Dysarthric.   	Cranial Nerves - left neglect/ left visual field cut, PERRL, dysphagia, central facial weakness, dysarthria.   	Motor Exam -   	Right upper 4/5 shoulder and elbow strengths, 3/5 distally  	Left upper 4+/5 shoulder strengths, 5/5 elbow/wrist/hand strengths   	Right lower 3/5 hip flexor, 4/5 knee and ankle strengths   	Left lower 5/5 hip flexor, 5/5 knee and ankle strengths   	Sensory: Intact LT bilaterally.   	Coord: Impaired on right due to weakness   	 DTS Reflexes: R>L     	Toes are flexor on the left, extensor on the right              FUNCTIONAL PROGRESS  Gait - min c RW  ADLs - min A  Transfers -min A  Functional transfer - min A      RECENT LABS                        13.4   7.4   )-----------( 265      ( 01 Oct 2018 05:47 )             39.1     10-01    136  |  100  |  26<H>  ----------------------------<  160<H>  4.0   |  32<H>  |  1.35<H>    Ca    9.1      01 Oct 2018 05:47    TPro  6.9  /  Alb  3.0<L>  /  TBili  1.1  /  DBili  x   /  AST  23  /  ALT  22  /  AlkPhos  83  10-01      LIVER FUNCTIONS - ( 01 Oct 2018 05:47 )  Alb: 3.0 g/dL / Pro: 6.9 g/dL / ALK PHOS: 83 U/L / ALT: 22 U/L DA / AST: 23 U/L / GGT: x             Direct LDL: 99 mg/dL (09-08-18 @ 05:30)    Hemoglobin A1C, Whole Blood: 6.3 % (09-08-18 @ 05:30)              RADIOLOGY/OTHER RESULTS      CURRENT MEDICATIONS  MEDICATIONS  (STANDING):  amLODIPine   Tablet 5 milliGRAM(s) Oral daily  aspirin  chewable 81 milliGRAM(s) Oral daily  atorvastatin 40 milliGRAM(s) Oral at bedtime  dextrose 5%. 1000 milliLiter(s) (50 mL/Hr) IV Continuous <Continuous>  dextrose 50% Injectable 12.5 Gram(s) IV Push once  docusate sodium 100 milliGRAM(s) Oral two times a day  enoxaparin Injectable 30 milliGRAM(s) SubCutaneous daily  escitalopram 5 milliGRAM(s) Oral daily  lidocaine   Patch 1 Patch Transdermal daily  magnesium oxide 400 milliGRAM(s) Oral three times a day with meals  metoprolol tartrate 25 milliGRAM(s) Oral every 12 hours  nystatin Powder 1 Application(s) Topical three times a day  oxybutynin 5 milliGRAM(s) Oral two times a day    MEDICATIONS  (PRN):  acetaminophen   Tablet .. 650 milliGRAM(s) Oral every 6 hours PRN Moderate Pain (4 - 6)  bisacodyl Suppository 10 milliGRAM(s) Rectal daily PRN Constipation  dextrose 40% Gel 15 Gram(s) Oral once PRN Blood Glucose LESS THAN 70 milliGRAM(s)/deciliter  glucagon  Injectable 1 milliGRAM(s) IntraMuscular once PRN Glucose LESS THAN 70 milligrams/deciliter      ASSESSMENT & PLAN        GI/Bowel Management -Colace   Management - Toilet Q2  Skin - Turn Q2  Pain - Tylenol PRN  DVT PPX - Lovenox  Diet - advanced to soft c nectar      Continue comprehensive acute rehab program consisting of 3hrs/day of OT/PT and SLP.

## 2018-10-02 PROCEDURE — 99233 SBSQ HOSP IP/OBS HIGH 50: CPT

## 2018-10-02 PROCEDURE — 99232 SBSQ HOSP IP/OBS MODERATE 35: CPT

## 2018-10-02 RX ADMIN — NYSTATIN CREAM 1 APPLICATION(S): 100000 CREAM TOPICAL at 05:15

## 2018-10-02 RX ADMIN — ATORVASTATIN CALCIUM 40 MILLIGRAM(S): 80 TABLET, FILM COATED ORAL at 21:55

## 2018-10-02 RX ADMIN — Medication 100 MILLIGRAM(S): at 17:31

## 2018-10-02 RX ADMIN — LIDOCAINE 1 PATCH: 4 CREAM TOPICAL at 20:30

## 2018-10-02 RX ADMIN — MAGNESIUM OXIDE 400 MG ORAL TABLET 400 MILLIGRAM(S): 241.3 TABLET ORAL at 16:45

## 2018-10-02 RX ADMIN — LIDOCAINE 1 PATCH: 4 CREAM TOPICAL at 08:13

## 2018-10-02 RX ADMIN — NYSTATIN CREAM 1 APPLICATION(S): 100000 CREAM TOPICAL at 21:55

## 2018-10-02 RX ADMIN — Medication 5 MILLIGRAM(S): at 17:30

## 2018-10-02 RX ADMIN — ENOXAPARIN SODIUM 30 MILLIGRAM(S): 100 INJECTION SUBCUTANEOUS at 12:26

## 2018-10-02 RX ADMIN — Medication 81 MILLIGRAM(S): at 12:26

## 2018-10-02 RX ADMIN — MAGNESIUM OXIDE 400 MG ORAL TABLET 400 MILLIGRAM(S): 241.3 TABLET ORAL at 08:13

## 2018-10-02 RX ADMIN — Medication 25 MILLIGRAM(S): at 05:15

## 2018-10-02 RX ADMIN — MAGNESIUM OXIDE 400 MG ORAL TABLET 400 MILLIGRAM(S): 241.3 TABLET ORAL at 12:26

## 2018-10-02 RX ADMIN — Medication 5 MILLIGRAM(S): at 05:15

## 2018-10-02 RX ADMIN — NYSTATIN CREAM 1 APPLICATION(S): 100000 CREAM TOPICAL at 13:02

## 2018-10-02 RX ADMIN — Medication 25 MILLIGRAM(S): at 17:31

## 2018-10-02 RX ADMIN — AMLODIPINE BESYLATE 5 MILLIGRAM(S): 2.5 TABLET ORAL at 05:15

## 2018-10-02 RX ADMIN — Medication 100 MILLIGRAM(S): at 05:15

## 2018-10-02 RX ADMIN — ESCITALOPRAM OXALATE 5 MILLIGRAM(S): 10 TABLET, FILM COATED ORAL at 12:26

## 2018-10-02 NOTE — CHART NOTE - NSCHARTNOTEFT_GEN_A_CORE
Nutrition follow up     Hospital course as per chart: Pt 89 y/o F with PMH: HTN, DM2, dyslipidemia, OA, sciatica, admitted S/P left sided watershed CVA with dysphagia, aphasia and right sided weakness, constipation on bowel regimen, LENNIE, conjunctivitis of eye. Noted diet advanced from dysphagia 1, pureed + honey thickened liquids to dysphagia 2, mechanical soft + nectar thickened liquids on (09/20).     Attempted to interview - pt lethargic/disoriented as per flow sheets. Noted fluctuating PO intake between % as per flow sheets. As per RN: pt with good appetite and PO intake, tolerating current diet consistency, denies pt with GI distress at this time, reports last BM yesterday (10/01). Education on heart healthy and T2DM nutrition therapy not applicable at this time.     Source: Patient [ ]    Family [ ]     other [x]; Medical record; RN     Diet: dysphagia 2, mechanical soft + nectar thickened liquids (09/20)    Enteral /Parenteral Nutrition: n/a    Current Weight: (09/19) 139.7 pounds -> (09/23) 136.4 pounds -?accuracy of weight fluctuations likely due to fluid shifts, no new weight documented.   % Weight Change: n/a    Pertinent Medications: MEDICATIONS  (STANDING):  amLODIPine   Tablet 5 milliGRAM(s) Oral daily  aspirin  chewable 81 milliGRAM(s) Oral daily  atorvastatin 40 milliGRAM(s) Oral at bedtime  dextrose 5%. 1000 milliLiter(s) (50 mL/Hr) IV Continuous <Continuous>  dextrose 50% Injectable 12.5 Gram(s) IV Push once  docusate sodium 100 milliGRAM(s) Oral two times a day  enoxaparin Injectable 30 milliGRAM(s) SubCutaneous daily  escitalopram 5 milliGRAM(s) Oral daily  lidocaine   Patch 1 Patch Transdermal daily  magnesium oxide 400 milliGRAM(s) Oral three times a day with meals  metoprolol tartrate 25 milliGRAM(s) Oral every 12 hours  nystatin Powder 1 Application(s) Topical three times a day  oxybutynin 5 milliGRAM(s) Oral two times a day    MEDICATIONS  (PRN):  acetaminophen   Tablet .. 650 milliGRAM(s) Oral every 6 hours PRN Moderate Pain (4 - 6)  bisacodyl Suppository 10 milliGRAM(s) Rectal daily PRN Constipation  dextrose 40% Gel 15 Gram(s) Oral once PRN Blood Glucose LESS THAN 70 milliGRAM(s)/deciliter  glucagon  Injectable 1 milliGRAM(s) IntraMuscular once PRN Glucose LESS THAN 70 milligrams/deciliter    Pertinent Labs: (10/01) Glu 160 mg/dL<H> Cr  1.35 mg/dL<H> BUN 26 mg/dL<H> GFR 34  (09/08) [id number] 6.3 %    Skin: no noted pressure injuries as per documentation.   Noted +1 generalized edema as per flow sheets.     Estimated Needs:   [x] no change since previous assessment  [ ] recalculated:     Previous Nutrition Diagnosis:     [x] Swallowing difficulty     Nutrition Diagnosis is [x] ongoing - being addressed with modified diet/fluid consistencies as per SLP recommendations.      New Nutrition Diagnosis: [x] not applicable    Interventions:     1. Recommend continue current diet as above. Defer diet/fluid consistencies to medical team/SLP recommendations.   2. Consider DASH/TLC if PO intake >75% and if diet advanced to regular consistency.   3. Obtain current weight to identify changes if any.   4. Provide T2DM and heart healthy nutrition therapy as needed/requested.   5. Encourage PO intake, obtain food preferences, provide feeding assistance as needed.     Monitoring and Evaluation:     [x] PO intake [x] Tolerance to diet prescription [x] weights [x] follow up per protocol    [x] other: needs for education    RD remains available.  Katy Martin RDN

## 2018-10-02 NOTE — PROGRESS NOTE ADULT - SUBJECTIVE AND OBJECTIVE BOX
CHIEF COMPLAINT: No acute events overnight. No new weakness. Aphasia.       HISTORY OF PRESENT ILLNESS  89 year old female with PMH HTN, DM2, dyslipidemia, OA had been in her usual state of health, was getting into the car when noted by family to suddenly become aphasic with pronounced weakness on the right side.  Brought immediately to ED at [facility], CT head negative for acute hemorrhage given TPA and transferred to ICU for further care. LDL 99, A1C 6.3. 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 Hospitalization complicated by constipation and 'pink eye'.   (CT head shows chronic lacunar infarcts on the right cerebellum, bilateral basal ganglia and thalami. There is volume loss and atherosclerosis.) (19 Sep 2018 13:21)      PAST MEDICAL & SURGICAL HISTORY:  Dyslipidemia  Osteoarthritis  Sciatica  Diabetes: Type 2  HTN (hypertension)  HTN (hypertension)  DM (diabetes mellitus)  H/O bilateral hip replacements: 2000&#x27;s at [facility], no complications  S/P hip replacement, bilateral       REVIEW OF SYMPTOMS  [X] Constitutional WNL     [X] Cardio WNL            [X] Resp WNL           [X] GI WNL                          [X]  WNL                   [X] Heme WNL              [X] Endo WNL                     [X] Skin WNL                 [X] MSK WNL              [X] Psych WNL      VITALS  Vital Signs Last 24 Hrs  T(C): 36.8 (02 Oct 2018 09:20), Max: 37.2 (02 Oct 2018 04:57)  T(F): 98.2 (02 Oct 2018 09:20), Max: 98.9 (02 Oct 2018 04:57)  HR: 78 (02 Oct 2018 09:20) (78 - 87)  BP: 127/76 (02 Oct 2018 09:20) (127/76 - 147/79)  BP(mean): --  RR: 15 (02 Oct 2018 09:20) (15 - 16)  SpO2: 94% (02 Oct 2018 09:20) (94% - 100%)      PHYSICAL EXAM  Constitutional - NAD, Comfortable  HEENT - NCAT, EOMI  Neck - Supple, No limited ROM  Chest - CTA bilaterally, No wheeze, No rhonchi, No crackles  Cardiovascular - RRR, S1S2, No murmurs  Abdomen - BS+, Soft, NTND  Extremities - No C/C/E, No calf tenderness   Skin-no rash  Wounds-      Neurologic Exam -                   HIF  - Awake, Alert, AAO to self, place, date, year, situation      No aphasia, normal attention, normal concentration, no apraxia, agnosia     Cranial Nerves - CN 2-12 intact     Motor - No focal deficits                            Sensory - Intact to LT,PP,Temprature,JPS, vibration                      Cortical sensory modalities intact     Reflexes - DTR Intact     Toes are flexor     Coordination/dysmetria - FTN intact             Balance - WNL Static and dynamic     Psychiatric - Mood stable, Affect WNL         FUNCTIONAL PROGRESS  Gait -   ADLs -   Transfers -  Functional transfer -     RECENT LABS                        13.4   7.4   )-----------( 265      ( 01 Oct 2018 05:47 )             39.1     10-01    136  |  100  |  26<H>  ----------------------------<  160<H>  4.0   |  32<H>  |  1.35<H>    Ca    9.1      01 Oct 2018 05:47    TPro  6.9  /  Alb  3.0<L>  /  TBili  1.1  /  DBili  x   /  AST  23  /  ALT  22  /  AlkPhos  83  10-01      LIVER FUNCTIONS - ( 01 Oct 2018 05:47 )  Alb: 3.0 g/dL / Pro: 6.9 g/dL / ALK PHOS: 83 U/L / ALT: 22 U/L DA / AST: 23 U/L / GGT: x             Direct LDL: 99 mg/dL (09-08-18 @ 05:30)    Hemoglobin A1C, Whole Blood: 6.3 % (09-08-18 @ 05:30)              RADIOLOGY/OTHER RESULTS      CURRENT MEDICATIONS  MEDICATIONS  (STANDING):  amLODIPine   Tablet 5 milliGRAM(s) Oral daily  aspirin  chewable 81 milliGRAM(s) Oral daily  atorvastatin 40 milliGRAM(s) Oral at bedtime  dextrose 5%. 1000 milliLiter(s) (50 mL/Hr) IV Continuous <Continuous>  dextrose 50% Injectable 12.5 Gram(s) IV Push once  docusate sodium 100 milliGRAM(s) Oral two times a day  enoxaparin Injectable 30 milliGRAM(s) SubCutaneous daily  escitalopram 5 milliGRAM(s) Oral daily  lidocaine   Patch 1 Patch Transdermal daily  magnesium oxide 400 milliGRAM(s) Oral three times a day with meals  metoprolol tartrate 25 milliGRAM(s) Oral every 12 hours  nystatin Powder 1 Application(s) Topical three times a day  oxybutynin 5 milliGRAM(s) Oral two times a day    MEDICATIONS  (PRN):  acetaminophen   Tablet .. 650 milliGRAM(s) Oral every 6 hours PRN Moderate Pain (4 - 6)  bisacodyl Suppository 10 milliGRAM(s) Rectal daily PRN Constipation  dextrose 40% Gel 15 Gram(s) Oral once PRN Blood Glucose LESS THAN 70 milliGRAM(s)/deciliter  glucagon  Injectable 1 milliGRAM(s) IntraMuscular once PRN Glucose LESS THAN 70 milligrams/deciliter      ASSESSMENT & PLAN          GI/Bowel Management - Dulcolax PRN, Fleet PRN   Management - Toilet Q2  Skin - Turn Q2  Pain - Tylenol PRN  DVT PPX - TEDs, SCDs  Diet -     Continue comprehensive acute rehab program consisting of 3hrs/day of OT/PT and SLP. CHIEF COMPLAINT: No acute events overnight. No new weakness. Aphasia.       HISTORY OF PRESENT ILLNESS  89 year old female with PMH HTN, DM2, dyslipidemia, OA had been in her usual state of health, was getting into the car when noted by family to suddenly become aphasic with pronounced weakness on the right side.  Brought immediately to ED at [facility], CT head negative for acute hemorrhage given TPA and transferred to ICU for further care. LDL 99, A1C 6.3. 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 Hospitalization complicated by constipation and 'pink eye'.   (CT head shows chronic lacunar infarcts on the right cerebellum, bilateral basal ganglia and thalami. There is volume loss and atherosclerosis.) (19 Sep 2018 13:21)      PAST MEDICAL & SURGICAL HISTORY:  Dyslipidemia  Osteoarthritis  Sciatica  Diabetes: Type 2  HTN (hypertension)  HTN (hypertension)  DM (diabetes mellitus)  H/O bilateral hip replacements: 2000&#x27;s at [facility] [place], operated by [facility], no complications  S/P hip replacement, bilateral       REVIEW OF SYMPTOMS  [X] Constitutional WNL     [X] Cardio WNL            [X] Resp WNL           [X] GI WNL                          [X]  WNL                   [X] Heme WNL                    [X] Skin WNL                 [X] MSK WNL              [X] Psych WNL      VITALS  Vital Signs Last 24 Hrs  T(C): 36.8 (02 Oct 2018 09:20), Max: 37.2 (02 Oct 2018 04:57)  T(F): 98.2 (02 Oct 2018 09:20), Max: 98.9 (02 Oct 2018 04:57)  HR: 78 (02 Oct 2018 09:20) (78 - 87)  BP: 127/76 (02 Oct 2018 09:20) (127/76 - 147/79)  BP(mean): --  RR: 15 (02 Oct 2018 09:20) (15 - 16)  SpO2: 94% (02 Oct 2018 09:20) (94% - 100%)      PHYSICAL EXAM  Constitutional - NAD, Comfortable  HEENT - NCAT, EOMI  Neck - Supple, No limited ROM  Chest - CTA bilaterally, No wheeze, No rhonchi, No crackles  Cardiovascular - RRR, S1S2, No murmurs  Abdomen - BS+, Soft, NTND  Extremities - No C/C/E, No calf tenderness   Skin-no rash      Neurologic Exam -                   Mental Status - Patient is alert, awake. Globally aphasic-verbal output improving. Follows simple commands. Repeats words. Dysarthric.   	Cranial Nerves - left neglect/ left visual field cut, PERRL, dysphagia, central facial weakness, dysarthria.   	Motor Exam -   	Right upper 4/5 shoulder and elbow strengths, 3/5 distally  	Left upper 4+/5 shoulder strengths, 5/5 elbow/wrist/hand strengths   	Right lower 3/5 hip flexor, 4/5 knee and ankle strengths   	Left lower 5/5 hip flexor, 5/5 knee and ankle strengths   	Sensory: Intact LT bilaterally.   	Coord: Impaired on right due to weakness   	 DTS Reflexes: R>L     	Toes are flexor on the left, extensor on the right              FUNCTIONAL PROGRESS  Gait - min c RW  ADLs - min A  Transfers -min A  Functional transfer - min A     RECENT LABS                        13.4   7.4   )-----------( 265      ( 01 Oct 2018 05:47 )             39.1     10-01    136  |  100  |  26<H>  ----------------------------<  160<H>  4.0   |  32<H>  |  1.35<H>    Ca    9.1      01 Oct 2018 05:47    TPro  6.9  /  Alb  3.0<L>  /  TBili  1.1  /  DBili  x   /  AST  23  /  ALT  22  /  AlkPhos  83  10-01      LIVER FUNCTIONS - ( 01 Oct 2018 05:47 )  Alb: 3.0 g/dL / Pro: 6.9 g/dL / ALK PHOS: 83 U/L / ALT: 22 U/L DA / AST: 23 U/L / GGT: x             Direct LDL: 99 mg/dL (09-08-18 @ 05:30)    Hemoglobin A1C, Whole Blood: 6.3 % (09-08-18 @ 05:30)              RADIOLOGY/OTHER RESULTS      CURRENT MEDICATIONS  MEDICATIONS  (STANDING):  amLODIPine   Tablet 5 milliGRAM(s) Oral daily  aspirin  chewable 81 milliGRAM(s) Oral daily  atorvastatin 40 milliGRAM(s) Oral at bedtime  dextrose 5%. 1000 milliLiter(s) (50 mL/Hr) IV Continuous <Continuous>  dextrose 50% Injectable 12.5 Gram(s) IV Push once  docusate sodium 100 milliGRAM(s) Oral two times a day  enoxaparin Injectable 30 milliGRAM(s) SubCutaneous daily  escitalopram 5 milliGRAM(s) Oral daily  lidocaine   Patch 1 Patch Transdermal daily  magnesium oxide 400 milliGRAM(s) Oral three times a day with meals  metoprolol tartrate 25 milliGRAM(s) Oral every 12 hours  nystatin Powder 1 Application(s) Topical three times a day  oxybutynin 5 milliGRAM(s) Oral two times a day    MEDICATIONS  (PRN):  acetaminophen   Tablet .. 650 milliGRAM(s) Oral every 6 hours PRN Moderate Pain (4 - 6)  bisacodyl Suppository 10 milliGRAM(s) Rectal daily PRN Constipation  dextrose 40% Gel 15 Gram(s) Oral once PRN Blood Glucose LESS THAN 70 milliGRAM(s)/deciliter  glucagon  Injectable 1 milliGRAM(s) IntraMuscular once PRN Glucose LESS THAN 70 milligrams/deciliter      ASSESSMENT & PLAN        GI/Bowel Management -Colace   Management - Toilet Q2  Skin - Turn Q2  Pain - Tylenol PRN  DVT PPX - Lovenox  Diet - advanced to soft c nectar      Continue comprehensive acute rehab program consisting of 3hrs/day of OT/PT and SLP. CHIEF COMPLAINT: No acute events overnight. No new weakness. Aphasia improving.      HISTORY OF PRESENT ILLNESS  89 year old female with PMH HTN, DM2, dyslipidemia, OA had been in her usual state of health, was getting into the car when noted by family to suddenly become aphasic with pronounced weakness on the right side.  Brought immediately to ED at [facility], CT head negative for acute hemorrhage given TPA and transferred to ICU for further care. LDL 99, A1C 6.3. 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 Hospitalization complicated by constipation and 'pink eye'.   (CT head shows chronic lacunar infarcts on the right cerebellum, bilateral basal ganglia and thalami. There is volume loss and atherosclerosis.) (19 Sep 2018 13:21)      PAST MEDICAL & SURGICAL HISTORY:  Dyslipidemia  Osteoarthritis  Sciatica  Diabetes: Type 2  HTN (hypertension)  HTN (hypertension)  DM (diabetes mellitus)  H/O bilateral hip replacements: 2000&#x27;s at [facility], no complications  S/P hip replacement, bilateral       REVIEW OF SYMPTOMS  [X] Constitutional WNL     [X] Cardio WNL            [X] Resp WNL           [X] GI WNL                          [X]  WNL                   [X] Heme WNL                    [X] Skin WNL                 [X] MSK WNL              [X] Psych WNL      VITALS  Vital Signs Last 24 Hrs  T(C): 36.8 (02 Oct 2018 09:20), Max: 37.2 (02 Oct 2018 04:57)  T(F): 98.2 (02 Oct 2018 09:20), Max: 98.9 (02 Oct 2018 04:57)  HR: 78 (02 Oct 2018 09:20) (78 - 87)  BP: 127/76 (02 Oct 2018 09:20) (127/76 - 147/79)  BP(mean): --  RR: 15 (02 Oct 2018 09:20) (15 - 16)  SpO2: 94% (02 Oct 2018 09:20) (94% - 100%)      PHYSICAL EXAM  Constitutional - NAD, Comfortable  HEENT - NCAT, EOMI  Neck - Supple, No limited ROM  Chest - CTA bilaterally, No wheeze, No rhonchi, No crackles  Cardiovascular - RRR, S1S2, No murmurs  Abdomen - BS+, Soft, NTND  Extremities - No C/C/E, No calf tenderness   Skin-no rash      Neurologic Exam -                   Mental Status - Patient is alert, awake.Aphasia improving with better verbal output and comprehension.  	Cranial Nerves - left neglect/ left visual field cut, PERRL; dysphagia and  central facial weakness are also improving.   	Motor Exam -   	Right upper 4/5 shoulder and elbow strengths, 4/5 distally  	Left upper 4+/5 shoulder strengths, 5/5 elbow/wrist/hand strengths   	Right lower 4/5 hip flexor, 4/5 knee and ankle strengths   	Left lower 5/5 hip flexor, 5/5 knee and ankle strengths   	Sensory: Intact LT bilaterally.   	Coord: Impaired on right due to weakness   	 DTS Reflexes: R>L     	Toes are flexor on the left, extensor on the right              FUNCTIONAL PROGRESS  Gait - min c RW  ADLs - min A  Transfers -min A  Functional transfer - min A     RECENT LABS                        13.4   7.4   )-----------( 265      ( 01 Oct 2018 05:47 )             39.1     10-01    136  |  100  |  26<H>  ----------------------------<  160<H>  4.0   |  32<H>  |  1.35<H>    Ca    9.1      01 Oct 2018 05:47    TPro  6.9  /  Alb  3.0<L>  /  TBili  1.1  /  DBili  x   /  AST  23  /  ALT  22  /  AlkPhos  83  10-01      LIVER FUNCTIONS - ( 01 Oct 2018 05:47 )  Alb: 3.0 g/dL / Pro: 6.9 g/dL / ALK PHOS: 83 U/L / ALT: 22 U/L DA / AST: 23 U/L / GGT: x             Direct LDL: 99 mg/dL (09-08-18 @ 05:30)    Hemoglobin A1C, Whole Blood: 6.3 % (09-08-18 @ 05:30)              RADIOLOGY/OTHER RESULTS      CURRENT MEDICATIONS  MEDICATIONS  (STANDING):  amLODIPine   Tablet 5 milliGRAM(s) Oral daily  aspirin  chewable 81 milliGRAM(s) Oral daily  atorvastatin 40 milliGRAM(s) Oral at bedtime  dextrose 5%. 1000 milliLiter(s) (50 mL/Hr) IV Continuous <Continuous>  dextrose 50% Injectable 12.5 Gram(s) IV Push once  docusate sodium 100 milliGRAM(s) Oral two times a day  enoxaparin Injectable 30 milliGRAM(s) SubCutaneous daily  escitalopram 5 milliGRAM(s) Oral daily  lidocaine   Patch 1 Patch Transdermal daily  magnesium oxide 400 milliGRAM(s) Oral three times a day with meals  metoprolol tartrate 25 milliGRAM(s) Oral every 12 hours  nystatin Powder 1 Application(s) Topical three times a day  oxybutynin 5 milliGRAM(s) Oral two times a day    MEDICATIONS  (PRN):  acetaminophen   Tablet .. 650 milliGRAM(s) Oral every 6 hours PRN Moderate Pain (4 - 6)  bisacodyl Suppository 10 milliGRAM(s) Rectal daily PRN Constipation  dextrose 40% Gel 15 Gram(s) Oral once PRN Blood Glucose LESS THAN 70 milliGRAM(s)/deciliter  glucagon  Injectable 1 milliGRAM(s) IntraMuscular once PRN Glucose LESS THAN 70 milligrams/deciliter      ASSESSMENT & PLAN        GI/Bowel Management -Colace   Management - Toilet Q2  Skin - Turn Q2  Pain - Tylenol PRN  DVT PPX - Lovenox  Diet - advanced to soft c nectar      Continue comprehensive acute rehab program consisting of 3hrs/day of OT/PT and SLP.

## 2018-10-02 NOTE — PROGRESS NOTE ADULT - SUBJECTIVE AND OBJECTIVE BOX
Patient is a 89y old  Female who presents with a chief complaint of s/p left sided watershed cva c dysphagia, aphasia and right sided weakness. (02 Oct 2018 10:31)      Patient seen and examined at bedside.    ALLERGIES:  No Known Allergies    MEDICATIONS:  acetaminophen   Tablet .. 650 milliGRAM(s) Oral every 6 hours PRN  amLODIPine   Tablet 5 milliGRAM(s) Oral daily  aspirin  chewable 81 milliGRAM(s) Oral daily  bisacodyl Suppository 10 milliGRAM(s) Rectal daily PRN  dextrose 40% Gel 15 Gram(s) Oral once PRN  dextrose 5%. 1000 milliLiter(s) IV Continuous <Continuous>  dextrose 50% Injectable 12.5 Gram(s) IV Push once  escitalopram 5 milliGRAM(s) Oral daily  glucagon  Injectable 1 milliGRAM(s) IntraMuscular once PRN  lidocaine   Patch 1 Patch Transdermal daily  metoprolol tartrate 25 milliGRAM(s) Oral every 12 hours  nystatin Powder 1 Application(s) Topical three times a day    Vital Signs Last 24 Hrs  T(F): 98.2 (02 Oct 2018 09:20), Max: 98.9 (02 Oct 2018 04:57)  HR: 78 (02 Oct 2018 09:20) (78 - 87)  BP: 127/76 (02 Oct 2018 09:20) (127/76 - 147/79)  RR: 15 (02 Oct 2018 09:20) (15 - 16)  SpO2: 94% (02 Oct 2018 09:20) (94% - 100%)  I&O's Summary    01 Oct 2018 07:01  -  02 Oct 2018 07:00  --------------------------------------------------------  IN: 300 mL / OUT: 400 mL / NET: -100 mL        PHYSICAL EXAM:  General: NAD, A/O x 3  ENT: MMM  Neck: Supple, No JVD  Lungs: Clear to auscultation bilaterally  Cardio: RRR, S1/S2, No murmurs  Abdomen: Soft, Nontender, Nondistended; Bowel sounds present  Extremities: No cyanosis, No edema    LABS:                        13.4   7.4   )-----------( 265      ( 01 Oct 2018 05:47 )             39.1     10-01    136  |  100  |  26  ----------------------------<  160  4.0   |  32  |  1.35    Ca    9.1      01 Oct 2018 05:47    TPro  6.9  /  Alb  3.0  /  TBili  1.1  /  DBili  x   /  AST  23  /  ALT  22  /  AlkPhos  83  10-01    eGFR if Non African American: 34 mL/min/1.73M2 (10-01-18 @ 05:47)  eGFR if : 39 mL/min/1.73M2 (10-01-18 @ 05:47)            09-08 Chol 161 mg/dL LDL 99 mg/dL HDL 33 mg/dL Trig 147 mg/dL        CAPILLARY BLOOD GLUCOSE        09-08 [id number] 6.3          RADIOLOGY & ADDITIONAL TESTS:    Care Discussed with Consultants/Other Providers: Patient is a 89y old  Female who presents with a chief complaint of s/p left sided watershed cva c dysphagia, aphasia and right sided weakness. (02 Oct 2018 10:31)      Patient seen and examined at bedside.    ALLERGIES:  No Known Allergies    MEDICATIONS:  acetaminophen   Tablet .. 650 milliGRAM(s) Oral every 6 hours PRN  amLODIPine   Tablet 5 milliGRAM(s) Oral daily  aspirin  chewable 81 milliGRAM(s) Oral daily  bisacodyl Suppository 10 milliGRAM(s) Rectal daily PRN  dextrose 40% Gel 15 Gram(s) Oral once PRN  dextrose 5%. 1000 milliLiter(s) IV Continuous <Continuous>  dextrose 50% Injectable 12.5 Gram(s) IV Push once  escitalopram 5 milliGRAM(s) Oral daily  glucagon  Injectable 1 milliGRAM(s) IntraMuscular once PRN  lidocaine   Patch 1 Patch Transdermal daily  metoprolol tartrate 25 milliGRAM(s) Oral every 12 hours  nystatin Powder 1 Application(s) Topical three times a day    Vital Signs Last 24 Hrs  T(F): 98.2 (02 Oct 2018 09:20), Max: 98.9 (02 Oct 2018 04:57)  HR: 78 (02 Oct 2018 09:20) (78 - 87)  BP: 127/76 (02 Oct 2018 09:20) (127/76 - 147/79)  RR: 15 (02 Oct 2018 09:20) (15 - 16)  SpO2: 94% (02 Oct 2018 09:20) (94% - 100%)  I&O's Summary    01 Oct 2018 07:01  -  02 Oct 2018 07:00  --------------------------------------------------------  IN: 300 mL / OUT: 400 mL / NET: -100 mL        PHYSICAL EXAM:  General: NAD, A/O x 3  ENT: MMM  Neck: Supple, No JVD  Lungs: Clear to auscultation bilaterally  Cardio: RRR, S1/S2, No murmurs  Abdomen: Soft, Nontender, Nondistended; Bowel sounds present  Extremities: No cyanosis, No edema  neuro: + aphasia   LABS:                        13.4   7.4   )-----------( 265      ( 01 Oct 2018 05:47 )             39.1     10-01    136  |  100  |  26  ----------------------------<  160  4.0   |  32  |  1.35    Ca    9.1      01 Oct 2018 05:47    TPro  6.9  /  Alb  3.0  /  TBili  1.1  /  DBili  x   /  AST  23  /  ALT  22  /  AlkPhos  83  10-01    eGFR if Non African American: 34 mL/min/1.73M2 (10-01-18 @ 05:47)  eGFR if : 39 mL/min/1.73M2 (10-01-18 @ 05:47)            09-08 Chol 161 mg/dL LDL 99 mg/dL HDL 33 mg/dL Trig 147 mg/dL        CAPILLARY BLOOD GLUCOSE        09-08 [id number] 6.3          RADIOLOGY & ADDITIONAL TESTS:    Care Discussed with Consultants/Other Providers:

## 2018-10-02 NOTE — PROGRESS NOTE ADULT - ASSESSMENT
89 y/o F with PMH HTN, DM, HLD, OA admitted with acute CVA now with aphasia, functional and gait impairment     1. acute cva: cw asa statin impaired adls/mobility  2. t2dm hba1c 6.3 fs stable cw fs monitoring   3. essential HTN: cw current meds  4. dyslipidemia cw statin   5. OA: cw pain management  6. ckd stage 3 cw bun/ cr monitoring   7. dvt ppx: lovenox 89 y/o F with PMH HTN, DM, HLD, OA admitted with acute CVA now with aphasia, functional and gait impairment     1. acute cva: cw asa statin impaired adls/mobility  2. aphasia secondary to acute cva being actively treated w speech therapy   2. t2dm hba1c 6.3 fs stable cw fs monitoring   3. essential HTN: cw current meds  4. dyslipidemia cw statin   5. OA: cw pain management  6. ckd stage 3 cw bun/ cr monitoring   7. dvt ppx: lovenox

## 2018-10-03 LAB
ALBUMIN SERPL ELPH-MCNC: 3 G/DL — LOW (ref 3.3–5)
ALP SERPL-CCNC: 84 U/L — SIGNIFICANT CHANGE UP (ref 40–120)
ALT FLD-CCNC: 20 U/L DA — SIGNIFICANT CHANGE UP (ref 10–45)
ANION GAP SERPL CALC-SCNC: 4 MMOL/L — LOW (ref 5–17)
AST SERPL-CCNC: 21 U/L — SIGNIFICANT CHANGE UP (ref 10–40)
BILIRUB SERPL-MCNC: 1.1 MG/DL — SIGNIFICANT CHANGE UP (ref 0.2–1.2)
BUN SERPL-MCNC: 27 MG/DL — HIGH (ref 7–23)
CALCIUM SERPL-MCNC: 9.4 MG/DL — SIGNIFICANT CHANGE UP (ref 8.4–10.5)
CHLORIDE SERPL-SCNC: 101 MMOL/L — SIGNIFICANT CHANGE UP (ref 96–108)
CO2 SERPL-SCNC: 32 MMOL/L — HIGH (ref 22–31)
CREAT SERPL-MCNC: 1.41 MG/DL — HIGH (ref 0.5–1.3)
GLUCOSE SERPL-MCNC: 152 MG/DL — HIGH (ref 70–99)
HCT VFR BLD CALC: 37.8 % — SIGNIFICANT CHANGE UP (ref 34.5–45)
HGB BLD-MCNC: 13 G/DL — SIGNIFICANT CHANGE UP (ref 11.5–15.5)
MCHC RBC-ENTMCNC: 31.1 PG — SIGNIFICANT CHANGE UP (ref 27–34)
MCHC RBC-ENTMCNC: 34.3 GM/DL — SIGNIFICANT CHANGE UP (ref 32–36)
MCV RBC AUTO: 90.6 FL — SIGNIFICANT CHANGE UP (ref 80–100)
PLATELET # BLD AUTO: 254 K/UL — SIGNIFICANT CHANGE UP (ref 150–400)
POTASSIUM SERPL-MCNC: 3.9 MMOL/L — SIGNIFICANT CHANGE UP (ref 3.5–5.3)
POTASSIUM SERPL-SCNC: 3.9 MMOL/L — SIGNIFICANT CHANGE UP (ref 3.5–5.3)
PROT SERPL-MCNC: 6.8 G/DL — SIGNIFICANT CHANGE UP (ref 6–8.3)
RBC # BLD: 4.17 M/UL — SIGNIFICANT CHANGE UP (ref 3.8–5.2)
RBC # FLD: 11.9 % — SIGNIFICANT CHANGE UP (ref 10.3–14.5)
SODIUM SERPL-SCNC: 137 MMOL/L — SIGNIFICANT CHANGE UP (ref 135–145)
WBC # BLD: 6.8 K/UL — SIGNIFICANT CHANGE UP (ref 3.8–10.5)
WBC # FLD AUTO: 6.8 K/UL — SIGNIFICANT CHANGE UP (ref 3.8–10.5)

## 2018-10-03 PROCEDURE — 99232 SBSQ HOSP IP/OBS MODERATE 35: CPT

## 2018-10-03 RX ADMIN — Medication 25 MILLIGRAM(S): at 17:34

## 2018-10-03 RX ADMIN — LIDOCAINE 1 PATCH: 4 CREAM TOPICAL at 20:00

## 2018-10-03 RX ADMIN — MAGNESIUM OXIDE 400 MG ORAL TABLET 400 MILLIGRAM(S): 241.3 TABLET ORAL at 11:27

## 2018-10-03 RX ADMIN — Medication 100 MILLIGRAM(S): at 17:34

## 2018-10-03 RX ADMIN — Medication 25 MILLIGRAM(S): at 05:28

## 2018-10-03 RX ADMIN — NYSTATIN CREAM 1 APPLICATION(S): 100000 CREAM TOPICAL at 14:34

## 2018-10-03 RX ADMIN — Medication 5 MILLIGRAM(S): at 05:28

## 2018-10-03 RX ADMIN — Medication 100 MILLIGRAM(S): at 05:28

## 2018-10-03 RX ADMIN — NYSTATIN CREAM 1 APPLICATION(S): 100000 CREAM TOPICAL at 21:27

## 2018-10-03 RX ADMIN — ESCITALOPRAM OXALATE 5 MILLIGRAM(S): 10 TABLET, FILM COATED ORAL at 11:25

## 2018-10-03 RX ADMIN — AMLODIPINE BESYLATE 5 MILLIGRAM(S): 2.5 TABLET ORAL at 05:28

## 2018-10-03 RX ADMIN — LIDOCAINE 1 PATCH: 4 CREAM TOPICAL at 07:45

## 2018-10-03 RX ADMIN — ENOXAPARIN SODIUM 30 MILLIGRAM(S): 100 INJECTION SUBCUTANEOUS at 11:25

## 2018-10-03 RX ADMIN — NYSTATIN CREAM 1 APPLICATION(S): 100000 CREAM TOPICAL at 05:28

## 2018-10-03 RX ADMIN — ATORVASTATIN CALCIUM 40 MILLIGRAM(S): 80 TABLET, FILM COATED ORAL at 21:27

## 2018-10-03 RX ADMIN — MAGNESIUM OXIDE 400 MG ORAL TABLET 400 MILLIGRAM(S): 241.3 TABLET ORAL at 17:34

## 2018-10-03 RX ADMIN — Medication 81 MILLIGRAM(S): at 11:25

## 2018-10-03 RX ADMIN — Medication 5 MILLIGRAM(S): at 17:34

## 2018-10-03 RX ADMIN — MAGNESIUM OXIDE 400 MG ORAL TABLET 400 MILLIGRAM(S): 241.3 TABLET ORAL at 08:03

## 2018-10-03 NOTE — PROGRESS NOTE ADULT - SUBJECTIVE AND OBJECTIVE BOX
CHIEF COMPLAINT: No new weakness, denies pain, in NAD.       HISTORY OF PRESENT ILLNESS  89 year old female with PMH HTN, DM2, dyslipidemia, OA had been in her usual state of health, was getting into the car when noted by family to suddenly become aphasic with pronounced weakness on the right side.  Brought immediately to ED at [facility], CT head negative for acute hemorrhage given TPA and transferred to ICU for further care. LDL 99, A1C 6.3. 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 Hospitalization complicated by constipation and 'pink eye'.   (CT head shows chronic lacunar infarcts on the right cerebellum, bilateral basal ganglia and thalami. There is volume loss and atherosclerosis.) (19 Sep 2018 13:21)      PAST MEDICAL & SURGICAL HISTORY:  Dyslipidemia  Osteoarthritis  Sciatica  Diabetes: Type 2  HTN (hypertension)  HTN (hypertension)  DM (diabetes mellitus)  H/O bilateral hip replacements: 2000&#x27;s at [facility], no complications  S/P hip replacement, bilateral       REVIEW OF SYMPTOMS  [X] Constitutional WNL     [X] Cardio WNL            [X] Resp WNL           [X] GI WNL                          [X]  WNL                   [X] Heme WNL              [X] Endo WNL                     [X] Skin WNL                 [X] MSK WNL                [X] Psych WNL      VITALS  Vital Signs Last 24 Hrs  T(C): 36.9 (03 Oct 2018 08:08), Max: 36.9 (03 Oct 2018 08:08)  T(F): 98.5 (03 Oct 2018 08:08), Max: 98.5 (03 Oct 2018 08:08)  HR: 71 (03 Oct 2018 08:08) (71 - 83)  BP: 135/79 (03 Oct 2018 08:08) (135/79 - 151/81)  BP(mean): --  RR: 15 (03 Oct 2018 08:08) (14 - 15)  SpO2: 95% (03 Oct 2018 08:08) (94% - 95%)      PHYSICAL EXAM  Constitutional - NAD, Comfortable  HEENT - NCAT, EOMI  Neck - Supple, No limited ROM  Chest - CTA bilaterally, No wheeze, No rhonchi, No crackles  Cardiovascular - RRR, S1S2, No murmurs  Abdomen - BS+, Soft, NTND  Extremities - No C/C/E, No calf tenderness   Skin-no rash  Wounds-healed      Neurologic Exam -                   Mental Status - Patient is alert, awake.Aphasia improving with better verbal output and comprehension.  	Cranial Nerves - left neglect/ left visual field cut, PERRL; dysphagia and  central facial weakness are also improving.   	Motor Exam -   	Right upper 4/5 shoulder and elbow strengths, 4/5 distally  	Left upper 4+/5 shoulder strengths, 5/5 elbow/wrist/hand strengths   	Right lower 4/5 hip flexor, 4/5 knee and ankle strengths   	Left lower 5/5 hip flexor, 5/5 knee and ankle strengths   	Sensory: Intact LT bilaterally.   	Coord: Impaired on right due to weakness   	 DTS Reflexes: R>L     	Toes are flexor on the left, extensor on the right              FUNCTIONAL PROGRESS  Gait - min c RW  ADLs - min A  Transfers -min A  Functional transfer - min A     RECENT LABS                        13.0   6.8   )-----------( 254      ( 03 Oct 2018 05:45 )             37.8     10-03    137  |  101  |  27<H>  ----------------------------<  152<H>  3.9   |  32<H>  |  1.41<H>    Ca    9.4      03 Oct 2018 05:45    TPro  6.8  /  Alb  3.0<L>  /  TBili  1.1  /  DBili  x   /  AST  21  /  ALT  20  /  AlkPhos  84  10-03      LIVER FUNCTIONS - ( 03 Oct 2018 05:45 )  Alb: 3.0 g/dL / Pro: 6.8 g/dL / ALK PHOS: 84 U/L / ALT: 20 U/L DA / AST: 21 U/L / GGT: x             Direct LDL: 99 mg/dL (09-08-18 @ 05:30)    Hemoglobin A1C, Whole Blood: 6.3 % (09-08-18 @ 05:30)              RADIOLOGY/OTHER RESULTS      CURRENT MEDICATIONS  MEDICATIONS  (STANDING):  amLODIPine   Tablet 5 milliGRAM(s) Oral daily  aspirin  chewable 81 milliGRAM(s) Oral daily  atorvastatin 40 milliGRAM(s) Oral at bedtime  dextrose 5%. 1000 milliLiter(s) (50 mL/Hr) IV Continuous <Continuous>  dextrose 50% Injectable 12.5 Gram(s) IV Push once  docusate sodium 100 milliGRAM(s) Oral two times a day  enoxaparin Injectable 30 milliGRAM(s) SubCutaneous daily  escitalopram 5 milliGRAM(s) Oral daily  lidocaine   Patch 1 Patch Transdermal daily  magnesium oxide 400 milliGRAM(s) Oral three times a day with meals  metoprolol tartrate 25 milliGRAM(s) Oral every 12 hours  nystatin Powder 1 Application(s) Topical three times a day  oxybutynin 5 milliGRAM(s) Oral two times a day    MEDICATIONS  (PRN):  acetaminophen   Tablet .. 650 milliGRAM(s) Oral every 6 hours PRN Moderate Pain (4 - 6)  bisacodyl Suppository 10 milliGRAM(s) Rectal daily PRN Constipation  dextrose 40% Gel 15 Gram(s) Oral once PRN Blood Glucose LESS THAN 70 milliGRAM(s)/deciliter  glucagon  Injectable 1 milliGRAM(s) IntraMuscular once PRN Glucose LESS THAN 70 milligrams/deciliter      ASSESSMENT & PLAN      GI/Bowel Management -Colace   Management - Toilet Q2  Skin - Turn Q2  Pain - Tylenol PRN  DVT PPX - Lovenox  Diet - advanced to soft c nectar      Continue comprehensive acute rehab program consisting of 3hrs/day of OT/PT and SLP.

## 2018-10-04 PROCEDURE — 99233 SBSQ HOSP IP/OBS HIGH 50: CPT

## 2018-10-04 PROCEDURE — 99232 SBSQ HOSP IP/OBS MODERATE 35: CPT

## 2018-10-04 RX ADMIN — ESCITALOPRAM OXALATE 5 MILLIGRAM(S): 10 TABLET, FILM COATED ORAL at 12:20

## 2018-10-04 RX ADMIN — LIDOCAINE 1 PATCH: 4 CREAM TOPICAL at 09:40

## 2018-10-04 RX ADMIN — LIDOCAINE 1 PATCH: 4 CREAM TOPICAL at 21:29

## 2018-10-04 RX ADMIN — NYSTATIN CREAM 1 APPLICATION(S): 100000 CREAM TOPICAL at 17:52

## 2018-10-04 RX ADMIN — Medication 5 MILLIGRAM(S): at 05:30

## 2018-10-04 RX ADMIN — ATORVASTATIN CALCIUM 40 MILLIGRAM(S): 80 TABLET, FILM COATED ORAL at 21:28

## 2018-10-04 RX ADMIN — MAGNESIUM OXIDE 400 MG ORAL TABLET 400 MILLIGRAM(S): 241.3 TABLET ORAL at 17:52

## 2018-10-04 RX ADMIN — Medication 25 MILLIGRAM(S): at 17:52

## 2018-10-04 RX ADMIN — Medication 5 MILLIGRAM(S): at 17:52

## 2018-10-04 RX ADMIN — Medication 100 MILLIGRAM(S): at 05:30

## 2018-10-04 RX ADMIN — MAGNESIUM OXIDE 400 MG ORAL TABLET 400 MILLIGRAM(S): 241.3 TABLET ORAL at 09:40

## 2018-10-04 RX ADMIN — MAGNESIUM OXIDE 400 MG ORAL TABLET 400 MILLIGRAM(S): 241.3 TABLET ORAL at 12:20

## 2018-10-04 RX ADMIN — Medication 25 MILLIGRAM(S): at 05:30

## 2018-10-04 RX ADMIN — ENOXAPARIN SODIUM 30 MILLIGRAM(S): 100 INJECTION SUBCUTANEOUS at 12:20

## 2018-10-04 RX ADMIN — Medication 100 MILLIGRAM(S): at 17:52

## 2018-10-04 RX ADMIN — NYSTATIN CREAM 1 APPLICATION(S): 100000 CREAM TOPICAL at 05:30

## 2018-10-04 RX ADMIN — Medication 81 MILLIGRAM(S): at 12:20

## 2018-10-04 RX ADMIN — NYSTATIN CREAM 1 APPLICATION(S): 100000 CREAM TOPICAL at 21:29

## 2018-10-04 RX ADMIN — AMLODIPINE BESYLATE 5 MILLIGRAM(S): 2.5 TABLET ORAL at 05:30

## 2018-10-04 NOTE — PROGRESS NOTE ADULT - ASSESSMENT
89 y/o F with PMH HTN, DM, HLD, OA admitted with acute CVA now with aphasia, functional and gait impairment     1. acute cva: cw asa statin impaired adls/mobility  2. aphasia secondary to acute cva being actively treated w speech therapy   2. t2dm hba1c 6.3 fs stable cw fs monitoring   3. essential HTN: cw current meds  4. dyslipidemia cw statin   5. OA: cw pain management  6. ckd stage 3 cw bun/ cr monitoring   7. dvt ppx: lovenox

## 2018-10-04 NOTE — PROGRESS NOTE ADULT - SUBJECTIVE AND OBJECTIVE BOX
Patient is a 89y old  Female who presents with a chief complaint of s/p left sided watershed cva c dysphagia, aphasia and right sided weakness. (02 Oct 2018 10:31)      Patient seen and examined at bedside.    ALLERGIES:  No Known Allergies    MEDICATIONS:  acetaminophen   Tablet .. 650 milliGRAM(s) Oral every 6 hours PRN  amLODIPine   Tablet 5 milliGRAM(s) Oral daily  aspirin  chewable 81 milliGRAM(s) Oral daily  bisacodyl Suppository 10 milliGRAM(s) Rectal daily PRN  dextrose 40% Gel 15 Gram(s) Oral once PRN  dextrose 5%. 1000 milliLiter(s) IV Continuous <Continuous>  dextrose 50% Injectable 12.5 Gram(s) IV Push once  escitalopram 5 milliGRAM(s) Oral daily  glucagon  Injectable 1 milliGRAM(s) IntraMuscular once PRN  lidocaine   Patch 1 Patch Transdermal daily  metoprolol tartrate 25 milliGRAM(s) Oral every 12 hours  nystatin Powder 1 Application(s) Topical three times a day    Vital Signs Last 24 Hrs  T(F): 98.2 (02 Oct 2018 09:20), Max: 98.9 (02 Oct 2018 04:57)  HR: 78 (02 Oct 2018 09:20) (78 - 87)  BP: 127/76 (02 Oct 2018 09:20) (127/76 - 147/79)  RR: 15 (02 Oct 2018 09:20) (15 - 16)  SpO2: 94% (02 Oct 2018 09:20) (94% - 100%)  I&O's Summary    01 Oct 2018 07:01  -  02 Oct 2018 07:00  --------------------------------------------------------  IN: 300 mL / OUT: 400 mL / NET: -100 mL        PHYSICAL EXAM:  General: NAD, A/O x 3  ENT: MMM  Neck: Supple, No JVD  Lungs: Clear to auscultation bilaterally  Cardio: RRR, S1/S2, No murmurs  Abdomen: Soft, Nontender, Nondistended; Bowel sounds present  Extremities: No cyanosis, No edema  neuro: + aphasia   LABS:                        13.4   7.4   )-----------( 265      ( 01 Oct 2018 05:47 )             39.1     10-01    136  |  100  |  26  ----------------------------<  160  4.0   |  32  |  1.35    Ca    9.1      01 Oct 2018 05:47    TPro  6.9  /  Alb  3.0  /  TBili  1.1  /  DBili  x   /  AST  23  /  ALT  22  /  AlkPhos  83  10-01    eGFR if Non African American: 34 mL/min/1.73M2 (10-01-18 @ 05:47)  eGFR if : 39 mL/min/1.73M2 (10-01-18 @ 05:47)            09-08 Chol 161 mg/dL LDL 99 mg/dL HDL 33 mg/dL Trig 147 mg/dL        CAPILLARY BLOOD GLUCOSE        09-08 [id number] 6.3          RADIOLOGY & ADDITIONAL TESTS:    Care Discussed with Consultants/Other Providers:

## 2018-10-04 NOTE — PROGRESS NOTE ADULT - SUBJECTIVE AND OBJECTIVE BOX
CHIEF COMPLAINT: No new symptoms. Denies any pain. Frustrated with aphasia, wants to go home.       HISTORY OF PRESENT ILLNESS  89 year old female with PMH HTN, DM2, dyslipidemia, OA had been in her usual state of health, was getting into the car when noted by family to suddenly become aphasic with pronounced weakness on the right side.  Brought immediately to ED at [facility], CT head negative for acute hemorrhage given TPA and transferred to ICU for further care. LDL 99, A1C 6.3. 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 Hospitalization complicated by constipation and 'pink eye'.   (CT head shows chronic lacunar infarcts on the right cerebellum, bilateral basal ganglia and thalami. There is volume loss and atherosclerosis.) (19 Sep 2018 13:21)      PAST MEDICAL & SURGICAL HISTORY:  Dyslipidemia  Osteoarthritis  Sciatica  Diabetes: Type 2  HTN (hypertension)  HTN (hypertension)  DM (diabetes mellitus)  H/O bilateral hip replacements: 2000&#x27;s at [facility] no complications  S/P hip replacement, bilateral       REVIEW OF SYMPTOMS  [X] Constitutional WNL     [X] Cardio WNL            [X] Resp WNL           [X] GI WNL                          [X]  WNL                   [X] Heme WNL              [X] Endo WNL                     [X] Skin WNL                 [X] MSK WNL                       VITALS  Vital Signs Last 24 Hrs  T(C): 37 (04 Oct 2018 09:35), Max: 37 (04 Oct 2018 09:35)  T(F): 98.6 (04 Oct 2018 09:35), Max: 98.6 (04 Oct 2018 09:35)  HR: 71 (04 Oct 2018 09:35) (71 - 81)  BP: 147/69 (04 Oct 2018 09:35) (127/66 - 147/74)  BP(mean): --  RR: 14 (04 Oct 2018 09:35) (14 - 14)  SpO2: 99% (04 Oct 2018 09:35) (94% - 99%)      PHYSICAL EXAM  Constitutional - NAD, Comfortable  HEENT - NCAT, EOMI  Neck - Supple, No limited ROM  Chest - CTA bilaterally, No wheeze, No rhonchi, No crackles  Cardiovascular - RRR, S1S2, No murmurs  Abdomen - BS+, Soft, NTND  Extremities - No C/C/E, No calf tenderness   Skin-improved, continue skin care    Neurologic Exam -                   Mental Status - Patient is alert, awake.Aphasia improving with better verbal output and comprehension.  	Cranial Nerves - left neglect/ left visual field cut, PERRL; dysphagia and  central facial weakness are also improving.   	Motor Exam -   	Right upper 4/5 shoulder and elbow strengths, 4/5 distally  	Left upper 4+/5 shoulder strengths, 5/5 elbow/wrist/hand strengths   	Right lower 4/5 hip flexor, 4/5 knee and ankle strengths   	Left lower 5/5 hip flexor, 5/5 knee and ankle strengths   	Sensory: Intact LT bilaterally.   	Coord: Impaired on right due to weakness   	 DTS Reflexes: R>L     	Toes are flexor on the left, extensor on the right              FUNCTIONAL PROGRESS  Gait - min c RW  ADLs - min A  Transfers -min A  Functional transfer - min A     RECENT LABS                        13.0   6.8   )-----------( 254      ( 03 Oct 2018 05:45 )             37.8     10-03    137  |  101  |  27<H>  ----------------------------<  152<H>  3.9   |  32<H>  |  1.41<H>    Ca    9.4      03 Oct 2018 05:45    TPro  6.8  /  Alb  3.0<L>  /  TBili  1.1  /  DBili  x   /  AST  21  /  ALT  20  /  AlkPhos  84  10-03      LIVER FUNCTIONS - ( 03 Oct 2018 05:45 )  Alb: 3.0 g/dL / Pro: 6.8 g/dL / ALK PHOS: 84 U/L / ALT: 20 U/L DA / AST: 21 U/L / GGT: x             Direct LDL: 99 mg/dL (09-08-18 @ 05:30)    Hemoglobin A1C, Whole Blood: 6.3 % (09-08-18 @ 05:30)              RADIOLOGY/OTHER RESULTS      CURRENT MEDICATIONS  MEDICATIONS  (STANDING):  amLODIPine   Tablet 5 milliGRAM(s) Oral daily  aspirin  chewable 81 milliGRAM(s) Oral daily  atorvastatin 40 milliGRAM(s) Oral at bedtime  dextrose 5%. 1000 milliLiter(s) (50 mL/Hr) IV Continuous <Continuous>  dextrose 50% Injectable 12.5 Gram(s) IV Push once  docusate sodium 100 milliGRAM(s) Oral two times a day  enoxaparin Injectable 30 milliGRAM(s) SubCutaneous daily  escitalopram 5 milliGRAM(s) Oral daily  lidocaine   Patch 1 Patch Transdermal daily  magnesium oxide 400 milliGRAM(s) Oral three times a day with meals  metoprolol tartrate 25 milliGRAM(s) Oral every 12 hours  nystatin Powder 1 Application(s) Topical three times a day  oxybutynin 5 milliGRAM(s) Oral two times a day    MEDICATIONS  (PRN):  acetaminophen   Tablet .. 650 milliGRAM(s) Oral every 6 hours PRN Moderate Pain (4 - 6)  bisacodyl Suppository 10 milliGRAM(s) Rectal daily PRN Constipation  dextrose 40% Gel 15 Gram(s) Oral once PRN Blood Glucose LESS THAN 70 milliGRAM(s)/deciliter  glucagon  Injectable 1 milliGRAM(s) IntraMuscular once PRN Glucose LESS THAN 70 milligrams/deciliter      ASSESSMENT & PLAN      GI/Bowel Management -Colace   Management - Toilet Q2  Skin - Turn Q2  Pain - Tylenol PRN  DVT PPX - Lovenox  Diet - advanced to soft c nectar    Continue comprehensive acute rehab program consisting of 3hrs/day of OT/PT and SLP.

## 2018-10-05 ENCOUNTER — TRANSCRIPTION ENCOUNTER (OUTPATIENT)
Age: 83
End: 2018-10-05

## 2018-10-05 VITALS
HEART RATE: 88 BPM | DIASTOLIC BLOOD PRESSURE: 88 MMHG | SYSTOLIC BLOOD PRESSURE: 140 MMHG | OXYGEN SATURATION: 93 % | RESPIRATION RATE: 12 BRPM | TEMPERATURE: 98 F

## 2018-10-05 LAB
ALBUMIN SERPL ELPH-MCNC: 3.1 G/DL — LOW (ref 3.3–5)
ALP SERPL-CCNC: 85 U/L — SIGNIFICANT CHANGE UP (ref 40–120)
ALT FLD-CCNC: 17 U/L DA — SIGNIFICANT CHANGE UP (ref 10–45)
ANION GAP SERPL CALC-SCNC: 7 MMOL/L — SIGNIFICANT CHANGE UP (ref 5–17)
AST SERPL-CCNC: 26 U/L — SIGNIFICANT CHANGE UP (ref 10–40)
BILIRUB SERPL-MCNC: 1 MG/DL — SIGNIFICANT CHANGE UP (ref 0.2–1.2)
BUN SERPL-MCNC: 23 MG/DL — SIGNIFICANT CHANGE UP (ref 7–23)
CALCIUM SERPL-MCNC: 9.2 MG/DL — SIGNIFICANT CHANGE UP (ref 8.4–10.5)
CHLORIDE SERPL-SCNC: 101 MMOL/L — SIGNIFICANT CHANGE UP (ref 96–108)
CO2 SERPL-SCNC: 30 MMOL/L — SIGNIFICANT CHANGE UP (ref 22–31)
CREAT SERPL-MCNC: 1.23 MG/DL — SIGNIFICANT CHANGE UP (ref 0.5–1.3)
GLUCOSE SERPL-MCNC: 158 MG/DL — HIGH (ref 70–99)
HCT VFR BLD CALC: 41.3 % — SIGNIFICANT CHANGE UP (ref 34.5–45)
HGB BLD-MCNC: 13.9 G/DL — SIGNIFICANT CHANGE UP (ref 11.5–15.5)
MCHC RBC-ENTMCNC: 30.1 PG — SIGNIFICANT CHANGE UP (ref 27–34)
MCHC RBC-ENTMCNC: 33.6 GM/DL — SIGNIFICANT CHANGE UP (ref 32–36)
MCV RBC AUTO: 89.7 FL — SIGNIFICANT CHANGE UP (ref 80–100)
PLATELET # BLD AUTO: 268 K/UL — SIGNIFICANT CHANGE UP (ref 150–400)
POTASSIUM SERPL-MCNC: 3.8 MMOL/L — SIGNIFICANT CHANGE UP (ref 3.5–5.3)
POTASSIUM SERPL-SCNC: 3.8 MMOL/L — SIGNIFICANT CHANGE UP (ref 3.5–5.3)
PROT SERPL-MCNC: 7.1 G/DL — SIGNIFICANT CHANGE UP (ref 6–8.3)
RBC # BLD: 4.6 M/UL — SIGNIFICANT CHANGE UP (ref 3.8–5.2)
RBC # FLD: 11.8 % — SIGNIFICANT CHANGE UP (ref 10.3–14.5)
SODIUM SERPL-SCNC: 138 MMOL/L — SIGNIFICANT CHANGE UP (ref 135–145)
WBC # BLD: 11.2 K/UL — HIGH (ref 3.8–10.5)
WBC # FLD AUTO: 11.2 K/UL — HIGH (ref 3.8–10.5)

## 2018-10-05 PROCEDURE — 99233 SBSQ HOSP IP/OBS HIGH 50: CPT

## 2018-10-05 PROCEDURE — 99239 HOSP IP/OBS DSCHRG MGMT >30: CPT

## 2018-10-05 RX ORDER — ESCITALOPRAM OXALATE 10 MG/1
1 TABLET, FILM COATED ORAL
Qty: 30 | Refills: 0
Start: 2018-10-05 | End: 2018-11-03

## 2018-10-05 RX ORDER — OXYBUTYNIN CHLORIDE 5 MG
1 TABLET ORAL
Qty: 60 | Refills: 0
Start: 2018-10-05 | End: 2018-11-03

## 2018-10-05 RX ORDER — ASPIRIN/CALCIUM CARB/MAGNESIUM 324 MG
1 TABLET ORAL
Qty: 0 | Refills: 0 | DISCHARGE
Start: 2018-10-05

## 2018-10-05 RX ORDER — OXYBUTYNIN CHLORIDE 5 MG
1 TABLET ORAL
Qty: 0 | Refills: 0 | DISCHARGE
Start: 2018-10-05 | End: 2018-11-03

## 2018-10-05 RX ORDER — METOPROLOL TARTRATE 50 MG
1 TABLET ORAL
Qty: 60 | Refills: 0
Start: 2018-10-05 | End: 2018-11-03

## 2018-10-05 RX ORDER — ATORVASTATIN CALCIUM 80 MG/1
1 TABLET, FILM COATED ORAL
Qty: 30 | Refills: 0
Start: 2018-10-05 | End: 2018-11-03

## 2018-10-05 RX ORDER — PANTOPRAZOLE SODIUM 20 MG/1
1 TABLET, DELAYED RELEASE ORAL
Qty: 30 | Refills: 0
Start: 2018-10-05 | End: 2018-11-03

## 2018-10-05 RX ORDER — AMLODIPINE BESYLATE 2.5 MG/1
1 TABLET ORAL
Qty: 30 | Refills: 0
Start: 2018-10-05 | End: 2018-11-03

## 2018-10-05 RX ORDER — ASPIRIN/CALCIUM CARB/MAGNESIUM 324 MG
1 TABLET ORAL
Qty: 0 | Refills: 0 | COMMUNITY

## 2018-10-05 RX ADMIN — Medication 25 MILLIGRAM(S): at 05:49

## 2018-10-05 RX ADMIN — NYSTATIN CREAM 1 APPLICATION(S): 100000 CREAM TOPICAL at 05:51

## 2018-10-05 RX ADMIN — LIDOCAINE 1 PATCH: 4 CREAM TOPICAL at 08:33

## 2018-10-05 RX ADMIN — ENOXAPARIN SODIUM 30 MILLIGRAM(S): 100 INJECTION SUBCUTANEOUS at 11:35

## 2018-10-05 RX ADMIN — AMLODIPINE BESYLATE 5 MILLIGRAM(S): 2.5 TABLET ORAL at 05:49

## 2018-10-05 RX ADMIN — Medication 5 MILLIGRAM(S): at 05:49

## 2018-10-05 RX ADMIN — ESCITALOPRAM OXALATE 5 MILLIGRAM(S): 10 TABLET, FILM COATED ORAL at 11:35

## 2018-10-05 RX ADMIN — Medication 81 MILLIGRAM(S): at 11:35

## 2018-10-05 RX ADMIN — MAGNESIUM OXIDE 400 MG ORAL TABLET 400 MILLIGRAM(S): 241.3 TABLET ORAL at 08:33

## 2018-10-05 RX ADMIN — MAGNESIUM OXIDE 400 MG ORAL TABLET 400 MILLIGRAM(S): 241.3 TABLET ORAL at 11:35

## 2018-10-05 NOTE — DISCHARGE NOTE ADULT - CARE PROVIDER_API CALL
Dr Burgos [facility]  Phone: [phone]  Fax: (       - Armando White  PCP  Phone: [phone]  Fax: (   )    -    Kylee Giordano), Neurology  [address] [place] [address]  Phone: [phone]

## 2018-10-05 NOTE — PROGRESS NOTE ADULT - ATTENDING COMMENTS
Patient is being discharged home with home care, 24/7 supervision for safety.  Discharge instructions were discussed with patient and family, all current medications were sent to the pharmacy. Patient and family were educated on importance of medication compliance,  continued  care with PMD and follow-up care with the specialists in the community. Safety and fall risk precautions  were discussed in detail, counseled on healthy life style modifications.  All questions were answered to their satisfaction.
Patient medically stable. Making progress towards rehab goals.   Continue rehab program.
Patient medically stable. Making progress towards rehab goals.   Continue rehab program.     Discharge plan is in progress, will provide family training
Patient medically stable. Making progress towards rehab goals.   Continue rehab program.     Fatigues quickly, but participates in therapy, stable neuroglially. Spoke with family at bedside
Patient medically stable. Making progress towards rehab goals.   Continue rehab program.     Improving aphasia and right hemiparesis. emotionally labile but appears brighter and happier , tolerates Lexapro well.
Patient medically stable. Making progress towards rehab goals.   Continue rehab program.     Increase Po fluids, modify diet as per speech/swallow therapist, bowel regimen in place. Repeat labs in the morning.
Patient medically stable. Making progress towards rehab goals.   Continue rehab program.     Multidisciplinary team meeting today:  patient's functional goals and needs, functional and clinical  progress were discussed, barriers to discharge were identified. Anticipate discharge home with home care, 24/7 supervision for safety.    EDOD 10/05/18    Spoke with patient's daughter, detailed update given, discharge plan discussed, all questions answered
Patient medically stable. Making progress towards rehab goals.   Continue rehab program.    Dischrge plan discussed with SW, patient, team . Encourage PO fluids intake.
Patient medically stable. Making progress towards rehab goals.   Continue rehab program.    Complaints of right shoulder pain , chronic issue, will use Lidoderm patches to alleviate symptoms.
Patient medically stable. Making progress towards rehab goals.   Continue rehab program.
Patient medically stable. Making progress towards rehab goals.   Continue rehab program.     Encourage PO fluids, monitor creatinine. Discharge plan discussed with patient's son.
Multidisciplinary team meeting today:  patient's functional goals and needs, functional and clinical  progress were discussed, barriers to discharge were identified. Anticipate discharge home with home care, 24/7 supervision for safety  EDOD 10/10/18    Will start Lexapro for depression, motor recovery facilitation

## 2018-10-05 NOTE — PROGRESS NOTE ADULT - PROBLEM SELECTOR PROBLEM 6
Constipation
Stage 3 chronic kidney disease
Constipation

## 2018-10-05 NOTE — DISCHARGE NOTE ADULT - MEDICATION SUMMARY - MEDICATIONS TO STOP TAKING
I will STOP taking the medications listed below when I get home from the hospital:    bacitracin-polymyxin B 500 units-10,000 units/g topical ointment  -- 1 application on skin every 8 hours    magnesium oxide 400 mg (241.3 mg elemental magnesium) oral tablet  -- 1 tab(s) by mouth 3 times a day (with meals)    aspirin 325 mg oral tablet  -- 1 tab(s) by mouth once a day    lisinopril 40 mg oral tablet  -- 1 tab(s) by mouth once a day    senna oral tablet  -- 2 tab(s) by mouth once a day (at bedtime)    docusate sodium 100 mg oral capsule  -- 1 cap(s) by mouth 2 times a day    ciprofloxacin 0.3% ophthalmic solution  -- 1 drop(s) to each affected eye every 4 hours    enoxaparin  -- 30 milligram(s) subcutaneous once a day

## 2018-10-05 NOTE — DISCHARGE NOTE ADULT - PLAN OF CARE
no new strokes Continue aspirin, lipitor, and BP medications as prescribed. Follow up with PCP and Neurology in 1 week. Monitor BP daily at home. A1C<6.5 Follow up with PCP in 1 week. You have been seen by endocrinology and no insulin regimen was recommended as necessary. Diabetic diet only, avoid simple carbohydrates. LDL<70 Continue Lipitor and follow up with PCP. -140 Continue BP medications and follow up with PCP and Neurology in 1 week. Monitor BP daily. no constipation Stay hydrated, eat fiber rich diet, may use OTC stool softeners as needed. Follow up with PCP. Creatinine normal limits Stay well hydrated, and follow up with PCP next week.

## 2018-10-05 NOTE — PROGRESS NOTE ADULT - PROBLEM SELECTOR PLAN 6
On bowel regimen. Mobilize. Monitor BMs.
Start bowel regimen. Mobilize. Large BM yesterday.
Start bowel regimen. Dulcolax-refused. Lactulose instead today. Mobilize.
On bowel regimen. Mobilize. BM daily.
On bowel regimen. Mobilize. Monitor BMs.
Start bowel regimen. Mobilize. Monitor BMs.
stable, monitor renal function
On bowel regimen. Mobilize. BM daily.

## 2018-10-05 NOTE — PROGRESS NOTE ADULT - ASSESSMENT
89 y/o F with PMH HTN, DM, HLD, OA admitted with acute CVA now with dysarthria, functional and gait impairment, and elevated WBC count    #Asymptomatic leukocytosis - new diagnosis  -Minimally elevated WBC count - with no source of focal infectious etiology, afebrile, other vitals stable  -She does appear clinically dehydrated, which could make her WBC count concentrated  -Would not keep patient in hospital for this elevated WBC count, however, needs repeat CBC in 3-5 days and would encourage hydration    #Acute CVA  -ASA, statin    #Aphasia  -Secondary to #2 above  -Actively being treated with SLP, which she should resume as outpatient    Patient can be discharged with close outpatient followup

## 2018-10-05 NOTE — PROGRESS NOTE ADULT - PROBLEM SELECTOR PLAN 1
Continue ASA/Lipitor c GI ppx. Monitor BP. Completed comprehensive PT/OT/SLP program. Aspiration and fall precautions. Lexapro added for mood stabilization, motor recovery. Seen by medicine, cleared for d/c home with follow ups.

## 2018-10-05 NOTE — PROGRESS NOTE ADULT - SUBJECTIVE AND OBJECTIVE BOX
CHIEF COMPLAINT: No events overnight, no new weakness, denies any cough, pain or dysuria.       HISTORY OF PRESENT ILLNESS  89 year old female with PMH HTN, DM2, dyslipidemia, OA had been in her usual state of health, was getting into the car when noted by family to suddenly become aphasic with pronounced weakness on the right side.  Brought immediately to ED at [facility], CT head negative for acute hemorrhage given TPA and transferred to ICU for further care. LDL 99, A1C 6.3. 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 Hospitalization complicated by constipation and 'pink eye'.   (CT head shows chronic lacunar infarcts on the right cerebellum, bilateral basal ganglia and thalami. There is volume loss and atherosclerosis.) (19 Sep 2018 13:21)      PAST MEDICAL & SURGICAL HISTORY:  Dyslipidemia  Osteoarthritis  Sciatica  Diabetes: Type 2, &quot;many years&quot;  HTN (hypertension)  HTN (hypertension)  DM (diabetes mellitus)  H/O bilateral hip replacements: 2000&#x27;s at [facility], no complications  S/P hip replacement, bilateral       REVIEW OF SYMPTOMS  [X] Constitutional WNL     [X] Cardio WNL            [X] Resp WNL           [X] GI WNL                          [X]  WNL                   [X] Heme WNL              [X] Endo WNL                     [X] Skin WNL                 [X] MSK WNL                   [X] Psych WNL      VITALS  Vital Signs Last 24 Hrs  T(C): 36.8 (05 Oct 2018 08:47), Max: 36.8 (05 Oct 2018 08:47)  T(F): 98.3 (05 Oct 2018 08:47), Max: 98.3 (05 Oct 2018 08:47)  HR: 93 (05 Oct 2018 08:47) (75 - 93)  BP: 151/81 (05 Oct 2018 08:47) (146/75 - 174/80)  BP(mean): --  RR: 14 (05 Oct 2018 08:47) (14 - 14)  SpO2: 95% (05 Oct 2018 08:47) (95% - 95%)      PHYSICAL EXAM  Constitutional - NAD, Comfortable  HEENT - NCAT, EOMI  Neck - Supple, No limited ROM  Chest - CTA bilaterally, No wheeze, No rhonchi, No crackles  Cardiovascular - RRR, S1S2, No murmurs  Abdomen - BS+, Soft, NTND  Extremities - No C/C/E, No calf tenderness   Skin-no rash  Wounds-healed      Neurologic Exam -                   Mental Status - Patient is alert, awake.Aphasia improving with better verbal output and comprehension.  	Cranial Nerves - left neglect/ left visual field cut, PERRL; dysphagia and  central facial weakness are also improving.   	Motor Exam -   	Right upper 4/5 shoulder and elbow strengths, 4/5 distally  	Left upper 4+/5 shoulder strengths, 5/5 elbow/wrist/hand strengths   	Right lower 4/5 hip flexor, 4/5 knee and ankle strengths   	Left lower 5/5 hip flexor, 5/5 knee and ankle strengths   	Sensory: Intact LT bilaterally.   	Coord: Impaired on right due to weakness   	 DTS Reflexes: R>L     	Toes are flexor on the left, extensor on the right              FUNCTIONAL PROGRESS  Gait - min c RW  ADLs - min A  Transfers -min A  Functional transfer - min A       RECENT LABS                        13.9   11.2  )-----------( 268      ( 05 Oct 2018 05:20 )             41.3     10-05    138  |  101  |  23  ----------------------------<  158<H>  3.8   |  30  |  1.23    Ca    9.2      05 Oct 2018 05:20    TPro  7.1  /  Alb  3.1<L>  /  TBili  1.0  /  DBili  x   /  AST  26  /  ALT  17  /  AlkPhos  85  10-05      LIVER FUNCTIONS - ( 05 Oct 2018 05:20 )  Alb: 3.1 g/dL / Pro: 7.1 g/dL / ALK PHOS: 85 U/L / ALT: 17 U/L DA / AST: 26 U/L / GGT: x             Direct LDL: 99 mg/dL (09-08-18 @ 05:30)    Hemoglobin A1C, Whole Blood: 6.3 % (09-08-18 @ 05:30)              RADIOLOGY/OTHER RESULTS      CURRENT MEDICATIONS  MEDICATIONS  (STANDING):  amLODIPine   Tablet 5 milliGRAM(s) Oral daily  aspirin  chewable 81 milliGRAM(s) Oral daily  atorvastatin 40 milliGRAM(s) Oral at bedtime  dextrose 5%. 1000 milliLiter(s) (50 mL/Hr) IV Continuous <Continuous>  dextrose 50% Injectable 12.5 Gram(s) IV Push once  docusate sodium 100 milliGRAM(s) Oral two times a day  enoxaparin Injectable 30 milliGRAM(s) SubCutaneous daily  escitalopram 5 milliGRAM(s) Oral daily  lidocaine   Patch 1 Patch Transdermal daily  magnesium oxide 400 milliGRAM(s) Oral three times a day with meals  metoprolol tartrate 25 milliGRAM(s) Oral every 12 hours  nystatin Powder 1 Application(s) Topical three times a day  oxybutynin 5 milliGRAM(s) Oral two times a day    MEDICATIONS  (PRN):  acetaminophen   Tablet .. 650 milliGRAM(s) Oral every 6 hours PRN Moderate Pain (4 - 6)  bisacodyl Suppository 10 milliGRAM(s) Rectal daily PRN Constipation  dextrose 40% Gel 15 Gram(s) Oral once PRN Blood Glucose LESS THAN 70 milliGRAM(s)/deciliter  glucagon  Injectable 1 milliGRAM(s) IntraMuscular once PRN Glucose LESS THAN 70 milligrams/deciliter      ASSESSMENT & PLAN          D/c home with follow ups.       Continue comprehensive acute rehab program consisting of 3hrs/day of OT/PT and SLP.

## 2018-10-05 NOTE — PROGRESS NOTE ADULT - PROBLEM SELECTOR PROBLEM 2
HTN (hypertension)
Type 2 diabetes mellitus without complication, with long-term current use of insulin
HTN (hypertension)

## 2018-10-05 NOTE — DISCHARGE NOTE ADULT - OTHER SIGNIFICANT FINDINGS
EXAM:  CT BRAIN      PROCEDURE DATE:  09/09/2018        INTERPRETATION:  9/9/2018 6:29 PM    CLINICAL HISTORY:  Right-sided weakness.    TECHNIQUE: Axial CT images are obtained from the cranial vertex to the   skullbase without the administration of IV contrast.    COMPARISON: Head CT 9/8/2018, 9/7/2018, 4/3/2018    FINDINGS:    Again noted are chronic lacunar infarcts on the right cerebellum,   bilateral basal ganglia and thalami.    There is volume loss and atherosclerosis. White matter hypodensities   noted compatible with at least moderate chronic microvascular ischemic   change in this age group. There is no midline shift, mass effect, extra   axial collection, intracranial hemorrhage, or ventriculomegaly.    The calvarium is intact. The visualized paranasal sinuses are aerated.   The mastoid air cells are clear aside from a trace right mastoid tip   effusion.    IMPRESSION:    No acute hemorrhage or mass effect. Chronic changes noted    MRI would more sensitive for the detection of an acute infarct on this   background if there are no contraindications.                  FROILAN HO   This document has been electronically signed. Sep  9 2018  6:33PM

## 2018-10-05 NOTE — PROGRESS NOTE ADULT - PROBLEM SELECTOR PROBLEM 4
Dyslipidemia

## 2018-10-05 NOTE — DISCHARGE NOTE ADULT - PATIENT PORTAL LINK FT
You can access the [facility] Portal, offered by [facility], by registering with the following website: [email or web address]

## 2018-10-05 NOTE — DISCHARGE NOTE ADULT - CARE PLAN
Principal Discharge DX:	Cerebrovascular accident (CVA) due to other mechanism  Goal:	no new strokes  Assessment and plan of treatment:	Continue aspirin, lipitor, and BP medications as prescribed. Follow up with PCP and Neurology in 1 week. Monitor BP daily at home.  Secondary Diagnosis:	Diabetes  Goal:	A1C<6.5  Assessment and plan of treatment:	Follow up with PCP in 1 week. You have been seen by endocrinology and no insulin regimen was recommended as necessary. Diabetic diet only, avoid simple carbohydrates.  Secondary Diagnosis:	Dyslipidemia  Goal:	LDL<70  Assessment and plan of treatment:	Continue Lipitor and follow up with PCP.  Secondary Diagnosis:	Essential hypertension  Goal:	-140  Assessment and plan of treatment:	Continue BP medications and follow up with PCP and Neurology in 1 week. Monitor BP daily.  Secondary Diagnosis:	Constipation  Goal:	no constipation  Assessment and plan of treatment:	Stay hydrated, eat fiber rich diet, may use OTC stool softeners as needed. Follow up with PCP.  Secondary Diagnosis:	Stage 3 chronic kidney disease  Goal:	Creatinine normal limits  Assessment and plan of treatment:	Stay well hydrated, and follow up with PCP next week.

## 2018-10-05 NOTE — PROGRESS NOTE ADULT - PROBLEM SELECTOR PROBLEM 7
Conjunctivitis of left eye

## 2018-10-05 NOTE — DISCHARGE NOTE ADULT - MEDICATION SUMMARY - MEDICATIONS TO TAKE
I will START or STAY ON the medications listed below when I get home from the hospital:    aspirin 81 mg oral tablet, chewable  -- 1 tab(s) by mouth once a day  -- Indication: For CVA (cerebral vascular accident)    escitalopram 5 mg oral tablet  -- 1 tab(s) by mouth once a day  -- Indication: For CVA (cerebral vascular accident)    atorvastatin 40 mg oral tablet  -- 1 tab(s) by mouth once a day (at bedtime)  -- Indication: For Dyslipidemia    metoprolol tartrate 25 mg oral tablet  -- 1 tab(s) by mouth every 12 hours  -- Indication: For HTN (hypertension)    amLODIPine 5 mg oral tablet  -- 1 tab(s) by mouth once a day  -- Indication: For Essential hypertension    pantoprazole 40 mg oral granule, delayed release  -- 1 tab(s) by mouth once a day (in the morning)  -- Indication: For prophylaxis    oxybutynin 5 mg oral tablet  -- 1 tab(s) by mouth 2 times a day  -- Indication: For incontinence

## 2018-10-05 NOTE — PROGRESS NOTE ADULT - PROBLEM SELECTOR PLAN 2
Lisinopril and Norvasc to continue. Monitor BP.
a1c 6.3  fingersticks stable
Lisinopril and Norvasc to continue. Monitor BP.

## 2018-10-05 NOTE — DISCHARGE NOTE ADULT - PROVIDER TOKENS
FREE:[LAST:[Dr Burgos],FIRST:[Armando],PHONE:[phone]:[(   )    -]] FREE:[LAST:[Dr Burgos],FIRST:[Armando],PHONE:[phone]:[(   )    -],ADDRESS:[PCP]],TOKEN:'[id number]'

## 2018-10-05 NOTE — PROGRESS NOTE ADULT - PROBLEM SELECTOR PROBLEM 5
Dysphagia
Osteoarthritis of multiple joints, unspecified osteoarthritis type
Dysphagia
Dysphagia

## 2018-10-05 NOTE — PROGRESS NOTE ADULT - PROBLEM SELECTOR PLAN 7
Eye drops completed. Resolved.
Continue eye drops.
Eye drops completed.
Eye drops completed.
Eye drops completed. Resolved.

## 2018-10-05 NOTE — PROGRESS NOTE ADULT - PROVIDER SPECIALTY LIST ADULT
Hospitalist
Neurology
Rehab Medicine

## 2018-10-05 NOTE — PROGRESS NOTE ADULT - PROBLEM SELECTOR PROBLEM 8
LENNIE (acute kidney injury)

## 2018-10-05 NOTE — PROGRESS NOTE ADULT - PROBLEM SELECTOR PROBLEM 3
Diabetes
Essential hypertension
Diabetes

## 2018-10-05 NOTE — PROGRESS NOTE ADULT - PROBLEM SELECTOR PLAN 4
Lipitor, monitor LFTs.
continue lipitor
Lipitor, monitor LFTs.
Lipitor, monitor LFTs.

## 2018-10-05 NOTE — PROGRESS NOTE ADULT - REASON FOR ADMISSION
s/p left sided watershed cva c dysphagia, aphasia and right sided weakness.
s/p left sided watershed CVA with dysphagia, aphasia and right sided weakness.
s/p left sided watershed cva c dysphagia, aphasia and right sided weakness.

## 2018-10-05 NOTE — PROGRESS NOTE ADULT - SUBJECTIVE AND OBJECTIVE BOX
Patient is a 89y old  Female who presents with a chief complaint of s/p left sided watershed cva with dysphagia, aphasia and right sided weakness. (05 Oct 2018 11:08)      Patient seen and examined at bedside. She is read to go home today, but was asked to revisit this patient because of an elevated WBC count. Patient has NO shortness of breath, NO cough, NO chest pain, NO burning with urination, NO nausea/vomiting or diarrhea. No focal symptoms of any infectious source.    ALLERGIES:  No Known Allergies    MEDICATIONS  (STANDING):  amLODIPine   Tablet 5 milliGRAM(s) Oral daily  aspirin  chewable 81 milliGRAM(s) Oral daily  atorvastatin 40 milliGRAM(s) Oral at bedtime  dextrose 5%. 1000 milliLiter(s) (50 mL/Hr) IV Continuous <Continuous>  dextrose 50% Injectable 12.5 Gram(s) IV Push once  docusate sodium 100 milliGRAM(s) Oral two times a day  enoxaparin Injectable 30 milliGRAM(s) SubCutaneous daily  escitalopram 5 milliGRAM(s) Oral daily  lidocaine   Patch 1 Patch Transdermal daily  magnesium oxide 400 milliGRAM(s) Oral three times a day with meals  metoprolol tartrate 25 milliGRAM(s) Oral every 12 hours  nystatin Powder 1 Application(s) Topical three times a day  oxybutynin 5 milliGRAM(s) Oral two times a day    MEDICATIONS  (PRN):  acetaminophen   Tablet .. 650 milliGRAM(s) Oral every 6 hours PRN Moderate Pain (4 - 6)  bisacodyl Suppository 10 milliGRAM(s) Rectal daily PRN Constipation  dextrose 40% Gel 15 Gram(s) Oral once PRN Blood Glucose LESS THAN 70 milliGRAM(s)/deciliter  glucagon  Injectable 1 milliGRAM(s) IntraMuscular once PRN Glucose LESS THAN 70 milligrams/deciliter    Vital Signs Last 24 Hrs  T(F): 98.3 (05 Oct 2018 08:47), Max: 98.3 (05 Oct 2018 08:47)  HR: 93 (05 Oct 2018 08:47) (75 - 93)  BP: 151/81 (05 Oct 2018 08:47) (146/75 - 174/80)  RR: 14 (05 Oct 2018 08:47) (14 - 14)  SpO2: 95% (05 Oct 2018 08:47) (95% - 95%)  I&O's Summary    04 Oct 2018 07:01  -  05 Oct 2018 07:00  --------------------------------------------------------  IN: 400 mL / OUT: 600 mL / NET: -200 mL      PHYSICAL EXAM:  General: NAD, follows commands  ENT: MMM, dry oral cavity  Neck: Supple, No JVD  Lungs: Clear to auscultation bilaterally  Cardio: RRR, S1/S2  Abdomen: Soft, Nontender, Nondistended; Bowel sounds present  Extremities: No calf tenderness, No pitting edema, + skin tenting    LABS:                        13.9   11.2  )-----------( 268      ( 05 Oct 2018 05:20 )             41.3     10-05    138  |  101  |  23  ----------------------------<  158  3.8   |  30  |  1.23    Ca    9.2      05 Oct 2018 05:20    TPro  7.1  /  Alb  3.1  /  TBili  1.0  /  DBili  x   /  AST  26  /  ALT  17  /  AlkPhos  85  10-05    eGFR if Non African American: 38 mL/min/1.73M2 (10-05-18 @ 05:20)  eGFR if African American: 43 mL/min/1.73M2 (10-05-18 @ 05:20)            09-08 Chol 161 mg/dL LDL 99 mg/dL HDL 33 mg/dL Trig 147 mg/dL        CAPILLARY BLOOD GLUCOSE        09-08 [id number] 6.3    Care Discussed with Consultants/Other Providers: EDUARDA Pink and Dr. Gould

## 2018-10-05 NOTE — DISCHARGE NOTE ADULT - HOSPITAL COURSE
89 year old female with PMH HTN, DM2, dyslipidemia, OA had been in her usual state of health, was getting into the car when noted by family to suddenly become aphasic with pronounced weakness on the right side.  Brought immediately to ED at [facility], CT head negative for acute hemorrhage given TPA and transferred to ICU for further care. LDL 99, A1C 6.3. 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 Hospitalization complicated by constipation and 'pink eye'. Bowel regimen started, and antibiotic treatment for pink eye completed. Creatinine elevation noted-resolving with hydration. Dysphagia diet. patient completed comprehensive PT/OT/SLP program and cleared for d/c home with follow ups. (On 10/5 WBC 11k, asymptomatic. Cleared by medicine for d/c. Patient to follow up with PCP next week for blood work repeat).

## 2018-10-05 NOTE — DISCHARGE NOTE ADULT - NS AS ACTIVITY OBS
No Heavy lifting/straining/Walking-Outdoors allowed/Sex allowed/Showering allowed/Stairs allowed/Do not drive or operate machinery/Walking-Indoors allowed/Do not make important decisions

## 2018-10-05 NOTE — PROGRESS NOTE ADULT - PROBLEM SELECTOR PROBLEM 1
CVA (cerebral vascular accident)
Cerebrovascular accident (CVA) due to other mechanism
CVA (cerebral vascular accident)

## 2018-10-05 NOTE — DISCHARGE NOTE ADULT - ADDITIONAL INSTRUCTIONS
Follow up with PCP Dr Burgos, your WBC lab work was elevated on the day of discharge and it is advised for you to repeat lab work with your PCP next week, Neurology in 1 week. Follow up with PCP Dr Burgos, your WBC lab work was elevated on the day of discharge and it is advised for you to repeat lab work with your PCP next week, Neurology Dr Giordano in 1 week.

## 2018-10-25 PROCEDURE — 97167 OT EVAL HIGH COMPLEX 60 MIN: CPT

## 2018-10-25 PROCEDURE — 83036 HEMOGLOBIN GLYCOSYLATED A1C: CPT

## 2018-10-25 PROCEDURE — 80053 COMPREHEN METABOLIC PANEL: CPT

## 2018-10-25 PROCEDURE — 70450 CT HEAD/BRAIN W/O DYE: CPT

## 2018-10-25 PROCEDURE — 84100 ASSAY OF PHOSPHORUS: CPT

## 2018-10-25 PROCEDURE — 93880 EXTRACRANIAL BILAT STUDY: CPT

## 2018-10-25 PROCEDURE — 83735 ASSAY OF MAGNESIUM: CPT

## 2018-10-25 PROCEDURE — 97116 GAIT TRAINING THERAPY: CPT

## 2018-10-25 PROCEDURE — 80048 BASIC METABOLIC PNL TOTAL CA: CPT

## 2018-10-25 PROCEDURE — 70551 MRI BRAIN STEM W/O DYE: CPT

## 2018-10-25 PROCEDURE — 70547 MR ANGIOGRAPHY NECK W/O DYE: CPT

## 2018-10-25 PROCEDURE — 80061 LIPID PANEL: CPT

## 2018-10-25 PROCEDURE — 93005 ELECTROCARDIOGRAM TRACING: CPT

## 2018-10-25 PROCEDURE — 97162 PT EVAL MOD COMPLEX 30 MIN: CPT

## 2018-10-25 PROCEDURE — 74230 X-RAY XM SWLNG FUNCJ C+: CPT

## 2018-10-25 PROCEDURE — 37195 THROMBOLYTIC THERAPY STROKE: CPT

## 2018-10-25 PROCEDURE — 84484 ASSAY OF TROPONIN QUANT: CPT

## 2018-10-25 PROCEDURE — 97530 THERAPEUTIC ACTIVITIES: CPT

## 2018-10-25 PROCEDURE — 85730 THROMBOPLASTIN TIME PARTIAL: CPT

## 2018-10-25 PROCEDURE — 99291 CRITICAL CARE FIRST HOUR: CPT | Mod: 25

## 2018-10-25 PROCEDURE — 92526 ORAL FUNCTION THERAPY: CPT

## 2018-10-25 PROCEDURE — 85027 COMPLETE CBC AUTOMATED: CPT

## 2018-10-25 PROCEDURE — 85610 PROTHROMBIN TIME: CPT

## 2018-10-25 PROCEDURE — 82962 GLUCOSE BLOOD TEST: CPT

## 2018-10-25 PROCEDURE — 70544 MR ANGIOGRAPHY HEAD W/O DYE: CPT

## 2018-10-25 PROCEDURE — 97112 NEUROMUSCULAR REEDUCATION: CPT

## 2018-10-25 PROCEDURE — 93306 TTE W/DOPPLER COMPLETE: CPT

## 2018-10-25 PROCEDURE — 92611 MOTION FLUOROSCOPY/SWALLOW: CPT

## 2018-10-25 PROCEDURE — 71045 X-RAY EXAM CHEST 1 VIEW: CPT

## 2018-11-11 PROCEDURE — 97112 NEUROMUSCULAR REEDUCATION: CPT

## 2018-11-11 PROCEDURE — 92526 ORAL FUNCTION THERAPY: CPT

## 2018-11-11 PROCEDURE — 97535 SELF CARE MNGMENT TRAINING: CPT

## 2018-11-11 PROCEDURE — 82962 GLUCOSE BLOOD TEST: CPT

## 2018-11-11 PROCEDURE — 85027 COMPLETE CBC AUTOMATED: CPT

## 2018-11-11 PROCEDURE — 97167 OT EVAL HIGH COMPLEX 60 MIN: CPT

## 2018-11-11 PROCEDURE — 92507 TX SP LANG VOICE COMM INDIV: CPT

## 2018-11-11 PROCEDURE — 92610 EVALUATE SWALLOWING FUNCTION: CPT

## 2018-11-11 PROCEDURE — 97530 THERAPEUTIC ACTIVITIES: CPT

## 2018-11-11 PROCEDURE — 97163 PT EVAL HIGH COMPLEX 45 MIN: CPT

## 2018-11-11 PROCEDURE — 97110 THERAPEUTIC EXERCISES: CPT

## 2018-11-11 PROCEDURE — 97116 GAIT TRAINING THERAPY: CPT

## 2018-11-11 PROCEDURE — 80048 BASIC METABOLIC PNL TOTAL CA: CPT

## 2018-11-11 PROCEDURE — 80053 COMPREHEN METABOLIC PANEL: CPT

## 2018-11-11 PROCEDURE — 92523 SPEECH SOUND LANG COMPREHEN: CPT

## 2019-02-16 NOTE — PROGRESS NOTE ADULT - PROBLEM SELECTOR PLAN 5
Diet advanced to mechanical  soft c nectar/dysphagia diet, aspiration precautions. Encourage fluids. no neck mass

## 2019-03-11 NOTE — PHYSICAL THERAPY INITIAL EVALUATION ADULT - STANDING BALANCE: STATIC
March 11, 2019      [facility]  [address] [place] [address]  Phone: [phone]  Fax: [phone]       Patient: Boom Blanco   YOB: 1968  Date of Visit: 03/11/2019    To Whom It May Concern:    Corazon Blanco  was at [facility] on 03/11/2019.  She is scheduled to proceed with a right total knee arthroplasty on 04/04/2019.  Please review an adjust her pain management protocol to cover her for postoperative pain control.  She will be given narcotic pain meds while admitted to the hospital postoperatively.  If you have any questions or concerns, or if I can be of further assistance, please do not hesitate to contact me.    Sincerely,    Sylvain Gorman, DO     
fair balance

## 2019-09-13 ENCOUNTER — INPATIENT (INPATIENT)
Facility: HOSPITAL | Age: 84
LOS: 6 days | Discharge: HOME | DRG: 57 | End: 2019-09-20
Attending: INTERNAL MEDICINE | Admitting: INTERNAL MEDICINE
Payer: COMMERCIAL

## 2019-09-13 VITALS
DIASTOLIC BLOOD PRESSURE: 79 MMHG | WEIGHT: 119.93 LBS | TEMPERATURE: 98 F | OXYGEN SATURATION: 95 % | SYSTOLIC BLOOD PRESSURE: 167 MMHG | HEIGHT: 61 IN | HEART RATE: 87 BPM | RESPIRATION RATE: 17 BRPM

## 2019-09-13 DIAGNOSIS — I21.4 NON-ST ELEVATION (NSTEMI) MYOCARDIAL INFARCTION: ICD-10-CM

## 2019-09-13 DIAGNOSIS — Z96.643 PRESENCE OF ARTIFICIAL HIP JOINT, BILATERAL: Chronic | ICD-10-CM

## 2019-09-13 LAB
ALBUMIN SERPL ELPH-MCNC: 3.7 G/DL — SIGNIFICANT CHANGE UP (ref 3.3–5)
ALP SERPL-CCNC: 81 U/L — SIGNIFICANT CHANGE UP (ref 40–120)
ALT FLD-CCNC: 15 U/L — SIGNIFICANT CHANGE UP (ref 10–45)
ANION GAP SERPL CALC-SCNC: 8 MMOL/L — SIGNIFICANT CHANGE UP (ref 5–17)
APPEARANCE UR: CLEAR — SIGNIFICANT CHANGE UP
APTT BLD: 31.2 SEC — SIGNIFICANT CHANGE UP (ref 27.5–36.3)
AST SERPL-CCNC: 24 U/L — SIGNIFICANT CHANGE UP (ref 10–40)
BACTERIA # UR AUTO: ABNORMAL /HPF
BASOPHILS # BLD AUTO: 0.05 K/UL — SIGNIFICANT CHANGE UP (ref 0–0.2)
BASOPHILS NFR BLD AUTO: 0.5 % — SIGNIFICANT CHANGE UP (ref 0–2)
BILIRUB SERPL-MCNC: 1.1 MG/DL — SIGNIFICANT CHANGE UP (ref 0.2–1.2)
BILIRUB UR-MCNC: NEGATIVE — SIGNIFICANT CHANGE UP
BUN SERPL-MCNC: 30 MG/DL — HIGH (ref 7–23)
CALCIUM SERPL-MCNC: 9.2 MG/DL — SIGNIFICANT CHANGE UP (ref 8.4–10.5)
CHLORIDE SERPL-SCNC: 104 MMOL/L — SIGNIFICANT CHANGE UP (ref 96–108)
CO2 SERPL-SCNC: 28 MMOL/L — SIGNIFICANT CHANGE UP (ref 22–31)
COLOR SPEC: YELLOW — SIGNIFICANT CHANGE UP
CREAT SERPL-MCNC: 1.45 MG/DL — HIGH (ref 0.5–1.3)
DIFF PNL FLD: NEGATIVE — SIGNIFICANT CHANGE UP
EOSINOPHIL # BLD AUTO: 0.06 K/UL — SIGNIFICANT CHANGE UP (ref 0–0.5)
EOSINOPHIL NFR BLD AUTO: 0.6 % — SIGNIFICANT CHANGE UP (ref 0–6)
EPI CELLS # UR: SIGNIFICANT CHANGE UP
GLUCOSE SERPL-MCNC: 233 MG/DL — HIGH (ref 70–99)
GLUCOSE UR QL: 50 MG/DL
HCT VFR BLD CALC: 42.3 % — SIGNIFICANT CHANGE UP (ref 34.5–45)
HGB BLD-MCNC: 13.9 G/DL — SIGNIFICANT CHANGE UP (ref 11.5–15.5)
IMM GRANULOCYTES NFR BLD AUTO: 0.5 % — SIGNIFICANT CHANGE UP (ref 0–1.5)
INR BLD: 1.07 RATIO — SIGNIFICANT CHANGE UP (ref 0.88–1.16)
KETONES UR-MCNC: NEGATIVE — SIGNIFICANT CHANGE UP
LEUKOCYTE ESTERASE UR-ACNC: NEGATIVE — SIGNIFICANT CHANGE UP
LYMPHOCYTES # BLD AUTO: 1.29 K/UL — SIGNIFICANT CHANGE UP (ref 1–3.3)
LYMPHOCYTES # BLD AUTO: 12.8 % — LOW (ref 13–44)
MCHC RBC-ENTMCNC: 30.5 PG — SIGNIFICANT CHANGE UP (ref 27–34)
MCHC RBC-ENTMCNC: 32.9 GM/DL — SIGNIFICANT CHANGE UP (ref 32–36)
MCV RBC AUTO: 93 FL — SIGNIFICANT CHANGE UP (ref 80–100)
MONOCYTES # BLD AUTO: 0.77 K/UL — SIGNIFICANT CHANGE UP (ref 0–0.9)
MONOCYTES NFR BLD AUTO: 7.6 % — SIGNIFICANT CHANGE UP (ref 2–14)
NEUTROPHILS # BLD AUTO: 7.89 K/UL — HIGH (ref 1.8–7.4)
NEUTROPHILS NFR BLD AUTO: 78 % — HIGH (ref 43–77)
NITRITE UR-MCNC: NEGATIVE — SIGNIFICANT CHANGE UP
NRBC # BLD: 0 /100 WBCS — SIGNIFICANT CHANGE UP (ref 0–0)
PH UR: 8 — SIGNIFICANT CHANGE UP (ref 5–8)
PLATELET # BLD AUTO: 159 K/UL — SIGNIFICANT CHANGE UP (ref 150–400)
POTASSIUM SERPL-MCNC: 4.4 MMOL/L — SIGNIFICANT CHANGE UP (ref 3.5–5.3)
POTASSIUM SERPL-SCNC: 4.4 MMOL/L — SIGNIFICANT CHANGE UP (ref 3.5–5.3)
PROT SERPL-MCNC: 7.8 G/DL — SIGNIFICANT CHANGE UP (ref 6–8.3)
PROT UR-MCNC: 15
PROTHROM AB SERPL-ACNC: 12 SEC — SIGNIFICANT CHANGE UP (ref 10–12.9)
RBC # BLD: 4.55 M/UL — SIGNIFICANT CHANGE UP (ref 3.8–5.2)
RBC # FLD: 13 % — SIGNIFICANT CHANGE UP (ref 10.3–14.5)
RBC CASTS # UR COMP ASSIST: SIGNIFICANT CHANGE UP /HPF (ref 0–4)
SODIUM SERPL-SCNC: 140 MMOL/L — SIGNIFICANT CHANGE UP (ref 135–145)
SP GR SPEC: 1.01 — SIGNIFICANT CHANGE UP (ref 1.01–1.02)
TROPONIN I SERPL-MCNC: 0.08 NG/ML — HIGH (ref 0.02–0.06)
UROBILINOGEN FLD QL: NEGATIVE — SIGNIFICANT CHANGE UP
WBC # BLD: 10.11 K/UL — SIGNIFICANT CHANGE UP (ref 3.8–10.5)
WBC # FLD AUTO: 10.11 K/UL — SIGNIFICANT CHANGE UP (ref 3.8–10.5)
WBC UR QL: SIGNIFICANT CHANGE UP /HPF (ref 0–5)

## 2019-09-13 PROCEDURE — 70450 CT HEAD/BRAIN W/O DYE: CPT | Mod: 26

## 2019-09-13 PROCEDURE — 70450 CT HEAD/BRAIN W/O DYE: CPT | Mod: 26,77

## 2019-09-13 PROCEDURE — 99223 1ST HOSP IP/OBS HIGH 75: CPT

## 2019-09-13 PROCEDURE — 93010 ELECTROCARDIOGRAM REPORT: CPT

## 2019-09-13 PROCEDURE — 71045 X-RAY EXAM CHEST 1 VIEW: CPT | Mod: 26

## 2019-09-13 PROCEDURE — 99222 1ST HOSP IP/OBS MODERATE 55: CPT

## 2019-09-13 PROCEDURE — 99285 EMERGENCY DEPT VISIT HI MDM: CPT

## 2019-09-13 PROCEDURE — 73060 X-RAY EXAM OF HUMERUS: CPT | Mod: 26,LT

## 2019-09-13 PROCEDURE — 73030 X-RAY EXAM OF SHOULDER: CPT | Mod: 26,LT

## 2019-09-13 PROCEDURE — 73070 X-RAY EXAM OF ELBOW: CPT | Mod: 26,RT

## 2019-09-13 RX ORDER — OXYBUTYNIN CHLORIDE 5 MG
5 TABLET ORAL THREE TIMES A DAY
Refills: 0 | Status: DISCONTINUED | OUTPATIENT
Start: 2019-09-13 | End: 2019-09-20

## 2019-09-13 RX ORDER — AMLODIPINE BESYLATE 2.5 MG/1
5 TABLET ORAL DAILY
Refills: 0 | Status: DISCONTINUED | OUTPATIENT
Start: 2019-09-13 | End: 2019-09-20

## 2019-09-13 RX ORDER — ESCITALOPRAM OXALATE 10 MG/1
5 TABLET, FILM COATED ORAL DAILY
Refills: 0 | Status: DISCONTINUED | OUTPATIENT
Start: 2019-09-13 | End: 2019-09-20

## 2019-09-13 RX ORDER — ASPIRIN/CALCIUM CARB/MAGNESIUM 324 MG
81 TABLET ORAL DAILY
Refills: 0 | Status: DISCONTINUED | OUTPATIENT
Start: 2019-09-13 | End: 2019-09-20

## 2019-09-13 RX ORDER — ASPIRIN/CALCIUM CARB/MAGNESIUM 324 MG
325 TABLET ORAL ONCE
Refills: 0 | Status: COMPLETED | OUTPATIENT
Start: 2019-09-13 | End: 2019-09-13

## 2019-09-13 RX ORDER — PANTOPRAZOLE SODIUM 20 MG/1
40 TABLET, DELAYED RELEASE ORAL
Refills: 0 | Status: DISCONTINUED | OUTPATIENT
Start: 2019-09-13 | End: 2019-09-20

## 2019-09-13 RX ORDER — ENOXAPARIN SODIUM 100 MG/ML
40 INJECTION SUBCUTANEOUS DAILY
Refills: 0 | Status: DISCONTINUED | OUTPATIENT
Start: 2019-09-13 | End: 2019-09-16

## 2019-09-13 RX ORDER — METOPROLOL TARTRATE 50 MG
25 TABLET ORAL EVERY 12 HOURS
Refills: 0 | Status: DISCONTINUED | OUTPATIENT
Start: 2019-09-13 | End: 2019-09-20

## 2019-09-13 RX ORDER — SULFAMETHOXAZOLE
0 POWDER (GRAM) MISCELLANEOUS
Qty: 0 | Refills: 0 | DISCHARGE

## 2019-09-13 RX ORDER — ATORVASTATIN CALCIUM 80 MG/1
40 TABLET, FILM COATED ORAL AT BEDTIME
Refills: 0 | Status: DISCONTINUED | OUTPATIENT
Start: 2019-09-13 | End: 2019-09-20

## 2019-09-13 RX ADMIN — Medication 325 MILLIGRAM(S): at 13:35

## 2019-09-13 RX ADMIN — Medication 1 TABLET(S): at 21:07

## 2019-09-13 RX ADMIN — Medication 300 MILLIGRAM(S): at 21:07

## 2019-09-13 RX ADMIN — ATORVASTATIN CALCIUM 40 MILLIGRAM(S): 80 TABLET, FILM COATED ORAL at 21:07

## 2019-09-13 RX ADMIN — Medication 5 MILLIGRAM(S): at 21:07

## 2019-09-13 RX ADMIN — Medication 25 MILLIGRAM(S): at 21:07

## 2019-09-13 NOTE — ED PROVIDER NOTE - NIH STROKE SCALE: 5A. MOTOR ARM, LEFT, QM
patient able to lift her arm off the bed, but can not keep it up as she has severe shoulder pain/(2) Some effort against gravity; limb cannot get to or maintain (if cued) 90 (or 45) degrees, drifts down to bed, but has some effort against gravity

## 2019-09-13 NOTE — H&P ADULT - HISTORY OF PRESENT ILLNESS
89 y/o F with PMH of 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 Her daughter reports she fell last night and was taken to urgent care, where her abrasions on the right arm was dressed and tetanus was given. Daughter reports pt fell twice again this morning with pt's right leg giving out. Denies head trauma. Denies CP, SOB, palpitations, n/v, abd pain, visual changes, HA, dizziness, blurry vision.     NIHSS scale in ED : 3    B/L arm X ray showed no acute fracture.     CT head negative acute pathology.    In ED, VS stable. EKG showed new TWI in lead III and avF, new RBBB. Mildly elevated trop.

## 2019-09-13 NOTE — H&P ADULT - NSHPLABSRESULTS_GEN_ALL_CORE
T(C): 36.7 (19 @ 11:58), Max: 36.7 (19 @ 11:58)  T(F): 98 (19 @ 11:58), Max: 98 (19 @ 11:58)  HR: 69 (19 @ 16:11) (69 - 87)  BP: 130/73 (19 @ 16:11) (130/73 - 167/79)  RR: 19 (19 @ 16:11) (16 - 20)  SpO2: 97% (19 @ 16:11) (95% - 97%)  Labs:                         13.9   10.11   )-----------(   159      ( 13 Sep 2019 12:30 )              42.3     Neutro%  78.0<H>   Lympho%  12.8<L>   Mono%    7.6     Bands    x            140  |  104  |  30<H>  ----------------------------<  233<H>  4.4   |  28  |  1.45<H>    Ca    9.2      13 Sep 2019 12:30    TPro  7.8  /  Alb  3.7  /  TBili  1.1  /  DBili  x   /  AST  24  /  ALT  15  /  AlkPhos  81      eGFR if : 36 mL/min/1.73M2 (19 @ 12:30)  eGFR if Non African American: 31 mL/min/1.73M2 (19 @ 12:30)      ( 13 Sep 2019 12:30 )   PT: 12.0 sec;   INR: 1.07 ratio;       PTT:31.2 sec    CARDIAC MARKERS ( 13 Sep 2019 15:55 )  .078 ng/mL / x     / x     / x     / x      CARDIAC MARKERS ( 13 Sep 2019 12:30 )  .084 ng/mL / x     / x     / x     / x          Urinalysis Basic - ( 13 Sep 2019 13:22 )    Color: Yellow / Appearance: Clear / S.015 / pH: x  Gluc: x / Ketone: Negative  / Bili: Negative / Urobili: Negative   Blood: x / Protein: 15 / Nitrite: Negative   Leuk Esterase: Negative / RBC: 0-4 /HPF / WBC 0-2 /HPF   Sq Epi: x / Non Sq Epi: Neg.-Few / Bacteria: Few /HPF

## 2019-09-13 NOTE — ED PROVIDER NOTE - OBJECTIVE STATEMENT
89 y/o F with PMH of BOYD rojas 89 y/o F with PMH of CVA x 1 yr ago with residual mild right sided weakness and speech pathology, HTN, DM, HLD was BIB her daughter (Pat) for evaluation of increased RLE weakness, worsened slurred x 2 days and 3 falls since yesterday. Her daughter reports she fell last night and was taken to urgent care, where her abrasions on the right arm was dressed and tetanus was given. Daughter reports she fell again this morning. Denies head trauma. Denies CP, SOB, palpitations, n/v, abd pain, visual changes, HA, dizziness or all other complaints   last normal 2 days ago

## 2019-09-13 NOTE — ED PROVIDER NOTE - NIH STROKE SCALE: 10. DYSARTHRIA, QM
this is worsened from baseline/(1) Mild-to-moderate dysarthria; patient slurs at least some words and, at worst, can be understood with some difficulty

## 2019-09-13 NOTE — ED ADULT NURSE NOTE - CHPI ED NUR SYMPTOMS NEG
no blurred vision/no change in level of consciousness/no numbness/no loss of consciousness/no nausea/no dizziness/no vomiting/no fever

## 2019-09-13 NOTE — ED ADULT NURSE NOTE - NSIMPLEMENTINTERV_GEN_ALL_ED
Implemented All Fall with Harm Risk Interventions:  Luke to call system. Call bell, personal items and telephone within reach. Instruct patient to call for assistance. Room bathroom lighting operational. Non-slip footwear when patient is off stretcher. Physically safe environment: no spills, clutter or unnecessary equipment. Stretcher in lowest position, wheels locked, appropriate side rails in place. Provide visual cue, wrist band, yellow gown, etc. Monitor gait and stability. Monitor for mental status changes and reorient to person, place, and time. Review medications for side effects contributing to fall risk. Reinforce activity limits and safety measures with patient and family. Provide visual clues: red socks.

## 2019-09-13 NOTE — H&P ADULT - NSHPPHYSICALEXAM_GEN_ALL_CORE
GENERAL: NAD, AAO X3  HEAD:  Atraumatic, Normocephalic  EYES: EOMI, PERRLA, conjunctiva and sclera clear  NECK: Supple  CHEST/LUNG: Clear to auscultation bilaterally; No wheeze  HEART: Regular rate and rhythm; +   ABDOMEN: Soft, Nontender, Nondistended; Bowel sounds present  EXTREMITIES:  no edema. + dressing on left hand and right forearm. left shoulder tender to touch  NEUROLOGY: CN II to XII grossly intact except mild right nasolabial flattening. muscle strength: RUE 5/5 LUE unable to fully assess 2/2 shoulder pain. RLE extension 4/5, flexion 5/5. sensation intact. No dysmetria. GENERAL: NAD, AAO X3  HEAD:  Atraumatic, Normocephalic  EYES: EOMI, PERRLA, conjunctiva and sclera clear  NECK: Supple  CHEST/LUNG: Clear to auscultation bilaterally; No wheeze  HEART: Regular rate and rhythm; + systolic murmur  ABDOMEN: Soft, Nontender, Nondistended; Bowel sounds present  EXTREMITIES:  no edema. + dressing on left hand and right forearm. left shoulder tender to touch  NEUROLOGY: CN II to XII grossly intact except mild right nasolabial flattening. muscle strength: RUE 5/5 LUE unable to fully assess 2/2 shoulder pain. RLE extension 4/5, flexion 5/5. sensation intact. No dysmetria.

## 2019-09-13 NOTE — ED PROVIDER NOTE - PHYSICAL EXAMINATION
skin/msk: +ecchymosis of right elbow with TTP, +skin abrasions of right forearm covered with guaze from UC  +left shoulder and humerus TTP, no bruising or obvious deformity noted     +good radial pulses b/l   no neurovascular compromise noted

## 2019-09-13 NOTE — ED PROVIDER NOTE - ATTENDING CONTRIBUTION TO CARE
89 y/o F with PMH of CVA x 1 yr ago with residual mild right sided weakness and speech pathology, HTN, DM, HLD was BIB her daughter (Pat) for evaluation of increased RLE weakness, worsened slurred x 2 days and 3 falls since yesterday. Her daughter reports she fell last night and was taken to urgent care, where her abrasions on the right arm was dressed and tetanus was given. Daughter reports she fell again this morning. Denies head trauma. Denies CP, SOB, palpitations, n/v, abd pain, visual changes, HA, dizziness or all other complaints. last normal 2 days ago. PE as noted above. labs reviewed, trop elevated. Head CT negative. imaging results reviewed, labs reviewed, trop elevated. Patient with new TWI in III and AVF. ASA given. Spoke with neurologist Dr. Lerma, she is aware and will see patient later today  Dr. Nieto:  I have reviewed and discussed with the PA/ resident the case specifics, including the history, physical assessment, evaluation, conclusion, laboratory results, and medical plan. I agree with the contents, and conclusions. I have personally examined, and interviewed the patient.

## 2019-09-13 NOTE — ED PROVIDER NOTE - CARE PLAN
Principal Discharge DX:	NSTEMI (non-ST elevated myocardial infarction) Principal Discharge DX:	NSTEMI (non-ST elevated myocardial infarction)  Secondary Diagnosis:	TIA (transient ischemic attack)

## 2019-09-13 NOTE — H&P ADULT - NSICDXPASTMEDICALHX_GEN_ALL_CORE_FT
PAST MEDICAL HISTORY:  Diabetes Type 2, "many years"    DM (diabetes mellitus)     Dyslipidemia     HTN (hypertension)     HTN (hypertension)     Osteoarthritis     Sciatica

## 2019-09-13 NOTE — CONSULT NOTE ADULT - SUBJECTIVE AND OBJECTIVE BOX
CHIEF COMPLAINT:  Patient is a 89y old  Female who presents with a chief complaint of Frequent fall, worsening R sided weakness and worsening slurred speech (13 Sep 2019 16:00)    HPI:  89 y/o F with PMH of CVA x 1 yr ago with residual mild right sided weakness, dysarthria and dysphagia, CKD3, HTN, DM, HLD, left hand abscess s/p recent I &D was BIB her daughter (Pat) for evaluation of increased RLE weakness, worsened slurred speech x 2 days and frequent falls for 2 days. Her daughter reports she fell last night and was taken to urgent care, where her abrasions on the right arm was dressed and tetanus was given. Daughter reports pt fell twice again this morning with pt's right leg giving out. Denies head trauma. Denies CP, SOB, palpitations, n/v, abd pain, visual changes, HA, dizziness, blurry vision.     NIHSS scale in ED : 3    B/L arm X ray showed no acute fracture.     CT head negative acute pathology.    In ED, VS stable. EKG showed new TWI in lead III and avF, new RBBB. Mildly elevated trop. (13 Sep 2019 16:00)    Seen with family at bedside. Denied chest pain, lightheadedness, dizziness or syncope.      PMH:   Dyslipidemia  Osteoarthritis  Sciatica  Diabetes  HTN (hypertension)  HTN (hypertension)  DM (diabetes mellitus)      PSH:   H/O bilateral hip replacements  S/P hip replacement, bilateral        FAMILY HISTORY:  FHx: leukemia: sister  Family history of CVA: mother  No pertinent family history in first degree relatives      SOCIAL HISTORY:  Smoking:   no  Alcohol:  no      ALLERGIES:  No Known Allergies      Home Medications:  aspirin 81 mg oral tablet, chewable: 1 tab(s) orally once a day (13 Sep 2019 16:10)  Bactrim  mg-160 mg oral tablet: 1 tab(s) orally every 12 hours (13 Sep 2019 16:10)  clindamycin 300 mg oral capsule: 300 milligram(s) orally 3 times a day (13 Sep 2019 16:10)  oxybutynin 5 mg oral tablet: 1 tab(s) orally 3 times a day (13 Sep 2019 16:10)  Protonix 40 mg oral delayed release tablet: 1 tab(s) orally once a day (13 Sep 2019 16:10)      MEDICATIONS:  amLODIPine   Tablet 5 milliGRAM(s) Oral daily  aspirin  chewable 81 milliGRAM(s) Oral daily  atorvastatin 40 milliGRAM(s) Oral at bedtime  clindamycin   Capsule 300 milliGRAM(s) Oral three times a day  enoxaparin Injectable 40 milliGRAM(s) SubCutaneous daily  escitalopram 5 milliGRAM(s) Oral daily  metoprolol tartrate 25 milliGRAM(s) Oral every 12 hours  oxybutynin 5 milliGRAM(s) Oral three times a day  pantoprazole    Tablet 40 milliGRAM(s) Oral before breakfast  trimethoprim  160 mG/sulfamethoxazole 800 mG 1 Tablet(s) Oral every 12 hours      REVIEW OF SYSTEMS: difficult to assess due to aphasia    PHYSICAL EXAM:  T(C): 36.7 (09-13-19 @ 11:58), Max: 36.7 (09-13-19 @ 11:58)  HR: 69 (09-13-19 @ 16:11) (69 - 87)  BP: 130/73 (09-13-19 @ 16:11) (130/73 - 167/79)  RR: 19 (09-13-19 @ 16:11) (16 - 20)  SpO2: 97% (09-13-19 @ 16:11) (95% - 97%)  Wt(kg): --    GENERAL: NAD, well-groomed, well-developed  EYES: EOMI, conjunctiva and sclera clear  ENT: Moist mucous membranes,  NECK:  No JVD, no bruits  CHEST/LUNG: Clear to ausculation and percussion bilaterally; No rales, rhonchi, wheezing, or rubs  HEART: Regular rate and rhythm; 3/6 DWAINE best RUSB  ABDOMEN: Soft, Nontender, Nondistended; Bowel sounds present  EXTREMITIES:   No clubbing, cyanosis, or edema  SKIN: No rashes or lesions  NERVOUS SYSTEM:  Alert Aphasic    Cardiovascular Diagnostic Testing:  ECG:  sinus rbbb    LABS:                        13.9   10.11 )-----------( 159      ( 13 Sep 2019 12:30 )             42.3     09-13    140  |  104  |  30<H>  ----------------------------<  233<H>  4.4   |  28  |  1.45<H>    Ca    9.2      13 Sep 2019 12:30    TPro  7.8  /  Alb  3.7  /  TBili  1.1  /  DBili  x   /  AST  24  /  ALT  15  /  AlkPhos  81  09-13    PT/INR - ( 13 Sep 2019 12:30 )   PT: 12.0 sec;   INR: 1.07 ratio         PTT - ( 13 Sep 2019 12:30 )  PTT:31.2 sec  CARDIAC MARKERS ( 13 Sep 2019 15:55 )  .078 ng/mL / x     / x     / x     / x      CARDIAC MARKERS ( 13 Sep 2019 12:30 )  .084 ng/mL / x     / x     / x     / x          Telemetry:  sinus    IMAGING:  < from: Xray Chest 1 View- PORTABLE-Urgent (09.13.19 @ 13:36) >    EXAM:  XR CHEST PORTABLE URGENT 1V      PROCEDURE DATE:  09/13/2019        INTERPRETATION:  Clinical indication:  Stroke Code    Technique: XR CHEST URGENT    Comparison:9/13/2018.    Findings:  Lines: None    Heart/Mediastinum/Lungs: The heart size is normal.The lungs are   clear.There are no pleural effusions.    Impression:    Clear lungs.    < end of copied text >

## 2019-09-13 NOTE — ED PROVIDER NOTE - PSH
H/O bilateral hip replacements  2000[facility] at [facility], no complications  S/P hip replacement, bilateral

## 2019-09-13 NOTE — ED ADULT NURSE NOTE - PSH
H/O bilateral hip replacements  2000's at [facility], no complications  S/P hip replacement, bilateral

## 2019-09-13 NOTE — CONSULT NOTE ADULT - ASSESSMENT
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 Patient's symptoms resolved and now there is worsening slurred speech and right lower extremity weakness for the past 2 days. Symptoms can be localized to the MARIJA territory. Will advise repeating head CT. Given her buttock mass, will also recommend MRI of the brain with and without gadolinium if repeat head CT is not revealing of infarct.       Patient will be signed out to Dr. Jared Gould who will be on call this weekend. 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 Patient's symptoms resolved in the interim and now there is worsening slurred speech and right lower extremity weakness for the past 2 days. Daughter thinks it may have been 2am in the morning. Symptoms can be localized to the MARIJA territory. Will advise repeating head CT. Continue asa and lipitor    NIHSS 1  tpa n  ldl pending  statin y  dysphagia p  smoking n  afib n  stroke edu y    Patient will be signed out to Dr. Jared Gould who will be on call this weekend. 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 Patient's symptoms resolved in the interim and now there is worsening slurred speech and right lower extremity weakness for the past 2 days. Daughter thinks it may have been 2am in the morning. Symptoms can be localized to the MARIJA territory. Will advise repeating head CT. Continue asa and lipitor    NIHSS 1  tpa n  ldl pending  statin y  dysphagia p  smoking n  afib n  stroke edu y    Dw  Man     Patient will be signed out to Dr. Jared Gould who will be on call this weekend.

## 2019-09-13 NOTE — H&P ADULT - ASSESSMENT
89 y/o F with PMH of 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     # worsened RLE weakness and slurred speech. r/o recurrent CVA  # Frequent falls   # H/O CVA in 2018 with residual right sided weakness, slurred speech and dysphagia  - per neuro: repeat CTH in 6 hour from the first CTH  - c/w ASA and statin   - PT consult, speech and swallow consult  - neuro checks q routine   - pt can have regular food with thickened liquid at home per family    # New TWI in lead III and avF, new RBBB  - mildly elevated trop likely demand ischemia. Pt is asymptomatic.   - echo  - trend troponin  - repeat ekg  - cont asa, statin, BB  - follow up with Cardiology    # recent I & D on left hand abscess  - cont bactrim DS q12 and clindamycin 300mg q8 until 9/20    # CKD 3  - stable    # HTN - cont with amlodipine 5mg and metoprolol 25mg q12     # T2DM  - Hba1c 6.3 last year and pt has been taken off of DM medications per family  - repeat Hba1c  - carb consistent diet  - monitor FS    # DVT ppx: SCD    IMPROVE VTE Individual Risk Assessment    RISK                                                                Points    [  ] Previous VTE                                                  3    [  ] Thrombophilia                                               2    [  ] Lower limb paralysis                                      2        (unable to hold up >15 seconds)      [  ] Current Cancer                                              2         (within 6 months)    [  ] Immobilization > 24 hrs                                1    [  ] ICU/CCU stay > 24 hours                              1    [ X ] Age > 60                                                      1    IMPROVE VTE Score __1_______    IMPROVE Score 0-1: Low Risk, No VTE prophylaxis required for most patients, encourage ambulation.   IMPROVE Score 2-3: At risk, pharmacologic VTE prophylaxis is indicated for most patients (in the absence of a contraindication)  IMPROVE Score > or = 4: High Risk, pharmacologic VTE prophylaxis is indicated for most patients (in the absence of a contraindication) 89 y/o F with PMH of 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     # worsened RLE weakness and slurred speech. r/o recurrent CVA  # Frequent falls   # H/O CVA in 2018 with residual right sided weakness, slurred speech and dysphagia  - per neuro: repeat CTH in 6 hour from the first CTH  - c/w ASA and statin   - PT consult, speech and swallow consult  - neuro checks q routine   - pt can have regular food with thickened liquid at home per family    # New TWI in lead III and avF, new RBBB  - mildly elevated trop likely demand ischemia. Pt is asymptomatic.   - echo  - trend troponin  - repeat ekg  - cont asa, statin, BB  - follow up with Cardiology    # murmur   - follow up echo    # recent I & D on left hand abscess  - cont bactrim DS q12 and clindamycin 300mg q8 until 9/20    # CKD 3  - stable    # HTN - cont with amlodipine 5mg and metoprolol 25mg q12     # T2DM  - Hba1c 6.3 last year and pt has been taken off of DM medications per family  - repeat Hba1c  - carb consistent diet  - monitor FS    # DVT ppx: SCD    IMPROVE VTE Individual Risk Assessment    RISK                                                                Points    [  ] Previous VTE                                                  3    [  ] Thrombophilia                                               2    [  ] Lower limb paralysis                                      2        (unable to hold up >15 seconds)      [  ] Current Cancer                                              2         (within 6 months)    [  ] Immobilization > 24 hrs                                1    [  ] ICU/CCU stay > 24 hours                              1    [ X ] Age > 60                                                      1    IMPROVE VTE Score __1_______    IMPROVE Score 0-1: Low Risk, No VTE prophylaxis required for most patients, encourage ambulation.   IMPROVE Score 2-3: At risk, pharmacologic VTE prophylaxis is indicated for most patients (in the absence of a contraindication)  IMPROVE Score > or = 4: High Risk, pharmacologic VTE prophylaxis is indicated for most patients (in the absence of a contraindication)

## 2019-09-13 NOTE — CONSULT NOTE ADULT - ASSESSMENT
s/p multiple falls  ?recurrent cva  elevated Tn, doubt ACS  RBBB  Murmur  history of DM      Suggest  serial ekg/enzymes, telemetry  echo  neuro evaluation

## 2019-09-13 NOTE — ED PROVIDER NOTE - CLINICAL SUMMARY MEDICAL DECISION MAKING FREE TEXT BOX
89 y/o F with PMH of CVA x 1 yr ago with residual mild right sided weakness and speech pathology, HTN, DM, HLD was BIB her daughter (Pat) for evaluation of increased RLE weakness, worsened slurred x 2 days and 3 falls since yesterday. Her daughter reports she fell last night and was taken to urgent care, where her abrasions on the right arm was dressed and tetanus was given. Daughter reports she fell again this morning. Denies head trauma. Denies CP, SOB, palpitations, n/v, abd pain, visual changes, HA, dizziness or all other complaints. last normal 2 days ago. PE as noted above. labs reviewed, trop elevated. Head CT negative. CXR pending, Reassess. 89 y/o F with PMH of CVA x 1 yr ago with residual mild right sided weakness and speech pathology, HTN, DM, HLD was BIB her daughter (Pat) for evaluation of increased RLE weakness, worsened slurred x 2 days and 3 falls since yesterday. Her daughter reports she fell last night and was taken to urgent care, where her abrasions on the right arm was dressed and tetanus was given. Daughter reports she fell again this morning. Denies head trauma. Denies CP, SOB, palpitations, n/v, abd pain, visual changes, HA, dizziness or all other complaints. last normal 2 days ago. PE as noted above. labs reviewed, trop elevated. Head CT negative. imaging results reviewed, labs reviewed, trop elevated. Patient with new TWI in III and AVF. ASA given. Spoke with neurologist Dr. Lerma, she is aware and will see patient later today

## 2019-09-13 NOTE — H&P ADULT - NSICDXPASTSURGICALHX_GEN_ALL_CORE_FT
PAST SURGICAL HISTORY:  H/O bilateral hip replacements 2000's at [facility] [place], no complications    S/P hip replacement, bilateral

## 2019-09-13 NOTE — ED ADULT NURSE NOTE - OBJECTIVE STATEMENT
Pt BIB family for increased right sided weakness and falls. Pt with PMH of CVA 1 year ago with right sided residual weakness now presenting with frequent falls, increased right leg weakness, and new onset confusion. According to family at bedside, pt has had slurred speech since after last CVA but starting this morning has been worse with increased confusion. Weakness worsened last night at 7pm after her fall. Pt with chronic right sided facial droop since last CVA, drinks nectar thick liquids at home, was able to swallow nectar water upon arrival. Pt c/o left arm pain with movement. Skin tears and ecchymosis to right arm. Pt with recent I&D of left hand and is taking antibiotics outpatient.

## 2019-09-14 DIAGNOSIS — I69.90 UNSPECIFIED SEQUELAE OF UNSPECIFIED CEREBROVASCULAR DISEASE: ICD-10-CM

## 2019-09-14 LAB
ANION GAP SERPL CALC-SCNC: 8 MMOL/L — SIGNIFICANT CHANGE UP (ref 5–17)
BUN SERPL-MCNC: 21 MG/DL — SIGNIFICANT CHANGE UP (ref 7–23)
CALCIUM SERPL-MCNC: 9.1 MG/DL — SIGNIFICANT CHANGE UP (ref 8.4–10.5)
CHLORIDE SERPL-SCNC: 104 MMOL/L — SIGNIFICANT CHANGE UP (ref 96–108)
CO2 SERPL-SCNC: 29 MMOL/L — SIGNIFICANT CHANGE UP (ref 22–31)
CREAT SERPL-MCNC: 1.45 MG/DL — HIGH (ref 0.5–1.3)
GLUCOSE BLDC GLUCOMTR-MCNC: 136 MG/DL — HIGH (ref 70–99)
GLUCOSE SERPL-MCNC: 210 MG/DL — HIGH (ref 70–99)
MAGNESIUM SERPL-MCNC: 1.6 MG/DL — SIGNIFICANT CHANGE UP (ref 1.6–2.6)
POTASSIUM SERPL-MCNC: 4.2 MMOL/L — SIGNIFICANT CHANGE UP (ref 3.5–5.3)
POTASSIUM SERPL-SCNC: 4.2 MMOL/L — SIGNIFICANT CHANGE UP (ref 3.5–5.3)
SODIUM SERPL-SCNC: 141 MMOL/L — SIGNIFICANT CHANGE UP (ref 135–145)
TROPONIN I SERPL-MCNC: 0.08 NG/ML — HIGH (ref 0.02–0.06)

## 2019-09-14 PROCEDURE — 99233 SBSQ HOSP IP/OBS HIGH 50: CPT

## 2019-09-14 PROCEDURE — 99231 SBSQ HOSP IP/OBS SF/LOW 25: CPT

## 2019-09-14 RX ORDER — INSULIN GLARGINE 100 [IU]/ML
6 INJECTION, SOLUTION SUBCUTANEOUS AT BEDTIME
Refills: 0 | Status: DISCONTINUED | OUTPATIENT
Start: 2019-09-14 | End: 2019-09-14

## 2019-09-14 RX ORDER — DEXTROSE 50 % IN WATER 50 %
25 SYRINGE (ML) INTRAVENOUS ONCE
Refills: 0 | Status: DISCONTINUED | OUTPATIENT
Start: 2019-09-14 | End: 2019-09-20

## 2019-09-14 RX ORDER — DEXTROSE 50 % IN WATER 50 %
15 SYRINGE (ML) INTRAVENOUS ONCE
Refills: 0 | Status: DISCONTINUED | OUTPATIENT
Start: 2019-09-14 | End: 2019-09-20

## 2019-09-14 RX ORDER — DEXTROSE 50 % IN WATER 50 %
12.5 SYRINGE (ML) INTRAVENOUS ONCE
Refills: 0 | Status: DISCONTINUED | OUTPATIENT
Start: 2019-09-14 | End: 2019-09-20

## 2019-09-14 RX ORDER — GLUCAGON INJECTION, SOLUTION 0.5 MG/.1ML
1 INJECTION, SOLUTION SUBCUTANEOUS ONCE
Refills: 0 | Status: DISCONTINUED | OUTPATIENT
Start: 2019-09-14 | End: 2019-09-20

## 2019-09-14 RX ORDER — SODIUM CHLORIDE 9 MG/ML
1000 INJECTION, SOLUTION INTRAVENOUS
Refills: 0 | Status: DISCONTINUED | OUTPATIENT
Start: 2019-09-14 | End: 2019-09-20

## 2019-09-14 RX ORDER — INSULIN LISPRO 100/ML
VIAL (ML) SUBCUTANEOUS AT BEDTIME
Refills: 0 | Status: DISCONTINUED | OUTPATIENT
Start: 2019-09-14 | End: 2019-09-20

## 2019-09-14 RX ORDER — INSULIN LISPRO 100/ML
VIAL (ML) SUBCUTANEOUS
Refills: 0 | Status: DISCONTINUED | OUTPATIENT
Start: 2019-09-14 | End: 2019-09-20

## 2019-09-14 RX ADMIN — Medication 5 MILLIGRAM(S): at 16:03

## 2019-09-14 RX ADMIN — Medication 300 MILLIGRAM(S): at 05:36

## 2019-09-14 RX ADMIN — Medication 5 MILLIGRAM(S): at 05:36

## 2019-09-14 RX ADMIN — ENOXAPARIN SODIUM 40 MILLIGRAM(S): 100 INJECTION SUBCUTANEOUS at 16:03

## 2019-09-14 RX ADMIN — Medication 300 MILLIGRAM(S): at 21:43

## 2019-09-14 RX ADMIN — Medication 300 MILLIGRAM(S): at 16:02

## 2019-09-14 RX ADMIN — ATORVASTATIN CALCIUM 40 MILLIGRAM(S): 80 TABLET, FILM COATED ORAL at 21:43

## 2019-09-14 RX ADMIN — Medication 81 MILLIGRAM(S): at 16:02

## 2019-09-14 RX ADMIN — Medication 1 TABLET(S): at 05:36

## 2019-09-14 RX ADMIN — Medication 5 MILLIGRAM(S): at 21:43

## 2019-09-14 RX ADMIN — PANTOPRAZOLE SODIUM 40 MILLIGRAM(S): 20 TABLET, DELAYED RELEASE ORAL at 06:40

## 2019-09-14 RX ADMIN — Medication 1 TABLET(S): at 17:59

## 2019-09-14 RX ADMIN — Medication 25 MILLIGRAM(S): at 17:59

## 2019-09-14 RX ADMIN — Medication 25 MILLIGRAM(S): at 05:36

## 2019-09-14 RX ADMIN — AMLODIPINE BESYLATE 5 MILLIGRAM(S): 2.5 TABLET ORAL at 05:36

## 2019-09-14 RX ADMIN — ESCITALOPRAM OXALATE 5 MILLIGRAM(S): 10 TABLET, FILM COATED ORAL at 16:04

## 2019-09-14 NOTE — PHYSICAL THERAPY INITIAL EVALUATION ADULT - ADDITIONAL COMMENTS
pt lives with daughter in private home.  pt was supervision level home amb with RW prior to admission.

## 2019-09-14 NOTE — SWALLOW BEDSIDE ASSESSMENT ADULT - PHARYNGEAL PHASE
No s/s aspiration with any consistency trialed, laryngeal excursion palpated to be WNL No s/s aspiration, laryngeal excursion palpated to be WFL

## 2019-09-14 NOTE — PROGRESS NOTE ADULT - ASSESSMENT
89 y/o F with PMH of 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     # worsened RLE weakness and slurred speech. r/o recurrent CVA  # Frequent falls   # H/O CVA in 2018 with residual right sided weakness, slurred speech and dysphagia  - follow up neuro. may need MRI brain inpatient  - c/w ASA and statin   - PT consult, speech and swallow consult  - neuro checks q routine     # New TWI in lead III and avF, new RBBB  - mildly elevated trop likely demand ischemia. Pt is asymptomatic.   - echo report pending  - cont asa, statin, BB  - follow up with Cardiology    # murmur   - follow up echo    # recent I & D on left hand abscess  - cont bactrim DS q12 and clindamycin 300mg q8 until 9/20    # CKD 3  - stable    # HTN - cont with amlodipine 5mg and metoprolol 25mg q12     # T2DM   - Serum Glucose Uncontrolled  - Hba1c 6.3 last year and pt has been taken off of DM medications per family  - Hba1c pending  - carb consistent diet  - start lantus 6 unit qHS and cont ISS    # DVT ppx: SCD    IMPROVE VTE Individual Risk Assessment    RISK                                                                Points    [  ] Previous VTE                                                  3    [  ] Thrombophilia                                               2    [  ] Lower limb paralysis                                      2        (unable to hold up >15 seconds)      [  ] Current Cancer                                              2         (within 6 months)    [  ] Immobilization > 24 hrs                                1    [  ] ICU/CCU stay > 24 hours                              1    [ X ] Age > 60                                                      1    IMPROVE VTE Score __1_______    IMPROVE Score 0-1: Low Risk, No VTE prophylaxis required for most patients, encourage ambulation.   IMPROVE Score 2-3: At risk, pharmacologic VTE prophylaxis is indicated for most patients (in the absence of a contraindication)  IMPROVE Score > or = 4: High Risk, pharmacologic VTE prophylaxis is indicated for most patients (in the absence of a contraindication)

## 2019-09-14 NOTE — PROGRESS NOTE ADULT - SUBJECTIVE AND OBJECTIVE BOX
89 yr-old woman with left MCA stroke 1 year ago noted to have recent falls at home and increased slurred speech. CT of head unchanged from prior studies.    Alert and oriented. Mild dysarthria. VF full. Has pain with abduction  of left shoulder. Has good strength in lower limbs.

## 2019-09-14 NOTE — PROGRESS NOTE ADULT - SUBJECTIVE AND OBJECTIVE BOX
Patient is a 89y old  Female who presents with a chief complaint of Frequent fall, worsening R sided weakness and worsening slurred speech (13 Sep 2019 16:30)      Patient seen and examined at bedside. No overnight events reported. Pt reports feeling well.     ALLERGIES:  No Known Allergies    MEDICATIONS  (STANDING):  amLODIPine   Tablet 5 milliGRAM(s) Oral daily  aspirin  chewable 81 milliGRAM(s) Oral daily  atorvastatin 40 milliGRAM(s) Oral at bedtime  clindamycin   Capsule 300 milliGRAM(s) Oral three times a day  enoxaparin Injectable 40 milliGRAM(s) SubCutaneous daily  escitalopram 5 milliGRAM(s) Oral daily  metoprolol tartrate 25 milliGRAM(s) Oral every 12 hours  oxybutynin 5 milliGRAM(s) Oral three times a day  pantoprazole    Tablet 40 milliGRAM(s) Oral before breakfast  trimethoprim  160 mG/sulfamethoxazole 800 mG 1 Tablet(s) Oral every 12 hours    MEDICATIONS  (PRN):    Vital Signs Last 24 Hrs  T(F): 98.7 (14 Sep 2019 09:48), Max: 98.7 (13 Sep 2019 21:00)  HR: 75 (14 Sep 2019 09:48) (69 - 84)  BP: 155/67 (14 Sep 2019 09:48) (130/73 - 177/73)  RR: 16 (14 Sep 2019 09:48) (15 - 20)  SpO2: 93% (14 Sep 2019 09:48) (91% - 97%)  I&O's Summary    13 Sep 2019 07:01  -  14 Sep 2019 07:00  --------------------------------------------------------  IN: 200 mL / OUT: 0 mL / NET: 200 mL          GENERAL: NAD  HEAD:  Atraumatic, Normocephalic  EYES: EOMI, PERRLA, conjunctiva and sclera clear  NECK: Supple, No JVD  CHEST/LUNG: Clear to auscultation bilaterally; No wheeze  HEART: Regular rate and rhythm; No murmurs, rubs, or gallops  ABDOMEN: Soft, Nontender, Nondistended; Bowel sounds present  EXTREMITIES: no edema. Left arm/ shoulder tender to palpate  PSYCH: AAOx3  NEUROLOGY: right facial droop (chronic). + slurred speech. LUE strength limited 2/2 pain. RUE/RLE/ LLE all 5/5 today. RLE strength improved.   SKIN: No rashes or lesions    LABS:                        13.9   10.11 )-----------( 159      ( 13 Sep 2019 12:30 )             42.3         141  |  104  |  21  ----------------------------<  210  4.2   |  29  |  1.45    Ca    9.1      14 Sep 2019 10:30    TPro  7.8  /  Alb  3.7  /  TBili  1.1  /  DBili  x   /  AST  24  /  ALT  15  /  AlkPhos  81          eGFR if African American: 36 mL/min/1.73M2 (19 @ 10:30)  eGFR if Non African American: 31 mL/min/1.73M2 (19 @ 10:30)    PT/INR - ( 13 Sep 2019 12:30 )   PT: 12.0 sec;   INR: 1.07 ratio         PTT - ( 13 Sep 2019 12:30 )  PTT:31.2 sec      CARDIAC MARKERS ( 14 Sep 2019 10:30 )  .078 ng/mL / x     / x     / x     / x      CARDIAC MARKERS ( 13 Sep 2019 19:40 )  .082 ng/mL / x     / x     / x     / x      CARDIAC MARKERS ( 13 Sep 2019 15:55 )  .078 ng/mL / x     / x     / x     / x      CARDIAC MARKERS ( 13 Sep 2019 12:30 )  .084 ng/mL / x     / x     / x     / x            Urinalysis Basic - ( 13 Sep 2019 13:22 )    Color: Yellow / Appearance: Clear / S.015 / pH: x  Gluc: x / Ketone: Negative  / Bili: Negative / Urobili: Negative   Blood: x / Protein: 15 / Nitrite: Negative   Leuk Esterase: Negative / RBC: 0-4 /HPF / WBC 0-2 /HPF   Sq Epi: x / Non Sq Epi: Neg.-Few / Bacteria: Few /HPF        RADIOLOGY & ADDITIONAL TESTS:    Care Discussed with Consultants/Other Providers:

## 2019-09-14 NOTE — CHART NOTE - NSCHARTNOTEFT_GEN_A_CORE
Pt. is diabetic, as per family, non-insulin dependent, managed with diet.  Plan: will change the diet to consistent carbs, monitor FS, order correction insulin and check HgA1C in AM.

## 2019-09-14 NOTE — SWALLOW BEDSIDE ASSESSMENT ADULT - COMMENTS
WBC: 10.11  CXR:  9/13/19 clear lungs  CT Head: 9/13/19 No acute findings WBC: 10.11  CXR:  9/13/19 clear lungs  CT Head: 9/13/19 No acute findings    Patient presents with mild oral-pharyngeal dysphagia with history of consuming thickened liquids. Oral preparation mildly reduced with decreased bolus formation, no suspicion of premature loss of bolus, laryngeal excursion palpated to be WNL. No clinical s/s aspiration with any consistency trialed. Patient would benefit from objective testing for possible diet upgrade.

## 2019-09-14 NOTE — SWALLOW BEDSIDE ASSESSMENT ADULT - SLP PERTINENT HISTORY OF CURRENT PROBLEM
Patient with h/o CVA with residual dysphagia, tolerating cut up foods with nectar thick liquids PTA. Now admitted with r/o new CVA. Patient with h/o CVA with residual dysphagia, tolerating cut up foods with nectar thick liquids PTA. Now admitted with r/o new CVA. s/p multiple falls

## 2019-09-15 LAB
ANION GAP SERPL CALC-SCNC: 8 MMOL/L — SIGNIFICANT CHANGE UP (ref 5–17)
BUN SERPL-MCNC: 25 MG/DL — HIGH (ref 7–23)
CALCIUM SERPL-MCNC: 8.8 MG/DL — SIGNIFICANT CHANGE UP (ref 8.4–10.5)
CHLORIDE SERPL-SCNC: 105 MMOL/L — SIGNIFICANT CHANGE UP (ref 96–108)
CO2 SERPL-SCNC: 26 MMOL/L — SIGNIFICANT CHANGE UP (ref 22–31)
CREAT SERPL-MCNC: 1.26 MG/DL — SIGNIFICANT CHANGE UP (ref 0.5–1.3)
GLUCOSE BLDC GLUCOMTR-MCNC: 112 MG/DL — HIGH (ref 70–99)
GLUCOSE BLDC GLUCOMTR-MCNC: 131 MG/DL — HIGH (ref 70–99)
GLUCOSE BLDC GLUCOMTR-MCNC: 173 MG/DL — HIGH (ref 70–99)
GLUCOSE BLDC GLUCOMTR-MCNC: 177 MG/DL — HIGH (ref 70–99)
GLUCOSE SERPL-MCNC: 133 MG/DL — HIGH (ref 70–99)
HBA1C BLD-MCNC: 7.3 % — HIGH (ref 4–5.6)
HCT VFR BLD CALC: 37.5 % — SIGNIFICANT CHANGE UP (ref 34.5–45)
HGB BLD-MCNC: 12.6 G/DL — SIGNIFICANT CHANGE UP (ref 11.5–15.5)
MCHC RBC-ENTMCNC: 30.4 PG — SIGNIFICANT CHANGE UP (ref 27–34)
MCHC RBC-ENTMCNC: 33.6 GM/DL — SIGNIFICANT CHANGE UP (ref 32–36)
MCV RBC AUTO: 90.6 FL — SIGNIFICANT CHANGE UP (ref 80–100)
NRBC # BLD: 0 /100 WBCS — SIGNIFICANT CHANGE UP (ref 0–0)
PLATELET # BLD AUTO: 147 K/UL — LOW (ref 150–400)
POTASSIUM SERPL-MCNC: 4.1 MMOL/L — SIGNIFICANT CHANGE UP (ref 3.5–5.3)
POTASSIUM SERPL-SCNC: 4.1 MMOL/L — SIGNIFICANT CHANGE UP (ref 3.5–5.3)
RBC # BLD: 4.14 M/UL — SIGNIFICANT CHANGE UP (ref 3.8–5.2)
RBC # FLD: 13 % — SIGNIFICANT CHANGE UP (ref 10.3–14.5)
SODIUM SERPL-SCNC: 139 MMOL/L — SIGNIFICANT CHANGE UP (ref 135–145)
WBC # BLD: 9.08 K/UL — SIGNIFICANT CHANGE UP (ref 3.8–10.5)
WBC # FLD AUTO: 9.08 K/UL — SIGNIFICANT CHANGE UP (ref 3.8–10.5)

## 2019-09-15 PROCEDURE — 99231 SBSQ HOSP IP/OBS SF/LOW 25: CPT

## 2019-09-15 PROCEDURE — 99233 SBSQ HOSP IP/OBS HIGH 50: CPT

## 2019-09-15 RX ADMIN — Medication 5 MILLIGRAM(S): at 21:35

## 2019-09-15 RX ADMIN — Medication 1 TABLET(S): at 05:37

## 2019-09-15 RX ADMIN — Medication 1: at 12:27

## 2019-09-15 RX ADMIN — Medication 5 MILLIGRAM(S): at 05:37

## 2019-09-15 RX ADMIN — Medication 25 MILLIGRAM(S): at 17:17

## 2019-09-15 RX ADMIN — ESCITALOPRAM OXALATE 5 MILLIGRAM(S): 10 TABLET, FILM COATED ORAL at 12:14

## 2019-09-15 RX ADMIN — Medication 300 MILLIGRAM(S): at 14:39

## 2019-09-15 RX ADMIN — PANTOPRAZOLE SODIUM 40 MILLIGRAM(S): 20 TABLET, DELAYED RELEASE ORAL at 05:43

## 2019-09-15 RX ADMIN — Medication 1 TABLET(S): at 17:17

## 2019-09-15 RX ADMIN — ENOXAPARIN SODIUM 40 MILLIGRAM(S): 100 INJECTION SUBCUTANEOUS at 12:14

## 2019-09-15 RX ADMIN — ATORVASTATIN CALCIUM 40 MILLIGRAM(S): 80 TABLET, FILM COATED ORAL at 21:34

## 2019-09-15 RX ADMIN — Medication 300 MILLIGRAM(S): at 05:38

## 2019-09-15 RX ADMIN — Medication 5 MILLIGRAM(S): at 14:39

## 2019-09-15 RX ADMIN — AMLODIPINE BESYLATE 5 MILLIGRAM(S): 2.5 TABLET ORAL at 05:43

## 2019-09-15 RX ADMIN — Medication 25 MILLIGRAM(S): at 05:38

## 2019-09-15 RX ADMIN — Medication 81 MILLIGRAM(S): at 12:14

## 2019-09-15 RX ADMIN — Medication 1: at 18:13

## 2019-09-15 RX ADMIN — Medication 300 MILLIGRAM(S): at 21:35

## 2019-09-15 NOTE — PROGRESS NOTE ADULT - SUBJECTIVE AND OBJECTIVE BOX
Patient is a 89y old  Female who presents with a chief complaint of Frequent fall, worsening R sided weakness and worsening slurred speech.  Pt notes chest pain has improved  pt has history of expressive aphasia from old incident.  Pt feels good without complaints and would like to go home.      Patient seen and examined at bedside.    ALLERGIES:  No Known Allergies    MEDICATIONS  (STANDING):  amLODIPine   Tablet 5 milliGRAM(s) Oral daily  aspirin  chewable 81 milliGRAM(s) Oral daily  atorvastatin 40 milliGRAM(s) Oral at bedtime  clindamycin   Capsule 300 milliGRAM(s) Oral three times a day  dextrose 5%. 1000 milliLiter(s) (50 mL/Hr) IV Continuous <Continuous>  dextrose 50% Injectable 12.5 Gram(s) IV Push once  dextrose 50% Injectable 25 Gram(s) IV Push once  dextrose 50% Injectable 25 Gram(s) IV Push once  enoxaparin Injectable 40 milliGRAM(s) SubCutaneous daily  escitalopram 5 milliGRAM(s) Oral daily  insulin lispro (HumaLOG) corrective regimen sliding scale   SubCutaneous three times a day before meals  insulin lispro (HumaLOG) corrective regimen sliding scale   SubCutaneous at bedtime  metoprolol tartrate 25 milliGRAM(s) Oral every 12 hours  oxybutynin 5 milliGRAM(s) Oral three times a day  pantoprazole    Tablet 40 milliGRAM(s) Oral before breakfast  trimethoprim  160 mG/sulfamethoxazole 800 mG 1 Tablet(s) Oral every 12 hours    MEDICATIONS  (PRN):  dextrose 40% Gel 15 Gram(s) Oral once PRN Blood Glucose LESS THAN 70 milliGRAM(s)/deciliter  glucagon  Injectable 1 milliGRAM(s) IntraMuscular once PRN Glucose LESS THAN 70 milligrams/deciliter    Vital Signs Last 24 Hrs  T(F): 98 (15 Sep 2019 05:14), Max: 98.7 (14 Sep 2019 09:48)  HR: 76 (15 Sep 2019 05:14) (74 - 83)  BP: 149/70 (15 Sep 2019 05:14) (143/57 - 155/67)  RR: 15 (15 Sep 2019 05:14) (15 - 16)  SpO2: 95% (15 Sep 2019 05:14) (91% - 95%)  I&O's Summary    14 Sep 2019 07:01  -  15 Sep 2019 07:00  --------------------------------------------------------  IN: 200 mL / OUT: 0 mL / NET: 200 mL      PHYSICAL EXAM:  General: NAD, A/O x 2 expressive aphasia follows al commands   ENT: MMM  Neck: Supple, No JVD  Lungs: Clear to auscultation bilaterally, Non labored breathing   Cardio: RRR, S1/S2, No murmurs  Abdomen: Soft, Nontender, Nondistended; Bowel sounds present  Extremities: No calf tenderness, No pitting edema, left hand wound dressed     LABS:                        12.6   9.08  )-----------( 147      ( 15 Sep 2019 06:00 )             37.5     09-15    139  |  105  |  25  ----------------------------<  133  4.1   |  26  |  1.26    Ca    8.8      15 Sep 2019 06:00  Mg     1.6         TPro  7.8  /  Alb  3.7  /  TBili  1.1  /  DBili  x   /  AST  24  /  ALT  15  /  AlkPhos  81      eGFR if Non African American: 36 mL/min/1.73M2 (09-15-19 @ 06:00)  eGFR if African American: 42 mL/min/1.73M2 (09-15-19 @ 06:00)    PT/INR - ( 13 Sep 2019 12:30 )   PT: 12.0 sec;   INR: 1.07 ratio         PTT - ( 13 Sep 2019 12:30 )  PTT:31.2 sec    CARDIAC MARKERS ( 14 Sep 2019 10:30 )  .078 ng/mL / x     / x     / x     / x      CARDIAC MARKERS ( 13 Sep 2019 19:40 )  .082 ng/mL / x     / x     / x     / x      CARDIAC MARKERS ( 13 Sep 2019 15:55 )  .078 ng/mL / x     / x     / x     / x      CARDIAC MARKERS ( 13 Sep 2019 12:30 )  .084 ng/mL / x     / x     / x     / x                        POCT Blood Glucose.: 131 mg/dL (15 Sep 2019 07:49)  POCT Blood Glucose.: 136 mg/dL (14 Sep 2019 21:53)    09-13 [id number] 7.3    Urinalysis Basic - ( 13 Sep 2019 13:22 )    Color: Yellow / Appearance: Clear / S.015 / pH: x  Gluc: x / Ketone: Negative  / Bili: Negative / Urobili: Negative   Blood: x / Protein: 15 / Nitrite: Negative   Leuk Esterase: Negative / RBC: 0-4 /HPF / WBC 0-2 /HPF   Sq Epi: x / Non Sq Epi: Neg.-Few / Bacteria: Few /HPF        RADIOLOGY & ADDITIONAL TESTS:  Echo results   Care Discussed with Consultants/Other Providers:   Summary:   1. Mild to at worst moderate calcific aortic stenosis   2. Left ventricular ejection fraction, by visual estimation, is 55 to   60%.   3. Spectral Doppler shows pseudonormal pattern of left ventricular   myocardial filling (Grade II diastolic dysfunction).   4. Mild thickening and calcification of the anterior and posterior   mitral valve leaflets.   5. Mild-moderate tricuspid regurgitation.   6. Estimated pulmonary artery systolic pressure is 39.6 mmHg assuming a   right atrial pressure of 10 mmHg, which is consistent with borderline   pulmonary hypertension.   7. LA volume Index is 41.0 ml/m² ml/m2. Patient is a 89y old  Female who presents with a chief complaint of Frequent fall, worsening R sided weakness and worsening slurred speech.  Pt notes chest pain has improved  pt has history of expressive aphasia from old incident.  Pt feels good without complaints and would like to go home.      Patient seen and examined at bedside.    ALLERGIES:  No Known Allergies    MEDICATIONS  (STANDING):  amLODIPine   Tablet 5 milliGRAM(s) Oral daily  aspirin  chewable 81 milliGRAM(s) Oral daily  atorvastatin 40 milliGRAM(s) Oral at bedtime  clindamycin   Capsule 300 milliGRAM(s) Oral three times a day  dextrose 5%. 1000 milliLiter(s) (50 mL/Hr) IV Continuous <Continuous>  dextrose 50% Injectable 12.5 Gram(s) IV Push once  dextrose 50% Injectable 25 Gram(s) IV Push once  dextrose 50% Injectable 25 Gram(s) IV Push once  enoxaparin Injectable 40 milliGRAM(s) SubCutaneous daily  escitalopram 5 milliGRAM(s) Oral daily  insulin lispro (HumaLOG) corrective regimen sliding scale   SubCutaneous three times a day before meals  insulin lispro (HumaLOG) corrective regimen sliding scale   SubCutaneous at bedtime  metoprolol tartrate 25 milliGRAM(s) Oral every 12 hours  oxybutynin 5 milliGRAM(s) Oral three times a day  pantoprazole    Tablet 40 milliGRAM(s) Oral before breakfast  trimethoprim  160 mG/sulfamethoxazole 800 mG 1 Tablet(s) Oral every 12 hours    MEDICATIONS  (PRN):  dextrose 40% Gel 15 Gram(s) Oral once PRN Blood Glucose LESS THAN 70 milliGRAM(s)/deciliter  glucagon  Injectable 1 milliGRAM(s) IntraMuscular once PRN Glucose LESS THAN 70 milligrams/deciliter    Vital Signs Last 24 Hrs  T(F): 98 (15 Sep 2019 05:14), Max: 98.7 (14 Sep 2019 09:48)  HR: 76 (15 Sep 2019 05:14) (74 - 83)  BP: 149/70 (15 Sep 2019 05:14) (143/57 - 155/67)  RR: 15 (15 Sep 2019 05:14) (15 - 16)  SpO2: 95% (15 Sep 2019 05:14) (91% - 95%)  I&O's Summary    14 Sep 2019 07:01  -  15 Sep 2019 07:00  --------------------------------------------------------  IN: 200 mL / OUT: 0 mL / NET: 200 mL      PHYSICAL EXAM:  General: NAD, A/O x 2 expressive aphasia follows al commands   ENT: MMM  Neck: Supple, No JVD  Lungs: Clear to auscultation bilaterally, Non labored breathing   Cardio: +S1/S2, +murmurs  Abdomen: Soft, Nontender, Nondistended; Bowel sounds present  Extremities: No calf tenderness, No pitting edema b/l le; left hand wound dressed     LABS:                        12.6   9.08  )-----------( 147      ( 15 Sep 2019 06:00 )             37.5     09-15    139  |  105  |  25  ----------------------------<  133  4.1   |  26  |  1.26    Ca    8.8      15 Sep 2019 06:00  Mg     1.6         TPro  7.8  /  Alb  3.7  /  TBili  1.1  /  DBili  x   /  AST  24  /  ALT  15  /  AlkPhos  81      eGFR if Non African American: 36 mL/min/1.73M2 (09-15-19 @ 06:00)  eGFR if African American: 42 mL/min/1.73M2 (09-15-19 @ 06:00)    PT/INR - ( 13 Sep 2019 12:30 )   PT: 12.0 sec;   INR: 1.07 ratio         PTT - ( 13 Sep 2019 12:30 )  PTT:31.2 sec    CARDIAC MARKERS ( 14 Sep 2019 10:30 )  .078 ng/mL / x     / x     / x     / x      CARDIAC MARKERS ( 13 Sep 2019 19:40 )  .082 ng/mL / x     / x     / x     / x      CARDIAC MARKERS ( 13 Sep 2019 15:55 )  .078 ng/mL / x     / x     / x     / x      CARDIAC MARKERS ( 13 Sep 2019 12:30 )  .084 ng/mL / x     / x     / x     / x        Glucose  POCT Blood Glucose.: 131 mg/dL (15 Sep 2019 07:49)  POCT Blood Glucose.: 136 mg/dL (14 Sep 2019 21:53)     [id number] 7.3    Urinalysis Basic - ( 13 Sep 2019 13:22 )  Color: Yellow / Appearance: Clear / S.015 / pH: x  Gluc: x / Ketone: Negative  / Bili: Negative / Urobili: Negative   Blood: x / Protein: 15 / Nitrite: Negative   Leuk Esterase: Negative / RBC: 0-4 /HPF / WBC 0-2 /HPF   Sq Epi: x / Non Sq Epi: Neg.-Few / Bacteria: Few /HPF      RADIOLOGY & ADDITIONAL TESTS:  Echo results   Care Discussed with Consultants/Other Providers:   Summary:   1. Mild to at worst moderate calcific aortic stenosis   2. Left ventricular ejection fraction, by visual estimation, is 55 to   60%.   3. Spectral Doppler shows pseudonormal pattern of left ventricular   myocardial filling (Grade II diastolic dysfunction).   4. Mild thickening and calcification of the anterior and posterior   mitral valve leaflets.   5. Mild-moderate tricuspid regurgitation.   6. Estimated pulmonary artery systolic pressure is 39.6 mmHg assuming a   right atrial pressure of 10 mmHg, which is consistent with borderline   pulmonary hypertension.   7. LA volume Index is 41.0 ml/m² ml/m2.

## 2019-09-15 NOTE — PROGRESS NOTE ADULT - ASSESSMENT
89 y/o F with PMH of CVA x 1 yr ago with residual mild right sided weakness, dysarthria and dysphagia, CKD3, HTN, DM, HLD, left hand abscess s/p recent I &D was BIB her daughter (Pat) for evaluation of increased RLE weakness, worsened slurred speech x 2 days and frequent falls for 2 days.     # worsened RLE weakness and slurred speech. r/o recurrent CVA  # Frequent falls   # H/O CVA in 2018 with residual right sided weakness, slurred speech and dysphagia  - follow up neuro. may need MRI brain inpatient  - c/w ASA and statin   - PT consult, speech and swallow consult  - neuro checks q routine     # New TWI in lead III and avF, new RBBB  - mildly elevated trop likely demand ischemia. Pt is asymptomatic.   - echo with Mild AS, LVF intact, mild to mod tricuspid reg, and boarderline Pulm htn  - cont asa, statin, BB  - follow up with Cardiology    # murmur   - follow up echo- as above     # recent I & D on left hand abscess  - cont bactrim DS q12 and clindamycin 300mg q8 until 9/20    # CKD 3  - stable    # HTN - cont with amlodipine 5mg and metoprolol 25mg q12 BP , SBP  143-156 acceptable     # T2DM   - Hba1c 6.3 last year and pt has been taken off of DM medications per family  - Hba1c pending  - carb consistent diet  - cont ISS    # DVT ppx: SCD 89 y/o F with PMH of CVA x 1 yr ago with residual mild right sided weakness, dysarthria and dysphagia, CKD3, HTN, DM, HLD, left hand abscess s/p recent I &D was BIB her daughter (Pat) for evaluation of increased RLE weakness, worsened slurred speech x 2 days and frequent falls for 2 days.     # worsened RLE weakness and slurred speech. r/o recurrent CVA w/  Frequent falls   - H/O CVA in 2018 with residual right sided weakness, slurred speech and dysphagia  - follow up neuro. may need MRI brain inpatient  - c/w ASA and statin   - PT consult, speech and swallow consult  - neuro checks q routine     # New TWI in lead III and avF, new RBBB  - mildly elevated trop likely demand ischemia. Pt is asymptomatic.   - echo with Mild AS, LVF intact, mild to mod tricuspid reg, and boarderline Pulm htn  - cont asa, statin, BB  - follow up with Cardiology    # murmur   - echo as above     # recent I & D on left hand abscess  - cont bactrim DS q12 and clindamycin 300mg q8 until 9/20    # CKD 3  - stable    # HTN - cont with amlodipine 5mg and metoprolol 25mg q12 BP , SBP  143-156 acceptable     # T2DM   - Hba1c 6.3 last year and pt has been taken off of DM medications per family  - 7.3 a1c on admit  - carb consistent diet  - cont ISS    # DVT ppx: SCD 89 y/o F with PMH of 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     # worsened RLE weakness and slurred speech. r/o recurrent CVA w/  Frequent falls   - H/O CVA in 2018 with residual right sided weakness, slurred speech and dysphagia  - follow up neuro. may need MRI brain inpatient  - c/w ASA and statin   - PT consult, speech and swallow consult  - neuro checks q routine   - awaiting MRI monday     # New TWI in lead III and avF, new RBBB  - mildly elevated trop likely demand ischemia. Pt is asymptomatic.   - echo with Mild AS, LVF intact, mild to mod tricuspid reg, and boarderline Pulm htn  - cont asa, statin, BB  - follow up with Cardiology    # murmur   - echo as above     # recent I & D on left hand abscess  - cont bactrim DS q12 and clindamycin 300mg q8 until 9/20    # CKD 3  - stable    # HTN - cont with amlodipine 5mg and metoprolol 25mg q12 BP , SBP  143-156 acceptable     # T2DM   - Hba1c 6.3 last year and pt has been taken off of DM medications per family  - 7.3 a1c on admit  - carb consistent diet  - cont ISS    # DVT ppx: SCD

## 2019-09-15 NOTE — PROGRESS NOTE ADULT - SUBJECTIVE AND OBJECTIVE BOX
Follow up for cardiac issues  SUBJ: patient without complaints no cp no sob  PMH  Dyslipidemia  Osteoarthritis  Sciatica  Diabetes  HTN (hypertension)  HTN (hypertension)  DM (diabetes mellitus)      MEDICATIONS  (STANDING):  amLODIPine   Tablet 5 milliGRAM(s) Oral daily  aspirin  chewable 81 milliGRAM(s) Oral daily  atorvastatin 40 milliGRAM(s) Oral at bedtime  clindamycin   Capsule 300 milliGRAM(s) Oral three times a day  dextrose 5%. 1000 milliLiter(s) (50 mL/Hr) IV Continuous <Continuous>  dextrose 50% Injectable 12.5 Gram(s) IV Push once  dextrose 50% Injectable 25 Gram(s) IV Push once  dextrose 50% Injectable 25 Gram(s) IV Push once  enoxaparin Injectable 40 milliGRAM(s) SubCutaneous daily  escitalopram 5 milliGRAM(s) Oral daily  insulin lispro (HumaLOG) corrective regimen sliding scale   SubCutaneous three times a day before meals  insulin lispro (HumaLOG) corrective regimen sliding scale   SubCutaneous at bedtime  metoprolol tartrate 25 milliGRAM(s) Oral every 12 hours  oxybutynin 5 milliGRAM(s) Oral three times a day  pantoprazole    Tablet 40 milliGRAM(s) Oral before breakfast  trimethoprim  160 mG/sulfamethoxazole 800 mG 1 Tablet(s) Oral every 12 hours    MEDICATIONS  (PRN):  dextrose 40% Gel 15 Gram(s) Oral once PRN Blood Glucose LESS THAN 70 milliGRAM(s)/deciliter  glucagon  Injectable 1 milliGRAM(s) IntraMuscular once PRN Glucose LESS THAN 70 milligrams/deciliter        PHYSICAL EXAM:  Vital Signs Last 24 Hrs  T(C): 36.7 (15 Sep 2019 10:30), Max: 36.9 (14 Sep 2019 21:41)  T(F): 98.1 (15 Sep 2019 10:30), Max: 98.4 (14 Sep 2019 21:41)  HR: 79 (15 Sep 2019 10:30) (74 - 83)  BP: 125/92 (15 Sep 2019 10:30) (125/92 - 151/72)  BP(mean): --  RR: 14 (15 Sep 2019 10:30) (14 - 16)  SpO2: 96% (15 Sep 2019 10:30) (91% - 96%)    GENERAL: NAD, well-groomed, well-developed  HEAD:  Atraumatic, Normocephalic  EYES: EOMI, PERRLA, conjunctiva and sclera clear  ENT: Moist mucous membranes,  NECK: Supple, No JVD, no bruits  CHEST/LUNG: Clear to percussion bilaterally; No rales, rhonchi, wheezing, or rubs  HEART: Regular rate and rhythm 1/6 beth at aortic area  ABDOMEN: Soft, Nontender, Nondistended; Bowel sounds present  EXTREMITIES:  2+ Peripheral Pulses, No clubbing, cyanosis, or edema  SKIN: No rashes or lesions  NERVOUS SYSTEM:  Alert & Oriented X3, Good concentration; Motor Strength 5/5 B/L upper and lower extremities; DTRs 2+ intact and symmetric      TELEMETRY:rsr    ECG:    LABS:                        12.6   9.08  )-----------( 147      ( 15 Sep 2019 06:00 )             37.5     09-15    139  |  105  |  25<H>  ----------------------------<  133<H>  4.1   |  26  |  1.26    Ca    8.8      15 Sep 2019 06:00  Mg     1.6     09-14    TPro  7.8  /  Alb  3.7  /  TBili  1.1  /  DBili  x   /  AST  24  /  ALT  15  /  AlkPhos  81  09-13    CARDIAC MARKERS ( 14 Sep 2019 10:30 )  .078 ng/mL / x     / x     / x     / x      CARDIAC MARKERS ( 13 Sep 2019 19:40 )  .082 ng/mL / x     / x     / x     / x      CARDIAC MARKERS ( 13 Sep 2019 15:55 )  .078 ng/mL / x     / x     / x     / x      CARDIAC MARKERS ( 13 Sep 2019 12:30 )  .084 ng/mL / x     / x     / x     / x          PT/INR - ( 13 Sep 2019 12:30 )   PT: 12.0 sec;   INR: 1.07 ratio         PTT - ( 13 Sep 2019 12:30 )  PTT:31.2 sec    I&O's Summary    14 Sep 2019 07:01  -  15 Sep 2019 07:00  --------------------------------------------------------  IN: 200 mL / OUT: 0 mL / NET: 200 mL    15 Sep 2019 07:01  -  15 Sep 2019 12:06  --------------------------------------------------------  IN: 100 mL / OUT: 0 mL / NET: 100 mL      BNP    RADIOLOGY & ADDITIONAL STUDIES:    ECHO:

## 2019-09-16 LAB
ANION GAP SERPL CALC-SCNC: 7 MMOL/L — SIGNIFICANT CHANGE UP (ref 5–17)
BUN SERPL-MCNC: 30 MG/DL — HIGH (ref 7–23)
CALCIUM SERPL-MCNC: 8.6 MG/DL — SIGNIFICANT CHANGE UP (ref 8.4–10.5)
CHLORIDE SERPL-SCNC: 104 MMOL/L — SIGNIFICANT CHANGE UP (ref 96–108)
CO2 SERPL-SCNC: 29 MMOL/L — SIGNIFICANT CHANGE UP (ref 22–31)
CREAT SERPL-MCNC: 1.57 MG/DL — HIGH (ref 0.5–1.3)
GLUCOSE BLDC GLUCOMTR-MCNC: 143 MG/DL — HIGH (ref 70–99)
GLUCOSE BLDC GLUCOMTR-MCNC: 155 MG/DL — HIGH (ref 70–99)
GLUCOSE BLDC GLUCOMTR-MCNC: 172 MG/DL — HIGH (ref 70–99)
GLUCOSE BLDC GLUCOMTR-MCNC: 82 MG/DL — SIGNIFICANT CHANGE UP (ref 70–99)
GLUCOSE SERPL-MCNC: 117 MG/DL — HIGH (ref 70–99)
HCT VFR BLD CALC: 35.5 % — SIGNIFICANT CHANGE UP (ref 34.5–45)
HGB BLD-MCNC: 12.1 G/DL — SIGNIFICANT CHANGE UP (ref 11.5–15.5)
MCHC RBC-ENTMCNC: 30.5 PG — SIGNIFICANT CHANGE UP (ref 27–34)
MCHC RBC-ENTMCNC: 34.1 GM/DL — SIGNIFICANT CHANGE UP (ref 32–36)
MCV RBC AUTO: 89.4 FL — SIGNIFICANT CHANGE UP (ref 80–100)
NRBC # BLD: 0 /100 WBCS — SIGNIFICANT CHANGE UP (ref 0–0)
PLATELET # BLD AUTO: 144 K/UL — LOW (ref 150–400)
POTASSIUM SERPL-MCNC: 4 MMOL/L — SIGNIFICANT CHANGE UP (ref 3.5–5.3)
POTASSIUM SERPL-SCNC: 4 MMOL/L — SIGNIFICANT CHANGE UP (ref 3.5–5.3)
RBC # BLD: 3.97 M/UL — SIGNIFICANT CHANGE UP (ref 3.8–5.2)
RBC # FLD: 13.2 % — SIGNIFICANT CHANGE UP (ref 10.3–14.5)
SODIUM SERPL-SCNC: 140 MMOL/L — SIGNIFICANT CHANGE UP (ref 135–145)
WBC # BLD: 7.71 K/UL — SIGNIFICANT CHANGE UP (ref 3.8–10.5)
WBC # FLD AUTO: 7.71 K/UL — SIGNIFICANT CHANGE UP (ref 3.8–10.5)

## 2019-09-16 PROCEDURE — 99233 SBSQ HOSP IP/OBS HIGH 50: CPT

## 2019-09-16 PROCEDURE — 99231 SBSQ HOSP IP/OBS SF/LOW 25: CPT

## 2019-09-16 PROCEDURE — 74230 X-RAY XM SWLNG FUNCJ C+: CPT | Mod: 26

## 2019-09-16 RX ORDER — ACETAMINOPHEN 500 MG
650 TABLET ORAL EVERY 6 HOURS
Refills: 0 | Status: DISCONTINUED | OUTPATIENT
Start: 2019-09-16 | End: 2019-09-20

## 2019-09-16 RX ORDER — ENOXAPARIN SODIUM 100 MG/ML
30 INJECTION SUBCUTANEOUS DAILY
Refills: 0 | Status: DISCONTINUED | OUTPATIENT
Start: 2019-09-17 | End: 2019-09-20

## 2019-09-16 RX ADMIN — ATORVASTATIN CALCIUM 40 MILLIGRAM(S): 80 TABLET, FILM COATED ORAL at 22:10

## 2019-09-16 RX ADMIN — ESCITALOPRAM OXALATE 5 MILLIGRAM(S): 10 TABLET, FILM COATED ORAL at 11:48

## 2019-09-16 RX ADMIN — PANTOPRAZOLE SODIUM 40 MILLIGRAM(S): 20 TABLET, DELAYED RELEASE ORAL at 06:16

## 2019-09-16 RX ADMIN — Medication 5 MILLIGRAM(S): at 06:16

## 2019-09-16 RX ADMIN — Medication 81 MILLIGRAM(S): at 11:48

## 2019-09-16 RX ADMIN — Medication 5 MILLIGRAM(S): at 14:22

## 2019-09-16 RX ADMIN — ENOXAPARIN SODIUM 40 MILLIGRAM(S): 100 INJECTION SUBCUTANEOUS at 11:48

## 2019-09-16 RX ADMIN — AMLODIPINE BESYLATE 5 MILLIGRAM(S): 2.5 TABLET ORAL at 06:17

## 2019-09-16 RX ADMIN — Medication 300 MILLIGRAM(S): at 14:22

## 2019-09-16 RX ADMIN — Medication 650 MILLIGRAM(S): at 12:47

## 2019-09-16 RX ADMIN — Medication 1 TABLET(S): at 06:17

## 2019-09-16 RX ADMIN — Medication 1: at 17:44

## 2019-09-16 RX ADMIN — Medication 300 MILLIGRAM(S): at 22:11

## 2019-09-16 RX ADMIN — Medication 25 MILLIGRAM(S): at 17:45

## 2019-09-16 RX ADMIN — Medication 5 MILLIGRAM(S): at 22:12

## 2019-09-16 RX ADMIN — Medication 1 TABLET(S): at 17:45

## 2019-09-16 RX ADMIN — Medication 300 MILLIGRAM(S): at 06:17

## 2019-09-16 RX ADMIN — Medication 25 MILLIGRAM(S): at 06:17

## 2019-09-16 RX ADMIN — Medication 1: at 11:48

## 2019-09-16 RX ADMIN — Medication 650 MILLIGRAM(S): at 13:40

## 2019-09-16 NOTE — PROGRESS NOTE ADULT - SUBJECTIVE AND OBJECTIVE BOX
Patient is a 89y old  Female who presents with a chief complaint of Frequent fall, worsening R sided weakness and worsening slurred speech (15 Sep 2019 12:06). No acute issues overnight       Patient seen and examined at bedside.    ALLERGIES:  No Known Allergies    MEDICATIONS  (STANDING):  amLODIPine   Tablet 5 milliGRAM(s) Oral daily  aspirin  chewable 81 milliGRAM(s) Oral daily  atorvastatin 40 milliGRAM(s) Oral at bedtime  clindamycin   Capsule 300 milliGRAM(s) Oral three times a day  dextrose 5%. 1000 milliLiter(s) (50 mL/Hr) IV Continuous <Continuous>  dextrose 50% Injectable 12.5 Gram(s) IV Push once  dextrose 50% Injectable 25 Gram(s) IV Push once  dextrose 50% Injectable 25 Gram(s) IV Push once  enoxaparin Injectable 40 milliGRAM(s) SubCutaneous daily  escitalopram 5 milliGRAM(s) Oral daily  insulin lispro (HumaLOG) corrective regimen sliding scale   SubCutaneous three times a day before meals  insulin lispro (HumaLOG) corrective regimen sliding scale   SubCutaneous at bedtime  metoprolol tartrate 25 milliGRAM(s) Oral every 12 hours  oxybutynin 5 milliGRAM(s) Oral three times a day  pantoprazole    Tablet 40 milliGRAM(s) Oral before breakfast  trimethoprim  160 mG/sulfamethoxazole 800 mG 1 Tablet(s) Oral every 12 hours    MEDICATIONS  (PRN):  dextrose 40% Gel 15 Gram(s) Oral once PRN Blood Glucose LESS THAN 70 milliGRAM(s)/deciliter  glucagon  Injectable 1 milliGRAM(s) IntraMuscular once PRN Glucose LESS THAN 70 milligrams/deciliter    Vital Signs Last 24 Hrs  T(F): 98 (16 Sep 2019 05:49), Max: 98.1 (15 Sep 2019 10:30)  HR: 86 (16 Sep 2019 05:49) (78 - 86)  BP: 143/74 (16 Sep 2019 05:49) (122/53 - 143/74)  RR: 15 (16 Sep 2019 05:49) (14 - 15)  SpO2: 95% (16 Sep 2019 05:49) (94% - 96%)  I&O's Summary    15 Sep 2019 07:01  -  16 Sep 2019 07:00  --------------------------------------------------------  IN: 300 mL / OUT: 0 mL / NET: 300 mL      BMI (kg/m2): 24.3 (19 @ 21:00)  PHYSICAL EXAM:  General: NAD, A/O x 1 (name only)  ENT: MMM  Neck: Supple, No JVD  Lungs: Clear to auscultation bilaterally  Cardio: RRR, S1/S2, + systolic murmur   Abdomen: Soft, Nontender, Nondistended; Bowel sounds present  Extremities: No calf tenderness, L hand wound dressed    LABS:                        12.1   7.71  )-----------( 144      ( 16 Sep 2019 05:33 )             35.5           140  |  104  |  30  ----------------------------<  117  4.0   |  29  |  1.57    Ca    8.6      16 Sep 2019 05:33  Mg     1.6         TPro  7.8  /  Alb  3.7  /  TBili  1.1  /  DBili  x   /  AST  24  /  ALT  15  /  AlkPhos  81       eGFR if Non African American: 28 mL/min/1.73M2 (19 @ 05:33)  eGFR if African American: 32 mL/min/1.73M2 (19 @ 05:33)    PT/INR - ( 13 Sep 2019 12:30 )   PT: 12.0 sec;   INR: 1.07 ratio         PTT - ( 13 Sep 2019 12:30 )  PTT:31.2 sec     CARDIAC MARKERS ( 14 Sep 2019 10:30 )  .078 ng/mL / x     / x     / x     / x      CARDIAC MARKERS ( 13 Sep 2019 19:40 )  .082 ng/mL / x     / x     / x     / x      CARDIAC MARKERS ( 13 Sep 2019 15:55 )  .078 ng/mL / x     / x     / x     / x      CARDIAC MARKERS ( 13 Sep 2019 12:30 )  .084 ng/mL / x     / x     / x     / x                        POCT Blood Glucose.: 143 mg/dL (16 Sep 2019 08:18)  POCT Blood Glucose.: 112 mg/dL (15 Sep 2019 21:33)  POCT Blood Glucose.: 177 mg/dL (15 Sep 2019 16:20)  POCT Blood Glucose.: 173 mg/dL (15 Sep 2019 11:39)    - [id number] 7.3    Urinalysis Basic - ( 13 Sep 2019 13:22 )    Color: Yellow / Appearance: Clear / S.015 / pH: x  Gluc: x / Ketone: Negative  / Bili: Negative / Urobili: Negative   Blood: x / Protein: 15 / Nitrite: Negative   Leuk Esterase: Negative / RBC: 0-4 /HPF / WBC 0-2 /HPF   Sq Epi: x / Non Sq Epi: Neg.-Few / Bacteria: Few /HPF          RADIOLOGY & ADDITIONAL TESTS:    Care Discussed with Consultants/Other Providers: Patient is a 89y old  Female who presents with a chief complaint of Frequent fall, worsening R sided weakness and worsening slurred speech (15 Sep 2019 12:06). No acute issues overnight       Patient seen and examined at bedside.    ALLERGIES:  No Known Allergies    MEDICATIONS  (STANDING):  amLODIPine   Tablet 5 milliGRAM(s) Oral daily  aspirin  chewable 81 milliGRAM(s) Oral daily  atorvastatin 40 milliGRAM(s) Oral at bedtime  clindamycin   Capsule 300 milliGRAM(s) Oral three times a day  dextrose 5%. 1000 milliLiter(s) (50 mL/Hr) IV Continuous <Continuous>  dextrose 50% Injectable 12.5 Gram(s) IV Push once  dextrose 50% Injectable 25 Gram(s) IV Push once  dextrose 50% Injectable 25 Gram(s) IV Push once  enoxaparin Injectable 40 milliGRAM(s) SubCutaneous daily  escitalopram 5 milliGRAM(s) Oral daily  insulin lispro (HumaLOG) corrective regimen sliding scale   SubCutaneous three times a day before meals  insulin lispro (HumaLOG) corrective regimen sliding scale   SubCutaneous at bedtime  metoprolol tartrate 25 milliGRAM(s) Oral every 12 hours  oxybutynin 5 milliGRAM(s) Oral three times a day  pantoprazole    Tablet 40 milliGRAM(s) Oral before breakfast  trimethoprim  160 mG/sulfamethoxazole 800 mG 1 Tablet(s) Oral every 12 hours    MEDICATIONS  (PRN):  dextrose 40% Gel 15 Gram(s) Oral once PRN Blood Glucose LESS THAN 70 milliGRAM(s)/deciliter  glucagon  Injectable 1 milliGRAM(s) IntraMuscular once PRN Glucose LESS THAN 70 milligrams/deciliter    Vital Signs Last 24 Hrs  T(F): 98 (16 Sep 2019 05:49), Max: 98.1 (15 Sep 2019 10:30)  HR: 86 (16 Sep 2019 05:49) (78 - 86)  BP: 143/74 (16 Sep 2019 05:49) (122/53 - 143/74)  RR: 15 (16 Sep 2019 05:49) (14 - 15)  SpO2: 95% (16 Sep 2019 05:49) (94% - 96%)  I&O's Summary    15 Sep 2019 07:01  -  16 Sep 2019 07:00  --------------------------------------------------------  IN: 300 mL / OUT: 0 mL / NET: 300 mL      BMI (kg/m2): 24.3 (19 @ 21:00)  PHYSICAL EXAM:  General: NAD, A/O x 1 (name only)  ENT: MMM  Neck: Supple, No JVD  Lungs: Clear to auscultation bilaterally  Cardio: S1/S2, + systolic murmur   Abdomen: Soft, Nontender, Nondistended; Bowel sounds present  Extremities: No calf tenderness, L hand wound dressed    LABS:                        12.1   7.71  )-----------( 144      ( 16 Sep 2019 05:33 )             35.5           140  |  104  |  30  ----------------------------<  117  4.0   |  29  |  1.57    Ca    8.6      16 Sep 2019 05:33  Mg     1.6         TPro  7.8  /  Alb  3.7  /  TBili  1.1  /  DBili  x   /  AST  24  /  ALT  15  /  AlkPhos  81       eGFR if Non African American: 28 mL/min/1.73M2 (19 @ 05:33)  eGFR if African American: 32 mL/min/1.73M2 (19 @ 05:33)    PT/INR - ( 13 Sep 2019 12:30 )   PT: 12.0 sec;   INR: 1.07 ratio         PTT - ( 13 Sep 2019 12:30 )  PTT:31.2 sec     CARDIAC MARKERS ( 14 Sep 2019 10:30 )  .078 ng/mL / x     / x     / x     / x      CARDIAC MARKERS ( 13 Sep 2019 19:40 )  .082 ng/mL / x     / x     / x     / x      CARDIAC MARKERS ( 13 Sep 2019 15:55 )  .078 ng/mL / x     / x     / x     / x      CARDIAC MARKERS ( 13 Sep 2019 12:30 )  .084 ng/mL / x     / x     / x     / x                        POCT Blood Glucose.: 143 mg/dL (16 Sep 2019 08:18)  POCT Blood Glucose.: 112 mg/dL (15 Sep 2019 21:33)  POCT Blood Glucose.: 177 mg/dL (15 Sep 2019 16:20)  POCT Blood Glucose.: 173 mg/dL (15 Sep 2019 11:39)    - [id number] 7.3    Urinalysis Basic - ( 13 Sep 2019 13:22 )    Color: Yellow / Appearance: Clear / S.015 / pH: x  Gluc: x / Ketone: Negative  / Bili: Negative / Urobili: Negative   Blood: x / Protein: 15 / Nitrite: Negative   Leuk Esterase: Negative / RBC: 0-4 /HPF / WBC 0-2 /HPF   Sq Epi: x / Non Sq Epi: Neg.-Few / Bacteria: Few /HPF          RADIOLOGY & ADDITIONAL TESTS:    Care Discussed with Consultants/Other Providers:

## 2019-09-16 NOTE — SWALLOW VFSS/MBS ASSESSMENT ADULT - ORAL PREP COMMENTS
Mildly reduced mastication with hard solids secondary to poor/missing dentition, however patient has been tolerating regular diet to date.

## 2019-09-16 NOTE — PROGRESS NOTE ADULT - ASSESSMENT
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 Awaiting MRI today    # worsened RLE weakness and slurred speech. r/o recurrent CVA w/  Frequent falls   - H/O CVA in 2018 with residual right sided weakness, slurred speech and dysphagia  -CT HEad with microvascular ischemic change--scheduled for MRI brain today  - c/w ASA and statin   - PT consult-JUAN  -S+S eval  - neuro checks q routine   -Neuro following    # New TWI in lead III and avF, new RBBB  - mildly elevated trop likely demand ischemia. Pt is asymptomatic.   - TTE with Mild AS, LVF intact, mild to mod tricuspid reg, and borderline Pulm htn  - cont asa, statin, BB  - Cardio following    # murmur   - echo as above     # recent I & D on left hand abscess  - cont bactrim DS q12 and clindamycin 300mg q8 until 9/20    # LENNIE on CKD 3  - Creat today 1.57 (1.26 yesterday)  -Suspect prerenal as pt not taking much PO  -Obtain urine Na and creatnine and UA  -Cont to monitor closely     # HTN -   cont with amlodipine 5mg and metoprolol 25mg q12 BP , SBP  143-156 acceptable     # T2DM   - Hba1c 6.3 last year and pt has been taken off of DM medications per family  - 7.3 a1c on admit  - carb consistent diet  - cont ISS    # DVT ppx: SCD

## 2019-09-16 NOTE — CHART NOTE - NSCHARTNOTEFT_GEN_A_CORE
Was called to patient bedside.  Patient returned back from speech and swallow about 2 hours ago  Daughter noticed a bruise on L arm that she stated was not present before    On my exam, patient with  a tender, golf-sized hematoma on left forearm and ecchymosis around L elbow    -Ice packs applied  -Daughter would like to hold off on LUE sono for now as it will cause pain to area-will obtain tomorrow if area not improving  -Daughter would like to speak with RN supervisor--GRETCHEN Larson made aware and call put out to supervisor  Will cont to follow

## 2019-09-16 NOTE — PROGRESS NOTE ADULT - SUBJECTIVE AND OBJECTIVE BOX
Await MRI brain. Now scheduled for tomorrow.    No change on exam. She has pain in upper limbs preventing her from using her walker. Suggest PM&R assessment.    Dr. Lerma will see patient tomorrow.

## 2019-09-16 NOTE — SWALLOW VFSS/MBS ASSESSMENT ADULT - SLP PERTINENT HISTORY OF CURRENT PROBLEM
Patient with h/o CVA with residual aphasia and dysphagia - on nectar thick liquids PTA - now admitted s/p fall.

## 2019-09-16 NOTE — GOALS OF CARE CONVERSATION - PERSONAL ADVANCE DIRECTIVE - CONVERSATION DETAILS
Met with pt. and daughter at bedside. Pt. awake and alert, has expressive aphasia from old CVA, unable to determine capacity. Daughter Pat at bedside. She states that she has siblings but she is the primary HCP. Document at home, she will bring in copy for the chart.

## 2019-09-16 NOTE — SWALLOW VFSS/MBS ASSESSMENT ADULT - COMMENTS
Patient seen for MBS, positioned at 90 degrees in wheelchair, oral and pharyngeal phases of swallow assessed. Patient trialed with thin liquids, puree, soft solids, mixed consistencies, and hard solids with barium added for contrast. Oral preparation mildly reduced with increased mastication time secondary to poor/missing dentition. A-P transport was WNL with timely trigger of swallow and hyolaryngeal excursion WNL. No aspiration with any consistency trialed.

## 2019-09-17 ENCOUNTER — APPOINTMENT (OUTPATIENT)
Dept: MRI IMAGING | Facility: HOSPITAL | Age: 84
End: 2019-09-17

## 2019-09-17 LAB
ANION GAP SERPL CALC-SCNC: 9 MMOL/L — SIGNIFICANT CHANGE UP (ref 5–17)
BUN SERPL-MCNC: 33 MG/DL — HIGH (ref 7–23)
CALCIUM SERPL-MCNC: 8.9 MG/DL — SIGNIFICANT CHANGE UP (ref 8.4–10.5)
CHLORIDE SERPL-SCNC: 104 MMOL/L — SIGNIFICANT CHANGE UP (ref 96–108)
CO2 SERPL-SCNC: 25 MMOL/L — SIGNIFICANT CHANGE UP (ref 22–31)
CREAT SERPL-MCNC: 1.54 MG/DL — HIGH (ref 0.5–1.3)
GLUCOSE BLDC GLUCOMTR-MCNC: 114 MG/DL — HIGH (ref 70–99)
GLUCOSE BLDC GLUCOMTR-MCNC: 142 MG/DL — HIGH (ref 70–99)
GLUCOSE BLDC GLUCOMTR-MCNC: 184 MG/DL — HIGH (ref 70–99)
GLUCOSE BLDC GLUCOMTR-MCNC: 205 MG/DL — HIGH (ref 70–99)
GLUCOSE SERPL-MCNC: 113 MG/DL — HIGH (ref 70–99)
HCT VFR BLD CALC: 35.7 % — SIGNIFICANT CHANGE UP (ref 34.5–45)
HGB BLD-MCNC: 12.1 G/DL — SIGNIFICANT CHANGE UP (ref 11.5–15.5)
MCHC RBC-ENTMCNC: 30.3 PG — SIGNIFICANT CHANGE UP (ref 27–34)
MCHC RBC-ENTMCNC: 33.9 GM/DL — SIGNIFICANT CHANGE UP (ref 32–36)
MCV RBC AUTO: 89.5 FL — SIGNIFICANT CHANGE UP (ref 80–100)
NRBC # BLD: 0 /100 WBCS — SIGNIFICANT CHANGE UP (ref 0–0)
PLATELET # BLD AUTO: 146 K/UL — LOW (ref 150–400)
POTASSIUM SERPL-MCNC: 3.9 MMOL/L — SIGNIFICANT CHANGE UP (ref 3.5–5.3)
POTASSIUM SERPL-SCNC: 3.9 MMOL/L — SIGNIFICANT CHANGE UP (ref 3.5–5.3)
RBC # BLD: 3.99 M/UL — SIGNIFICANT CHANGE UP (ref 3.8–5.2)
RBC # FLD: 13 % — SIGNIFICANT CHANGE UP (ref 10.3–14.5)
SODIUM SERPL-SCNC: 138 MMOL/L — SIGNIFICANT CHANGE UP (ref 135–145)
WBC # BLD: 6.89 K/UL — SIGNIFICANT CHANGE UP (ref 3.8–10.5)
WBC # FLD AUTO: 6.89 K/UL — SIGNIFICANT CHANGE UP (ref 3.8–10.5)

## 2019-09-17 PROCEDURE — 99231 SBSQ HOSP IP/OBS SF/LOW 25: CPT

## 2019-09-17 PROCEDURE — 70551 MRI BRAIN STEM W/O DYE: CPT | Mod: 26

## 2019-09-17 PROCEDURE — 99233 SBSQ HOSP IP/OBS HIGH 50: CPT

## 2019-09-17 RX ADMIN — ESCITALOPRAM OXALATE 5 MILLIGRAM(S): 10 TABLET, FILM COATED ORAL at 12:15

## 2019-09-17 RX ADMIN — ENOXAPARIN SODIUM 30 MILLIGRAM(S): 100 INJECTION SUBCUTANEOUS at 15:30

## 2019-09-17 RX ADMIN — Medication 5 MILLIGRAM(S): at 15:31

## 2019-09-17 RX ADMIN — Medication 2: at 12:15

## 2019-09-17 RX ADMIN — Medication 300 MILLIGRAM(S): at 22:30

## 2019-09-17 RX ADMIN — Medication 25 MILLIGRAM(S): at 05:13

## 2019-09-17 RX ADMIN — ATORVASTATIN CALCIUM 40 MILLIGRAM(S): 80 TABLET, FILM COATED ORAL at 22:30

## 2019-09-17 RX ADMIN — PANTOPRAZOLE SODIUM 40 MILLIGRAM(S): 20 TABLET, DELAYED RELEASE ORAL at 05:13

## 2019-09-17 RX ADMIN — Medication 300 MILLIGRAM(S): at 15:31

## 2019-09-17 RX ADMIN — Medication 5 MILLIGRAM(S): at 05:13

## 2019-09-17 RX ADMIN — Medication 1 TABLET(S): at 05:13

## 2019-09-17 RX ADMIN — Medication 81 MILLIGRAM(S): at 12:15

## 2019-09-17 RX ADMIN — Medication 1 TABLET(S): at 17:55

## 2019-09-17 RX ADMIN — Medication 25 MILLIGRAM(S): at 17:55

## 2019-09-17 RX ADMIN — Medication 5 MILLIGRAM(S): at 22:30

## 2019-09-17 RX ADMIN — Medication 300 MILLIGRAM(S): at 05:13

## 2019-09-17 RX ADMIN — AMLODIPINE BESYLATE 5 MILLIGRAM(S): 2.5 TABLET ORAL at 05:13

## 2019-09-17 NOTE — PROGRESS NOTE ADULT - SUBJECTIVE AND OBJECTIVE BOX
Subjective:Interval History - No events overnight    Objective:   Vital Signs Last 24 Hrs  T(C): 36.9 (17 Sep 2019 08:21), Max: 36.9 (17 Sep 2019 08:21)  T(F): 98.4 (17 Sep 2019 08:21), Max: 98.4 (17 Sep 2019 08:21)  HR: 98 (17 Sep 2019 08:21) (77 - 98)  BP: 106/7 (17 Sep 2019 08:21) (100/56 - 143/60)  BP(mean): --  RR: 17 (17 Sep 2019 08:21) (15 - 17)  SpO2: 95% (17 Sep 2019 08:21) (94% - 97%)    General Exam:   General appearance: No acute distress                   Neurological Exam:  Mental Status: Orientated to self, date, follows commands  Cranial Nerves: CN I - not tested.  PERRL, EOMI, VFF, no nystagmus or diplopia.  CN V1-3 intact to light touch and pinprick.  No facial asymmetry.  Tongue, uvula and palate midline.    Motor:                  Strength: elevates both arms and legs against gravity. limited range of motion of left shoulder and right elbow.   Dysmeria: None to finger-nose-finger    Sensation: intact to light touch    Deep Tendon Reflexes: 1+ bilateral biceps,  Other:    09-17    138  |  104  |  33<H>  ----------------------------<  113<H>  3.9   |  25  |  1.54<H>    Ca    8.9      17 Sep 2019 05:30                              12.1   6.89  )-----------( 146      ( 17 Sep 2019 05:30 )             35.7         MEDICATIONS  (STANDING):  amLODIPine   Tablet 5 milliGRAM(s) Oral daily  aspirin  chewable 81 milliGRAM(s) Oral daily  atorvastatin 40 milliGRAM(s) Oral at bedtime  clindamycin   Capsule 300 milliGRAM(s) Oral three times a day  dextrose 5%. 1000 milliLiter(s) (50 mL/Hr) IV Continuous <Continuous>  dextrose 50% Injectable 12.5 Gram(s) IV Push once  dextrose 50% Injectable 25 Gram(s) IV Push once  dextrose 50% Injectable 25 Gram(s) IV Push once  enoxaparin Injectable 30 milliGRAM(s) SubCutaneous daily  escitalopram 5 milliGRAM(s) Oral daily  insulin lispro (HumaLOG) corrective regimen sliding scale   SubCutaneous three times a day before meals  insulin lispro (HumaLOG) corrective regimen sliding scale   SubCutaneous at bedtime  metoprolol tartrate 25 milliGRAM(s) Oral every 12 hours  oxybutynin 5 milliGRAM(s) Oral three times a day  pantoprazole    Tablet 40 milliGRAM(s) Oral before breakfast  trimethoprim  160 mG/sulfamethoxazole 800 mG 1 Tablet(s) Oral every 12 hours    MEDICATIONS  (PRN):  acetaminophen    Suspension .. 650 milliGRAM(s) Oral every 6 hours PRN Temp greater or equal to 38C (100.4F), Mild Pain (1 - 3), Moderate Pain (4 - 6)  dextrose 40% Gel 15 Gram(s) Oral once PRN Blood Glucose LESS THAN 70 milliGRAM(s)/deciliter  glucagon  Injectable 1 milliGRAM(s) IntraMuscular once PRN Glucose LESS THAN 70 milligrams/deciliter

## 2019-09-17 NOTE — PROGRESS NOTE ADULT - ASSESSMENT
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 Awaiting MRI today    # worsened RLE weakness and slurred speech. r/o recurrent CVA w/  Frequent falls   - H/O CVA in 2018 with residual right sided weakness, slurred speech and dysphagia  -CT HEad with microvascular ischemic change--scheduled for MRI brain today 1 PM  - c/w ASA and statin   - PT consult-JUAN--family wants acute rehab--will wait to see what MRI reveals  -S+S eval--regular with thin liquids   - neuro checks q routine   -Neuro following    # New TWI in lead III and avF, new RBBB  - mildly elevated trop likely demand ischemia. Pt is asymptomatic.   - TTE with Mild AS, LVF intact, mild to mod tricuspid reg, and borderline Pulm htn  - cont asa, statin, BB  - Cardio following    # murmur   - echo as above     # recent I & D on left hand abscess  - cont bactrim DS q12 and clindamycin 300mg q8 until 9/20    # LENNIE on CKD 3  - Creat today 1.54  -Suspect prerenal as pt not taking much PO--will give gentle IVF (500cc)  -Cont to monitor closely     # HTN -   cont with amlodipine 5mg and metoprolol 25mg q12 BP , SBP  acceptable    # T2DM   - Hba1c 6.3 last year and pt has been taken off of DM medications per family  - 7.3 a1c on admit  - carb consistent diet  - cont ISS    # DVT ppx: SCD  Dispo: pending MRI today. PT rec JUAN. Family wants to pursue acute rehab pending MRI results.

## 2019-09-17 NOTE — PROGRESS NOTE ADULT - SUBJECTIVE AND OBJECTIVE BOX
Patient is a 89y old  Female who presents with a chief complaint of Frequent fall, worsening R sided weakness and worsening slurred speech (17 Sep 2019 08:42). Awaiting MRI this afternoon. Keeps asking to "go home"      Patient seen and examined at bedside.    ALLERGIES:  No Known Allergies    MEDICATIONS  (STANDING):  amLODIPine   Tablet 5 milliGRAM(s) Oral daily  aspirin  chewable 81 milliGRAM(s) Oral daily  atorvastatin 40 milliGRAM(s) Oral at bedtime  clindamycin   Capsule 300 milliGRAM(s) Oral three times a day  dextrose 5%. 1000 milliLiter(s) (50 mL/Hr) IV Continuous <Continuous>  dextrose 50% Injectable 12.5 Gram(s) IV Push once  dextrose 50% Injectable 25 Gram(s) IV Push once  dextrose 50% Injectable 25 Gram(s) IV Push once  enoxaparin Injectable 30 milliGRAM(s) SubCutaneous daily  escitalopram 5 milliGRAM(s) Oral daily  insulin lispro (HumaLOG) corrective regimen sliding scale   SubCutaneous three times a day before meals  insulin lispro (HumaLOG) corrective regimen sliding scale   SubCutaneous at bedtime  metoprolol tartrate 25 milliGRAM(s) Oral every 12 hours  oxybutynin 5 milliGRAM(s) Oral three times a day  pantoprazole    Tablet 40 milliGRAM(s) Oral before breakfast  trimethoprim  160 mG/sulfamethoxazole 800 mG 1 Tablet(s) Oral every 12 hours    MEDICATIONS  (PRN):  acetaminophen    Suspension .. 650 milliGRAM(s) Oral every 6 hours PRN Temp greater or equal to 38C (100.4F), Mild Pain (1 - 3), Moderate Pain (4 - 6)  dextrose 40% Gel 15 Gram(s) Oral once PRN Blood Glucose LESS THAN 70 milliGRAM(s)/deciliter  glucagon  Injectable 1 milliGRAM(s) IntraMuscular once PRN Glucose LESS THAN 70 milligrams/deciliter    Vital Signs Last 24 Hrs  T(F): 98.4 (17 Sep 2019 08:21), Max: 98.4 (17 Sep 2019 08:21)  HR: 98 (17 Sep 2019 08:21) (77 - 98)  BP: 106/7 (17 Sep 2019 08:21) (100/56 - 143/60)  RR: 17 (17 Sep 2019 08:21) (15 - 17)  SpO2: 95% (17 Sep 2019 08:21) (94% - 97%)  I&O's Summary    16 Sep 2019 07:01  -  17 Sep 2019 07:00  --------------------------------------------------------  IN: 540 mL / OUT: 200 mL / NET: 340 mL    17 Sep 2019 07:01  -  17 Sep 2019 09:52  --------------------------------------------------------  IN: 200 mL / OUT: 0 mL / NET: 200 mL      BMI (kg/m2): 24.3 (09-13-19 @ 21:00)  PHYSICAL EXAM:  General: NAD, A/O x 1-to name ONLY   ENT: MMM  Neck: Supple, No JVD  Lungs: Clear to auscultation bilaterally  Cardio: RRR, S1/S2, No murmurs  Abdomen: Soft, Nontender, Nondistended; Bowel sounds present  Extremities: No calf tenderness, No pitting edema, scattered ecchymosis-worse on L arm , L hand wound dressed    LABS:                        12.1   6.89  )-----------( 146      ( 17 Sep 2019 05:30 )             35.7       09-17    138  |  104  |  33  ----------------------------<  113  3.9   |  25  |  1.54    Ca    8.9      17 Sep 2019 05:30  Mg     1.6     09-14       eGFR if Non African American: 28 mL/min/1.73M2 (09-17-19 @ 05:30)  eGFR if African American: 33 mL/min/1.73M2 (09-17-19 @ 05:30)         CARDIAC MARKERS ( 14 Sep 2019 10:30 )  .078 ng/mL / x     / x     / x     / x                        POCT Blood Glucose.: 114 mg/dL (17 Sep 2019 07:36)  POCT Blood Glucose.: 82 mg/dL (16 Sep 2019 21:20)  POCT Blood Glucose.: 155 mg/dL (16 Sep 2019 17:37)  POCT Blood Glucose.: 172 mg/dL (16 Sep 2019 11:40)    09-13 [id number] 7.3          RADIOLOGY & ADDITIONAL TESTS:    Care Discussed with Consultants/Other Providers: Patient is a 89y old  Female who presents with a chief complaint of Frequent fall, worsening R sided weakness and worsening slurred speech (17 Sep 2019 08:42). Awaiting MRI this afternoon. Keeps asking to "go home"    Patient seen and examined at bedside.    ALLERGIES:  No Known Allergies    MEDICATIONS  (STANDING):  amLODIPine   Tablet 5 milliGRAM(s) Oral daily  aspirin  chewable 81 milliGRAM(s) Oral daily  atorvastatin 40 milliGRAM(s) Oral at bedtime  clindamycin   Capsule 300 milliGRAM(s) Oral three times a day  dextrose 5%. 1000 milliLiter(s) (50 mL/Hr) IV Continuous <Continuous>  dextrose 50% Injectable 12.5 Gram(s) IV Push once  dextrose 50% Injectable 25 Gram(s) IV Push once  dextrose 50% Injectable 25 Gram(s) IV Push once  enoxaparin Injectable 30 milliGRAM(s) SubCutaneous daily  escitalopram 5 milliGRAM(s) Oral daily  insulin lispro (HumaLOG) corrective regimen sliding scale   SubCutaneous three times a day before meals  insulin lispro (HumaLOG) corrective regimen sliding scale   SubCutaneous at bedtime  metoprolol tartrate 25 milliGRAM(s) Oral every 12 hours  oxybutynin 5 milliGRAM(s) Oral three times a day  pantoprazole    Tablet 40 milliGRAM(s) Oral before breakfast  trimethoprim  160 mG/sulfamethoxazole 800 mG 1 Tablet(s) Oral every 12 hours    MEDICATIONS  (PRN):  acetaminophen    Suspension .. 650 milliGRAM(s) Oral every 6 hours PRN Temp greater or equal to 38C (100.4F), Mild Pain (1 - 3), Moderate Pain (4 - 6)  dextrose 40% Gel 15 Gram(s) Oral once PRN Blood Glucose LESS THAN 70 milliGRAM(s)/deciliter  glucagon  Injectable 1 milliGRAM(s) IntraMuscular once PRN Glucose LESS THAN 70 milligrams/deciliter    Vital Signs Last 24 Hrs  T(F): 98.4 (17 Sep 2019 08:21), Max: 98.4 (17 Sep 2019 08:21)  HR: 98 (17 Sep 2019 08:21) (77 - 98)  BP: 106/7 (17 Sep 2019 08:21) (100/56 - 143/60)  RR: 17 (17 Sep 2019 08:21) (15 - 17)  SpO2: 95% (17 Sep 2019 08:21) (94% - 97%)  I&O's Summary    16 Sep 2019 07:01  -  17 Sep 2019 07:00  --------------------------------------------------------  IN: 540 mL / OUT: 200 mL / NET: 340 mL    17 Sep 2019 07:01  -  17 Sep 2019 09:52  --------------------------------------------------------  IN: 200 mL / OUT: 0 mL / NET: 200 mL      BMI (kg/m2): 24.3 (09-13-19 @ 21:00)    PHYSICAL EXAM:  General: NAD, A/O x 1 (name only)  ENT: MMM  Neck: Supple, No JVD  Lungs: Clear to auscultation bilaterally  Cardio: S1/S2, + systolic murmur   Abdomen: Soft, Nontender, Nondistended; Bowel sounds present  Extremities: No calf tenderness, L hand wound dressed    LABS:                        12.1   6.89  )-----------( 146      ( 17 Sep 2019 05:30 )             35.7       09-17    138  |  104  |  33  ----------------------------<  113  3.9   |  25  |  1.54    Ca    8.9      17 Sep 2019 05:30  Mg     1.6     09-14    eGFR if Non African American: 28 mL/min/1.73M2 (09-17-19 @ 05:30)  eGFR if African American: 33 mL/min/1.73M2 (09-17-19 @ 05:30)      CARDIAC MARKERS ( 14 Sep 2019 10:30 )  .078 ng/mL / x     / x     / x     / x        Glucose  POCT Blood Glucose.: 114 mg/dL (17 Sep 2019 07:36)  POCT Blood Glucose.: 82 mg/dL (16 Sep 2019 21:20)  POCT Blood Glucose.: 155 mg/dL (16 Sep 2019 17:37)  POCT Blood Glucose.: 172 mg/dL (16 Sep 2019 11:40)    09-13 [id number] 7.3      RADIOLOGY & ADDITIONAL TESTS:  no new tests    Care Discussed with Consultants/Other Providers:

## 2019-09-17 NOTE — PROGRESS NOTE ADULT - ASSESSMENT
89 W who has left MCA stroke 1 year ago with recent falls at home and weakness as per family. HCT is unchanged. MRI of brain is pending today.

## 2019-09-18 ENCOUNTER — TRANSCRIPTION ENCOUNTER (OUTPATIENT)
Age: 84
End: 2019-09-18

## 2019-09-18 LAB
ANION GAP SERPL CALC-SCNC: 6 MMOL/L — SIGNIFICANT CHANGE UP (ref 5–17)
BUN SERPL-MCNC: 36 MG/DL — HIGH (ref 7–23)
CALCIUM SERPL-MCNC: 9.1 MG/DL — SIGNIFICANT CHANGE UP (ref 8.4–10.5)
CHLORIDE SERPL-SCNC: 103 MMOL/L — SIGNIFICANT CHANGE UP (ref 96–108)
CO2 SERPL-SCNC: 28 MMOL/L — SIGNIFICANT CHANGE UP (ref 22–31)
CREAT SERPL-MCNC: 1.65 MG/DL — HIGH (ref 0.5–1.3)
GLUCOSE BLDC GLUCOMTR-MCNC: 113 MG/DL — HIGH (ref 70–99)
GLUCOSE BLDC GLUCOMTR-MCNC: 133 MG/DL — HIGH (ref 70–99)
GLUCOSE BLDC GLUCOMTR-MCNC: 134 MG/DL — HIGH (ref 70–99)
GLUCOSE BLDC GLUCOMTR-MCNC: 148 MG/DL — HIGH (ref 70–99)
GLUCOSE SERPL-MCNC: 112 MG/DL — HIGH (ref 70–99)
HCT VFR BLD CALC: 35.9 % — SIGNIFICANT CHANGE UP (ref 34.5–45)
HGB BLD-MCNC: 12.2 G/DL — SIGNIFICANT CHANGE UP (ref 11.5–15.5)
MCHC RBC-ENTMCNC: 30.9 PG — SIGNIFICANT CHANGE UP (ref 27–34)
MCHC RBC-ENTMCNC: 34 GM/DL — SIGNIFICANT CHANGE UP (ref 32–36)
MCV RBC AUTO: 90.9 FL — SIGNIFICANT CHANGE UP (ref 80–100)
NRBC # BLD: 0 /100 WBCS — SIGNIFICANT CHANGE UP (ref 0–0)
PLATELET # BLD AUTO: 173 K/UL — SIGNIFICANT CHANGE UP (ref 150–400)
POTASSIUM SERPL-MCNC: 4.3 MMOL/L — SIGNIFICANT CHANGE UP (ref 3.5–5.3)
POTASSIUM SERPL-SCNC: 4.3 MMOL/L — SIGNIFICANT CHANGE UP (ref 3.5–5.3)
RBC # BLD: 3.95 M/UL — SIGNIFICANT CHANGE UP (ref 3.8–5.2)
RBC # FLD: 13.1 % — SIGNIFICANT CHANGE UP (ref 10.3–14.5)
SODIUM SERPL-SCNC: 137 MMOL/L — SIGNIFICANT CHANGE UP (ref 135–145)
WBC # BLD: 7.08 K/UL — SIGNIFICANT CHANGE UP (ref 3.8–10.5)
WBC # FLD AUTO: 7.08 K/UL — SIGNIFICANT CHANGE UP (ref 3.8–10.5)

## 2019-09-18 PROCEDURE — 99222 1ST HOSP IP/OBS MODERATE 55: CPT | Mod: GC

## 2019-09-18 PROCEDURE — 99239 HOSP IP/OBS DSCHRG MGMT >30: CPT

## 2019-09-18 RX ORDER — AZTREONAM 2 G
1 VIAL (EA) INJECTION
Qty: 0 | Refills: 0 | DISCHARGE

## 2019-09-18 RX ORDER — SODIUM CHLORIDE 9 MG/ML
500 INJECTION INTRAMUSCULAR; INTRAVENOUS; SUBCUTANEOUS
Refills: 0 | Status: COMPLETED | OUTPATIENT
Start: 2019-09-18 | End: 2019-09-18

## 2019-09-18 RX ADMIN — SODIUM CHLORIDE 100 MILLILITER(S): 9 INJECTION INTRAMUSCULAR; INTRAVENOUS; SUBCUTANEOUS at 17:50

## 2019-09-18 RX ADMIN — Medication 5 MILLIGRAM(S): at 05:46

## 2019-09-18 RX ADMIN — Medication 25 MILLIGRAM(S): at 05:46

## 2019-09-18 RX ADMIN — ATORVASTATIN CALCIUM 40 MILLIGRAM(S): 80 TABLET, FILM COATED ORAL at 21:07

## 2019-09-18 RX ADMIN — ENOXAPARIN SODIUM 30 MILLIGRAM(S): 100 INJECTION SUBCUTANEOUS at 11:29

## 2019-09-18 RX ADMIN — Medication 81 MILLIGRAM(S): at 11:28

## 2019-09-18 RX ADMIN — Medication 25 MILLIGRAM(S): at 17:37

## 2019-09-18 RX ADMIN — Medication 5 MILLIGRAM(S): at 14:39

## 2019-09-18 RX ADMIN — Medication 300 MILLIGRAM(S): at 21:07

## 2019-09-18 RX ADMIN — AMLODIPINE BESYLATE 5 MILLIGRAM(S): 2.5 TABLET ORAL at 05:46

## 2019-09-18 RX ADMIN — Medication 300 MILLIGRAM(S): at 14:39

## 2019-09-18 RX ADMIN — Medication 300 MILLIGRAM(S): at 05:46

## 2019-09-18 RX ADMIN — ESCITALOPRAM OXALATE 5 MILLIGRAM(S): 10 TABLET, FILM COATED ORAL at 11:28

## 2019-09-18 RX ADMIN — PANTOPRAZOLE SODIUM 40 MILLIGRAM(S): 20 TABLET, DELAYED RELEASE ORAL at 05:46

## 2019-09-18 RX ADMIN — Medication 1 TABLET(S): at 17:38

## 2019-09-18 RX ADMIN — Medication 5 MILLIGRAM(S): at 21:07

## 2019-09-18 RX ADMIN — Medication 1 TABLET(S): at 05:46

## 2019-09-18 NOTE — DISCHARGE NOTE PROVIDER - HOSPITAL COURSE
89 y/o F with PMH of 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 Her daughter reports she fell last night and was taken to urgent care, where her abrasions on the right arm was dressed and tetanus was given. Daughter reports pt fell twice again this morning with pt's right leg giving out. Denies head trauma. Denies CP, SOB, palpitations, n/v, abd pain, visual changes, HA, dizziness, blurry vision.       B/L arm X ray showed no acute fracture.       CT head negative acute pathology.    EKG showed new TWI in lead III and avF, new RBBB. Mildly elevated trop. MRI with no ischemia.    Patient evaluated by PM+R and accepted into acute rehab. Will cont bactrim and clinda for hand abscess until 9/20/19        Medically stable for discharge        < from: MR Head No Cont (09.17.19 @ 14:14) >        IMPRESSION:        1)  no diffusion restriction or MR evidence of acute ischemia. Fairly     extensive chronic ischemic changes throughout both hemispheres with     moderate volume loss..    2)  right mastoid partial opacification. Fairly extensive disc disease     and degenerative changes in the cervical spine. Dedicated cervical     imaging may be considered, if clinically warranted..         < end of copied text >        < from: CT Head No Cont (09.13.19 @ 17:45) >        IMPRESSION: No interval change in appearance of the brain since earlier     study of the same day. No evidence of hemorrhage.        < end of copied text > 89 y/o F with PMH of 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 Her daughter reports she fell last night and was taken to urgent care, where her abrasions on the right arm was dressed and tetanus was given. Daughter reports pt fell twice again this morning with pt's right leg giving out. Denies head trauma. Denies CP, SOB, palpitations, n/v, abd pain, visual changes, HA, dizziness, blurry vision.       B/L arm X ray showed no acute fracture.       CT head negative acute pathology.    EKG showed new TWI in lead III and avF, new RBBB. Mildly elevated trop. MRI with no ischemia.    Patient evaluated by PM+R and accepted into acute rehab. Will cont bactrim and clinda for hand abscess until 9/20/19        Medically stable for discharge        < from: MR Head No Cont (09.17.19 @ 14:14) >        IMPRESSION:        1)  no diffusion restriction or MR evidence of acute ischemia. Fairly     extensive chronic ischemic changes throughout both hemispheres with     moderate volume loss..    2)  right mastoid partial opacification. Fairly extensive disc disease     and degenerative changes in the cervical spine. Dedicated cervical     imaging may be considered, if clinically warranted..     < end of copied text >        < from: CT Head No Cont (09.13.19 @ 17:45) >    IMPRESSION: No interval change in appearance of the brain since earlier     study of the same day. No evidence of hemorrhage.    < end of copied text > 89 y/o F with PMH of 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 Her daughter reports she fell last night and was taken to urgent care, where her abrasions on the right arm was dressed and tetanus was given. Daughter reports pt fell twice again this morning with pt's right leg giving out. Denies head trauma. Denies CP, SOB, palpitations, n/v, abd pain, visual changes, HA, dizziness, blurry vision.       B/L arm X ray showed no acute fracture.       CT head negative acute pathology.    EKG showed new TWI in lead III and avF, new RBBB. Mildly elevated trop. case discussed with cardiology -- c/w medical management     MRI with no ischemia.    Patient evaluated by PM+R and accepted into acute rehab. Will cont bactrim and clinda for hand abscess until 9/20/19        Medically stable for discharge        < from: MR Head No Cont (09.17.19 @ 14:14) >        IMPRESSION:        1)  no diffusion restriction or MR evidence of acute ischemia. Fairly     extensive chronic ischemic changes throughout both hemispheres with     moderate volume loss..    2)  right mastoid partial opacification. Fairly extensive disc disease     and degenerative changes in the cervical spine. Dedicated cervical     imaging may be considered, if clinically warranted..     < end of copied text >        < from: CT Head No Cont (09.13.19 @ 17:45) >    IMPRESSION: No interval change in appearance of the brain since earlier     study of the same day. No evidence of hemorrhage.    < end of copied text >

## 2019-09-18 NOTE — CONSULT NOTE ADULT - ASSESSMENT
Pt with new dysarthria, gait abnormality in setting of previous CVA with significant decline in fucntion  PT/OT   continue current meds  left shoulder- consider CT   Dispo- acute rehab- was at supervision level prior, can tolerate 3h- very motivated to return to baseline.

## 2019-09-18 NOTE — CONSULT NOTE ADULT - SUBJECTIVE AND OBJECTIVE BOX
HPI:  89 y/o F with PMH of 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 Her daughter reports she fell last night and was taken to urgent care, where her abrasions on the right arm was dressed and tetanus was given. Daughter reports pt fell twice again this morning with pt's right leg giving out. Denies head trauma. Denies CP, SOB, palpitations, n/v, abd pain, visual changes, HA, dizziness, blurry vision.     B/L arm X ray showed no acute fracture.     CT head negative acute pathology.  EKG showed new TWI in lead III and avF, new RBBB. Mildly elevated trop. (13 Sep 2019 16:00)  MRI-  IMPRESSION:        no diffusion restriction or MR evidence of acute ischemia. Fairly   extensive chronic ischemic changes throughout both hemispheres with   moderate volume loss.    REVIEW OF SYSTEMS: No chest pain, shortness of breath, nausea, vomiting or diarhea.      PAST MEDICAL & SURGICAL HISTORY  Dyslipidemia  Osteoarthritis  Sciatica  Diabetes  HTN (hypertension)  HTN (hypertension)  DM (diabetes mellitus)  H/O bilateral hip replacements  S/P hip replacement, bilateral      SOCIAL HISTORY  Smoking - Denied, EtOH - Denied, Drugs - Denied    FUNCTIONAL HISTORY:   Lives with family   supervision PTA     CURRENT FUNCTIONAL STATUS: mod a       FAMILY HISTORY   FHx: leukemia  Family history of CVA  No pertinent family history in first degree relatives      RECENT LABS/IMAGING  CBC Full  -  ( 18 Sep 2019 05:40 )  WBC Count : 7.08 K/uL  RBC Count : 3.95 M/uL  Hemoglobin : 12.2 g/dL  Hematocrit : 35.9 %  Platelet Count - Automated : 173 K/uL  Mean Cell Volume : 90.9 fl  Mean Cell Hemoglobin : 30.9 pg  Mean Cell Hemoglobin Concentration : 34.0 gm/dL  Auto Neutrophil # : x  Auto Lymphocyte # : x  Auto Monocyte # : x  Auto Eosinophil # : x  Auto Basophil # : x  Auto Neutrophil % : x  Auto Lymphocyte % : x  Auto Monocyte % : x  Auto Eosinophil % : x  Auto Basophil % : x    09-18    137  |  103  |  36<H>  ----------------------------<  112<H>  4.3   |  28  |  1.65<H>    Ca    9.1      18 Sep 2019 05:40          VITALS  T(C): 36.9 (09-18-19 @ 05:24), Max: 36.9 (09-18-19 @ 05:24)  HR: 87 (09-18-19 @ 05:24) (76 - 90)  BP: 165/66 (09-18-19 @ 05:24) (133/56 - 165/66)  RR: 16 (09-18-19 @ 05:24) (16 - 17)  SpO2: 93% (09-18-19 @ 05:24) (93% - 97%)  Wt(kg): --    ALLERGIES  No Known Allergies      MEDICATIONS   acetaminophen    Suspension .. 650 milliGRAM(s) Oral every 6 hours PRN  amLODIPine   Tablet 5 milliGRAM(s) Oral daily  aspirin  chewable 81 milliGRAM(s) Oral daily  atorvastatin 40 milliGRAM(s) Oral at bedtime  clindamycin   Capsule 300 milliGRAM(s) Oral three times a day  dextrose 40% Gel 15 Gram(s) Oral once PRN  dextrose 5%. 1000 milliLiter(s) IV Continuous <Continuous>  dextrose 50% Injectable 12.5 Gram(s) IV Push once  dextrose 50% Injectable 25 Gram(s) IV Push once  dextrose 50% Injectable 25 Gram(s) IV Push once  enoxaparin Injectable 30 milliGRAM(s) SubCutaneous daily  escitalopram 5 milliGRAM(s) Oral daily  glucagon  Injectable 1 milliGRAM(s) IntraMuscular once PRN  insulin lispro (HumaLOG) corrective regimen sliding scale   SubCutaneous three times a day before meals  insulin lispro (HumaLOG) corrective regimen sliding scale   SubCutaneous at bedtime  metoprolol tartrate 25 milliGRAM(s) Oral every 12 hours  oxybutynin 5 milliGRAM(s) Oral three times a day  pantoprazole    Tablet 40 milliGRAM(s) Oral before breakfast  sodium chloride 0.9%. 500 milliLiter(s) IV Continuous <Continuous>  trimethoprim  160 mG/sulfamethoxazole 800 mG 1 Tablet(s) Oral every 12 hours      ----------------------------------------------------------------------------------------  PHYSICAL EXAM  Constitutional - NAD, Comfortable  HEENT - NCAT, EOMI  Neck - Supple, No limited ROM  Chest - CTA bilaterally, No wheeze, No rhonchi, No crackles  Cardiovascular - RRR, S1S2, No murmurs  Abdomen - BS+, Soft, NTND  Extremities - No C/C/E, No calf tenderness   Neurologic Exam -                    Cognitive - Awake, Alert, AAO to self, place, date, year, situation     Communication - Fluent, No dysarthria, no aphasia     Cranial Nerves - CN 2-12 intact     Motor - No focal deficits                       Sensory - Intact to LT     Reflexes - DTR Intact, No primitive reflexive     Balance - WNL Static  Psychiatric - Mood stable, Affect WNL HPI:  89 y/o F with PMH of 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 Her daughter reports she fell last night and was taken to urgent care, where her abrasions on the right arm was dressed and tetanus was given. Daughter reports pt fell twice again this morning with pt's right leg giving out. Denies head trauma. Denies CP, SOB, palpitations, n/v, abd pain, visual changes, HA, dizziness, blurry vision.     B/L arm X ray showed no acute fracture.     CT head negative acute pathology.  EKG showed new TWI in lead III and avF, new RBBB. Mildly elevated trop. (13 Sep 2019 16:00)  MRI-  IMPRESSION:        no diffusion restriction or MR evidence of acute ischemia. Fairly   extensive chronic ischemic changes throughout both hemispheres with   moderate volume loss.    REVIEW OF SYSTEMS: No chest pain, shortness of breath, nausea, vomiting or diarhea.    + left shoulder pain, worse with overhead activity     PAST MEDICAL & SURGICAL HISTORY  Dyslipidemia  Osteoarthritis  Sciatica  Diabetes  HTN (hypertension)  HTN (hypertension)  DM (diabetes mellitus)  H/O bilateral hip replacements  S/P hip replacement, bilateral      SOCIAL HISTORY  Smoking - Denied, EtOH - Denied, Drugs - Denied    FUNCTIONAL HISTORY:   Lives with daughter-  supervision PTA- walked with walker, dressed herself, toileted     CURRENT FUNCTIONAL STATUS: mod a       FAMILY HISTORY   FHx: leukemia  Family history of CVA  No pertinent family history in first degree relatives      RECENT LABS/IMAGING  CBC Full  -  ( 18 Sep 2019 05:40 )  WBC Count : 7.08 K/uL  RBC Count : 3.95 M/uL  Hemoglobin : 12.2 g/dL  Hematocrit : 35.9 %  Platelet Count - Automated : 173 K/uL  Mean Cell Volume : 90.9 fl  Mean Cell Hemoglobin : 30.9 pg  Mean Cell Hemoglobin Concentration : 34.0 gm/dL  Auto Neutrophil # : x  Auto Lymphocyte # : x  Auto Monocyte # : x  Auto Eosinophil # : x  Auto Basophil # : x  Auto Neutrophil % : x  Auto Lymphocyte % : x  Auto Monocyte % : x  Auto Eosinophil % : x  Auto Basophil % : x    09-18    137  |  103  |  36<H>  ----------------------------<  112<H>  4.3   |  28  |  1.65<H>    Ca    9.1      18 Sep 2019 05:40          VITALS  T(C): 36.9 (09-18-19 @ 05:24), Max: 36.9 (09-18-19 @ 05:24)  HR: 87 (09-18-19 @ 05:24) (76 - 90)  BP: 165/66 (09-18-19 @ 05:24) (133/56 - 165/66)  RR: 16 (09-18-19 @ 05:24) (16 - 17)  SpO2: 93% (09-18-19 @ 05:24) (93% - 97%)  Wt(kg): --    ALLERGIES  No Known Allergies      MEDICATIONS   acetaminophen    Suspension .. 650 milliGRAM(s) Oral every 6 hours PRN  amLODIPine   Tablet 5 milliGRAM(s) Oral daily  aspirin  chewable 81 milliGRAM(s) Oral daily  atorvastatin 40 milliGRAM(s) Oral at bedtime  clindamycin   Capsule 300 milliGRAM(s) Oral three times a day  dextrose 40% Gel 15 Gram(s) Oral once PRN  dextrose 5%. 1000 milliLiter(s) IV Continuous <Continuous>  dextrose 50% Injectable 12.5 Gram(s) IV Push once  dextrose 50% Injectable 25 Gram(s) IV Push once  dextrose 50% Injectable 25 Gram(s) IV Push once  enoxaparin Injectable 30 milliGRAM(s) SubCutaneous daily  escitalopram 5 milliGRAM(s) Oral daily  glucagon  Injectable 1 milliGRAM(s) IntraMuscular once PRN  insulin lispro (HumaLOG) corrective regimen sliding scale   SubCutaneous three times a day before meals  insulin lispro (HumaLOG) corrective regimen sliding scale   SubCutaneous at bedtime  metoprolol tartrate 25 milliGRAM(s) Oral every 12 hours  oxybutynin 5 milliGRAM(s) Oral three times a day  pantoprazole    Tablet 40 milliGRAM(s) Oral before breakfast  sodium chloride 0.9%. 500 milliLiter(s) IV Continuous <Continuous>  trimethoprim  160 mG/sulfamethoxazole 800 mG 1 Tablet(s) Oral every 12 hours      ----------------------------------------------------------------------------------------  PHYSICAL EXAM  Constitutional - NAD, Comfortable  HEENT - NCAT, EOMI  Neck - Supple, No limited ROM  Chest - CTA bilaterally, No wheeze, No rhonchi, No crackles  Cardiovascular - RRR, S1S2, No murmurs  Abdomen - BS+, Soft, NTND  Extremities - No C/C/E, No calf tenderness   Neurologic Exam -                    Cognitive - Awake, Alert, AAO to self, place, date, year, situation     Communication - + dysarthria, no aphasia     Cranial Nerves - CN 2-12 intact     Motor - MOVes UE antigravity proximally but limited by pain LUE, 5/5 distally.   LE 4/5                        Sensory - Intact to LT     Reflexes - DTR Intact, No primitive reflexive     Balance - poor   Psychiatric - Mood stable, Affect WNL

## 2019-09-18 NOTE — PROGRESS NOTE ADULT - ASSESSMENT
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 Medically stable for discharge to [facility] vs acute rehab    # worsened RLE weakness and slurred speech. r/o recurrent CVA w/  Frequent falls   - H/O CVA in 2018 with residual right sided weakness, slurred speech and dysphagia  -CT HEad with microvascular ischemic change  -MRI with no acute changes   - c/w ASA and statin   - PT consult-[facility]--family wants acute rehab--acute rehab to consult today  -S+S eval--regular with thin liquids   - neuro checks q routine   -Neuro following    # New TWI in lead III and avF, new RBBB  - mildly elevated trop likely demand ischemia. Pt is asymptomatic.   - TTE with Mild AS, LVF intact, mild to mod tricuspid reg, and borderline Pulm htn  - cont asa, statin, BB  - Cardio following    # murmur   - echo as above     # recent I & D on left hand abscess  - cont bactrim DS q12 and clindamycin 300mg q8 until 9/20    # LENNIE on CKD 3  - Creat today 1.6  -Suspect prerenal as pt not taking much PO--will give gentle IVF (500cc)  -Cont to monitor closely     # HTN -   cont with amlodipine 5mg and metoprolol 25mg q12 BP , SBP  acceptable    # T2DM   - Hba1c 6.3 last year and pt has been taken off of DM medications per family  - 7.3 a1c on admit  - carb consistent diet  - cont ISS    # DVT ppx: SCD  Dispo:[facility] vs acute rehab. Medically stable. Will need insurance auth 89 y/o F with PMH of CVA x 1 yr ago with residual mild R sided weakness, dysarthria and dysphagia, CKD3, HTN, DM, HLD, left hand abscess s/p recent I &D was BIB her daughter (Parris) for evaluation of increased RLE weakness, worsened slurred speech x 2 days and frequent falls for 2 days. Medically stable for discharge to [facility] vs acute rehab    # worsened RLE weakness and slurred speech. r/o recurrent CVA w/  Frequent falls   - H/O CVA in 2018 with residual right sided weakness, slurred speech and dysphagia  -CT HEad with microvascular ischemic change  -MRI with no acute changes   - c/w ASA and statin   - PT consult-[facility]--family wants acute rehab--acute rehab to consult today  -S+S eval--regular with thin liquids   - neuro checks q routine   -Neuro following    # New TWI in lead III and avF, new RBBB  - mildly elevated trop likely demand ischemia. Pt is asymptomatic.   - TTE with Mild AS, LVF intact, mild to mod tricuspid reg, and borderline Pulm htn  - cont asa, statin, BB  - Cardio following    # murmur   - echo as above     # recent I & D on left hand abscess  - cont bactrim DS q12 and clindamycin 300mg q8 until 9/20    # LENNIE on CKD 3  - Creat today 1.6  -Suspect prerenal as pt not taking much PO--will give gentle IVF (500cc)  -Cont to monitor closely     # HTN -   cont with amlodipine 5mg and metoprolol 25mg q12 BP , SBP  acceptable    # T2DM   - Hba1c 6.3 last year and pt has been taken off of DM medications per family  - 7.3 a1c on admit  - carb consistent diet  - cont ISS    # DVT ppx: SCD    Dispo: acute rehab. Medically stable. Will need insurance auth

## 2019-09-18 NOTE — PROGRESS NOTE ADULT - SUBJECTIVE AND OBJECTIVE BOX
Patient is a 89y old  Female who presents with a chief complaint of Frequent fall, worsening R sided weakness and worsening slurred speech (17 Sep 2019 09:51). No acute issues overnight       Patient seen and examined at bedside.    ALLERGIES:  No Known Allergies    MEDICATIONS  (STANDING):  amLODIPine   Tablet 5 milliGRAM(s) Oral daily  aspirin  chewable 81 milliGRAM(s) Oral daily  atorvastatin 40 milliGRAM(s) Oral at bedtime  clindamycin   Capsule 300 milliGRAM(s) Oral three times a day  dextrose 5%. 1000 milliLiter(s) (50 mL/Hr) IV Continuous <Continuous>  dextrose 50% Injectable 12.5 Gram(s) IV Push once  dextrose 50% Injectable 25 Gram(s) IV Push once  dextrose 50% Injectable 25 Gram(s) IV Push once  enoxaparin Injectable 30 milliGRAM(s) SubCutaneous daily  escitalopram 5 milliGRAM(s) Oral daily  insulin lispro (HumaLOG) corrective regimen sliding scale   SubCutaneous three times a day before meals  insulin lispro (HumaLOG) corrective regimen sliding scale   SubCutaneous at bedtime  metoprolol tartrate 25 milliGRAM(s) Oral every 12 hours  oxybutynin 5 milliGRAM(s) Oral three times a day  pantoprazole    Tablet 40 milliGRAM(s) Oral before breakfast  trimethoprim  160 mG/sulfamethoxazole 800 mG 1 Tablet(s) Oral every 12 hours    MEDICATIONS  (PRN):  acetaminophen    Suspension .. 650 milliGRAM(s) Oral every 6 hours PRN Temp greater or equal to 38C (100.4F), Mild Pain (1 - 3), Moderate Pain (4 - 6)  dextrose 40% Gel 15 Gram(s) Oral once PRN Blood Glucose LESS THAN 70 milliGRAM(s)/deciliter  glucagon  Injectable 1 milliGRAM(s) IntraMuscular once PRN Glucose LESS THAN 70 milligrams/deciliter    Vital Signs Last 24 Hrs  T(F): 98.5 (18 Sep 2019 05:24), Max: 98.5 (18 Sep 2019 05:24)  HR: 87 (18 Sep 2019 05:24) (76 - 90)  BP: 165/66 (18 Sep 2019 05:24) (133/56 - 165/66)  RR: 16 (18 Sep 2019 05:24) (16 - 17)  SpO2: 93% (18 Sep 2019 05:24) (93% - 97%)  I&O's Summary    17 Sep 2019 07:01  -  18 Sep 2019 07:00  --------------------------------------------------------  IN: 550 mL / OUT: 0 mL / NET: 550 mL    18 Sep 2019 07:01  -  18 Sep 2019 10:38  --------------------------------------------------------  IN: 200 mL / OUT: 0 mL / NET: 200 mL      BMI (kg/m2): 24.3 (09-13-19 @ 21:00)  PHYSICAL EXAM:  General: NAD, A/O x 1, pleasant   ENT: MMM  Neck: Supple, No JVD  Lungs: Clear to auscultation bilaterally  Cardio: RRR, S1/S2, + systolic murmur   Abdomen: Soft, Nontender, Nondistended; Bowel sounds present  Extremities: No calf tenderness, No pitting edema    LABS:                        12.2   7.08  )-----------( 173      ( 18 Sep 2019 05:40 )             35.9       09-18    137  |  103  |  36  ----------------------------<  112  4.3   |  28  |  1.65    Ca    9.1      18 Sep 2019 05:40       eGFR if Non African American: 26 mL/min/1.73M2 (09-18-19 @ 05:40)  eGFR if African American: 30 mL/min/1.73M2 (09-18-19 @ 05:40)                           POCT Blood Glucose.: 134 mg/dL (18 Sep 2019 07:54)  POCT Blood Glucose.: 184 mg/dL (17 Sep 2019 22:29)  POCT Blood Glucose.: 142 mg/dL (17 Sep 2019 16:54)  POCT Blood Glucose.: 205 mg/dL (17 Sep 2019 12:01)    09-13 [id number] 7.3          RADIOLOGY & ADDITIONAL TESTS:  < from: MR Head No Cont (09.17.19 @ 14:14) >    IMPRESSION:      1)  no diffusion restriction or MR evidence of acute ischemia. Fairly   extensive chronic ischemic changes throughout both hemispheres with   moderate volume loss..  2)  right mastoid partial opacification. Fairly extensive disc disease   and degenerative changes in the cervical spine. Dedicated cervical   imaging may be considered, if clinically warranted..       < end of copied text >      Care Discussed with Consultants/Other Providers: Patient is a 89y old  Female who presents with a chief complaint of Frequent fall, worsening R sided weakness and worsening slurred speech (17 Sep 2019 09:51). No acute issues overnight     Patient seen and examined at bedside.    ALLERGIES:  No Known Allergies    MEDICATIONS  (STANDING):  amLODIPine   Tablet 5 milliGRAM(s) Oral daily  aspirin  chewable 81 milliGRAM(s) Oral daily  atorvastatin 40 milliGRAM(s) Oral at bedtime  clindamycin   Capsule 300 milliGRAM(s) Oral three times a day  dextrose 5%. 1000 milliLiter(s) (50 mL/Hr) IV Continuous <Continuous>  dextrose 50% Injectable 12.5 Gram(s) IV Push once  dextrose 50% Injectable 25 Gram(s) IV Push once  dextrose 50% Injectable 25 Gram(s) IV Push once  enoxaparin Injectable 30 milliGRAM(s) SubCutaneous daily  escitalopram 5 milliGRAM(s) Oral daily  insulin lispro (HumaLOG) corrective regimen sliding scale   SubCutaneous three times a day before meals  insulin lispro (HumaLOG) corrective regimen sliding scale   SubCutaneous at bedtime  metoprolol tartrate 25 milliGRAM(s) Oral every 12 hours  oxybutynin 5 milliGRAM(s) Oral three times a day  pantoprazole    Tablet 40 milliGRAM(s) Oral before breakfast  trimethoprim  160 mG/sulfamethoxazole 800 mG 1 Tablet(s) Oral every 12 hours    MEDICATIONS  (PRN):  acetaminophen    Suspension .. 650 milliGRAM(s) Oral every 6 hours PRN Temp greater or equal to 38C (100.4F), Mild Pain (1 - 3), Moderate Pain (4 - 6)  dextrose 40% Gel 15 Gram(s) Oral once PRN Blood Glucose LESS THAN 70 milliGRAM(s)/deciliter  glucagon  Injectable 1 milliGRAM(s) IntraMuscular once PRN Glucose LESS THAN 70 milligrams/deciliter    Vital Signs Last 24 Hrs  T(F): 98.5 (18 Sep 2019 05:24), Max: 98.5 (18 Sep 2019 05:24)  HR: 87 (18 Sep 2019 05:24) (76 - 90)  BP: 165/66 (18 Sep 2019 05:24) (133/56 - 165/66)  RR: 16 (18 Sep 2019 05:24) (16 - 17)  SpO2: 93% (18 Sep 2019 05:24) (93% - 97%)  I&O's Summary    17 Sep 2019 07:01  -  18 Sep 2019 07:00  --------------------------------------------------------  IN: 550 mL / OUT: 0 mL / NET: 550 mL    18 Sep 2019 07:01  -  18 Sep 2019 10:38  --------------------------------------------------------  IN: 200 mL / OUT: 0 mL / NET: 200 mL      BMI (kg/m2): 24.3 (09-13-19 @ 21:00)  PHYSICAL EXAM:  General: NAD, A/O x 1, pleasant   ENT: MMM  Neck: Supple, No JVD  Lungs: Clear to auscultation bilaterally  Cardio:  S1/S2, + systolic murmur   Abdomen: Soft, Nontender, Nondistended; Bowel sounds present  Extremities: No calf tenderness, No pitting edema    LABS:                        12.2   7.08  )-----------( 173      ( 18 Sep 2019 05:40 )             35.9       09-18    137  |  103  |  36  ----------------------------<  112  4.3   |  28  |  1.65    Ca    9.1      18 Sep 2019 05:40     eGFR if Non African American: 26 mL/min/1.73M2 (09-18-19 @ 05:40)  eGFR if African American: 30 mL/min/1.73M2 (09-18-19 @ 05:40)    Glucose  POCT Blood Glucose.: 134 mg/dL (18 Sep 2019 07:54)  POCT Blood Glucose.: 184 mg/dL (17 Sep 2019 22:29)  POCT Blood Glucose.: 142 mg/dL (17 Sep 2019 16:54)  POCT Blood Glucose.: 205 mg/dL (17 Sep 2019 12:01)    09-13 [id number] 7.3    RADIOLOGY & ADDITIONAL TESTS:  < from: MR Head No Cont (09.17.19 @ 14:14) >  IMPRESSION:      1)  no diffusion restriction or MR evidence of acute ischemia. Fairly   extensive chronic ischemic changes throughout both hemispheres with   moderate volume loss..  2)  right mastoid partial opacification. Fairly extensive disc disease   and degenerative changes in the cervical spine. Dedicated cervical   imaging may be considered, if clinically warranted..   < end of copied text >      Care Discussed with Consultants/Other Providers:   Rehab

## 2019-09-18 NOTE — CONSULT NOTE ADULT - REASON FOR ADMISSION
Frequent fall, worsening R sided weakness and worsening slurred speech

## 2019-09-18 NOTE — DISCHARGE NOTE PROVIDER - CARE PROVIDER_API CALL
Armando Feldman)  Family Medicine; Wound Care  [address] [place] [address]  Phone: [phone]  Fax: [phone]  Follow Up Time:

## 2019-09-18 NOTE — DISCHARGE NOTE PROVIDER - NSDCCPCAREPLAN_GEN_ALL_CORE_FT
PRINCIPAL DISCHARGE DIAGNOSIS  Diagnosis: Weakness  Assessment and Plan of Treatment:       SECONDARY DISCHARGE DIAGNOSES  Diagnosis: TIA (transient ischemic attack)  Assessment and Plan of Treatment: PRINCIPAL DISCHARGE DIAGNOSIS  Diagnosis: Weakness  Assessment and Plan of Treatment: - take medications as rx  - final disposition and instructions per Acute rehab team      SECONDARY DISCHARGE DIAGNOSES  Diagnosis: TIA (transient ischemic attack)  Assessment and Plan of Treatment:

## 2019-09-19 LAB
ANION GAP SERPL CALC-SCNC: 5 MMOL/L — SIGNIFICANT CHANGE UP (ref 5–17)
BUN SERPL-MCNC: 29 MG/DL — HIGH (ref 7–23)
CALCIUM SERPL-MCNC: 9 MG/DL — SIGNIFICANT CHANGE UP (ref 8.4–10.5)
CHLORIDE SERPL-SCNC: 105 MMOL/L — SIGNIFICANT CHANGE UP (ref 96–108)
CO2 SERPL-SCNC: 29 MMOL/L — SIGNIFICANT CHANGE UP (ref 22–31)
CREAT SERPL-MCNC: 1.5 MG/DL — HIGH (ref 0.5–1.3)
GLUCOSE BLDC GLUCOMTR-MCNC: 131 MG/DL — HIGH (ref 70–99)
GLUCOSE BLDC GLUCOMTR-MCNC: 147 MG/DL — HIGH (ref 70–99)
GLUCOSE BLDC GLUCOMTR-MCNC: 152 MG/DL — HIGH (ref 70–99)
GLUCOSE BLDC GLUCOMTR-MCNC: 181 MG/DL — HIGH (ref 70–99)
GLUCOSE SERPL-MCNC: 110 MG/DL — HIGH (ref 70–99)
POTASSIUM SERPL-MCNC: 4.6 MMOL/L — SIGNIFICANT CHANGE UP (ref 3.5–5.3)
POTASSIUM SERPL-SCNC: 4.6 MMOL/L — SIGNIFICANT CHANGE UP (ref 3.5–5.3)
SODIUM SERPL-SCNC: 139 MMOL/L — SIGNIFICANT CHANGE UP (ref 135–145)

## 2019-09-19 PROCEDURE — 99232 SBSQ HOSP IP/OBS MODERATE 35: CPT

## 2019-09-19 RX ORDER — DOCUSATE SODIUM 100 MG
1 CAPSULE ORAL
Qty: 0 | Refills: 0 | DISCHARGE
Start: 2019-09-19

## 2019-09-19 RX ORDER — DOCUSATE SODIUM 100 MG
100 CAPSULE ORAL THREE TIMES A DAY
Refills: 0 | Status: DISCONTINUED | OUTPATIENT
Start: 2019-09-19 | End: 2019-09-20

## 2019-09-19 RX ADMIN — Medication 100 MILLIGRAM(S): at 14:27

## 2019-09-19 RX ADMIN — Medication 300 MILLIGRAM(S): at 21:33

## 2019-09-19 RX ADMIN — Medication 81 MILLIGRAM(S): at 11:18

## 2019-09-19 RX ADMIN — Medication 5 MILLIGRAM(S): at 21:33

## 2019-09-19 RX ADMIN — Medication 300 MILLIGRAM(S): at 05:34

## 2019-09-19 RX ADMIN — Medication 300 MILLIGRAM(S): at 14:27

## 2019-09-19 RX ADMIN — Medication 1: at 17:13

## 2019-09-19 RX ADMIN — ATORVASTATIN CALCIUM 40 MILLIGRAM(S): 80 TABLET, FILM COATED ORAL at 21:34

## 2019-09-19 RX ADMIN — Medication 25 MILLIGRAM(S): at 05:34

## 2019-09-19 RX ADMIN — ESCITALOPRAM OXALATE 5 MILLIGRAM(S): 10 TABLET, FILM COATED ORAL at 11:18

## 2019-09-19 RX ADMIN — AMLODIPINE BESYLATE 5 MILLIGRAM(S): 2.5 TABLET ORAL at 05:34

## 2019-09-19 RX ADMIN — Medication 5 MILLIGRAM(S): at 05:34

## 2019-09-19 RX ADMIN — Medication 25 MILLIGRAM(S): at 17:14

## 2019-09-19 RX ADMIN — ENOXAPARIN SODIUM 30 MILLIGRAM(S): 100 INJECTION SUBCUTANEOUS at 11:19

## 2019-09-19 RX ADMIN — Medication 10 MILLIGRAM(S): at 11:20

## 2019-09-19 RX ADMIN — Medication 1 TABLET(S): at 17:13

## 2019-09-19 RX ADMIN — PANTOPRAZOLE SODIUM 40 MILLIGRAM(S): 20 TABLET, DELAYED RELEASE ORAL at 05:34

## 2019-09-19 RX ADMIN — Medication 1 TABLET(S): at 05:34

## 2019-09-19 RX ADMIN — Medication 5 MILLIGRAM(S): at 14:27

## 2019-09-19 RX ADMIN — Medication 100 MILLIGRAM(S): at 21:33

## 2019-09-19 NOTE — CHART NOTE - NSCHARTNOTEFT_GEN_A_CORE
spoke to [facility] peer to peer Dr Cobian-> patient denied for acute rehab as does not meet criteria for medical severity; approved for [facility] due to rehab needs spoke to Julio Cesar peer to peer Dr Cobian-> patient denied for acute rehab as does not meet criteria for medical severity; approved for [facility] due to rehab needs    insurance not approving acute rehab as patient did not have surgery, spinal cord injury, or severe stroke.    spoke to patient son bedside - all questions answered

## 2019-09-19 NOTE — PROGRESS NOTE ADULT - ATTENDING COMMENTS
I have personally seen and examined patient on the above date.  I discussed the case with IVANA Jo and I agree with findings and plan as detailed per note above, which I have amended where appropriate.
I have personally seen and examined patient on the above date.  I discussed the case with IVANA Vick and I agree with findings and plan as detailed per note above, which I have amended where appropriate.
I have personally seen and examined patient on the above date.  I discussed the case with IVANA Vick and I agree with findings and plan as detailed per note above, which I have amended where appropriate.

## 2019-09-19 NOTE — PROGRESS NOTE ADULT - SUBJECTIVE AND OBJECTIVE BOX
Patient is a 89y old  Female who presents with a chief complaint of Frequent fall, worsening R sided weakness and worsening slurred speech.  Pt complaining of nausea this morning  pt without vomiting, denies pain.  Pt has not had a BM in days.      Patient seen and examined at bedside.    ALLERGIES:  No Known Allergies    MEDICATIONS  (STANDING):  amLODIPine   Tablet 5 milliGRAM(s) Oral daily  aspirin  chewable 81 milliGRAM(s) Oral daily  atorvastatin 40 milliGRAM(s) Oral at bedtime  clindamycin   Capsule 300 milliGRAM(s) Oral three times a day  dextrose 5%. 1000 milliLiter(s) (50 mL/Hr) IV Continuous <Continuous>  dextrose 50% Injectable 12.5 Gram(s) IV Push once  dextrose 50% Injectable 25 Gram(s) IV Push once  dextrose 50% Injectable 25 Gram(s) IV Push once  enoxaparin Injectable 30 milliGRAM(s) SubCutaneous daily  escitalopram 5 milliGRAM(s) Oral daily  insulin lispro (HumaLOG) corrective regimen sliding scale   SubCutaneous three times a day before meals  insulin lispro (HumaLOG) corrective regimen sliding scale   SubCutaneous at bedtime  metoprolol tartrate 25 milliGRAM(s) Oral every 12 hours  oxybutynin 5 milliGRAM(s) Oral three times a day  pantoprazole    Tablet 40 milliGRAM(s) Oral before breakfast  trimethoprim  160 mG/sulfamethoxazole 800 mG 1 Tablet(s) Oral every 12 hours    MEDICATIONS  (PRN):  acetaminophen    Suspension .. 650 milliGRAM(s) Oral every 6 hours PRN Temp greater or equal to 38C (100.4F), Mild Pain (1 - 3), Moderate Pain (4 - 6)  dextrose 40% Gel 15 Gram(s) Oral once PRN Blood Glucose LESS THAN 70 milliGRAM(s)/deciliter  glucagon  Injectable 1 milliGRAM(s) IntraMuscular once PRN Glucose LESS THAN 70 milligrams/deciliter    Vital Signs Last 24 Hrs  T(F): 97.9 (19 Sep 2019 05:05), Max: 98.2 (18 Sep 2019 15:55)  HR: 88 (19 Sep 2019 05:05) (72 - 89)  BP: 164/81 (19 Sep 2019 05:05) (124/58 - 164/81)  RR: 16 (19 Sep 2019 05:05) (16 - 17)  SpO2: 96% (19 Sep 2019 05:05) (94% - 97%)  I&O's Summary    18 Sep 2019 07:01  -  19 Sep 2019 07:00  --------------------------------------------------------  IN: 400 mL / OUT: 100 mL / NET: 300 mL      PHYSICAL EXAM:  General: NAD, A/O x 2 disarthric   ENT: MMM  Neck: Supple, No JVD  Lungs: Clear to auscultation bilaterally, Non labored breathing   Cardio: RRR, S1/S2, 2/6  murmurs  Abdomen: Soft, Nontender, Nondistended; Bowel sounds present  Extremities: No calf tenderness, No pitting edema    LABS:                        12.2   7.08  )-----------( 173      ( 18 Sep 2019 05:40 )             35.9     09-19    139  |  105  |  29  ----------------------------<  110  4.6   |  29  |  1.50    Ca    9.0      19 Sep 2019 05:05      eGFR if Non African American: 29 mL/min/1.73M2 (09-19-19 @ 05:05)  eGFR if African American: 34 mL/min/1.73M2 (09-19-19 @ 05:05)                          POCT Blood Glucose.: 131 mg/dL (19 Sep 2019 07:46)  POCT Blood Glucose.: 113 mg/dL (18 Sep 2019 21:06)  POCT Blood Glucose.: 133 mg/dL (18 Sep 2019 17:03)  POCT Blood Glucose.: 148 mg/dL (18 Sep 2019 11:49)    09-13 [id number] 7.3        RADIOLOGY & ADDITIONAL TESTS:    Care Discussed with Consultants/Other Providers: Patient is a 89y old  Female who presents with a chief complaint of Frequent fall, worsening R sided weakness and worsening slurred speech.  Pt complaining of nausea this morning  pt without vomiting, denies pain.  Pt has not had a BM in days.    Patient seen and examined at bedside.    ALLERGIES:  No Known Allergies    MEDICATIONS  (STANDING):  amLODIPine   Tablet 5 milliGRAM(s) Oral daily  aspirin  chewable 81 milliGRAM(s) Oral daily  atorvastatin 40 milliGRAM(s) Oral at bedtime  clindamycin   Capsule 300 milliGRAM(s) Oral three times a day  dextrose 5%. 1000 milliLiter(s) (50 mL/Hr) IV Continuous <Continuous>  dextrose 50% Injectable 12.5 Gram(s) IV Push once  dextrose 50% Injectable 25 Gram(s) IV Push once  dextrose 50% Injectable 25 Gram(s) IV Push once  enoxaparin Injectable 30 milliGRAM(s) SubCutaneous daily  escitalopram 5 milliGRAM(s) Oral daily  insulin lispro (HumaLOG) corrective regimen sliding scale   SubCutaneous three times a day before meals  insulin lispro (HumaLOG) corrective regimen sliding scale   SubCutaneous at bedtime  metoprolol tartrate 25 milliGRAM(s) Oral every 12 hours  oxybutynin 5 milliGRAM(s) Oral three times a day  pantoprazole    Tablet 40 milliGRAM(s) Oral before breakfast  trimethoprim  160 mG/sulfamethoxazole 800 mG 1 Tablet(s) Oral every 12 hours    MEDICATIONS  (PRN):  acetaminophen    Suspension .. 650 milliGRAM(s) Oral every 6 hours PRN Temp greater or equal to 38C (100.4F), Mild Pain (1 - 3), Moderate Pain (4 - 6)  dextrose 40% Gel 15 Gram(s) Oral once PRN Blood Glucose LESS THAN 70 milliGRAM(s)/deciliter  glucagon  Injectable 1 milliGRAM(s) IntraMuscular once PRN Glucose LESS THAN 70 milligrams/deciliter    Vital Signs Last 24 Hrs  T(F): 97.9 (19 Sep 2019 05:05), Max: 98.2 (18 Sep 2019 15:55)  HR: 88 (19 Sep 2019 05:05) (72 - 89)  BP: 164/81 (19 Sep 2019 05:05) (124/58 - 164/81)  RR: 16 (19 Sep 2019 05:05) (16 - 17)  SpO2: 96% (19 Sep 2019 05:05) (94% - 97%)  I&O's Summary    18 Sep 2019 07:01  -  19 Sep 2019 07:00  --------------------------------------------------------  IN: 400 mL / OUT: 100 mL / NET: 300 mL      PHYSICAL EXAM:  General: NAD, A/O x 2 dysarthric   ENT: MMM  Neck: Supple, No JVD  Lungs: Clear to auscultation bilaterally, Non labored breathing   Cardio: +S1/S2, 2/6  murmurs  Abdomen: Soft, Nontender, Nondistended; Bowel sounds present  Extremities: No calf tenderness, No pitting edema    LABS:                        12.2   7.08  )-----------( 173      ( 18 Sep 2019 05:40 )             35.9     09-19    139  |  105  |  29  ----------------------------<  110  4.6   |  29  |  1.50    Ca    9.0      19 Sep 2019 05:05    eGFR if Non African American: 29 mL/min/1.73M2 (09-19-19 @ 05:05)  eGFR if African American: 34 mL/min/1.73M2 (09-19-19 @ 05:05)    Glucose  POCT Blood Glucose.: 131 mg/dL (19 Sep 2019 07:46)  POCT Blood Glucose.: 113 mg/dL (18 Sep 2019 21:06)  POCT Blood Glucose.: 133 mg/dL (18 Sep 2019 17:03)  POCT Blood Glucose.: 148 mg/dL (18 Sep 2019 11:49)    09-13 [id number] 7.3    RADIOLOGY & ADDITIONAL TESTS:  < from: MR Head No Cont (09.17.19 @ 14:14) >  IMPRESSION:      1)  no diffusion restriction or MR evidence of acute ischemia. Fairly   extensive chronic ischemic changes throughout both hemispheres with   moderate volume loss..  2)  right mastoid partial opacification. Fairly extensive disc disease   and degenerative changes in the cervical spine. Dedicated cervical   imaging may be considered, if clinically warranted..   < end of copied text >    < from: TTE Echo Complete w/Doppler (09.14.19 @ 14:07) >  Summary:   1. Mild to at worst moderate calcific aortic stenosis   2. Left ventricular ejection fraction, by visual estimation, is 55 to   60%.   3. Spectral Doppler shows pseudonormal pattern of left ventricular   myocardial filling (Grade II diastolic dysfunction).   4. Mild thickening and calcification of the anterior and posterior   mitral valve leaflets.   5. Mild-moderate tricuspid regurgitation.   6. Estimated pulmonary artery systolic pressure is 39.6 mmHg assuming a   right atrial pressure of 10 mmHg, which is consistent with borderline   pulmonary hypertension.   7. LA volume Index is 41.0 ml/m² ml/m2.    < end of copied text >      Care Discussed with Consultants/Other Providers:

## 2019-09-19 NOTE — PROGRESS NOTE ADULT - ASSESSMENT
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 Medically stable for discharge to [facility] vs acute rehab    # worsened RLE weakness and slurred speech. r/o recurrent CVA w/  Frequent falls   - H/O CVA in 2018 with residual right sided weakness, slurred speech ,  dysarthric and dysphagia  -CT HEad with microvascular ischemic change  -MRI with no acute changes   - c/w ASA and statin   - PT consult-[facility]--family wants acute rehab--acute rehab accepted- awaiting auth  -S+S eval--regular with thin liquids   -Neuro following    # New TWI in lead III and avF, new RBBB  - mildly elevated trop likely demand ischemia. Pt is asymptomatic.   - TTE with Mild AS, LVF intact, mild to mod tricuspid reg, and borderline Pulm htn  - cont asa, statin, BB  - Cardio following    # murmur   - echo as above     # recent I & D on left hand abscess  - cont bactrim DS q12 and clindamycin 300mg q8 until 9/20    # LENNIE on CKD 3  - Creat today 1.6  -Suspect prerenal as pt not taking much PO--will give gentle IVF (500cc)  -Cont to monitor closely     # HTN -   cont with amlodipine 5mg and metoprolol 25mg q12 BP , SBP  acceptable    # T2DM   - Hba1c 6.3 last year and pt has been taken off of DM medications per family  - 7.3 a1c on admit  - carb consistent diet  - cont ISS    #nausea  - zofran prn  - bowel regimen no bm for days     # DVT ppx: SCD    Dispo: acute rehab. Medically stable. 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 Medically stable for discharge to [facility] vs acute rehab    # worsened RLE weakness and slurred speech. r/o recurrent CVA w/  Frequent falls   - H/O CVA in 2018 with residual right sided weakness, slurred speech ,  dysarthric and dysphagia  -CT HEad with microvascular ischemic change  -MRI with no acute changes   - c/w ASA and statin   - PT consult-[facility]--family wants acute rehab--acute rehab accepted- awaiting auth  -S+S eval--regular with thin liquids   -Neuro following    # New TWI in lead III and avF, new RBBB  - mildly elevated trop likely demand ischemia. Pt is asymptomatic.   - TTE with Mild AS, LVF intact, mild to mod tricuspid reg, and borderline Pulm htn  - cont asa, statin, BB  - Cardio following    # murmur   - echo as above     # recent I & D on left hand abscess  - cont bactrim DS q12 and clindamycin 300mg q8 until 9/20    # LENNIE on CKD 3  - Creat today 1.56 improved with fluids   -Suspect prerenal as pt not taking much PO--will give gentle IVF (500cc)  -Cont to monitor closely     # HTN -   cont with amlodipine 5mg and metoprolol 25mg q12 BP , SBP  acceptable    # T2DM   - Hba1c 6.3 last year and pt has been taken off of DM medications per family  - 7.3 a1c on admit  - carb consistent diet  - cont ISS    #nausea  - zofran prn  - bowel regimen no bm for days     # DVT ppx: SCD    Dispo: acute rehab. Medically stable. Awaiting auth- most likely today

## 2019-09-20 ENCOUNTER — TRANSCRIPTION ENCOUNTER (OUTPATIENT)
Age: 84
End: 2019-09-20

## 2019-09-20 VITALS
TEMPERATURE: 98 F | HEART RATE: 82 BPM | RESPIRATION RATE: 14 BRPM | OXYGEN SATURATION: 90 % | DIASTOLIC BLOOD PRESSURE: 59 MMHG | SYSTOLIC BLOOD PRESSURE: 111 MMHG

## 2019-09-20 LAB
GLUCOSE BLDC GLUCOMTR-MCNC: 159 MG/DL — HIGH (ref 70–99)
GLUCOSE BLDC GLUCOMTR-MCNC: 168 MG/DL — HIGH (ref 70–99)

## 2019-09-20 PROCEDURE — 73060 X-RAY EXAM OF HUMERUS: CPT

## 2019-09-20 PROCEDURE — 74230 X-RAY XM SWLNG FUNCJ C+: CPT

## 2019-09-20 PROCEDURE — 99233 SBSQ HOSP IP/OBS HIGH 50: CPT

## 2019-09-20 PROCEDURE — 85730 THROMBOPLASTIN TIME PARTIAL: CPT

## 2019-09-20 PROCEDURE — 97163 PT EVAL HIGH COMPLEX 45 MIN: CPT

## 2019-09-20 PROCEDURE — 70551 MRI BRAIN STEM W/O DYE: CPT

## 2019-09-20 PROCEDURE — 84484 ASSAY OF TROPONIN QUANT: CPT

## 2019-09-20 PROCEDURE — 93306 TTE W/DOPPLER COMPLETE: CPT

## 2019-09-20 PROCEDURE — 97116 GAIT TRAINING THERAPY: CPT

## 2019-09-20 PROCEDURE — 83036 HEMOGLOBIN GLYCOSYLATED A1C: CPT

## 2019-09-20 PROCEDURE — 80048 BASIC METABOLIC PNL TOTAL CA: CPT

## 2019-09-20 PROCEDURE — 92611 MOTION FLUOROSCOPY/SWALLOW: CPT

## 2019-09-20 PROCEDURE — 92610 EVALUATE SWALLOWING FUNCTION: CPT

## 2019-09-20 PROCEDURE — 71045 X-RAY EXAM CHEST 1 VIEW: CPT

## 2019-09-20 PROCEDURE — 36000 PLACE NEEDLE IN VEIN: CPT

## 2019-09-20 PROCEDURE — 83735 ASSAY OF MAGNESIUM: CPT

## 2019-09-20 PROCEDURE — 70450 CT HEAD/BRAIN W/O DYE: CPT

## 2019-09-20 PROCEDURE — 85610 PROTHROMBIN TIME: CPT

## 2019-09-20 PROCEDURE — 80053 COMPREHEN METABOLIC PANEL: CPT

## 2019-09-20 PROCEDURE — 73070 X-RAY EXAM OF ELBOW: CPT

## 2019-09-20 PROCEDURE — 81001 URINALYSIS AUTO W/SCOPE: CPT

## 2019-09-20 PROCEDURE — 73030 X-RAY EXAM OF SHOULDER: CPT

## 2019-09-20 PROCEDURE — 99285 EMERGENCY DEPT VISIT HI MDM: CPT | Mod: 25

## 2019-09-20 PROCEDURE — 82962 GLUCOSE BLOOD TEST: CPT

## 2019-09-20 PROCEDURE — 93005 ELECTROCARDIOGRAM TRACING: CPT

## 2019-09-20 PROCEDURE — 85027 COMPLETE CBC AUTOMATED: CPT

## 2019-09-20 PROCEDURE — 36415 COLL VENOUS BLD VENIPUNCTURE: CPT

## 2019-09-20 RX ADMIN — Medication 81 MILLIGRAM(S): at 11:38

## 2019-09-20 RX ADMIN — ESCITALOPRAM OXALATE 5 MILLIGRAM(S): 10 TABLET, FILM COATED ORAL at 11:38

## 2019-09-20 RX ADMIN — Medication 1: at 11:48

## 2019-09-20 RX ADMIN — Medication 5 MILLIGRAM(S): at 05:36

## 2019-09-20 RX ADMIN — Medication 25 MILLIGRAM(S): at 05:36

## 2019-09-20 RX ADMIN — ENOXAPARIN SODIUM 30 MILLIGRAM(S): 100 INJECTION SUBCUTANEOUS at 11:43

## 2019-09-20 RX ADMIN — Medication 1 TABLET(S): at 05:36

## 2019-09-20 RX ADMIN — Medication 1: at 07:56

## 2019-09-20 RX ADMIN — Medication 300 MILLIGRAM(S): at 05:37

## 2019-09-20 RX ADMIN — PANTOPRAZOLE SODIUM 40 MILLIGRAM(S): 20 TABLET, DELAYED RELEASE ORAL at 05:36

## 2019-09-20 RX ADMIN — AMLODIPINE BESYLATE 5 MILLIGRAM(S): 2.5 TABLET ORAL at 05:36

## 2019-09-20 RX ADMIN — Medication 100 MILLIGRAM(S): at 05:36

## 2019-09-20 NOTE — DISCHARGE NOTE NURSING/CASE MANAGEMENT/SOCIAL WORK - PATIENT PORTAL LINK FT
You can access the FollowMyHealth Patient Portal offered by [facility] by registering at the following website: [email or web address]. By joining [facility]’s FollowMyHealth portal, you will also be able to view your health information using other applications (apps) compatible with our system.

## 2019-09-20 NOTE — PROGRESS NOTE ADULT - ASSESSMENT
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    # RLE weakness and slurred speech: New Ischemic changes ruled out  -MRI with no acute changes , extensive chronic changes  - c/w ASA and statin     #TWI in lead III and avF, new RBBB---  Pt remains asymptomatic.   - mildly elevated trop likely demand ischemia.- TTE with Mild AS, LVF intact, mild to mod tricuspid reg, and borderline Pulm htn  - cont asa, statin, BB. c/w medical management  - case discussed with Dr. serrano    # recent I & D on left hand abscess  - cont bactrim DS q12 and clindamycin 300mg q8 until 9/20-- complete course today    # LENNIE on CKD 3 vs ckd progression  unknown baseline cr  - Creat stable 1.5-1.6  - monitor cr    # HTN -   cont with amlodipine 5mg and metoprolol 25mg q12 BP , SBP  acceptable    # T2DM   - Hba1c 6.3 last year and pt has been taken off of DM medications per family  - 7.3 a1c on admit  - carb consistent diet  - cont ISS    #nausea-- resolved  - zofran prn  - c/w bowel regimen as pt is constipated    # DVT ppx: SCD    Dispo: Discharge to [facility] today

## 2019-09-20 NOTE — DISCHARGE NOTE NURSING/CASE MANAGEMENT/SOCIAL WORK - NSDCPEPTSTRK_GEN_ALL_CORE
Call 911 for stroke/Stroke support groups for patients, families, and friends/Need for follow up after discharge/Risk factors for stroke/Stroke education booklet/Stroke warning signs and symptoms/Signs and symptoms of stroke/Prescribed medications

## 2019-09-20 NOTE — PROGRESS NOTE ADULT - SUBJECTIVE AND OBJECTIVE BOX
Patient is a 89y old  Female who presents with a chief complaint of Frequent fall, worsening R sided weakness and worsening slurred speech (19 Sep 2019 08:06)    Interval Hx  Patient seen and examined at bedside. No overnight events reported.     ALLERGIES:  No Known Allergies    MEDICATIONS  (STANDING):  amLODIPine   Tablet 5 milliGRAM(s) Oral daily  aspirin  chewable 81 milliGRAM(s) Oral daily  atorvastatin 40 milliGRAM(s) Oral at bedtime  clindamycin   Capsule 300 milliGRAM(s) Oral three times a day  dextrose 5%. 1000 milliLiter(s) (50 mL/Hr) IV Continuous <Continuous>  dextrose 50% Injectable 12.5 Gram(s) IV Push once  dextrose 50% Injectable 25 Gram(s) IV Push once  dextrose 50% Injectable 25 Gram(s) IV Push once  docusate sodium 100 milliGRAM(s) Oral three times a day  enoxaparin Injectable 30 milliGRAM(s) SubCutaneous daily  escitalopram 5 milliGRAM(s) Oral daily  insulin lispro (HumaLOG) corrective regimen sliding scale   SubCutaneous three times a day before meals  insulin lispro (HumaLOG) corrective regimen sliding scale   SubCutaneous at bedtime  metoprolol tartrate 25 milliGRAM(s) Oral every 12 hours  oxybutynin 5 milliGRAM(s) Oral three times a day  pantoprazole    Tablet 40 milliGRAM(s) Oral before breakfast  trimethoprim  160 mG/sulfamethoxazole 800 mG 1 Tablet(s) Oral every 12 hours    MEDICATIONS  (PRN):  acetaminophen    Suspension .. 650 milliGRAM(s) Oral every 6 hours PRN Temp greater or equal to 38C (100.4F), Mild Pain (1 - 3), Moderate Pain (4 - 6)  dextrose 40% Gel 15 Gram(s) Oral once PRN Blood Glucose LESS THAN 70 milliGRAM(s)/deciliter  glucagon  Injectable 1 milliGRAM(s) IntraMuscular once PRN Glucose LESS THAN 70 milligrams/deciliter    Vital Signs Last 24 Hrs  T(F): 97.9 (20 Sep 2019 09:54), Max: 98 (19 Sep 2019 15:48)  HR: 82 (20 Sep 2019 09:54) (78 - 95)  BP: 111/59 (20 Sep 2019 09:54) (111/59 - 143/84)  RR: 14 (20 Sep 2019 09:54) (14 - 16)  SpO2: 90% (20 Sep 2019 09:54) (90% - 96%)  I&O's Summary    19 Sep 2019 07:01  -  20 Sep 2019 07:00  --------------------------------------------------------  IN: 200 mL / OUT: 0 mL / NET: 200 mL    20 Sep 2019 07:01  -  20 Sep 2019 11:37  --------------------------------------------------------  IN: 150 mL / OUT: 0 mL / NET: 150 mL      PHYSICAL EXAM:  General: NAD, A/O x 2    ENT: MMM  Neck: Supple, No JVD  Lungs: Clear to auscultation bilaterally, Non labored breathing   Cardio: +S1/S2, 2/6  murmurs  Abdomen: Soft, Nontender, Nondistended; Bowel sounds present  Extremities: No calf tenderness, No pitting edema    LABS:                        12.2   7.08  )-----------( 173      ( 18 Sep 2019 05:40 )             35.9     09-19    139  |  105  |  29  ----------------------------<  110  4.6   |  29  |  1.50    Ca    9.0      19 Sep 2019 05:05          eGFR if Non African American: 29 mL/min/1.73M2 (09-19-19 @ 05:05)  eGFR if African American: 34 mL/min/1.73M2 (09-19-19 @ 05:05)      POCT Blood Glucose.: 159 mg/dL (20 Sep 2019 11:32)  POCT Blood Glucose.: 168 mg/dL (20 Sep 2019 07:25)  POCT Blood Glucose.: 152 mg/dL (19 Sep 2019 20:42)  POCT Blood Glucose.: 181 mg/dL (19 Sep 2019 17:00)  POCT Blood Glucose.: 147 mg/dL (19 Sep 2019 11:54)    09-13 [id number] 7.3        RADIOLOGY & ADDITIONAL TESTS:    < from: CT Head No Cont (09.13.19 @ 17:45) >  No interval change in appearance of the brain since earlier   study of the same day. No evidence of hemorrhage.        < end of copied text >      < from: MR Head No Cont (09.17.19 @ 14:14) >   no diffusion restriction or MR evidence of acute ischemia. Fairly   extensive chronic ischemic changes throughout both hemispheres with   moderate volume loss..  2)  right mastoid partial opacification. Fairly extensive disc disease   and degenerative changes in the cervical spine. Dedicated cervical   imaging may be considered, if clinically warranted..       < end of copied text >      Care Discussed with Consultants/Other Providers: cardiology dr. Fuller

## 2019-09-20 NOTE — PROGRESS NOTE ADULT - REASON FOR ADMISSION
Frequent fall, worsening R sided weakness and worsening slurred speech

## 2019-09-20 NOTE — PROGRESS NOTE ADULT - PROVIDER SPECIALTY LIST ADULT
Cardiology
Hospitalist
Internal Medicine
Neurology

## 2019-10-21 ENCOUNTER — INPATIENT (INPATIENT)
Facility: HOSPITAL | Age: 84
LOS: 5 days | Discharge: HOME | DRG: 65 | End: 2019-10-27
Attending: INTERNAL MEDICINE | Admitting: INTERNAL MEDICINE
Payer: COMMERCIAL

## 2019-10-21 VITALS
SYSTOLIC BLOOD PRESSURE: 179 MMHG | TEMPERATURE: 98 F | HEART RATE: 88 BPM | HEIGHT: 61 IN | WEIGHT: 113.98 LBS | DIASTOLIC BLOOD PRESSURE: 97 MMHG | OXYGEN SATURATION: 96 % | RESPIRATION RATE: 17 BRPM

## 2019-10-21 DIAGNOSIS — H54.3 UNQUALIFIED VISUAL LOSS, BOTH EYES: ICD-10-CM

## 2019-10-21 DIAGNOSIS — Z96.643 PRESENCE OF ARTIFICIAL HIP JOINT, BILATERAL: Chronic | ICD-10-CM

## 2019-10-21 DIAGNOSIS — I63.531 CEREBRAL INFARCTION DUE TO UNSPECIFIED OCCLUSION OR STENOSIS OF RIGHT POSTERIOR CEREBRAL ARTERY: ICD-10-CM

## 2019-10-21 LAB
ANION GAP SERPL CALC-SCNC: 5 MMOL/L — SIGNIFICANT CHANGE UP (ref 5–17)
BASOPHILS # BLD AUTO: 0.04 K/UL — SIGNIFICANT CHANGE UP (ref 0–0.2)
BASOPHILS NFR BLD AUTO: 0.6 % — SIGNIFICANT CHANGE UP (ref 0–2)
BUN SERPL-MCNC: 13 MG/DL — SIGNIFICANT CHANGE UP (ref 7–23)
CALCIUM SERPL-MCNC: 9.1 MG/DL — SIGNIFICANT CHANGE UP (ref 8.4–10.5)
CHLORIDE SERPL-SCNC: 102 MMOL/L — SIGNIFICANT CHANGE UP (ref 96–108)
CO2 SERPL-SCNC: 33 MMOL/L — HIGH (ref 22–31)
CREAT SERPL-MCNC: 1.22 MG/DL — SIGNIFICANT CHANGE UP (ref 0.5–1.3)
EOSINOPHIL # BLD AUTO: 0.18 K/UL — SIGNIFICANT CHANGE UP (ref 0–0.5)
EOSINOPHIL NFR BLD AUTO: 2.8 % — SIGNIFICANT CHANGE UP (ref 0–6)
ERYTHROCYTE [SEDIMENTATION RATE] IN BLOOD: 20 MM/HR — SIGNIFICANT CHANGE UP (ref 0–20)
GLUCOSE BLDC GLUCOMTR-MCNC: 133 MG/DL — HIGH (ref 70–99)
GLUCOSE BLDC GLUCOMTR-MCNC: 146 MG/DL — HIGH (ref 70–99)
GLUCOSE SERPL-MCNC: 240 MG/DL — HIGH (ref 70–99)
HCT VFR BLD CALC: 41.6 % — SIGNIFICANT CHANGE UP (ref 34.5–45)
HGB BLD-MCNC: 13.8 G/DL — SIGNIFICANT CHANGE UP (ref 11.5–15.5)
IMM GRANULOCYTES NFR BLD AUTO: 0.3 % — SIGNIFICANT CHANGE UP (ref 0–1.5)
LYMPHOCYTES # BLD AUTO: 1.54 K/UL — SIGNIFICANT CHANGE UP (ref 1–3.3)
LYMPHOCYTES # BLD AUTO: 24.2 % — SIGNIFICANT CHANGE UP (ref 13–44)
MCHC RBC-ENTMCNC: 30.7 PG — SIGNIFICANT CHANGE UP (ref 27–34)
MCHC RBC-ENTMCNC: 33.2 GM/DL — SIGNIFICANT CHANGE UP (ref 32–36)
MCV RBC AUTO: 92.7 FL — SIGNIFICANT CHANGE UP (ref 80–100)
MONOCYTES # BLD AUTO: 0.46 K/UL — SIGNIFICANT CHANGE UP (ref 0–0.9)
MONOCYTES NFR BLD AUTO: 7.2 % — SIGNIFICANT CHANGE UP (ref 2–14)
NEUTROPHILS # BLD AUTO: 4.12 K/UL — SIGNIFICANT CHANGE UP (ref 1.8–7.4)
NEUTROPHILS NFR BLD AUTO: 64.9 % — SIGNIFICANT CHANGE UP (ref 43–77)
NRBC # BLD: 0 /100 WBCS — SIGNIFICANT CHANGE UP (ref 0–0)
PLATELET # BLD AUTO: 221 K/UL — SIGNIFICANT CHANGE UP (ref 150–400)
POTASSIUM SERPL-MCNC: 3.5 MMOL/L — SIGNIFICANT CHANGE UP (ref 3.5–5.3)
POTASSIUM SERPL-SCNC: 3.5 MMOL/L — SIGNIFICANT CHANGE UP (ref 3.5–5.3)
RBC # BLD: 4.49 M/UL — SIGNIFICANT CHANGE UP (ref 3.8–5.2)
RBC # FLD: 14 % — SIGNIFICANT CHANGE UP (ref 10.3–14.5)
SODIUM SERPL-SCNC: 140 MMOL/L — SIGNIFICANT CHANGE UP (ref 135–145)
WBC # BLD: 6.36 K/UL — SIGNIFICANT CHANGE UP (ref 3.8–10.5)
WBC # FLD AUTO: 6.36 K/UL — SIGNIFICANT CHANGE UP (ref 3.8–10.5)

## 2019-10-21 PROCEDURE — 70450 CT HEAD/BRAIN W/O DYE: CPT | Mod: 26

## 2019-10-21 PROCEDURE — 99282 EMERGENCY DEPT VISIT SF MDM: CPT

## 2019-10-21 PROCEDURE — 71045 X-RAY EXAM CHEST 1 VIEW: CPT | Mod: 26

## 2019-10-21 PROCEDURE — 93880 EXTRACRANIAL BILAT STUDY: CPT | Mod: 26

## 2019-10-21 PROCEDURE — 99223 1ST HOSP IP/OBS HIGH 75: CPT

## 2019-10-21 PROCEDURE — 99285 EMERGENCY DEPT VISIT HI MDM: CPT

## 2019-10-21 PROCEDURE — 93010 ELECTROCARDIOGRAM REPORT: CPT

## 2019-10-21 RX ORDER — AMLODIPINE BESYLATE 2.5 MG/1
5 TABLET ORAL DAILY
Refills: 0 | Status: DISCONTINUED | OUTPATIENT
Start: 2019-10-21 | End: 2019-10-22

## 2019-10-21 RX ORDER — ACETAMINOPHEN 500 MG
650 TABLET ORAL ONCE
Refills: 0 | Status: COMPLETED | OUTPATIENT
Start: 2019-10-21 | End: 2019-10-21

## 2019-10-21 RX ORDER — MORPHINE SULFATE 50 MG/1
2 CAPSULE, EXTENDED RELEASE ORAL ONCE
Refills: 0 | Status: DISCONTINUED | OUTPATIENT
Start: 2019-10-21 | End: 2019-10-21

## 2019-10-21 RX ORDER — INSULIN LISPRO 100/ML
3 VIAL (ML) SUBCUTANEOUS
Refills: 0 | Status: DISCONTINUED | OUTPATIENT
Start: 2019-10-21 | End: 2019-10-25

## 2019-10-21 RX ORDER — DEXTROSE 50 % IN WATER 50 %
12.5 SYRINGE (ML) INTRAVENOUS ONCE
Refills: 0 | Status: DISCONTINUED | OUTPATIENT
Start: 2019-10-21 | End: 2019-10-27

## 2019-10-21 RX ORDER — INSULIN LISPRO 100/ML
VIAL (ML) SUBCUTANEOUS
Refills: 0 | Status: DISCONTINUED | OUTPATIENT
Start: 2019-10-21 | End: 2019-10-27

## 2019-10-21 RX ORDER — INSULIN GLARGINE 100 [IU]/ML
10 INJECTION, SOLUTION SUBCUTANEOUS AT BEDTIME
Refills: 0 | Status: DISCONTINUED | OUTPATIENT
Start: 2019-10-21 | End: 2019-10-25

## 2019-10-21 RX ORDER — DOCUSATE SODIUM 100 MG
100 CAPSULE ORAL THREE TIMES A DAY
Refills: 0 | Status: DISCONTINUED | OUTPATIENT
Start: 2019-10-21 | End: 2019-10-27

## 2019-10-21 RX ORDER — AZTREONAM 2 G
1 VIAL (EA) INJECTION
Qty: 0 | Refills: 0 | DISCHARGE

## 2019-10-21 RX ORDER — HEPARIN SODIUM 5000 [USP'U]/ML
5000 INJECTION INTRAVENOUS; SUBCUTANEOUS EVERY 8 HOURS
Refills: 0 | Status: DISCONTINUED | OUTPATIENT
Start: 2019-10-21 | End: 2019-10-27

## 2019-10-21 RX ORDER — DEXTROSE 50 % IN WATER 50 %
25 SYRINGE (ML) INTRAVENOUS ONCE
Refills: 0 | Status: DISCONTINUED | OUTPATIENT
Start: 2019-10-21 | End: 2019-10-27

## 2019-10-21 RX ORDER — METOPROLOL TARTRATE 50 MG
25 TABLET ORAL EVERY 12 HOURS
Refills: 0 | Status: DISCONTINUED | OUTPATIENT
Start: 2019-10-21 | End: 2019-10-25

## 2019-10-21 RX ORDER — ESCITALOPRAM OXALATE 10 MG/1
5 TABLET, FILM COATED ORAL DAILY
Refills: 0 | Status: DISCONTINUED | OUTPATIENT
Start: 2019-10-21 | End: 2019-10-25

## 2019-10-21 RX ORDER — DEXTROSE 50 % IN WATER 50 %
15 SYRINGE (ML) INTRAVENOUS ONCE
Refills: 0 | Status: DISCONTINUED | OUTPATIENT
Start: 2019-10-21 | End: 2019-10-27

## 2019-10-21 RX ORDER — SODIUM CHLORIDE 9 MG/ML
1000 INJECTION, SOLUTION INTRAVENOUS
Refills: 0 | Status: DISCONTINUED | OUTPATIENT
Start: 2019-10-21 | End: 2019-10-27

## 2019-10-21 RX ORDER — OXYBUTYNIN CHLORIDE 5 MG
5 TABLET ORAL THREE TIMES A DAY
Refills: 0 | Status: DISCONTINUED | OUTPATIENT
Start: 2019-10-21 | End: 2019-10-27

## 2019-10-21 RX ORDER — GLUCAGON INJECTION, SOLUTION 0.5 MG/.1ML
1 INJECTION, SOLUTION SUBCUTANEOUS ONCE
Refills: 0 | Status: DISCONTINUED | OUTPATIENT
Start: 2019-10-21 | End: 2019-10-27

## 2019-10-21 RX ORDER — ASPIRIN/CALCIUM CARB/MAGNESIUM 324 MG
81 TABLET ORAL DAILY
Refills: 0 | Status: DISCONTINUED | OUTPATIENT
Start: 2019-10-21 | End: 2019-10-27

## 2019-10-21 RX ORDER — PANTOPRAZOLE SODIUM 20 MG/1
40 TABLET, DELAYED RELEASE ORAL
Refills: 0 | Status: DISCONTINUED | OUTPATIENT
Start: 2019-10-21 | End: 2019-10-27

## 2019-10-21 RX ORDER — ATORVASTATIN CALCIUM 80 MG/1
80 TABLET, FILM COATED ORAL DAILY
Refills: 0 | Status: DISCONTINUED | OUTPATIENT
Start: 2019-10-21 | End: 2019-10-27

## 2019-10-21 RX ADMIN — INSULIN GLARGINE 10 UNIT(S): 100 INJECTION, SOLUTION SUBCUTANEOUS at 22:40

## 2019-10-21 RX ADMIN — Medication 650 MILLIGRAM(S): at 14:30

## 2019-10-21 RX ADMIN — HEPARIN SODIUM 5000 UNIT(S): 5000 INJECTION INTRAVENOUS; SUBCUTANEOUS at 22:40

## 2019-10-21 RX ADMIN — Medication 100 MILLIGRAM(S): at 22:40

## 2019-10-21 RX ADMIN — Medication 650 MILLIGRAM(S): at 13:30

## 2019-10-21 RX ADMIN — Medication 25 MILLIGRAM(S): at 20:30

## 2019-10-21 RX ADMIN — ATORVASTATIN CALCIUM 80 MILLIGRAM(S): 80 TABLET, FILM COATED ORAL at 20:42

## 2019-10-21 RX ADMIN — MORPHINE SULFATE 2 MILLIGRAM(S): 50 CAPSULE, EXTENDED RELEASE ORAL at 16:01

## 2019-10-21 RX ADMIN — MORPHINE SULFATE 2 MILLIGRAM(S): 50 CAPSULE, EXTENDED RELEASE ORAL at 16:16

## 2019-10-21 RX ADMIN — Medication 5 MILLIGRAM(S): at 23:16

## 2019-10-21 NOTE — ED ADULT TRIAGE NOTE - CHIEF COMPLAINT QUOTE
Pt brought in by family from home, daughter stated pt woke up around 1030 am, c/o loss of vision with decreased hearing, now can see from right eye, last normal was last night, h/o stroke last year, recently in the hospital for a fall Pt brought in by family from home, daughter stated pt woke up around 1030 am, c/o loss of vision with decreased hearing, now can see from right eye, last normal was last night, h/o stroke last year, recently in the hospital for a fall, c/o headache

## 2019-10-21 NOTE — ED ADULT NURSE NOTE - PSH
H/O bilateral hip replacements  2000's at [facility], no complications  S/P hip replacement, bilateral

## 2019-10-21 NOTE — ED ADULT NURSE NOTE - NSIMPLEMENTINTERV_GEN_ALL_ED
Implemented All Fall with Harm Risk Interventions:  [place] to call system. Call bell, personal items and telephone within reach. Instruct patient to call for assistance. Room bathroom lighting operational. Non-slip footwear when patient is off stretcher. Physically safe environment: no spills, clutter or unnecessary equipment. Stretcher in lowest position, wheels locked, appropriate side rails in place. Provide visual cue, wrist band, yellow gown, etc. Monitor gait and stability. Monitor for mental status changes and reorient to person, place, and time. Review medications for side effects contributing to fall risk. Reinforce activity limits and safety measures with patient and family. Provide visual clues: red socks.

## 2019-10-21 NOTE — ED ADULT NURSE NOTE - CHIEF COMPLAINT QUOTE
Pt brought in by family from home, daughter stated pt woke up around 1030 am, c/o loss of vision with decreased hearing, now can see from right eye, last normal was last night, h/o stroke last year, recently in the hospital for a fall, c/o headache

## 2019-10-21 NOTE — ED PROVIDER NOTE - OBJECTIVE STATEMENT
Patient awoke this AM with vision loss in left eye. No motor weakness. Does have left sided headache

## 2019-10-21 NOTE — ED ADULT NURSE NOTE - NSFALLRSKHARMRISK_ED_ALL_ED
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  Dr Garza would like her off plavix and aspirin a week before the procedure if possible, at least 3 days if the week is not feasible.  No surgery date is set up yet.  If she needs lovenox bridging we ask that your office do the bridging.    Thanks much  Crista QUINN   yes

## 2019-10-21 NOTE — H&P ADULT - NSHPLABSRESULTS_GEN_ALL_CORE
13.8   6.36  )-----------( 221      ( 21 Oct 2019 12:50 )             41.6       10-21    140  |  102  |  13  ----------------------------<  240<H>  3.5   |  33<H>  |  1.22    Ca    9.1      21 Oct 2019 12:50 13.8   6.36  )-----------( 221      ( 21 Oct 2019 12:50 )             41.6       10-21    140  |  102  |  13  ----------------------------<  240<H>  3.5   |  33<H>  |  1.22    Ca    9.1      21 Oct 2019 12:50    < from: TTE Echo Complete w/Doppler (09.14.19 @ 14:07) >    1. Mild to at worst moderate calcific aortic stenosis   2. Left ventricular ejection fraction, by visual estimation, is 55 to   60%.   3. Spectral Doppler shows pseudonormal pattern of left ventricular   myocardial filling (Grade II diastolic dysfunction).   4. Mild thickening and calcification of the anterior and posterior   mitral valve leaflets.   5. Mild-moderate tricuspid regurgitation.   6. Estimated pulmonary artery systolic pressure is 39.6 mmHg assuming a   right atrial pressure of 10 mmHg, which is consistent with borderline   pulmonary hypertension.   7. LA volume Index is 41.0 ml/m² ml/m2.      < end of copied text >

## 2019-10-21 NOTE — ED ADULT NURSE NOTE - OBJECTIVE STATEMENT
Pt brought in by family from home complaining of headache and vision loss. Pt daughter stated pt woke up around 1030 am, c/o loss of vison both eyes with decreased hearing. Pt now states she can see from right eye. pt and family states vision last known normal was last night. pt with h/o stroke last year, recently in the hospital for a fall. Pt denies any n/v/d, chest pain, SOB, blurred vision, dizziness, fever, chills or any other complaint at this time. Pt brought in by family from home complaining of headache and vision loss. Pt daughter stated pt woke up around 1030 am, c/o loss of vison both eyes with decreased hearing. Pt now states she can see from right eye. pt states she sees light but its too blurry to see anything. pt and family states vision last known normal was last night. pt with h/o stroke last year, recently in the hospital for a fall. Pt denies any n/v/d, chest pain, SOB, blurred vision, dizziness, fever, chills or any other complaint at this time. Pt brought in by family from home complaining of headache and vision loss. Pt daughter stated pt woke up around 1030 am, c/o loss of vison both eyes with decreased hearing. Pt now states she can see from right eye. pt states she sees light but its too blurry to see anything. pt and family states vision last known normal was last night. pt with h/o stroke last year, recently in the hospital for a fall. pt family states pt ambulates at home with walker but today has been too weak to do so. Pt denies any n/v/d, chest pain, SOB, blurred vision, dizziness, fever, chills or any other complaint at this time. Pt brought in by family from home complaining of headache and vision loss. Pt daughter stated pt woke up around 1030 am, c/o loss of vison both eyes with decreased hearing. Pt now states she can see from right eye. pt states she sees light but its too blurry to see anything. pt and family states vision last known normal was last night. pt with h/o stroke last year, recently in the hospital for a fall. pt family states pt ambulates at home with walker but today has been too weak to do so. pt with normal speech as per family at bedside. Pt denies any n/v/d, chest pain, SOB, blurred vision, dizziness, fever, chills or any other complaint at this time.

## 2019-10-21 NOTE — ED PROVIDER NOTE - PSH
H/O bilateral hip replacements  2000's at [facility], no complications  S/P hip replacement, bilateral

## 2019-10-21 NOTE — H&P ADULT - NEUROLOGICAL COMMENTS
motor : b/l 5/5, sensory intact. extraocular eye movement restricted bilaterally , decreased visual field left eye

## 2019-10-21 NOTE — H&P ADULT - ASSESSMENT
89 y/o F with PMHx of CVA (1 yr ago), CKD3, HTN, DM2, HLD, was BIB daughter for sudden onset vision loss and hearing impairment from this morning. Patient also has severe hearing loss, as per history obtained from daughter and family at bedside , she was last seen normal at night , but upon awakening this morning around 10:30 am she started c/o loss of vision of both eyes, with complete loss in left eye and partial loss in right eye , also associated with worsening of hearing impairment.       #B/L vision loss/ Hearing impairment   R/o CVA vs CRAO/CRVO   -- admit to Tele  -- ct head unremarkable  --  neuro checks q4h  --  Ophthalmology consult called by ED : awaiting recs  --  Neurology consult called by ED: follow up recs for further imaging MRI/MRA vs CTA   -- --  rx:  start asa,  lipitor 80  --   labs:  check lipid profile, a1c, tsh  --  neurology eval pending   speech/swallow eval for dysphagia 89 y/o F with PMHx of CVA (1 yr ago), CKD3, HTN, DM2, HLD, was BIB daughter for sudden onset vision loss and hearing impairment from this morning. Patient also has severe hearing loss, as per history obtained from daughter and family at bedside , she was last seen normal at night , but upon awakening this morning around 10:30 am she started c/o loss of vision of both eyes, with complete loss in left eye and partial loss in right eye , also associated with worsening of hearing impairment.     #B/L vision loss/ Hearing impairment   R/o CVA vs CRAO/CRVO   -- admit to Tele  -- ct head unremarkable  --  neuro checks q4h  --  Ophthalmology consult called by ED : awaiting recs  --  Neurology consult called by ED: follow up recs for further imaging MRI/MRA vs CTA   --  rx:  start asa,  lipitor 80  --  labs:  check lipid profile, a1c, tsh  --  neurology eval pending  -- recent ech findings as above  -- check carotid duplex  --  speech/swallow eval     # CKD 3  - stable    # HTN   cont with home meds : amlodipine 5mg and metoprolol 25mg q12     # T2DM  - off meds per family  -- last hba1c :7.3 per EMR ()  -- monitor fingersticks  -- if FS>180 start on insulin regimen    # DVT ppx: SCD    Code status : DNR/DNI    CAPRINI SCORE [CLOT]    AGE RELATED RISK FACTORS                                                       MOBILITY RELATED FACTORS  [ ] Age 41-60 years                                            (1 Point)                  [ ] Bed rest                                                        (1 Point)  [ ] Age: 61-74 years                                           (2 Points)                 [ ] Plaster cast                                                   (2 Points)  [ x ] Age= 75 years                                              (3 Points)                 [ ] Bed bound for more than 72 hours                 (2 Points)    DISEASE RELATED RISK FACTORS                                               GENDER SPECIFIC FACTORS  [ ] Edema in the lower extremities                       (1 Point)                  [ ] Pregnancy                                                     (1 Point)  [ ] Varicose veins                                               (1 Point)                  [ ] Post-partum < 6 weeks                                   (1 Point)             [ ] BMI > 25 Kg/m2                                            (1 Point)                  [ ] Hormonal therapy  or oral contraception          (1 Point)                 [ ] Sepsis (in the previous month)                        (1 Point)                  [ ] History of pregnancy complications                 (1 point)  [ ] Pneumonia or serious lung disease                                               [ ] Unexplained or recurrent                     (1 Point)           (in the previous month)                               (1 Point)  [ ] Abnormal pulmonary function test                     (1 Point)                 SURGERY RELATED RISK FACTORS  [ ] Acute myocardial infarction                              (1 Point)                 [ ]  Section                                             (1 Point)  [ ] Congestive heart failure (in the previous month)  (1 Point)               [ ] Minor surgery                                                  (1 Point)   [ ] Inflammatory bowel disease                             (1 Point)                 [ ] Arthroscopic surgery                                        (2 Points)  [ ] Central venous access                                      (2 Points)                [ ] General surgery lasting more than 45 minutes   (2 Points)       [ ] Stroke (in the previous month)                          (5 Points)               [ ] Elective arthroplasty                                         (5 Points)                                                                                                                                               HEMATOLOGY RELATED FACTORS                                                 TRAUMA RELATED RISK FACTORS  [ ] Prior episodes of VTE                                     (3 Points)                 [ ] Fracture of the hip, pelvis, or leg                       (5 Points)  [ ] Positive family history for VTE                         (3 Points)                 [ ] Acute spinal cord injury (in the previous month)  (5 Points)  [ ] Prothrombin 56490 A                                     (3 Points)                 [ ] Paralysis  (less than 1 month)                             (5 Points)  [ ] Factor V Leiden                                             (3 Points)                  [ ] Multiple Trauma within 1 month                        (5 Points)  [ ] Lupus anticoagulants                                     (3 Points)                                                           [ ] Anticardiolipin antibodies                               (3 Points)                                                       [ ] High homocysteine in the blood                      (3 Points)                                             [ ] Other congenital or acquired thrombophilia      (3 Points)                                                [ ] Heparin induced thrombocytopenia                  (3 Points)                                          Total Score [   3       ] 89 y/o F with PMHx of CVA (1 yr ago), CKD3, HTN, DM2, HLD, was BIB daughter for sudden onset vision loss and hearing impairment from this morning. Patient also has severe hearing loss, as per history obtained from daughter and family at bedside , she was last seen normal at night , but upon awakening this morning around 10:30 am she started c/o loss of vision of both eyes, with complete loss in left eye and partial loss in right eye , also associated with worsening of hearing impairment.     #B/L vision loss/ Hearing impairment   R/o CVA vs CRAO/CRVO   -- admit to Tele  -- ct head unremarkable  --  neuro checks q4h  --  Ophthalmology consult called by ED : awaiting recs  --  Neurology consult called by ED: follow up recs for further imaging MRI/MRA vs CTA   --  rx:  start asa,  lipitor 80  -- recent echo findings as above  -- check carotid duplex  --  speech/swallow eval     # CKD 3  - stable    # HTN   cont with home meds : amlodipine 5mg and metoprolol 25mg q12     # T2DM  - off meds per family  -- last hba1c :7.3 per EMR ()  -- monitor fingersticks  -- will start on insulin regimen    # DVT ppx: SCD    Code status : DNR/DNI    CAPRINI SCORE [CLOT]    AGE RELATED RISK FACTORS                                                       MOBILITY RELATED FACTORS  [ ] Age 41-60 years                                            (1 Point)                  [ ] Bed rest                                                        (1 Point)  [ ] Age: 61-74 years                                           (2 Points)                 [ ] Plaster cast                                                   (2 Points)  [ x ] Age= 75 years                                              (3 Points)                 [ ] Bed bound for more than 72 hours                 (2 Points)    DISEASE RELATED RISK FACTORS                                               GENDER SPECIFIC FACTORS  [ ] Edema in the lower extremities                       (1 Point)                  [ ] Pregnancy                                                     (1 Point)  [ ] Varicose veins                                               (1 Point)                  [ ] Post-partum < 6 weeks                                   (1 Point)             [ ] BMI > 25 Kg/m2                                            (1 Point)                  [ ] Hormonal therapy  or oral contraception          (1 Point)                 [ ] Sepsis (in the previous month)                        (1 Point)                  [ ] History of pregnancy complications                 (1 point)  [ ] Pneumonia or serious lung disease                                               [ ] Unexplained or recurrent                     (1 Point)           (in the previous month)                               (1 Point)  [ ] Abnormal pulmonary function test                     (1 Point)                 SURGERY RELATED RISK FACTORS  [ ] Acute myocardial infarction                              (1 Point)                 [ ]  Section                                             (1 Point)  [ ] Congestive heart failure (in the previous month)  (1 Point)               [ ] Minor surgery                                                  (1 Point)   [ ] Inflammatory bowel disease                             (1 Point)                 [ ] Arthroscopic surgery                                        (2 Points)  [ ] Central venous access                                      (2 Points)                [ ] General surgery lasting more than 45 minutes   (2 Points)       [ ] Stroke (in the previous month)                          (5 Points)               [ ] Elective arthroplasty                                         (5 Points)                                                                                                                                               HEMATOLOGY RELATED FACTORS                                                 TRAUMA RELATED RISK FACTORS  [ ] Prior episodes of VTE                                     (3 Points)                 [ ] Fracture of the hip, pelvis, or leg                       (5 Points)  [ ] Positive family history for VTE                         (3 Points)                 [ ] Acute spinal cord injury (in the previous month)  (5 Points)  [ ] Prothrombin 36737 A                                     (3 Points)                 [ ] Paralysis  (less than 1 month)                             (5 Points)  [ ] Factor V Leiden                                             (3 Points)                  [ ] Multiple Trauma within 1 month                        (5 Points)  [ ] Lupus anticoagulants                                     (3 Points)                                                           [ ] Anticardiolipin antibodies                               (3 Points)                                                       [ ] High homocysteine in the blood                      (3 Points)                                             [ ] Other congenital or acquired thrombophilia      (3 Points)                                                [ ] Heparin induced thrombocytopenia                  (3 Points)                                          Total Score [   3       ] 89 y/o F with PMHx of CVA (1 yr ago), CKD3, HTN, DM2, HLD, was BIB daughter for sudden onset vision loss and hearing impairment from this morning. Patient also has severe hearing loss, as per history obtained from daughter and family at bedside , she was last seen normal at night , but upon awakening this morning around 10:30 am she started c/o loss of vision of both eyes, with complete loss in left eye and partial loss in right eye , also associated with worsening of hearing impairment.     #B/L vision loss (left >right ) / Hearing impairment   R/o CVA vs CRAO/CRVO   -- admit to Tele  -- ct head unremarkable  --  neuro checks q4h  --  Ophthalmology consult called by ED : awaiting recs  --  Neurology consult called by ED: follow up recs for further imaging MRI/MRA vs CTA   --  rx:  start asa,  lipitor 80  -- recent echo findings as above  -- check carotid duplex  --  speech/swallow eval     # CKD 3  - stable    # HTN   cont with home meds : amlodipine 5mg and metoprolol 25mg q12     # T2DM  - off meds per family  -- last hba1c :7.3 per EMR ()  -- monitor fingersticks  -- will start on insulin regimen    # DVT ppx: SCD    Code status : DNR/DNI    CAPRINI SCORE [CLOT]    AGE RELATED RISK FACTORS                                                       MOBILITY RELATED FACTORS  [ ] Age 41-60 years                                            (1 Point)                  [ ] Bed rest                                                        (1 Point)  [ ] Age: 61-74 years                                           (2 Points)                 [ ] Plaster cast                                                   (2 Points)  [ x ] Age= 75 years                                              (3 Points)                 [ ] Bed bound for more than 72 hours                 (2 Points)    DISEASE RELATED RISK FACTORS                                               GENDER SPECIFIC FACTORS  [ ] Edema in the lower extremities                       (1 Point)                  [ ] Pregnancy                                                     (1 Point)  [ ] Varicose veins                                               (1 Point)                  [ ] Post-partum < 6 weeks                                   (1 Point)             [ ] BMI > 25 Kg/m2                                            (1 Point)                  [ ] Hormonal therapy  or oral contraception          (1 Point)                 [ ] Sepsis (in the previous month)                        (1 Point)                  [ ] History of pregnancy complications                 (1 point)  [ ] Pneumonia or serious lung disease                                               [ ] Unexplained or recurrent                     (1 Point)           (in the previous month)                               (1 Point)  [ ] Abnormal pulmonary function test                     (1 Point)                 SURGERY RELATED RISK FACTORS  [ ] Acute myocardial infarction                              (1 Point)                 [ ]  Section                                             (1 Point)  [ ] Congestive heart failure (in the previous month)  (1 Point)               [ ] Minor surgery                                                  (1 Point)   [ ] Inflammatory bowel disease                             (1 Point)                 [ ] Arthroscopic surgery                                        (2 Points)  [ ] Central venous access                                      (2 Points)                [ ] General surgery lasting more than 45 minutes   (2 Points)       [ ] Stroke (in the previous month)                          (5 Points)               [ ] Elective arthroplasty                                         (5 Points)                                                                                                                                               HEMATOLOGY RELATED FACTORS                                                 TRAUMA RELATED RISK FACTORS  [ ] Prior episodes of VTE                                     (3 Points)                 [ ] Fracture of the hip, pelvis, or leg                       (5 Points)  [ ] Positive family history for VTE                         (3 Points)                 [ ] Acute spinal cord injury (in the previous month)  (5 Points)  [ ] Prothrombin 44922 A                                     (3 Points)                 [ ] Paralysis  (less than 1 month)                             (5 Points)  [ ] Factor V Leiden                                             (3 Points)                  [ ] Multiple Trauma within 1 month                        (5 Points)  [ ] Lupus anticoagulants                                     (3 Points)                                                           [ ] Anticardiolipin antibodies                               (3 Points)                                                       [ ] High homocysteine in the blood                      (3 Points)                                             [ ] Other congenital or acquired thrombophilia      (3 Points)                                                [ ] Heparin induced thrombocytopenia                  (3 Points)                                          Total Score [   3       ] 89 y/o F with PMHx of CVA (1 yr ago), CKD3, HTN, DM2, HLD, was BIB daughter for sudden onset vision loss and hearing impairment from this morning. Patient also has severe hearing loss, as per history obtained from daughter and family at bedside , she was last seen normal at night , but upon awakening this morning around 10:30 am she started c/o loss of vision of both eyes, with complete loss in left eye and partial loss in right eye , also associated with worsening of hearing impairment.     #B/L vision loss (left >right ) / Hearing impairment   R/o CVA / ? Acute ocular stroke vs CRAO/CRVO   -- admit to Tele  -- ct head unremarkable  --  neuro checks q4h  --  Ophthalmology consult called by ED : awaiting recs  --  Neurology consult called by ED: follow up recs for further imaging MRI/MRA vs CTA   --  rx:  start asa,  lipitor 80  -- recent echo findings as above  -- check carotid duplex  --  speech/swallow eval     # CKD 3  - stable    # HTN   cont with home meds : amlodipine 5mg and metoprolol 25mg q12     # T2DM  - off meds per family  -- last hba1c :7.3 per EMR ()  -- monitor fingersticks  -- will start on insulin regimen    # DVT ppx: SCD    Code status : DNR/DNI    CAPRINI SCORE [CLOT]    AGE RELATED RISK FACTORS                                                       MOBILITY RELATED FACTORS  [ ] Age 41-60 years                                            (1 Point)                  [ ] Bed rest                                                        (1 Point)  [ ] Age: 61-74 years                                           (2 Points)                 [ ] Plaster cast                                                   (2 Points)  [ x ] Age= 75 years                                              (3 Points)                 [ ] Bed bound for more than 72 hours                 (2 Points)    DISEASE RELATED RISK FACTORS                                               GENDER SPECIFIC FACTORS  [ ] Edema in the lower extremities                       (1 Point)                  [ ] Pregnancy                                                     (1 Point)  [ ] Varicose veins                                               (1 Point)                  [ ] Post-partum < 6 weeks                                   (1 Point)             [ ] BMI > 25 Kg/m2                                            (1 Point)                  [ ] Hormonal therapy  or oral contraception          (1 Point)                 [ ] Sepsis (in the previous month)                        (1 Point)                  [ ] History of pregnancy complications                 (1 point)  [ ] Pneumonia or serious lung disease                                               [ ] Unexplained or recurrent                     (1 Point)           (in the previous month)                               (1 Point)  [ ] Abnormal pulmonary function test                     (1 Point)                 SURGERY RELATED RISK FACTORS  [ ] Acute myocardial infarction                              (1 Point)                 [ ]  Section                                             (1 Point)  [ ] Congestive heart failure (in the previous month)  (1 Point)               [ ] Minor surgery                                                  (1 Point)   [ ] Inflammatory bowel disease                             (1 Point)                 [ ] Arthroscopic surgery                                        (2 Points)  [ ] Central venous access                                      (2 Points)                [ ] General surgery lasting more than 45 minutes   (2 Points)       [ ] Stroke (in the previous month)                          (5 Points)               [ ] Elective arthroplasty                                         (5 Points)                                                                                                                                               HEMATOLOGY RELATED FACTORS                                                 TRAUMA RELATED RISK FACTORS  [ ] Prior episodes of VTE                                     (3 Points)                 [ ] Fracture of the hip, pelvis, or leg                       (5 Points)  [ ] Positive family history for VTE                         (3 Points)                 [ ] Acute spinal cord injury (in the previous month)  (5 Points)  [ ] Prothrombin 54781 A                                     (3 Points)                 [ ] Paralysis  (less than 1 month)                             (5 Points)  [ ] Factor V Leiden                                             (3 Points)                  [ ] Multiple Trauma within 1 month                        (5 Points)  [ ] Lupus anticoagulants                                     (3 Points)                                                           [ ] Anticardiolipin antibodies                               (3 Points)                                                       [ ] High homocysteine in the blood                      (3 Points)                                             [ ] Other congenital or acquired thrombophilia      (3 Points)                                                [ ] Heparin induced thrombocytopenia                  (3 Points)                                          Total Score [   3       ] 89 y/o F with PMHx of CVA (1 yr ago), CKD3, HTN, DM2, HLD, was BIB daughter for sudden onset vision loss and hearing impairment from this morning. Patient also has severe hearing loss, as per history obtained from daughter and family at bedside , she was last seen normal at night , but upon awakening this morning around 10:30 am she started c/o loss of vision of both eyes, with complete loss in left eye and partial loss in right eye , also associated with worsening of hearing impairment.     #B/L vision loss (left >right ) / Hearing impairment   R/o CVA / ? Acute ocular stroke vs CRAO/CRVO   -- admit to Tele  -- ct head unremarkable  --  neuro checks q4h  --  Ophthalmology consult called by ED : awaiting recs  --  Neurology consult called by ED: follow up recs for further imaging MRI/MRA vs CTA   --  rx:  start asa,  lipitor 80  -- recent echo findings as above  -- check carotid duplex  --  speech/swallow eval     # CKD 3  - stable    # HTN   cont with home meds : amlodipine 5mg and metoprolol 25mg q12     # T2DM  - off meds per family  -- last hba1c :7.3 per EMR ()  -- monitor fingersticks  -- will start on insulin regimen    # DVT ppx: Heparin s/c    Code status : DNR/DNI    CAPRINI SCORE [CLOT]    AGE RELATED RISK FACTORS                                                       MOBILITY RELATED FACTORS  [ ] Age 41-60 years                                            (1 Point)                  [ ] Bed rest                                                        (1 Point)  [ ] Age: 61-74 years                                           (2 Points)                 [ ] Plaster cast                                                   (2 Points)  [ x ] Age= 75 years                                              (3 Points)                 [ ] Bed bound for more than 72 hours                 (2 Points)    DISEASE RELATED RISK FACTORS                                               GENDER SPECIFIC FACTORS  [ ] Edema in the lower extremities                       (1 Point)                  [ ] Pregnancy                                                     (1 Point)  [ ] Varicose veins                                               (1 Point)                  [ ] Post-partum < 6 weeks                                   (1 Point)             [ ] BMI > 25 Kg/m2                                            (1 Point)                  [ ] Hormonal therapy  or oral contraception          (1 Point)                 [ ] Sepsis (in the previous month)                        (1 Point)                  [ ] History of pregnancy complications                 (1 point)  [ ] Pneumonia or serious lung disease                                               [ ] Unexplained or recurrent                     (1 Point)           (in the previous month)                               (1 Point)  [ ] Abnormal pulmonary function test                     (1 Point)                 SURGERY RELATED RISK FACTORS  [ ] Acute myocardial infarction                              (1 Point)                 [ ]  Section                                             (1 Point)  [ ] Congestive heart failure (in the previous month)  (1 Point)               [ ] Minor surgery                                                  (1 Point)   [ ] Inflammatory bowel disease                             (1 Point)                 [ ] Arthroscopic surgery                                        (2 Points)  [ ] Central venous access                                      (2 Points)                [ ] General surgery lasting more than 45 minutes   (2 Points)       [ ] Stroke (in the previous month)                          (5 Points)               [ ] Elective arthroplasty                                         (5 Points)                                                                                                                                               HEMATOLOGY RELATED FACTORS                                                 TRAUMA RELATED RISK FACTORS  [ ] Prior episodes of VTE                                     (3 Points)                 [ ] Fracture of the hip, pelvis, or leg                       (5 Points)  [ ] Positive family history for VTE                         (3 Points)                 [ ] Acute spinal cord injury (in the previous month)  (5 Points)  [ ] Prothrombin 47614 A                                     (3 Points)                 [ ] Paralysis  (less than 1 month)                             (5 Points)  [ ] Factor V Leiden                                             (3 Points)                  [ ] Multiple Trauma within 1 month                        (5 Points)  [ ] Lupus anticoagulants                                     (3 Points)                                                           [ ] Anticardiolipin antibodies                               (3 Points)                                                       [ ] High homocysteine in the blood                      (3 Points)                                             [ ] Other congenital or acquired thrombophilia      (3 Points)                                                [ ] Heparin induced thrombocytopenia                  (3 Points)                                          Total Score [   3       ]

## 2019-10-21 NOTE — H&P ADULT - HISTORY OF PRESENT ILLNESS
89 y/o F with PMHx of CVA (1 yr ago), CKD3, HTN, DM2, HLD, was BIB daughter for sudden onset vision loss and hearing impairment from this morning. Patient also has severe hearing loss, as per history obtained from daughter and family at bedside , she was last seen normal at night , but upon awakening this morning around 10:30 am she started c/o loss of vision of both eyes, with complete loss in left eye and partial loss in right eye , also associated with worsening of hearing impairment. Pt states she sees light but its too blurry to see anything.   She had h/o stroke last year, and was recently in the hospital for a fall, was reevaluated for another stroke with no new changes and stable findings , she underwent acute rehab following that and family states pt was ambulating fine at home with walker. She also has mild headache but denies any loc/ nausea/vomiting/ slurred speech/ weakness /numbness or loss of sensation / vomiting /chest pain/abd pain . Denies GI or  symptoms.   ROS otherwise negative.

## 2019-10-21 NOTE — CONSULT NOTE ADULT - SUBJECTIVE AND OBJECTIVE BOX
89 yr-old woman with hx of watershed left cerebral infarct 1 year ago awakened with visual loss this morning.   Patient was admitted 1 month ago with falls and increasing right sided weakness and seen by my associate Dr. Lerma.     EHR reviewed.    OE at 7 :15 PM She is alert and oriented. She has a left homonymous hemianopia. Moving all limbs easily. No pathologic reflexes. Carotid doppler in progress.        CT of head at 1 PM upon my review shows hypoattenuation in right occipital lobe consistent with early infarction.

## 2019-10-21 NOTE — ED PROVIDER NOTE - CLINICAL SUMMARY MEDICAL DECISION MAKING FREE TEXT BOX
Patient had sudden onset of blindness in left eye . Most likely new CVA. No definite time of onset. Admit with neuro and opthalmology consult.

## 2019-10-21 NOTE — H&P ADULT - NSHPREVIEWOFSYSTEMS_GEN_ALL_CORE
Review of Systems:  	•	CONSTITUTIONAL - no fever, no diaphoresis, no weight change  	•	SKIN - no rash  	•	HEMATOLOGIC - no bleeding, no bruising  	•	EYES - see hpi  	•	ENT - see hpi  	•	RESPIRATORY - no shortness of breath, no cough  	•	CARDIAC - no chest pain, no palpitations  	•	GI - no abd pain, no nausea, no vomiting, no diarrhea, no constipation, no bleeding  	•	GENITO-URINARY - no discharge, no dysuria; no hematuria,   	•	ENDO - no polydypsia, no polyurea, no heat/no cold intolerance  	•	MUSCULOSKELETAL - no joint paint, no swelling, no redness  	•	NEUROLOGIC - see hpi  	•	PSYCH - no anxiety, non suicidal, non homicidal, no hallucination, no depression  	•	ALLERGY - no rhinitis

## 2019-10-21 NOTE — H&P ADULT - NSICDXPASTSURGICALHX_GEN_ALL_CORE_FT
PAST SURGICAL HISTORY:  H/O bilateral hip replacements 2000's at [facility], no complications    S/P hip replacement, bilateral

## 2019-10-22 DIAGNOSIS — H54.3 UNQUALIFIED VISUAL LOSS, BOTH EYES: ICD-10-CM

## 2019-10-22 DIAGNOSIS — I10 ESSENTIAL (PRIMARY) HYPERTENSION: ICD-10-CM

## 2019-10-22 DIAGNOSIS — M19.90 UNSPECIFIED OSTEOARTHRITIS, UNSPECIFIED SITE: ICD-10-CM

## 2019-10-22 DIAGNOSIS — E11.9 TYPE 2 DIABETES MELLITUS WITHOUT COMPLICATIONS: ICD-10-CM

## 2019-10-22 LAB
ANION GAP SERPL CALC-SCNC: 7 MMOL/L — SIGNIFICANT CHANGE UP (ref 5–17)
BASOPHILS # BLD AUTO: 0.05 K/UL — SIGNIFICANT CHANGE UP (ref 0–0.2)
BASOPHILS NFR BLD AUTO: 0.7 % — SIGNIFICANT CHANGE UP (ref 0–2)
BUN SERPL-MCNC: 13 MG/DL — SIGNIFICANT CHANGE UP (ref 7–23)
CALCIUM SERPL-MCNC: 9.4 MG/DL — SIGNIFICANT CHANGE UP (ref 8.4–10.5)
CHLORIDE SERPL-SCNC: 102 MMOL/L — SIGNIFICANT CHANGE UP (ref 96–108)
CHOLEST SERPL-MCNC: 138 MG/DL — SIGNIFICANT CHANGE UP (ref 10–199)
CO2 SERPL-SCNC: 31 MMOL/L — SIGNIFICANT CHANGE UP (ref 22–31)
CREAT SERPL-MCNC: 0.95 MG/DL — SIGNIFICANT CHANGE UP (ref 0.5–1.3)
EOSINOPHIL # BLD AUTO: 0.24 K/UL — SIGNIFICANT CHANGE UP (ref 0–0.5)
EOSINOPHIL NFR BLD AUTO: 3.2 % — SIGNIFICANT CHANGE UP (ref 0–6)
GLUCOSE BLDC GLUCOMTR-MCNC: 130 MG/DL — HIGH (ref 70–99)
GLUCOSE BLDC GLUCOMTR-MCNC: 146 MG/DL — HIGH (ref 70–99)
GLUCOSE BLDC GLUCOMTR-MCNC: 84 MG/DL — SIGNIFICANT CHANGE UP (ref 70–99)
GLUCOSE BLDC GLUCOMTR-MCNC: 93 MG/DL — SIGNIFICANT CHANGE UP (ref 70–99)
GLUCOSE SERPL-MCNC: 119 MG/DL — HIGH (ref 70–99)
HCT VFR BLD CALC: 40.6 % — SIGNIFICANT CHANGE UP (ref 34.5–45)
HDLC SERPL-MCNC: 41 MG/DL — LOW
HGB BLD-MCNC: 13.6 G/DL — SIGNIFICANT CHANGE UP (ref 11.5–15.5)
IMM GRANULOCYTES NFR BLD AUTO: 0.3 % — SIGNIFICANT CHANGE UP (ref 0–1.5)
LIPID PNL WITH DIRECT LDL SERPL: 75 MG/DL — SIGNIFICANT CHANGE UP
LYMPHOCYTES # BLD AUTO: 2.51 K/UL — SIGNIFICANT CHANGE UP (ref 1–3.3)
LYMPHOCYTES # BLD AUTO: 33.3 % — SIGNIFICANT CHANGE UP (ref 13–44)
MAGNESIUM SERPL-MCNC: 1.5 MG/DL — LOW (ref 1.6–2.6)
MCHC RBC-ENTMCNC: 30.6 PG — SIGNIFICANT CHANGE UP (ref 27–34)
MCHC RBC-ENTMCNC: 33.5 GM/DL — SIGNIFICANT CHANGE UP (ref 32–36)
MCV RBC AUTO: 91.2 FL — SIGNIFICANT CHANGE UP (ref 80–100)
MONOCYTES # BLD AUTO: 0.68 K/UL — SIGNIFICANT CHANGE UP (ref 0–0.9)
MONOCYTES NFR BLD AUTO: 9 % — SIGNIFICANT CHANGE UP (ref 2–14)
NEUTROPHILS # BLD AUTO: 4.04 K/UL — SIGNIFICANT CHANGE UP (ref 1.8–7.4)
NEUTROPHILS NFR BLD AUTO: 53.5 % — SIGNIFICANT CHANGE UP (ref 43–77)
NRBC # BLD: 0 /100 WBCS — SIGNIFICANT CHANGE UP (ref 0–0)
PLATELET # BLD AUTO: 237 K/UL — SIGNIFICANT CHANGE UP (ref 150–400)
POTASSIUM SERPL-MCNC: 3.5 MMOL/L — SIGNIFICANT CHANGE UP (ref 3.5–5.3)
POTASSIUM SERPL-SCNC: 3.5 MMOL/L — SIGNIFICANT CHANGE UP (ref 3.5–5.3)
RBC # BLD: 4.45 M/UL — SIGNIFICANT CHANGE UP (ref 3.8–5.2)
RBC # FLD: 13.9 % — SIGNIFICANT CHANGE UP (ref 10.3–14.5)
SODIUM SERPL-SCNC: 140 MMOL/L — SIGNIFICANT CHANGE UP (ref 135–145)
TOTAL CHOLESTEROL/HDL RATIO MEASUREMENT: 3.4 RATIO — SIGNIFICANT CHANGE UP (ref 3.3–7.1)
TRIGL SERPL-MCNC: 111 MG/DL — SIGNIFICANT CHANGE UP (ref 10–149)
WBC # BLD: 7.54 K/UL — SIGNIFICANT CHANGE UP (ref 3.8–10.5)
WBC # FLD AUTO: 7.54 K/UL — SIGNIFICANT CHANGE UP (ref 3.8–10.5)

## 2019-10-22 PROCEDURE — 70450 CT HEAD/BRAIN W/O DYE: CPT | Mod: 26

## 2019-10-22 PROCEDURE — 99233 SBSQ HOSP IP/OBS HIGH 50: CPT

## 2019-10-22 RX ORDER — AMLODIPINE BESYLATE 2.5 MG/1
10 TABLET ORAL DAILY
Refills: 0 | Status: DISCONTINUED | OUTPATIENT
Start: 2019-10-23 | End: 2019-10-27

## 2019-10-22 RX ORDER — MAGNESIUM SULFATE 500 MG/ML
1 VIAL (ML) INJECTION ONCE
Refills: 0 | Status: COMPLETED | OUTPATIENT
Start: 2019-10-22 | End: 2019-10-22

## 2019-10-22 RX ORDER — CLOPIDOGREL BISULFATE 75 MG/1
75 TABLET, FILM COATED ORAL DAILY
Refills: 0 | Status: DISCONTINUED | OUTPATIENT
Start: 2019-10-22 | End: 2019-10-27

## 2019-10-22 RX ORDER — AMLODIPINE BESYLATE 2.5 MG/1
5 TABLET ORAL ONCE
Refills: 0 | Status: COMPLETED | OUTPATIENT
Start: 2019-10-22 | End: 2019-10-22

## 2019-10-22 RX ADMIN — AMLODIPINE BESYLATE 5 MILLIGRAM(S): 2.5 TABLET ORAL at 06:43

## 2019-10-22 RX ADMIN — Medication 100 MILLIGRAM(S): at 05:25

## 2019-10-22 RX ADMIN — Medication 5 MILLIGRAM(S): at 05:25

## 2019-10-22 RX ADMIN — Medication 3 UNIT(S): at 12:44

## 2019-10-22 RX ADMIN — ATORVASTATIN CALCIUM 80 MILLIGRAM(S): 80 TABLET, FILM COATED ORAL at 12:47

## 2019-10-22 RX ADMIN — PANTOPRAZOLE SODIUM 40 MILLIGRAM(S): 20 TABLET, DELAYED RELEASE ORAL at 05:25

## 2019-10-22 RX ADMIN — Medication 25 MILLIGRAM(S): at 05:25

## 2019-10-22 RX ADMIN — ESCITALOPRAM OXALATE 5 MILLIGRAM(S): 10 TABLET, FILM COATED ORAL at 12:47

## 2019-10-22 RX ADMIN — HEPARIN SODIUM 5000 UNIT(S): 5000 INJECTION INTRAVENOUS; SUBCUTANEOUS at 21:48

## 2019-10-22 RX ADMIN — AMLODIPINE BESYLATE 5 MILLIGRAM(S): 2.5 TABLET ORAL at 18:57

## 2019-10-22 RX ADMIN — Medication 100 GRAM(S): at 18:58

## 2019-10-22 RX ADMIN — Medication 100 MILLIGRAM(S): at 21:48

## 2019-10-22 RX ADMIN — INSULIN GLARGINE 10 UNIT(S): 100 INJECTION, SOLUTION SUBCUTANEOUS at 21:48

## 2019-10-22 RX ADMIN — Medication 5 MILLIGRAM(S): at 13:39

## 2019-10-22 RX ADMIN — HEPARIN SODIUM 5000 UNIT(S): 5000 INJECTION INTRAVENOUS; SUBCUTANEOUS at 06:43

## 2019-10-22 RX ADMIN — Medication 100 MILLIGRAM(S): at 13:39

## 2019-10-22 RX ADMIN — HEPARIN SODIUM 5000 UNIT(S): 5000 INJECTION INTRAVENOUS; SUBCUTANEOUS at 13:39

## 2019-10-22 RX ADMIN — Medication 3 UNIT(S): at 08:49

## 2019-10-22 RX ADMIN — CLOPIDOGREL BISULFATE 75 MILLIGRAM(S): 75 TABLET, FILM COATED ORAL at 18:56

## 2019-10-22 RX ADMIN — Medication 81 MILLIGRAM(S): at 12:46

## 2019-10-22 RX ADMIN — Medication 25 MILLIGRAM(S): at 18:57

## 2019-10-22 RX ADMIN — Medication 5 MILLIGRAM(S): at 21:48

## 2019-10-22 NOTE — PROGRESS NOTE ADULT - ASSESSMENT
Patient is s/p an acute right PCA infarct with a right occipital infarct and left visual field cut.  R/O basilar artery disease.     REC:  observe on telemetry, ASA statin, diabetic and BP control, add Plavix 75 mg Q day.

## 2019-10-22 NOTE — CONSULT NOTE ADULT - SUBJECTIVE AND OBJECTIVE BOX
Ophthalmology Consult Note    89yFemale admitted with loss of vision yesterday  Poor historian    PAST MEDICAL & SURGICAL HISTORY:  Dyslipidemia  Osteoarthritis  Sciatica  Diabetes: Type 2, &quot;many years&quot;  HTN (hypertension)  HTN (hypertension)  DM (diabetes mellitus)  H/O bilateral hip replacements: 2000&#x27;s at [facility] [place], no complications  S/P hip replacement, bilateral    MEDICATIONS  (STANDING):  amLODIPine   Tablet 5 milliGRAM(s) Oral daily  aspirin  chewable 81 milliGRAM(s) Oral daily  atorvastatin 80 milliGRAM(s) Oral daily  dextrose 5%. 1000 milliLiter(s) (50 mL/Hr) IV Continuous <Continuous>  dextrose 50% Injectable 12.5 Gram(s) IV Push once  dextrose 50% Injectable 25 Gram(s) IV Push once  dextrose 50% Injectable 25 Gram(s) IV Push once  docusate sodium 100 milliGRAM(s) Oral three times a day  escitalopram 5 milliGRAM(s) Oral daily  heparin  Injectable 5000 Unit(s) SubCutaneous every 8 hours  insulin glargine Injectable (LANTUS) 10 Unit(s) SubCutaneous at bedtime  insulin lispro (HumaLOG) corrective regimen sliding scale   SubCutaneous three times a day before meals  insulin lispro Injectable (HumaLOG) 3 Unit(s) SubCutaneous before breakfast  insulin lispro Injectable (HumaLOG) 3 Unit(s) SubCutaneous before lunch  insulin lispro Injectable (HumaLOG) 3 Unit(s) SubCutaneous before dinner  metoprolol tartrate 25 milliGRAM(s) Oral every 12 hours  oxybutynin 5 milliGRAM(s) Oral three times a day  pantoprazole    Tablet 40 milliGRAM(s) Oral before breakfast    POH -??    Allergies - No Known Allergies        Va - at least directional HM OU (poor cooperation, squeezing of eyelids)  Pupils - 3/3 PERRLA NO APD  EOMs - Grossly Full OU  CVF - unable    Portable SLE- bedside    LLA -  WNL OU  C/S - W&Q OU  K - Clear OU  A/C - D&Q OU  Iris - Flat OU  Lens - P/C IOL OU  IOP - soft OU palp    DFE - Tropicamide 1%    20D indirect    Vitreous clear OU  C/D - .2/.2 + mild disc pallor OU; No disc edema  Macula: decreased reflex  Vessels: attenuated OU; NO arterial or venous occlusion, NO emboli seen  Periphery: flat OU. No tears or RD.    Imp/Plan:    1. Pseudophakia OU- observe  2. Vision loss OU- NO acute intraocular pathology; NO APD OU- mostly likely central in orgmahsa Shankar MD, FACS  [phone] cell Ophthalmology Consult Note    89yFemale admitted with loss of vision yesterday  Poor historian    PAST MEDICAL & SURGICAL HISTORY:  Dyslipidemia  Osteoarthritis  Sciatica  Diabetes: Type 2, &quot;many years&quot;  HTN (hypertension)  HTN (hypertension)  DM (diabetes mellitus)  H/O bilateral hip replacements: 2000&#x27;s at [facility], no complications  S/P hip replacement, bilateral    MEDICATIONS  (STANDING):  amLODIPine   Tablet 5 milliGRAM(s) Oral daily  aspirin  chewable 81 milliGRAM(s) Oral daily  atorvastatin 80 milliGRAM(s) Oral daily  dextrose 5%. 1000 milliLiter(s) (50 mL/Hr) IV Continuous <Continuous>  dextrose 50% Injectable 12.5 Gram(s) IV Push once  dextrose 50% Injectable 25 Gram(s) IV Push once  dextrose 50% Injectable 25 Gram(s) IV Push once  docusate sodium 100 milliGRAM(s) Oral three times a day  escitalopram 5 milliGRAM(s) Oral daily  heparin  Injectable 5000 Unit(s) SubCutaneous every 8 hours  insulin glargine Injectable (LANTUS) 10 Unit(s) SubCutaneous at bedtime  insulin lispro (HumaLOG) corrective regimen sliding scale   SubCutaneous three times a day before meals  insulin lispro Injectable (HumaLOG) 3 Unit(s) SubCutaneous before breakfast  insulin lispro Injectable (HumaLOG) 3 Unit(s) SubCutaneous before lunch  insulin lispro Injectable (HumaLOG) 3 Unit(s) SubCutaneous before dinner  metoprolol tartrate 25 milliGRAM(s) Oral every 12 hours  oxybutynin 5 milliGRAM(s) Oral three times a day  pantoprazole    Tablet 40 milliGRAM(s) Oral before breakfast    POH -??    Allergies - No Known Allergies        Va - at least directional HM OU (poor cooperation, squeezing of eyelids)  Pupils - 3/3 PERRLA NO APD  EOMs - Grossly Full OU  CVF - unable    Portable SLE- bedside    LLA -  WNL OU  C/S - W&Q OU  K - Clear OU  A/C - D&Q OU  Iris - Flat OU  Lens - P/C IOL OU  IOP - soft OU palp    DFE - Tropicamide 1%    20D indirect    Vitreous clear OU  C/D - .2/.2 + mild disc pallor OU; No disc edema  Macula: decreased reflex  Vessels: attenuated OU; NO arterial or venous occlusion, NO emboli seen; No diabetic retinopathy  Periphery: flat OU. No tears or RD.    Imp/Plan:    1. Pseudophakia OU- observe  2. Vision loss OU- NO acute intraocular pathology; NO APD OU- mostly likely central in orgin- +RT occipital infarct    Denilson Shankar MD, FACS  [phone] cell

## 2019-10-22 NOTE — PROGRESS NOTE ADULT - PROBLEM SELECTOR PLAN 1
Repeat head CT R PCA CVA  Appreciate neuro consult  Started on plavix   continue tele monitoring  PT eval  lipid profile done  on aspirin and statin

## 2019-10-22 NOTE — PROGRESS NOTE ADULT - ASSESSMENT
89 y/o F with PMHx of CVA (1 yr ago), CKD3, HTN, DM2, HLD, was BIB daughter for sudden onset vision loss and hearing impairment from this morning. Patient also has severe hearing loss, as per history obtained from daughter and family at bedside , she was last seen normal at night , but upon awakening she started c/o loss of vision of both eyes, with complete loss in left eye and partial loss in right eye , also associated with worsening of hearing impairment.     #B/L vision loss (left >right ) / Hearing impairment   R/o CVA / ? Acute ocular stroke vs CRAO/CRVO   -- admit to Tele  -- ct head unremarkable  --  neuro checks q4h  --  Ophthalmology consult called by ED : awaiting recs  --  Neurology consult called by ED: follow up recs for further imaging MRI/MRA vs CTA   --  rx:  start asa,  lipitor 80  -- recent echo findings as above  -- check carotid duplex  --  speech/swallow eval     # CKD 3  - stable    # HTN   cont with home meds : amlodipine 5mg and metoprolol 25mg q12     # T2DM  - off meds per family  -- last hba1c :7.3 per EMR (sept/19)  -- monitor fingersticks  -- will start on insulin regimen    # DVT ppx: Heparin s/c    Code status : DNR/DNI

## 2019-10-22 NOTE — SWALLOW BEDSIDE ASSESSMENT ADULT - SWALLOW EVAL: FUNCTIONAL LEVEL AT TIME OF EVAL
Patient alert and oriented x3 (Person, place and situation), able to follow multiple step directives and able to functionally express verbal wants and needs.  Mild dysarthria evident which reportedly baseline and resultant to previous stroke 1 year ago

## 2019-10-23 LAB
ALBUMIN SERPL ELPH-MCNC: 2.9 G/DL — LOW (ref 3.3–5)
ALP SERPL-CCNC: 88 U/L — SIGNIFICANT CHANGE UP (ref 40–120)
ALT FLD-CCNC: 13 U/L — SIGNIFICANT CHANGE UP (ref 10–45)
ANION GAP SERPL CALC-SCNC: 9 MMOL/L — SIGNIFICANT CHANGE UP (ref 5–17)
AST SERPL-CCNC: 19 U/L — SIGNIFICANT CHANGE UP (ref 10–40)
BILIRUB SERPL-MCNC: 1.4 MG/DL — HIGH (ref 0.2–1.2)
BUN SERPL-MCNC: 18 MG/DL — SIGNIFICANT CHANGE UP (ref 7–23)
CALCIUM SERPL-MCNC: 8.8 MG/DL — SIGNIFICANT CHANGE UP (ref 8.4–10.5)
CHLORIDE SERPL-SCNC: 102 MMOL/L — SIGNIFICANT CHANGE UP (ref 96–108)
CO2 SERPL-SCNC: 29 MMOL/L — SIGNIFICANT CHANGE UP (ref 22–31)
CREAT SERPL-MCNC: 1.16 MG/DL — SIGNIFICANT CHANGE UP (ref 0.5–1.3)
GLUCOSE BLDC GLUCOMTR-MCNC: 102 MG/DL — HIGH (ref 70–99)
GLUCOSE BLDC GLUCOMTR-MCNC: 112 MG/DL — HIGH (ref 70–99)
GLUCOSE BLDC GLUCOMTR-MCNC: 122 MG/DL — HIGH (ref 70–99)
GLUCOSE BLDC GLUCOMTR-MCNC: 129 MG/DL — HIGH (ref 70–99)
GLUCOSE SERPL-MCNC: 107 MG/DL — HIGH (ref 70–99)
HCT VFR BLD CALC: 38 % — SIGNIFICANT CHANGE UP (ref 34.5–45)
HGB BLD-MCNC: 12.8 G/DL — SIGNIFICANT CHANGE UP (ref 11.5–15.5)
MAGNESIUM SERPL-MCNC: 1.8 MG/DL — SIGNIFICANT CHANGE UP (ref 1.6–2.6)
MCHC RBC-ENTMCNC: 30.8 PG — SIGNIFICANT CHANGE UP (ref 27–34)
MCHC RBC-ENTMCNC: 33.7 GM/DL — SIGNIFICANT CHANGE UP (ref 32–36)
MCV RBC AUTO: 91.3 FL — SIGNIFICANT CHANGE UP (ref 80–100)
NRBC # BLD: 0 /100 WBCS — SIGNIFICANT CHANGE UP (ref 0–0)
PLATELET # BLD AUTO: 214 K/UL — SIGNIFICANT CHANGE UP (ref 150–400)
POTASSIUM SERPL-MCNC: 3 MMOL/L — LOW (ref 3.5–5.3)
POTASSIUM SERPL-SCNC: 3 MMOL/L — LOW (ref 3.5–5.3)
PROT SERPL-MCNC: 6.8 G/DL — SIGNIFICANT CHANGE UP (ref 6–8.3)
RBC # BLD: 4.16 M/UL — SIGNIFICANT CHANGE UP (ref 3.8–5.2)
RBC # FLD: 13.9 % — SIGNIFICANT CHANGE UP (ref 10.3–14.5)
SODIUM SERPL-SCNC: 140 MMOL/L — SIGNIFICANT CHANGE UP (ref 135–145)
WBC # BLD: 8.79 K/UL — SIGNIFICANT CHANGE UP (ref 3.8–10.5)
WBC # FLD AUTO: 8.79 K/UL — SIGNIFICANT CHANGE UP (ref 3.8–10.5)

## 2019-10-23 PROCEDURE — 99233 SBSQ HOSP IP/OBS HIGH 50: CPT

## 2019-10-23 PROCEDURE — 93306 TTE W/DOPPLER COMPLETE: CPT | Mod: 26

## 2019-10-23 RX ORDER — POTASSIUM CHLORIDE 20 MEQ
40 PACKET (EA) ORAL EVERY 4 HOURS
Refills: 0 | Status: COMPLETED | OUTPATIENT
Start: 2019-10-23 | End: 2019-10-23

## 2019-10-23 RX ADMIN — Medication 100 MILLIGRAM(S): at 21:12

## 2019-10-23 RX ADMIN — HEPARIN SODIUM 5000 UNIT(S): 5000 INJECTION INTRAVENOUS; SUBCUTANEOUS at 06:08

## 2019-10-23 RX ADMIN — Medication 25 MILLIGRAM(S): at 06:09

## 2019-10-23 RX ADMIN — Medication 5 MILLIGRAM(S): at 06:08

## 2019-10-23 RX ADMIN — PANTOPRAZOLE SODIUM 40 MILLIGRAM(S): 20 TABLET, DELAYED RELEASE ORAL at 06:08

## 2019-10-23 RX ADMIN — Medication 5 MILLIGRAM(S): at 21:12

## 2019-10-23 RX ADMIN — Medication 100 MILLIGRAM(S): at 06:08

## 2019-10-23 RX ADMIN — Medication 40 MILLIEQUIVALENT(S): at 17:04

## 2019-10-23 RX ADMIN — Medication 25 MILLIGRAM(S): at 17:04

## 2019-10-23 RX ADMIN — Medication 3 UNIT(S): at 07:52

## 2019-10-23 RX ADMIN — Medication 81 MILLIGRAM(S): at 12:13

## 2019-10-23 RX ADMIN — Medication 3 UNIT(S): at 17:04

## 2019-10-23 RX ADMIN — HEPARIN SODIUM 5000 UNIT(S): 5000 INJECTION INTRAVENOUS; SUBCUTANEOUS at 21:12

## 2019-10-23 RX ADMIN — ESCITALOPRAM OXALATE 5 MILLIGRAM(S): 10 TABLET, FILM COATED ORAL at 12:13

## 2019-10-23 RX ADMIN — INSULIN GLARGINE 10 UNIT(S): 100 INJECTION, SOLUTION SUBCUTANEOUS at 21:12

## 2019-10-23 RX ADMIN — Medication 5 MILLIGRAM(S): at 13:42

## 2019-10-23 RX ADMIN — Medication 3 UNIT(S): at 12:14

## 2019-10-23 RX ADMIN — HEPARIN SODIUM 5000 UNIT(S): 5000 INJECTION INTRAVENOUS; SUBCUTANEOUS at 13:42

## 2019-10-23 RX ADMIN — ATORVASTATIN CALCIUM 80 MILLIGRAM(S): 80 TABLET, FILM COATED ORAL at 21:12

## 2019-10-23 RX ADMIN — CLOPIDOGREL BISULFATE 75 MILLIGRAM(S): 75 TABLET, FILM COATED ORAL at 12:13

## 2019-10-23 RX ADMIN — AMLODIPINE BESYLATE 10 MILLIGRAM(S): 2.5 TABLET ORAL at 06:08

## 2019-10-23 RX ADMIN — Medication 100 MILLIGRAM(S): at 13:42

## 2019-10-23 RX ADMIN — Medication 40 MILLIEQUIVALENT(S): at 12:14

## 2019-10-23 NOTE — PROGRESS NOTE ADULT - ASSESSMENT
89 y/o F with PMHx of CVA (1 yr ago), CKD3, HTN, DM2, HLD, was BIB daughter for sudden onset vision loss and hearing impairment from this morning. Patient also has severe hearing loss, as per history obtained from daughter and family at bedside , she was last seen normal at night , but upon awakening she started c/o loss of vision of both eyes, with complete loss in left eye and partial loss in right eye , also associated with worsening of hearing impairment.         # DVT ppx: Heparin s/c    Code status : DNR/DNI

## 2019-10-23 NOTE — PHYSICAL THERAPY INITIAL EVALUATION ADULT - ADDITIONAL COMMENTS
pt d/c'd from [facility] 2 wks ago, lives with daughter in private home, 3 christopher w/rail.  pt was supervision level home amb with RW prior to admission.

## 2019-10-23 NOTE — PROGRESS NOTE ADULT - PROBLEM SELECTOR PLAN 1
Repeat head CT R PCA CVA  Appreciate neuro consult  Started on plavix   continue tele monitoring  PT eval  lipid profile done  on aspirin and statin  Echo done - EF-55% moderate aortic stenosis

## 2019-10-24 DIAGNOSIS — R07.9 CHEST PAIN, UNSPECIFIED: ICD-10-CM

## 2019-10-24 LAB
ALBUMIN SERPL ELPH-MCNC: 2.8 G/DL — LOW (ref 3.3–5)
ALP SERPL-CCNC: 85 U/L — SIGNIFICANT CHANGE UP (ref 40–120)
ALT FLD-CCNC: 12 U/L — SIGNIFICANT CHANGE UP (ref 10–45)
ANION GAP SERPL CALC-SCNC: 9 MMOL/L — SIGNIFICANT CHANGE UP (ref 5–17)
AST SERPL-CCNC: 22 U/L — SIGNIFICANT CHANGE UP (ref 10–40)
BILIRUB SERPL-MCNC: 1.6 MG/DL — HIGH (ref 0.2–1.2)
BUN SERPL-MCNC: 21 MG/DL — SIGNIFICANT CHANGE UP (ref 7–23)
CALCIUM SERPL-MCNC: 8.6 MG/DL — SIGNIFICANT CHANGE UP (ref 8.4–10.5)
CHLORIDE SERPL-SCNC: 103 MMOL/L — SIGNIFICANT CHANGE UP (ref 96–108)
CO2 SERPL-SCNC: 26 MMOL/L — SIGNIFICANT CHANGE UP (ref 22–31)
CREAT SERPL-MCNC: 1.14 MG/DL — SIGNIFICANT CHANGE UP (ref 0.5–1.3)
GLUCOSE BLDC GLUCOMTR-MCNC: 113 MG/DL — HIGH (ref 70–99)
GLUCOSE BLDC GLUCOMTR-MCNC: 132 MG/DL — HIGH (ref 70–99)
GLUCOSE BLDC GLUCOMTR-MCNC: 73 MG/DL — SIGNIFICANT CHANGE UP (ref 70–99)
GLUCOSE BLDC GLUCOMTR-MCNC: 84 MG/DL — SIGNIFICANT CHANGE UP (ref 70–99)
GLUCOSE BLDC GLUCOMTR-MCNC: 96 MG/DL — SIGNIFICANT CHANGE UP (ref 70–99)
GLUCOSE SERPL-MCNC: 83 MG/DL — SIGNIFICANT CHANGE UP (ref 70–99)
HCT VFR BLD CALC: 36.2 % — SIGNIFICANT CHANGE UP (ref 34.5–45)
HGB BLD-MCNC: 12.1 G/DL — SIGNIFICANT CHANGE UP (ref 11.5–15.5)
MAGNESIUM SERPL-MCNC: 1.5 MG/DL — LOW (ref 1.6–2.6)
MCHC RBC-ENTMCNC: 30.3 PG — SIGNIFICANT CHANGE UP (ref 27–34)
MCHC RBC-ENTMCNC: 33.4 GM/DL — SIGNIFICANT CHANGE UP (ref 32–36)
MCV RBC AUTO: 90.5 FL — SIGNIFICANT CHANGE UP (ref 80–100)
NRBC # BLD: 0 /100 WBCS — SIGNIFICANT CHANGE UP (ref 0–0)
PLATELET # BLD AUTO: 181 K/UL — SIGNIFICANT CHANGE UP (ref 150–400)
POTASSIUM SERPL-MCNC: 3.6 MMOL/L — SIGNIFICANT CHANGE UP (ref 3.5–5.3)
POTASSIUM SERPL-SCNC: 3.6 MMOL/L — SIGNIFICANT CHANGE UP (ref 3.5–5.3)
PROT SERPL-MCNC: 6.5 G/DL — SIGNIFICANT CHANGE UP (ref 6–8.3)
RBC # BLD: 4 M/UL — SIGNIFICANT CHANGE UP (ref 3.8–5.2)
RBC # FLD: 13.9 % — SIGNIFICANT CHANGE UP (ref 10.3–14.5)
SODIUM SERPL-SCNC: 138 MMOL/L — SIGNIFICANT CHANGE UP (ref 135–145)
TROPONIN I SERPL-MCNC: 0.07 NG/ML — HIGH (ref 0.02–0.06)
WBC # BLD: 7.46 K/UL — SIGNIFICANT CHANGE UP (ref 3.8–10.5)
WBC # FLD AUTO: 7.46 K/UL — SIGNIFICANT CHANGE UP (ref 3.8–10.5)

## 2019-10-24 PROCEDURE — 99223 1ST HOSP IP/OBS HIGH 75: CPT

## 2019-10-24 PROCEDURE — 71045 X-RAY EXAM CHEST 1 VIEW: CPT | Mod: 26

## 2019-10-24 PROCEDURE — 93010 ELECTROCARDIOGRAM REPORT: CPT

## 2019-10-24 PROCEDURE — 99233 SBSQ HOSP IP/OBS HIGH 50: CPT

## 2019-10-24 RX ORDER — SODIUM CHLORIDE 9 MG/ML
1000 INJECTION INTRAMUSCULAR; INTRAVENOUS; SUBCUTANEOUS
Refills: 0 | Status: DISCONTINUED | OUTPATIENT
Start: 2019-10-24 | End: 2019-10-27

## 2019-10-24 RX ORDER — ACETAMINOPHEN 500 MG
650 TABLET ORAL ONCE
Refills: 0 | Status: COMPLETED | OUTPATIENT
Start: 2019-10-24 | End: 2019-10-24

## 2019-10-24 RX ADMIN — Medication 3 UNIT(S): at 08:32

## 2019-10-24 RX ADMIN — Medication 650 MILLIGRAM(S): at 09:57

## 2019-10-24 RX ADMIN — ATORVASTATIN CALCIUM 80 MILLIGRAM(S): 80 TABLET, FILM COATED ORAL at 21:04

## 2019-10-24 RX ADMIN — SODIUM CHLORIDE 50 MILLILITER(S): 9 INJECTION INTRAMUSCULAR; INTRAVENOUS; SUBCUTANEOUS at 17:07

## 2019-10-24 RX ADMIN — Medication 100 MILLIGRAM(S): at 21:04

## 2019-10-24 RX ADMIN — Medication 5 MILLIGRAM(S): at 05:46

## 2019-10-24 RX ADMIN — Medication 100 MILLIGRAM(S): at 05:46

## 2019-10-24 RX ADMIN — Medication 81 MILLIGRAM(S): at 09:57

## 2019-10-24 RX ADMIN — Medication 3 UNIT(S): at 17:05

## 2019-10-24 RX ADMIN — HEPARIN SODIUM 5000 UNIT(S): 5000 INJECTION INTRAVENOUS; SUBCUTANEOUS at 05:46

## 2019-10-24 RX ADMIN — ESCITALOPRAM OXALATE 5 MILLIGRAM(S): 10 TABLET, FILM COATED ORAL at 09:57

## 2019-10-24 RX ADMIN — Medication 5 MILLIGRAM(S): at 13:17

## 2019-10-24 RX ADMIN — CLOPIDOGREL BISULFATE 75 MILLIGRAM(S): 75 TABLET, FILM COATED ORAL at 09:57

## 2019-10-24 RX ADMIN — PANTOPRAZOLE SODIUM 40 MILLIGRAM(S): 20 TABLET, DELAYED RELEASE ORAL at 05:46

## 2019-10-24 RX ADMIN — Medication 25 MILLIGRAM(S): at 17:03

## 2019-10-24 RX ADMIN — Medication 650 MILLIGRAM(S): at 11:16

## 2019-10-24 RX ADMIN — Medication 5 MILLIGRAM(S): at 21:30

## 2019-10-24 RX ADMIN — Medication 25 MILLIGRAM(S): at 05:46

## 2019-10-24 RX ADMIN — HEPARIN SODIUM 5000 UNIT(S): 5000 INJECTION INTRAVENOUS; SUBCUTANEOUS at 13:17

## 2019-10-24 RX ADMIN — AMLODIPINE BESYLATE 10 MILLIGRAM(S): 2.5 TABLET ORAL at 05:46

## 2019-10-24 RX ADMIN — HEPARIN SODIUM 5000 UNIT(S): 5000 INJECTION INTRAVENOUS; SUBCUTANEOUS at 21:29

## 2019-10-24 RX ADMIN — Medication 100 MILLIGRAM(S): at 09:57

## 2019-10-24 RX ADMIN — INSULIN GLARGINE 10 UNIT(S): 100 INJECTION, SOLUTION SUBCUTANEOUS at 21:04

## 2019-10-24 NOTE — CHART NOTE - NSCHARTNOTEFT_GEN_A_CORE
Upon Nutritional Assessment by the Registered Dietitian your patient was determined to meet criteria / has evidence of the following diagnosis/diagnoses:                 [X]  Moderate Protein Calorie Malnutrition         Findings as based on:  [X] Comprehensive Nutrition Assessment   [X] Nutrition Focused Physical Exam - Temporal, Orbital, Clavicle, Scapular, Bicep, Tricep, Thigh, Calf & Hand Wasting Observed  [X] Other: Poor PO Intake 7+ Days     Nutrition Plan/Recommendations:    1) Glucerna shake BID     PROVIDER Section:   By signing this assessment you are acknowledging and agree with the diagnosis/diagnoses assigned by the Registered Dietitian    Earline Bennett RDN

## 2019-10-24 NOTE — PROGRESS NOTE ADULT - PROBLEM SELECTOR PROBLEM 2
Blindness of left eye with category 5 blindness of right eye Stroke due to occlusion of right posterior cerebral artery

## 2019-10-24 NOTE — PROGRESS NOTE ADULT - PROBLEM SELECTOR PLAN 2
Appreciate optho consult  No pathology found in the optho exam Repeat head CT R PCA CVA  Appreciate neuro consult  Started on plavix   continue tele monitoring  PT eval  lipid profile done  on aspirin and statin  Echo done - EF-55% moderate aortic stenosis  Carotids US

## 2019-10-24 NOTE — DIETITIAN INITIAL EVALUATION ADULT. - OTHER INFO
89 y/o F with PMHx of CVA (1 yr ago), CKD3, HTN, DM2, HLD, was BIB daughter for sudden onset vision loss and hearing impairment. History of Dm , well controlled. Po intake is reported fair PTA as per daughter. No edema noted, No Bm since PTA. Skin is ecchymotic. Diet history appears patient consumed a Regular diet(NCS) , daughter also reports providing protein shake in evening to patient do to fair appetite.. SLP josh noted.

## 2019-10-24 NOTE — PROGRESS NOTE ADULT - ASSESSMENT
89 y/o F with PMHx of CVA (1 yr ago), CKD3, HTN, DM2, HLD, was BIB daughter for sudden onset vision loss and hearing impairment from this morning. Patient also has severe hearing loss, as per history obtained from daughter and family at bedside , she was last seen normal at night , but upon awakening she started c/o loss of vision of both eyes, with complete loss in left eye and partial loss in right eye , also associated with worsening of hearing impairment.         # DVT ppx: Heparin s/c    Code status : DNR/DNI 89 y/o F with PMHx of CVA (1 yr ago), CKD3, HTN, DM2, HLD, was BIB daughter for sudden onset vision loss and hearing impairment from this morning. Patient also has severe hearing loss, as per history obtained from daughter and family at bedside , she was last seen normal at night , but upon awakening she started c/o loss of vision of both eyes, with complete loss in left eye and partial loss in right eye , also associated with worsening of hearing impairment.         # DVT ppx: Heparin s/c    Code status : DNR/DNI    Dispo: Neurologically ready for discharge, new chest pain 10/24, DC to [facility] when cards stabilize

## 2019-10-24 NOTE — PROGRESS NOTE ADULT - PROBLEM SELECTOR PLAN 1
Repeat head CT R PCA CVA  Appreciate neuro consult  Started on plavix   continue tele monitoring  PT eval  lipid profile done  on aspirin and statin  Echo done - EF-55% moderate aortic stenosis  Carotids US New chest pain 10/24  Appreciate cards consult  Elevated but flat trops  EKG shows previously known RBBB  Patient thought to be dehydrated-IVF started

## 2019-10-24 NOTE — CONSULT NOTE ADULT - SUBJECTIVE AND OBJECTIVE BOX
Chief Complaint:   89 y/o F with PMHx of CVA (1 yr ago), CKD3, HTN, DM2, HLD, was BIB daughter for sudden onset vision loss and hearing impairment from this morning. Patient also has severe hearing loss, as per history obtained from daughter and family at bedside , she was last seen normal at night , but upon awakening this morning around 10:30 am she started c/o loss of vision of both eyes, with complete loss in left eye and partial loss in right eye , also associated with worsening of hearing impairment. Pt states she sees light but its too blurry to see anything.   She had h/o stroke last year, and was recently in the hospital for a fall, was reevaluated for another stroke with no new changes and stable findings , she underwent acute rehab following that and family states pt was ambulating fine at home with walker. She also has mild headache but denies any loc/ nausea/vomiting/ slurred speech/ weakness /numbness or loss of sensation / vomiting /chest pain/abd pain . Denies GI or  symptoms.       HPI:    PMH:   Dyslipidemia  Osteoarthritis  Sciatica  Diabetes  HTN (hypertension)  HTN (hypertension)  DM (diabetes mellitus)    PSH:   H/O bilateral hip replacements  S/P hip replacement, bilateral    Family History:  FAMILY HISTORY:  FHx: leukemia: sister  Family history of CVA: mother  No pertinent family history in first degree relatives      Social History:  Smoking:  Alcohol:  Drugs:    Allergies:  No Known Allergies      Medications:  amLODIPine   Tablet 10 milliGRAM(s) Oral daily  aspirin  chewable 81 milliGRAM(s) Oral daily  atorvastatin 80 milliGRAM(s) Oral daily  clopidogrel Tablet 75 milliGRAM(s) Oral daily  dextrose 40% Gel 15 Gram(s) Oral once PRN  dextrose 5%. 1000 milliLiter(s) IV Continuous <Continuous>  dextrose 50% Injectable 12.5 Gram(s) IV Push once  dextrose 50% Injectable 25 Gram(s) IV Push once  dextrose 50% Injectable 25 Gram(s) IV Push once  docusate sodium 100 milliGRAM(s) Oral three times a day  escitalopram 5 milliGRAM(s) Oral daily  glucagon  Injectable 1 milliGRAM(s) IntraMuscular once PRN  heparin  Injectable 5000 Unit(s) SubCutaneous every 8 hours  insulin glargine Injectable (LANTUS) 10 Unit(s) SubCutaneous at bedtime  insulin lispro (HumaLOG) corrective regimen sliding scale   SubCutaneous three times a day before meals  insulin lispro Injectable (HumaLOG) 3 Unit(s) SubCutaneous before breakfast  insulin lispro Injectable (HumaLOG) 3 Unit(s) SubCutaneous before lunch  insulin lispro Injectable (HumaLOG) 3 Unit(s) SubCutaneous before dinner  metoprolol tartrate 25 milliGRAM(s) Oral every 12 hours  oxybutynin 5 milliGRAM(s) Oral three times a day  pantoprazole    Tablet 40 milliGRAM(s) Oral before breakfast      REVIEW OF SYSTEMS:  CONSTITUTIONAL: No fever, weight loss, or fatigue  EYES: No eye pain, visual disturbances, or discharge  ENMT:  No difficulty hearing, tinnitus, vertigo; No sinus or throat pain  NECK: No pain or stiffness  BREASTS: No pain, masses, or nipple discharge  RESPIRATORY: No cough, wheezing, chills or hemoptysis; No shortness of breath  CARDIOVASCULAR: No chest pain, palpitations, dizziness, or leg swelling  GASTROINTESTINAL: No abdominal or epigastric pain. No nausea, vomiting, or hematemesis; No diarrhea or constipation. No melena or hematochezia.  GENITOURINARY: No dysuria, frequency, hematuria, or incontinence  NEUROLOGICAL: No headaches, memory loss, loss of strength, numbness, or tremors  SKIN: No itching, burning, rashes, or lesions   LYMPH NODES: No enlarged glands  ENDOCRINE: No heat or cold intolerance; No hair loss  MUSCULOSKELETAL: No joint pain or swelling; No muscle, back, or extremity pain  PSYCHIATRIC: No depression, anxiety, mood swings, or difficulty sleeping  HEME/LYMPH: No easy bruising, or bleeding gums  ALLERY AND IMMUNOLOGIC: No hives or eczema    Physical Exam:  T(C): 36.6 (10-24-19 @ 15:52), Max: 37.1 (10-23-19 @ 21:00)  HR: 66 (10-24-19 @ 15:52) (66 - 84)  BP: 130/56 (10-24-19 @ 15:52) (115/65 - 166/76)  RR: 16 (10-24-19 @ 15:52) (15 - 16)  SpO2: 100% (10-24-19 @ 15:52) (94% - 100%)  Wt(kg): --    GENERAL: NAD, grossly dehydrated  HEAD:  Atraumatic, Normocephalic  EYES: EOMI, conjunctiva and sclera clear  ENT: Moist mucous membranes,  NECK: Supple, No JVD, no bruits  CHEST/LUNG: Clear to percussion bilaterally; No rales, rhonchi, wheezing, or rubs  HEART: Regular rate and rhythm; No murmurs, rubs, or gallops PMI non displaced.  ABDOMEN: Soft, Nontender, Nondistended; Bowel sounds present  EXTREMITIES:  2+ Peripheral Pulses, No clubbing, cyanosis, or edema  SKIN: No rashes or lesions  NERVOUS SYSTEM:  Cranial Nerves II-XII intact     Cardiovascular Diagnostic Testing:  ECG:    < from: 12 Lead ECG (10.21.19 @ 12:55) >  Ventricular Rate 69 BPM    Atrial Rate 69 BPM    P-R Interval 138 ms    QRS Duration 120 ms    Q-T Interval 464 ms    QTC Calculation(Bezet) 497 ms    P Axis 31 degrees    R Axis 42 degrees    T Axis -1 degrees    Diagnosis Line Sinus rhythm with premature atrial complexes  Right bundle branch block  Counterclockwise rotation  Abnormal ECG  When compared with ECG of 13-SEP-2019 12:26,  premature atrial complexes are now present    Confirmed by DONNY MOTA MD ([id number]) on 10/22/2019 9:43:20 AM    < end of copied text >      ECHO:    < from: TTE Echo Complete w/Doppler (10.23.19 @ 10:43) >  Summary:   1. Left ventricularejection fraction, by visual estimation, is 55 to   60%.   2. Normal global left ventricular systolic function.   3. Spectral Doppler shows impaired relaxation pattern of left   ventricular myocardial filling (Grade I diastolic dysfunction).   4. There is mild concentric left ventricular hypertrophy.   5. Normal right ventricular size and function.   6. Mild mitral annular calcification.   7. Mild thickening and calcification of the anterior and posterior   mitral valve leaflets.   8. Estimated pulmonary artery systolic pressure is 38.3 mmHg assuming a   right atrial pressure of 10 mmHg, which is consistent with borderline   pulmonary hypertension.   9. LA volume Index is 29.6 ml/m² ml/m2.  10. Peak transaortic gradient equals 18.2 mmHg, meantransaortic gradient   equals 10.4 mmHg, the calculated aortic valve area equals 1.03 cm² by the   continuity equation consistent with moderate aortic stenosis.    [id number] Viktor Mckeon MD, [facility] , Electronically signed on 10/23/2019 at   12:50:19 PM         *** Final ***        VIKTOR MCKEON M.D., ATTENDING CARDIOLOGIST  This document has been electronically signed. Oct 23 2019 10:43AM    < end of copied text >      Labs:                        12.1   7.46  )-----------( 181      ( 24 Oct 2019 05:30 )             36.2     10-24    138  |  103  |  21  ----------------------------<  83  3.6   |  26  |  1.14    Ca    8.6      24 Oct 2019 05:30  Mg     1.5     10-24    TPro  6.5  /  Alb  2.8<L>  /  TBili  1.6<H>  /  DBili  x   /  AST  22  /  ALT  12  /  AlkPhos  85  10-24      CARDIAC MARKERS ( 24 Oct 2019 12:40 )  .072 ng/mL / x     / x     / x     / x      CARDIAC MARKERS ( 24 Oct 2019 09:50 )  .068 ng/mL / x     / x     / x     / x         Imaging:

## 2019-10-24 NOTE — CONSULT NOTE ADULT - ATTENDING COMMENTS
TIA  transient visual and hearing loss in a patient with atypical chest pain and elevated troponin. the elevated troponin are of unclear significance. absent "rise and fall" or clear cut angina. also may related to a neurologic event . would evaluate echo. would rule out for Afib. currently no clear cut indication for an anticoagulant such as warfarin or apixaban, however as I told family it may be paroxsymal and monitoring is warranted either with 30 monitor or with internal loop recorder    chest pains atypical  patient is questionable candidate for invasive and non invasive cardiac testing given advanced age.      dehydration  suggest fluid resuscitation      dehydration  would benefit from iv fluid resuscitation.

## 2019-10-25 DIAGNOSIS — R23.0 CYANOSIS: ICD-10-CM

## 2019-10-25 DIAGNOSIS — F32.9 MAJOR DEPRESSIVE DISORDER, SINGLE EPISODE, UNSPECIFIED: ICD-10-CM

## 2019-10-25 LAB
ANION GAP SERPL CALC-SCNC: 11 MMOL/L — SIGNIFICANT CHANGE UP (ref 5–17)
BUN SERPL-MCNC: 25 MG/DL — HIGH (ref 7–23)
CALCIUM SERPL-MCNC: 8.9 MG/DL — SIGNIFICANT CHANGE UP (ref 8.4–10.5)
CHLORIDE SERPL-SCNC: 103 MMOL/L — SIGNIFICANT CHANGE UP (ref 96–108)
CO2 SERPL-SCNC: 29 MMOL/L — SIGNIFICANT CHANGE UP (ref 22–31)
CREAT SERPL-MCNC: 1.18 MG/DL — SIGNIFICANT CHANGE UP (ref 0.5–1.3)
GLUCOSE BLDC GLUCOMTR-MCNC: 100 MG/DL — HIGH (ref 70–99)
GLUCOSE BLDC GLUCOMTR-MCNC: 154 MG/DL — HIGH (ref 70–99)
GLUCOSE BLDC GLUCOMTR-MCNC: 163 MG/DL — HIGH (ref 70–99)
GLUCOSE BLDC GLUCOMTR-MCNC: 57 MG/DL — LOW (ref 70–99)
GLUCOSE BLDC GLUCOMTR-MCNC: 58 MG/DL — LOW (ref 70–99)
GLUCOSE BLDC GLUCOMTR-MCNC: 95 MG/DL — SIGNIFICANT CHANGE UP (ref 70–99)
GLUCOSE SERPL-MCNC: 63 MG/DL — LOW (ref 70–99)
HCT VFR BLD CALC: 36.3 % — SIGNIFICANT CHANGE UP (ref 34.5–45)
HGB BLD-MCNC: 12.1 G/DL — SIGNIFICANT CHANGE UP (ref 11.5–15.5)
MCHC RBC-ENTMCNC: 30.3 PG — SIGNIFICANT CHANGE UP (ref 27–34)
MCHC RBC-ENTMCNC: 33.3 GM/DL — SIGNIFICANT CHANGE UP (ref 32–36)
MCV RBC AUTO: 91 FL — SIGNIFICANT CHANGE UP (ref 80–100)
NRBC # BLD: 0 /100 WBCS — SIGNIFICANT CHANGE UP (ref 0–0)
PLATELET # BLD AUTO: 197 K/UL — SIGNIFICANT CHANGE UP (ref 150–400)
POTASSIUM SERPL-MCNC: 3.3 MMOL/L — LOW (ref 3.5–5.3)
POTASSIUM SERPL-SCNC: 3.3 MMOL/L — LOW (ref 3.5–5.3)
RBC # BLD: 3.99 M/UL — SIGNIFICANT CHANGE UP (ref 3.8–5.2)
RBC # FLD: 13.9 % — SIGNIFICANT CHANGE UP (ref 10.3–14.5)
SODIUM SERPL-SCNC: 143 MMOL/L — SIGNIFICANT CHANGE UP (ref 135–145)
TROPONIN I SERPL-MCNC: 0.06 NG/ML — HIGH (ref 0.02–0.06)
WBC # BLD: 7.81 K/UL — SIGNIFICANT CHANGE UP (ref 3.8–10.5)
WBC # FLD AUTO: 7.81 K/UL — SIGNIFICANT CHANGE UP (ref 3.8–10.5)

## 2019-10-25 PROCEDURE — 99233 SBSQ HOSP IP/OBS HIGH 50: CPT

## 2019-10-25 PROCEDURE — 99222 1ST HOSP IP/OBS MODERATE 55: CPT

## 2019-10-25 PROCEDURE — 99231 SBSQ HOSP IP/OBS SF/LOW 25: CPT

## 2019-10-25 PROCEDURE — 90791 PSYCH DIAGNOSTIC EVALUATION: CPT

## 2019-10-25 PROCEDURE — 99222 1ST HOSP IP/OBS MODERATE 55: CPT | Mod: GC

## 2019-10-25 RX ORDER — ESCITALOPRAM OXALATE 10 MG/1
5 TABLET, FILM COATED ORAL ONCE
Refills: 0 | Status: COMPLETED | OUTPATIENT
Start: 2019-10-25 | End: 2019-10-25

## 2019-10-25 RX ORDER — POTASSIUM CHLORIDE 20 MEQ
20 PACKET (EA) ORAL ONCE
Refills: 0 | Status: COMPLETED | OUTPATIENT
Start: 2019-10-25 | End: 2019-10-25

## 2019-10-25 RX ORDER — METOPROLOL TARTRATE 50 MG
12.5 TABLET ORAL
Refills: 0 | Status: DISCONTINUED | OUTPATIENT
Start: 2019-10-25 | End: 2019-10-27

## 2019-10-25 RX ORDER — ESCITALOPRAM OXALATE 10 MG/1
5 TABLET, FILM COATED ORAL DAILY
Refills: 0 | Status: DISCONTINUED | OUTPATIENT
Start: 2019-10-26 | End: 2019-10-27

## 2019-10-25 RX ORDER — DEXTROSE 50 % IN WATER 50 %
12.5 SYRINGE (ML) INTRAVENOUS ONCE
Refills: 0 | Status: COMPLETED | OUTPATIENT
Start: 2019-10-25 | End: 2019-10-25

## 2019-10-25 RX ORDER — LOSARTAN POTASSIUM 100 MG/1
25 TABLET, FILM COATED ORAL DAILY
Refills: 0 | Status: DISCONTINUED | OUTPATIENT
Start: 2019-10-25 | End: 2019-10-27

## 2019-10-25 RX ORDER — ESCITALOPRAM OXALATE 10 MG/1
10 TABLET, FILM COATED ORAL DAILY
Refills: 0 | Status: DISCONTINUED | OUTPATIENT
Start: 2019-10-25 | End: 2019-10-25

## 2019-10-25 RX ADMIN — ESCITALOPRAM OXALATE 5 MILLIGRAM(S): 10 TABLET, FILM COATED ORAL at 17:13

## 2019-10-25 RX ADMIN — Medication 5 MILLIGRAM(S): at 21:08

## 2019-10-25 RX ADMIN — PANTOPRAZOLE SODIUM 40 MILLIGRAM(S): 20 TABLET, DELAYED RELEASE ORAL at 05:27

## 2019-10-25 RX ADMIN — Medication 100 MILLIGRAM(S): at 21:07

## 2019-10-25 RX ADMIN — CLOPIDOGREL BISULFATE 75 MILLIGRAM(S): 75 TABLET, FILM COATED ORAL at 11:30

## 2019-10-25 RX ADMIN — HEPARIN SODIUM 5000 UNIT(S): 5000 INJECTION INTRAVENOUS; SUBCUTANEOUS at 21:08

## 2019-10-25 RX ADMIN — Medication 5 MILLIGRAM(S): at 05:24

## 2019-10-25 RX ADMIN — ATORVASTATIN CALCIUM 80 MILLIGRAM(S): 80 TABLET, FILM COATED ORAL at 21:08

## 2019-10-25 RX ADMIN — Medication 81 MILLIGRAM(S): at 11:30

## 2019-10-25 RX ADMIN — Medication 25 MILLIGRAM(S): at 17:13

## 2019-10-25 RX ADMIN — Medication 1: at 17:12

## 2019-10-25 RX ADMIN — Medication 100 MILLIGRAM(S): at 05:24

## 2019-10-25 RX ADMIN — ESCITALOPRAM OXALATE 5 MILLIGRAM(S): 10 TABLET, FILM COATED ORAL at 11:31

## 2019-10-25 RX ADMIN — Medication 25 MILLIGRAM(S): at 05:23

## 2019-10-25 RX ADMIN — AMLODIPINE BESYLATE 10 MILLIGRAM(S): 2.5 TABLET ORAL at 05:24

## 2019-10-25 RX ADMIN — HEPARIN SODIUM 5000 UNIT(S): 5000 INJECTION INTRAVENOUS; SUBCUTANEOUS at 05:23

## 2019-10-25 RX ADMIN — Medication 12.5 GRAM(S): at 07:50

## 2019-10-25 RX ADMIN — Medication 5 MILLIGRAM(S): at 14:53

## 2019-10-25 RX ADMIN — Medication 100 MILLIGRAM(S): at 11:31

## 2019-10-25 RX ADMIN — Medication 20 MILLIEQUIVALENT(S): at 14:52

## 2019-10-25 NOTE — CONSULT NOTE ADULT - SUBJECTIVE AND OBJECTIVE BOX
Psychiatry Consultation-Liaison  Note:   89yo Female  Encounter:  Telemetry Unit  Chart reviewed	  Met with: Daughter Angela at bedside.   Case Discussed with: Nursing staff/Treatment Team/Attending of Record.     Chief complaint: " I was thinking of Donald."     Interval History:   HPI:  89 y/o F with PMHx of CVA (1 yr ago), CKD3, HTN, DM2, HLD, was BIB daughter for sudden onset vision loss and hearing impairment from this morning. Patient also has severe hearing loss, as per history obtained from daughter and family at bedside , she was last seen normal at night , but upon awakening this morning around 10:30 am she started c/o loss of vision of both eyes, with complete loss in left eye and partial loss in right eye , also associated with worsening of hearing impairment. Pt states she sees light but its too blurry to see anything.   She had h/o stroke last year, and was recently in the hospital for a fall, was reevaluated for another stroke with no new changes and stable findings , she underwent acute rehab following that and family states pt was ambulating fine at home with walker. She also has mild headache but denies any loc/ nausea/vomiting/ slurred speech/ weakness /numbness or loss of sensation / vomiting /chest pain/abd pain . Denies GI or  symptoms.   ROS otherwise negative. (21 Oct 2019 16:41)      Symptom progression/resolution:   Psychiatry asked to acutely see patient due to her being upset and crying this afternoon. As per hospitalist pt had made a statement of passive suicidality.   When writer came to interview her, pt had forgotten she had made that statement and also stated " wasn't me." She reports that she had been thinking   of her son in law who  just 1 month ago, and that made her upset coupled with her deficits after a new CVA in right PCA territory, per neurology she has   a left visual field cut. Pt reports that now she can see the TV and her hearing is better than when she came in. Pt had a stroke 1 year ago with deficits in her speech.   She also received news that there will be another grandchild expected. She does endorse poor appetite, but also reports difficulty chewing and does not like pureed food  but would eat mechanical soft consistency.     MEDICATIONS  (STANDING):  amLODIPine   Tablet 10 milliGRAM(s) Oral daily  aspirin  chewable 81 milliGRAM(s) Oral daily  atorvastatin 80 milliGRAM(s) Oral daily  clopidogrel Tablet 75 milliGRAM(s) Oral daily  dextrose 5%. 1000 milliLiter(s) (50 mL/Hr) IV Continuous <Continuous>  dextrose 50% Injectable 12.5 Gram(s) IV Push once  dextrose 50% Injectable 25 Gram(s) IV Push once  dextrose 50% Injectable 25 Gram(s) IV Push once  docusate sodium 100 milliGRAM(s) Oral three times a day  escitalopram 10 milliGRAM(s) Oral daily  heparin  Injectable 5000 Unit(s) SubCutaneous every 8 hours  insulin lispro (HumaLOG) corrective regimen sliding scale   SubCutaneous three times a day before meals  losartan 25 milliGRAM(s) Oral daily  metoprolol tartrate 12.5 milliGRAM(s) Oral two times a day  oxybutynin 5 milliGRAM(s) Oral three times a day  pantoprazole    Tablet 40 milliGRAM(s) Oral before breakfast  sodium chloride 0.9%. 1000 milliLiter(s) (50 mL/Hr) IV Continuous <Continuous>    MEDICATIONS  (PRN):  dextrose 40% Gel 15 Gram(s) Oral once PRN Blood Glucose LESS THAN 70 milliGRAM(s)/deciliter  glucagon  Injectable 1 milliGRAM(s) IntraMuscular once PRN Glucose LESS THAN 70 milligrams/deciliter      Mental Status Examination:  General Appearance: Pleasant appearing elderly female, looks her stated age. Good eye contact.   Remarkable Features: Poor dentition. has bruise on hand from IV site.   Psychomotor State: calm  Abnormal movements and posture: deficits as per neurology note.   Social interaction and affect: Cooperative with bright affect, smiling with her daughter, appropriately sad when thinking of  son in law.   Cognition, perceptual abnormalities: Overall goal directed, if questions put directly to her.   Speech is dysarthric and slurred. At times forgetful, has 4 children had to name them in order  to directly recall daughter's name.   Consciousness: alert  Orientation: to person and hospital   Memory: Recent/Past/Remote: impaired post Left MCA CVA, l year ago.   Attention: intact  Naming/Repetition/Comprehension: some issue with direct naming, repetition wnl, comprehends.   Insight/Judgment: Fair, has not been a management problem.     Studies:    Laboratory testing-                        12.1   7.81  )-----------( 197      ( 25 Oct 2019 07:00 )             36.3     10-    143  |  103  |  25<H>  ----------------------------<  63<L>  3.3<L>   |  29  |  1.18    Ca    8.9      25 Oct 2019 07:00  Mg     1.5     10-24    TPro  6.5  /  Alb  2.8<L>  /  TBili  1.6<H>  /  DBili  x   /  AST  22  /  ALT  12  /  AlkPhos  85  10-24    POCT Blood Glucose.: 163 mg/dL (25 Oct 2019 16:45)    LIVER FUNCTIONS - ( 24 Oct 2019 05:30 )  Alb: 2.8 g/dL / Pro: 6.5 g/dL / ALK PHOS: 85 U/L / ALT: 12 U/L / AST: 22 U/L / GGT: x             Imaging-  < from: CT Head No Cont (10.22. @ 10:22) >  EXAM:  CT BRAIN      PROCEDURE DATE:  10/22/2019        INTERPRETATION:  CT brain without contrast  Comparison yesterday  History visual loss    There is maturation of mild cytotoxic edema in the right medial occipital   lobe consistent with PCA distribution infarct without mass effect or   hemorrhage. There is chronic microvascular ischemic change in the   periventricular white matter and basal ganglia. There is no hydrocephalus    IMPRESSION:    Small acute bland right PCA distribution infarct.    FAWAD SCHNEIDER M.D., ATTENDING RADIOLOGIST  This document has been electronically signed. Oct 22 2019 10:51AM      < end of copied text >    Examinations-    Vital Signs Last 24 Hrs:   T(C): 36.8 (25 Oct 2019 16:45), Max: 36.8 (25 Oct 2019 16:45)  T(F): 98.2 (25 Oct 2019 16:45), Max: 98.2 (25 Oct 2019 16:45)  HR: 74 (25 Oct 2019 16:45) (67 - 74)  BP: 121/59 (25 Oct 2019 16:45) (121/59 - 144/63)  BP(mean): --  RR: 16 (25 Oct 2019 16:45) (15 - 16)  SpO2: 97% (25 Oct 2019 16:45) (95% - 100%)    Psychiatric Diagnosis: Bereavement, r/o adjustment disorder with disturbance of emotions/ conduct.     Recommendations:  Given advanced age, do not recommend increasing Lexapro to 10 mg, mood very much changed by family support and   good news. Pt appears to have had catastrophic thoughts in the moment and now does not recall feeling hopeless.     Other Treatment considerations-pt at risk for vascular dementia, would not however pursue aggressive treatment at this time.   Level of supervision: Routine    Guidance for team/ family management-family at bedside provided collateral.     Guidance for patient management-Continue Lexapro 5 mg for brain recovery post CVA.     Referrals- Pt to go to [facility].     Hospital course Follow-up: Will sign off - reconsult if needed.

## 2019-10-25 NOTE — CONSULT NOTE ADULT - SUBJECTIVE AND OBJECTIVE BOX
HPI:  89 y/o F with PMHx of CVA (1 yr ago), CKD3, HTN, DM2, HLD, was BIB daughter for sudden onset vision loss and hearing impairment from this morning. Patient also has severe hearing loss, as per history obtained from daughter and family at bedside , she was last seen normal at night , but upon awakening this morning around 10:30 am she started c/o loss of vision of both eyes, with complete loss in left eye and partial loss in right eye , also associated with worsening of hearing impairment. Pt states she sees light but its too blurry to see anything.   She had h/o stroke last year, and was recently in the hospital for a fall, was reevaluated for another stroke with no new changes and stable findings , she underwent acute rehab following that and family states pt was ambulating fine at home with walker. She also has mild headache but denies any loc/ nausea/vomiting/ slurred speech/ weakness /numbness or loss of sensation / vomiting /chest pain/abd pain . Denies GI or  symptoms.   ROS otherwise negative. (21 Oct 2019 16:41)      CT-There is maturation of mild cytotoxic edema in the right medial occipital   lobe consistent with PCA distribution infarct without mass effect or   hemorrhage.     swallow eval- soft with thins     REVIEW OF SYSTEMS: No chest pain, shortness of breath, nausea, vomiting or diarhea.      PAST MEDICAL & SURGICAL HISTORY  Dyslipidemia  Osteoarthritis  Sciatica  Diabetes  HTN (hypertension)  HTN (hypertension)  DM (diabetes mellitus)  H/O bilateral hip replacements  S/P hip replacement, bilateral      SOCIAL HISTORY  Smoking - Denied, EtOH - Denied, Drugs - Denied    FUNCTIONAL HISTORY:   Lives   Independent    CURRENT FUNCTIONAL STATUS:      FAMILY HISTORY   FHx: leukemia  Family history of CVA  No pertinent family history in first degree relatives      RECENT LABS/IMAGING  CBC Full  -  ( 25 Oct 2019 07:00 )  WBC Count : 7.81 K/uL  RBC Count : 3.99 M/uL  Hemoglobin : 12.1 g/dL  Hematocrit : 36.3 %  Platelet Count - Automated : 197 K/uL  Mean Cell Volume : 91.0 fl  Mean Cell Hemoglobin : 30.3 pg  Mean Cell Hemoglobin Concentration : 33.3 gm/dL  Auto Neutrophil # : x  Auto Lymphocyte # : x  Auto Monocyte # : x  Auto Eosinophil # : x  Auto Basophil # : x  Auto Neutrophil % : x  Auto Lymphocyte % : x  Auto Monocyte % : x  Auto Eosinophil % : x  Auto Basophil % : x    10-25    143  |  103  |  25<H>  ----------------------------<  63<L>  3.3<L>   |  29  |  1.18    Ca    8.9      25 Oct 2019 07:00  Mg     1.5     10-24    TPro  6.5  /  Alb  2.8<L>  /  TBili  1.6<H>  /  DBili  x   /  AST  22  /  ALT  12  /  AlkPhos  85  10-24        VITALS  T(C): 36.7 (10-25-19 @ 10:43), Max: 36.7 (10-24-19 @ 20:27)  HR: 67 (10-25-19 @ 10:43) (66 - 72)  BP: 129/54 (10-25-19 @ 10:43) (129/54 - 144/63)  RR: 16 (10-25-19 @ 10:43) (15 - 16)  SpO2: 95% (10-25-19 @ 10:43) (95% - 100%)  Wt(kg): --    ALLERGIES  No Known Allergies      MEDICATIONS   amLODIPine   Tablet 10 milliGRAM(s) Oral daily  aspirin  chewable 81 milliGRAM(s) Oral daily  atorvastatin 80 milliGRAM(s) Oral daily  clopidogrel Tablet 75 milliGRAM(s) Oral daily  dextrose 40% Gel 15 Gram(s) Oral once PRN  dextrose 5%. 1000 milliLiter(s) IV Continuous <Continuous>  dextrose 50% Injectable 12.5 Gram(s) IV Push once  dextrose 50% Injectable 25 Gram(s) IV Push once  dextrose 50% Injectable 25 Gram(s) IV Push once  docusate sodium 100 milliGRAM(s) Oral three times a day  escitalopram 5 milliGRAM(s) Oral daily  glucagon  Injectable 1 milliGRAM(s) IntraMuscular once PRN  heparin  Injectable 5000 Unit(s) SubCutaneous every 8 hours  insulin lispro (HumaLOG) corrective regimen sliding scale   SubCutaneous three times a day before meals  metoprolol tartrate 25 milliGRAM(s) Oral every 12 hours  oxybutynin 5 milliGRAM(s) Oral three times a day  pantoprazole    Tablet 40 milliGRAM(s) Oral before breakfast  sodium chloride 0.9%. 1000 milliLiter(s) IV Continuous <Continuous>      ----------------------------------------------------------------------------------------  PHYSICAL EXAM  Constitutional - NAD, Comfortable  HEENT - NCAT, EOMI  Neck - Supple, No limited ROM  Chest - CTA bilaterally, No wheeze, No rhonchi, No crackles  Cardiovascular - RRR, S1S2, No murmurs  Abdomen - BS+, Soft, NTND  Extremities - No C/C/E, No calf tenderness   Neurologic Exam -                    Cognitive - Awake, Alert, AAO to self, place, date, year, situation     Communication - Fluent, No dysarthria, no aphasia     Cranial Nerves - CN 2-12 intact     Motor - No focal deficits                       Sensory - Intact to LT     Reflexes - DTR Intact, No primitive reflexive     Balance - WNL Static  Psychiatric - Mood stable, Affect WNL HPI:  89 y/o F with PMHx of CVA (1 yr ago), CKD3, HTN, DM2, HLD, was BIB daughter for sudden onset vision loss and hearing impairment from this morning. Patient also has severe hearing loss, as per history obtained from daughter and family at bedside , she was last seen normal at night , but upon awakening this morning around 10:30 am she started c/o loss of vision of both eyes, with complete loss in left eye and partial loss in right eye , also associated with worsening of hearing impairment. Pt states she sees light but its too blurry to see anything.   She had h/o stroke last year, and was recently in the hospital for a fall, was reevaluated for another stroke with no new changes and stable findings , she underwent acute rehab following that and family states pt was ambulating fine at home with walker. She also has mild headache but denies any loc/ nausea/vomiting/ slurred speech/ weakness /numbness or loss of sensation / vomiting /chest pain/abd pain . Denies GI or  symptoms.   ROS otherwise negative. (21 Oct 2019 16:41)      CT-There is maturation of mild cytotoxic edema in the right medial occipital   lobe consistent with PCA distribution infarct without mass effect or   hemorrhage.   swallow eval- soft with thins   Patient with complaint of vision loss, difficulty with speech    REVIEW OF SYSTEMS: No chest pain, shortness of breath, nausea, vomiting or diarrhea.      PAST MEDICAL & SURGICAL HISTORY  Dyslipidemia  Osteoarthritis  Sciatica  Diabetes  HTN (hypertension)  HTN (hypertension)  DM (diabetes mellitus)  H/O bilateral hip replacements  S/P hip replacement, bilateral      SOCIAL HISTORY  Smoking - Denied, EtOH - Denied, Drugs - Denied    FUNCTIONAL HISTORY:   Lives with children  walked with walker PTA  within the home  and required assist with ADLs    CURRENT FUNCTIONAL STATUS: mod a       FAMILY HISTORY   FHx: leukemia  Family history of CVA  No pertinent family history in first degree relatives      RECENT LABS/IMAGING  CBC Full  -  ( 25 Oct 2019 07:00 )  WBC Count : 7.81 K/uL  RBC Count : 3.99 M/uL  Hemoglobin : 12.1 g/dL  Hematocrit : 36.3 %  Platelet Count - Automated : 197 K/uL  Mean Cell Volume : 91.0 fl  Mean Cell Hemoglobin : 30.3 pg  Mean Cell Hemoglobin Concentration : 33.3 gm/dL  Auto Neutrophil # : x  Auto Lymphocyte # : x  Auto Monocyte # : x  Auto Eosinophil # : x  Auto Basophil # : x  Auto Neutrophil % : x  Auto Lymphocyte % : x  Auto Monocyte % : x  Auto Eosinophil % : x  Auto Basophil % : x    10-25    143  |  103  |  25<H>  ----------------------------<  63<L>  3.3<L>   |  29  |  1.18    Ca    8.9      25 Oct 2019 07:00  Mg     1.5     10-24    TPro  6.5  /  Alb  2.8<L>  /  TBili  1.6<H>  /  DBili  x   /  AST  22  /  ALT  12  /  AlkPhos  85  10-24        VITALS  T(C): 36.7 (10-25-19 @ 10:43), Max: 36.7 (10-24-19 @ 20:27)  HR: 67 (10-25-19 @ 10:43) (66 - 72)  BP: 129/54 (10-25-19 @ 10:43) (129/54 - 144/63)  RR: 16 (10-25-19 @ 10:43) (15 - 16)  SpO2: 95% (10-25-19 @ 10:43) (95% - 100%)  Wt(kg): --    ALLERGIES  No Known Allergies      MEDICATIONS   amLODIPine   Tablet 10 milliGRAM(s) Oral daily  aspirin  chewable 81 milliGRAM(s) Oral daily  atorvastatin 80 milliGRAM(s) Oral daily  clopidogrel Tablet 75 milliGRAM(s) Oral daily  dextrose 40% Gel 15 Gram(s) Oral once PRN  dextrose 5%. 1000 milliLiter(s) IV Continuous <Continuous>  dextrose 50% Injectable 12.5 Gram(s) IV Push once  dextrose 50% Injectable 25 Gram(s) IV Push once  dextrose 50% Injectable 25 Gram(s) IV Push once  docusate sodium 100 milliGRAM(s) Oral three times a day  escitalopram 5 milliGRAM(s) Oral daily  glucagon  Injectable 1 milliGRAM(s) IntraMuscular once PRN  heparin  Injectable 5000 Unit(s) SubCutaneous every 8 hours  insulin lispro (HumaLOG) corrective regimen sliding scale   SubCutaneous three times a day before meals  metoprolol tartrate 25 milliGRAM(s) Oral every 12 hours  oxybutynin 5 milliGRAM(s) Oral three times a day  pantoprazole    Tablet 40 milliGRAM(s) Oral before breakfast  sodium chloride 0.9%. 1000 milliLiter(s) IV Continuous <Continuous>      ----------------------------------------------------------------------------------------  PHYSICAL EXAM  Constitutional - NAD, Comfortable  HEENT - NCAT, EOMI  Neck - Supple, No limited ROM  Chest - CTA bilaterally, No wheeze, No rhonchi, No crackles  Cardiovascular - RRR, S1S2, No murmurs  Abdomen - BS+, Soft, NTND  Extremities - No C/C/E, No calf tenderness   Neurologic Exam -                    Cognitive - Awake, Alert, AAO to self, place, date, year, situation     Communication - Fluent, No dysarthria, no aphasia     Cranial Nerves - CN 2-12 intact     Motor - LUE 4/5, RUE 5/5, bilateral LE 4/5                        Sensory - Intact to LT     Reflexes - DTR Intact, No primitive reflexive     Balance - poor standing balance   Psychiatric - Mood stable, Affect WNL

## 2019-10-25 NOTE — PROGRESS NOTE ADULT - ASSESSMENT
89 y/o F with PMHx of CVA (1 yr ago), CKD3, HTN, DM2, HLD, was BIB daughter for sudden onset vision loss and hearing impairment from this morning. Patient also has severe hearing loss, as per history obtained from daughter and family at bedside , she was last seen normal at night , but upon awakening she started c/o loss of vision of both eyes, with complete loss in left eye and partial loss in right eye , also associated with worsening of hearing impairment.         # DVT ppx: Heparin s/c    Code status : DNR/DNI    Dispo: Neurologically ready for discharge. pending JUAN 89 y/o F with PMHx of CVA (1 yr ago), CKD3, HTN, DM2, HLD, was BIB daughter for sudden onset vision loss and hearing impairment from this morning. Patient also has severe hearing loss, as per history obtained from daughter and family at bedside , she was last seen normal at night , but upon awakening she started c/o loss of vision of both eyes, with complete loss in left eye and partial loss in right eye , also associated with worsening of hearing impairment.         # DVT ppx: Heparin s/c    Code status : DNR/DNI    Dispo: Neurologically ready for discharge. pending JUAN. denied by acute rehab.

## 2019-10-25 NOTE — PROGRESS NOTE ADULT - PROBLEM SELECTOR PLAN 1
acute R PCA CVA  Appreciate neuro consult  Started on plavix   continue tele monitoring  PT eval  lipid profile done  on aspirin and statin  Echo done - EF-55% moderate aortic stenosis  Carotids US: no hemodynamically significant stenosis

## 2019-10-25 NOTE — PROGRESS NOTE ADULT - PROBLEM SELECTOR PLAN 6
amlodipine 10mg qD  continue metoprolol amlodipine 10mg qD  decreased dose metoprolol 2/2 cyanotic fingertips  added low dose losartan

## 2019-10-25 NOTE — CONSULT NOTE ADULT - ASSESSMENT
New right posterior cerebral artery infarct.
1. PT- bed mobility,transfers, gait and balance training  2. OT- ADL'S  3. CVA-Asa, Plavix  Statin  4. Given pt's age, previous function status and new visual defect, she would benefit from subacute rehab as will require longer rehab course and 24 h supervision at home.   Will follow.

## 2019-10-25 NOTE — PROGRESS NOTE ADULT - PROBLEM SELECTOR PLAN 5
Well controlled hypoglycemia FS 58 this morning   Hba1c 7.2  DC lantus and Lispro   monitor FS closely and continue with ISS

## 2019-10-25 NOTE — PROGRESS NOTE ADULT - PROBLEM SELECTOR PLAN 3
possible adjustment disorder  no active suicidal ideation or plan  increase lexapro to 10mg qD  follow up psych

## 2019-10-25 NOTE — PROGRESS NOTE ADULT - ASSESSMENT
cva  flat troponin curve  Aortic valve disease  APC's  HBP  peripheral cyanosis may be related to valve disease and b blocker use oxygen sat 100%    Consider reducing b blocker, may use ARB

## 2019-10-25 NOTE — PROGRESS NOTE ADULT - PROBLEM SELECTOR PLAN 2
CP resolved. ACS   Appreciate cards consult  flat trops.   elevated trop likely 2/2 demand ischemia  EKG shows previously known RBBB

## 2019-10-26 LAB
ANION GAP SERPL CALC-SCNC: 8 MMOL/L — SIGNIFICANT CHANGE UP (ref 5–17)
BASOPHILS # BLD AUTO: 0.06 K/UL — SIGNIFICANT CHANGE UP (ref 0–0.2)
BASOPHILS NFR BLD AUTO: 1 % — SIGNIFICANT CHANGE UP (ref 0–2)
BUN SERPL-MCNC: 20 MG/DL — SIGNIFICANT CHANGE UP (ref 7–23)
CALCIUM SERPL-MCNC: 9.4 MG/DL — SIGNIFICANT CHANGE UP (ref 8.4–10.5)
CHLORIDE SERPL-SCNC: 103 MMOL/L — SIGNIFICANT CHANGE UP (ref 96–108)
CO2 SERPL-SCNC: 29 MMOL/L — SIGNIFICANT CHANGE UP (ref 22–31)
CREAT SERPL-MCNC: 1.06 MG/DL — SIGNIFICANT CHANGE UP (ref 0.5–1.3)
EOSINOPHIL # BLD AUTO: 0.71 K/UL — HIGH (ref 0–0.5)
EOSINOPHIL NFR BLD AUTO: 12 % — HIGH (ref 0–6)
GLUCOSE BLDC GLUCOMTR-MCNC: 113 MG/DL — HIGH (ref 70–99)
GLUCOSE BLDC GLUCOMTR-MCNC: 149 MG/DL — HIGH (ref 70–99)
GLUCOSE SERPL-MCNC: 115 MG/DL — HIGH (ref 70–99)
HCT VFR BLD CALC: 43.1 % — SIGNIFICANT CHANGE UP (ref 34.5–45)
HGB BLD-MCNC: 14.1 G/DL — SIGNIFICANT CHANGE UP (ref 11.5–15.5)
IMM GRANULOCYTES NFR BLD AUTO: 0.3 % — SIGNIFICANT CHANGE UP (ref 0–1.5)
LYMPHOCYTES # BLD AUTO: 2.18 K/UL — SIGNIFICANT CHANGE UP (ref 1–3.3)
LYMPHOCYTES # BLD AUTO: 36.7 % — SIGNIFICANT CHANGE UP (ref 13–44)
MAGNESIUM SERPL-MCNC: 1.6 MG/DL — SIGNIFICANT CHANGE UP (ref 1.6–2.6)
MCHC RBC-ENTMCNC: 31.3 PG — SIGNIFICANT CHANGE UP (ref 27–34)
MCHC RBC-ENTMCNC: 32.7 GM/DL — SIGNIFICANT CHANGE UP (ref 32–36)
MCV RBC AUTO: 95.8 FL — SIGNIFICANT CHANGE UP (ref 80–100)
MONOCYTES # BLD AUTO: 0.54 K/UL — SIGNIFICANT CHANGE UP (ref 0–0.9)
MONOCYTES NFR BLD AUTO: 9.1 % — SIGNIFICANT CHANGE UP (ref 2–14)
NEUTROPHILS # BLD AUTO: 2.43 K/UL — SIGNIFICANT CHANGE UP (ref 1.8–7.4)
NEUTROPHILS NFR BLD AUTO: 40.9 % — LOW (ref 43–77)
NRBC # BLD: 0 /100 WBCS — SIGNIFICANT CHANGE UP (ref 0–0)
PLATELET # BLD AUTO: 191 K/UL — SIGNIFICANT CHANGE UP (ref 150–400)
POTASSIUM SERPL-MCNC: 3.5 MMOL/L — SIGNIFICANT CHANGE UP (ref 3.5–5.3)
POTASSIUM SERPL-SCNC: 3.5 MMOL/L — SIGNIFICANT CHANGE UP (ref 3.5–5.3)
RBC # BLD: 4.5 M/UL — SIGNIFICANT CHANGE UP (ref 3.8–5.2)
RBC # FLD: 14.1 % — SIGNIFICANT CHANGE UP (ref 10.3–14.5)
SODIUM SERPL-SCNC: 140 MMOL/L — SIGNIFICANT CHANGE UP (ref 135–145)
WBC # BLD: 5.94 K/UL — SIGNIFICANT CHANGE UP (ref 3.8–10.5)
WBC # FLD AUTO: 5.94 K/UL — SIGNIFICANT CHANGE UP (ref 3.8–10.5)

## 2019-10-26 PROCEDURE — 99232 SBSQ HOSP IP/OBS MODERATE 35: CPT

## 2019-10-26 RX ORDER — MAGNESIUM OXIDE 400 MG ORAL TABLET 241.3 MG
400 TABLET ORAL
Refills: 0 | Status: DISCONTINUED | OUTPATIENT
Start: 2019-10-26 | End: 2019-10-27

## 2019-10-26 RX ADMIN — HEPARIN SODIUM 5000 UNIT(S): 5000 INJECTION INTRAVENOUS; SUBCUTANEOUS at 05:40

## 2019-10-26 RX ADMIN — ATORVASTATIN CALCIUM 80 MILLIGRAM(S): 80 TABLET, FILM COATED ORAL at 21:57

## 2019-10-26 RX ADMIN — ESCITALOPRAM OXALATE 5 MILLIGRAM(S): 10 TABLET, FILM COATED ORAL at 13:24

## 2019-10-26 RX ADMIN — LOSARTAN POTASSIUM 25 MILLIGRAM(S): 100 TABLET, FILM COATED ORAL at 05:41

## 2019-10-26 RX ADMIN — Medication 100 MILLIGRAM(S): at 21:57

## 2019-10-26 RX ADMIN — Medication 5 MILLIGRAM(S): at 14:55

## 2019-10-26 RX ADMIN — Medication 100 MILLIGRAM(S): at 14:54

## 2019-10-26 RX ADMIN — AMLODIPINE BESYLATE 10 MILLIGRAM(S): 2.5 TABLET ORAL at 05:40

## 2019-10-26 RX ADMIN — Medication 81 MILLIGRAM(S): at 13:24

## 2019-10-26 RX ADMIN — Medication 12.5 MILLIGRAM(S): at 18:54

## 2019-10-26 RX ADMIN — PANTOPRAZOLE SODIUM 40 MILLIGRAM(S): 20 TABLET, DELAYED RELEASE ORAL at 05:59

## 2019-10-26 RX ADMIN — MAGNESIUM OXIDE 400 MG ORAL TABLET 400 MILLIGRAM(S): 241.3 TABLET ORAL at 18:54

## 2019-10-26 RX ADMIN — Medication 5 MILLIGRAM(S): at 21:57

## 2019-10-26 RX ADMIN — CLOPIDOGREL BISULFATE 75 MILLIGRAM(S): 75 TABLET, FILM COATED ORAL at 13:24

## 2019-10-26 RX ADMIN — Medication 12.5 MILLIGRAM(S): at 05:41

## 2019-10-26 RX ADMIN — HEPARIN SODIUM 5000 UNIT(S): 5000 INJECTION INTRAVENOUS; SUBCUTANEOUS at 14:54

## 2019-10-26 RX ADMIN — SODIUM CHLORIDE 50 MILLILITER(S): 9 INJECTION INTRAMUSCULAR; INTRAVENOUS; SUBCUTANEOUS at 14:55

## 2019-10-26 RX ADMIN — Medication 100 MILLIGRAM(S): at 05:40

## 2019-10-26 RX ADMIN — Medication 5 MILLIGRAM(S): at 05:40

## 2019-10-26 NOTE — SWALLOW BEDSIDE ASSESSMENT ADULT - SWALLOW EVAL: DIAGNOSIS
Pt presents with mild oral dysphagia with prolonged mastication of solids due to poor lower dentition, that has worsened since admission per daughters with loss of several teeth. Pt able to tolerate this liquids with no overt behavioral signs/symptoms of penetration or aspiration.
Pateint administered thin liquids, puree and solids and presented with josue-pharyngeal swallow to be grossly within functional limits with the exception of prolonged mastication of solids, secondary to poor and missing dentition of mandibular arch.  Adequate bolus reception/formulation and transit.  Fairly timely trigger of pharyngeal swallow with fairly adequate hyolaryngeal elevation/excursion.  No overt signs/symptoms of penetration and/or aspiration evident across all consistencies administered.

## 2019-10-26 NOTE — PROGRESS NOTE ADULT - PROBLEM SELECTOR PLAN 2
CP resolved. r/o ACS  Appreciate cards consult  flat trops.   elevated trop likely 2/2 demand ischemia  EKG shows previously known RBBB  cont medical optimization  ASA 81mg, plavix, BB

## 2019-10-26 NOTE — SWALLOW BEDSIDE ASSESSMENT ADULT - SWALLOW EVAL: RECOMMENDED FEEDING/EATING TECHNIQUES
allow for swallow between intakes/position upright (90 degrees)/maintain upright posture during/after eating for 30 mins
oral hygiene/position upright (90 degrees)/small sips/bites/maintain upright posture during/after eating for 30 mins

## 2019-10-26 NOTE — SWALLOW BEDSIDE ASSESSMENT ADULT - ASR SWALLOW ASPIRATION MONITOR
oral hygiene/position upright (90Y)/change of breathing pattern/cough/fever/gurgly voice/pneumonia/throat clearing/upper respiratory infection

## 2019-10-26 NOTE — PROGRESS NOTE ADULT - ATTENDING COMMENTS
stroke   cami continue to rule out afib. antiplatelets for now. may come to 30 day monitor or even ilr loop recorder to exclude same.     chest pains  resolved. ? candidate for an ischemic eval given advanced age and multiple comorbidities.

## 2019-10-26 NOTE — PROGRESS NOTE ADULT - PROBLEM SELECTOR PLAN 8
decreased metoprolol and added losartan  appreciate cardiology decreased metoprolol and added losartan  appreciate cardiology    DISPO: home with home PT vs subacute rehab

## 2019-10-26 NOTE — SWALLOW BEDSIDE ASSESSMENT ADULT - SLP PERTINENT HISTORY OF CURRENT PROBLEM
89 y/o F with PMHx of CVA (1 yr ago), CKD3, HTN, DM2, HLD, was BIB daughter for sudden onset vision loss and hearing impairment from this morning. Patient also has severe hearing loss, as per history obtained from daughter and family at bedside , she was last seen normal at night , but upon awakening this morning around 10:30 am she started c/o loss of vision of both eyes, with complete loss in left eye and partial loss in right eye , also associated with worsening of hearing impairment. Pt states she sees light but its too blurry to see anything.  She had h/o stroke last year, and was recently in the hospital for a fall, was reevaluated for another stroke with no new changes and stable findings , she underwent acute rehab following that and family states pt was ambulating fine at home with walker. She also has mild headache but denies any loc/ nausea/vomiting/ slurred speech/ weakness /numbness or loss of sensation / vomiting /chest pain/abd pain . Denies GI or  symptoms.
89 y/o F with PMHx of CVA (1 yr ago), CKD3, HTN, DM2, HLD, was BIB daughter for sudden onset vision loss and hearing impairment from this morning. Patient also has severe hearing loss, as per history obtained from daughter and family at bedside , she was last seen normal at night , but upon awakening she started c/o loss of vision of both eyes, with complete loss in left eye and partial loss in right eye , also associated with worsening of hearing impairment.

## 2019-10-26 NOTE — PROGRESS NOTE ADULT - ASSESSMENT
89 y/o F with PMHx of CVA (1 yr ago), CKD3, HTN, DM2, HLD, was BIB daughter for sudden onset vision loss and hearing impairment from this morning. Patient also has severe hearing loss, as per history obtained from daughter and family at bedside , she was last seen normal at night , but upon awakening she started c/o loss of vision of both eyes, with complete loss in left eye and partial loss in right eye , also associated with worsening of hearing impairment.         # DVT ppx: Heparin s/c    Code status : DNR/DNI    Dispo: Neurologically ready for discharge. pending JUAN. denied by acute rehab. 89 y/o F with PMHx of CVA (1 yr ago), CKD3, HTN, DM2, HLD, was BIB daughter for sudden onset vision loss and hearing impairment from this morning. Patient also has severe hearing loss, as per history obtained from daughter and family at bedside , she was last seen normal at night , but upon awakening she started c/o loss of vision of both eyes, with complete loss in left eye and partial loss in right eye , also associated with worsening of hearing impairment.         # DVT ppx: Heparin s/c    Code status : DNR/DNI    Dispo: Neurologically ready for discharge. pending JUAN vs home with home PT. denied by acute rehab.

## 2019-10-26 NOTE — SWALLOW BEDSIDE ASSESSMENT ADULT - DIET PRIOR TO ADMI
Please advise--no hours in clinic in 1 week.    Does patient need any testing??  
soft with thins
regular

## 2019-10-27 ENCOUNTER — TRANSCRIPTION ENCOUNTER (OUTPATIENT)
Age: 84
End: 2019-10-27

## 2019-10-27 VITALS
HEART RATE: 74 BPM | SYSTOLIC BLOOD PRESSURE: 141 MMHG | DIASTOLIC BLOOD PRESSURE: 54 MMHG | TEMPERATURE: 98 F | RESPIRATION RATE: 15 BRPM | OXYGEN SATURATION: 98 %

## 2019-10-27 LAB — GLUCOSE BLDC GLUCOMTR-MCNC: 136 MG/DL — HIGH (ref 70–99)

## 2019-10-27 PROCEDURE — 99239 HOSP IP/OBS DSCHRG MGMT >30: CPT

## 2019-10-27 RX ORDER — PANTOPRAZOLE SODIUM 20 MG/1
1 TABLET, DELAYED RELEASE ORAL
Qty: 0 | Refills: 0 | DISCHARGE

## 2019-10-27 RX ORDER — AMLODIPINE BESYLATE 2.5 MG/1
1 TABLET ORAL
Qty: 30 | Refills: 0
Start: 2019-10-27 | End: 2019-11-25

## 2019-10-27 RX ORDER — METOPROLOL TARTRATE 50 MG
0.5 TABLET ORAL
Qty: 30 | Refills: 0
Start: 2019-10-27 | End: 2019-11-25

## 2019-10-27 RX ORDER — CLOPIDOGREL BISULFATE 75 MG/1
1 TABLET, FILM COATED ORAL
Qty: 30 | Refills: 0
Start: 2019-10-27 | End: 2019-11-25

## 2019-10-27 RX ORDER — LOSARTAN POTASSIUM 100 MG/1
1 TABLET, FILM COATED ORAL
Qty: 30 | Refills: 0
Start: 2019-10-27 | End: 2019-11-25

## 2019-10-27 RX ORDER — METOPROLOL TARTRATE 50 MG
0.5 TABLET ORAL
Qty: 0 | Refills: 0 | DISCHARGE
Start: 2019-10-27

## 2019-10-27 RX ADMIN — Medication 100 MILLIGRAM(S): at 05:02

## 2019-10-27 RX ADMIN — SODIUM CHLORIDE 50 MILLILITER(S): 9 INJECTION INTRAMUSCULAR; INTRAVENOUS; SUBCUTANEOUS at 13:20

## 2019-10-27 RX ADMIN — ESCITALOPRAM OXALATE 5 MILLIGRAM(S): 10 TABLET, FILM COATED ORAL at 12:51

## 2019-10-27 RX ADMIN — LOSARTAN POTASSIUM 25 MILLIGRAM(S): 100 TABLET, FILM COATED ORAL at 05:02

## 2019-10-27 RX ADMIN — HEPARIN SODIUM 5000 UNIT(S): 5000 INJECTION INTRAVENOUS; SUBCUTANEOUS at 05:07

## 2019-10-27 RX ADMIN — HEPARIN SODIUM 5000 UNIT(S): 5000 INJECTION INTRAVENOUS; SUBCUTANEOUS at 13:21

## 2019-10-27 RX ADMIN — MAGNESIUM OXIDE 400 MG ORAL TABLET 400 MILLIGRAM(S): 241.3 TABLET ORAL at 17:07

## 2019-10-27 RX ADMIN — Medication 100 MILLIGRAM(S): at 14:59

## 2019-10-27 RX ADMIN — Medication 5 MILLIGRAM(S): at 05:02

## 2019-10-27 RX ADMIN — CLOPIDOGREL BISULFATE 75 MILLIGRAM(S): 75 TABLET, FILM COATED ORAL at 12:51

## 2019-10-27 RX ADMIN — Medication 81 MILLIGRAM(S): at 12:51

## 2019-10-27 RX ADMIN — PANTOPRAZOLE SODIUM 40 MILLIGRAM(S): 20 TABLET, DELAYED RELEASE ORAL at 05:02

## 2019-10-27 RX ADMIN — Medication 12.5 MILLIGRAM(S): at 17:07

## 2019-10-27 RX ADMIN — Medication 12.5 MILLIGRAM(S): at 05:02

## 2019-10-27 RX ADMIN — AMLODIPINE BESYLATE 10 MILLIGRAM(S): 2.5 TABLET ORAL at 05:02

## 2019-10-27 RX ADMIN — MAGNESIUM OXIDE 400 MG ORAL TABLET 400 MILLIGRAM(S): 241.3 TABLET ORAL at 12:51

## 2019-10-27 RX ADMIN — Medication 5 MILLIGRAM(S): at 14:59

## 2019-10-27 NOTE — PROGRESS NOTE ADULT - SUBJECTIVE AND OBJECTIVE BOX
Patient is a 89y old  Female who presents with a chief complaint of s/p CVA RT occipital infarct (22 Oct 2019 11:25)      Patient seen and examined at bedside.  denies any chest pain or shortness of breath.     ALLERGIES:  No Known Allergies    MEDICATIONS:  amLODIPine   Tablet 10 milliGRAM(s) Oral daily  aspirin  chewable 81 milliGRAM(s) Oral daily  atorvastatin 80 milliGRAM(s) Oral daily  clopidogrel Tablet 75 milliGRAM(s) Oral daily  dextrose 40% Gel 15 Gram(s) Oral once PRN  dextrose 5%. 1000 milliLiter(s) IV Continuous <Continuous>  dextrose 50% Injectable 12.5 Gram(s) IV Push once  dextrose 50% Injectable 25 Gram(s) IV Push once  dextrose 50% Injectable 25 Gram(s) IV Push once  docusate sodium 100 milliGRAM(s) Oral three times a day  escitalopram 5 milliGRAM(s) Oral daily  glucagon  Injectable 1 milliGRAM(s) IntraMuscular once PRN  heparin  Injectable 5000 Unit(s) SubCutaneous every 8 hours  insulin glargine Injectable (LANTUS) 10 Unit(s) SubCutaneous at bedtime  insulin lispro (HumaLOG) corrective regimen sliding scale   SubCutaneous three times a day before meals  insulin lispro Injectable (HumaLOG) 3 Unit(s) SubCutaneous before breakfast  insulin lispro Injectable (HumaLOG) 3 Unit(s) SubCutaneous before lunch  insulin lispro Injectable (HumaLOG) 3 Unit(s) SubCutaneous before dinner  metoprolol tartrate 25 milliGRAM(s) Oral every 12 hours  oxybutynin 5 milliGRAM(s) Oral three times a day  pantoprazole    Tablet 40 milliGRAM(s) Oral before breakfast    Vital Signs Last 24 Hrs  T(F): 98.6 (23 Oct 2019 16:39), Max: 99.6 (22 Oct 2019 21:56)  HR: 84 (23 Oct 2019 16:47) (77 - 88)  BP: 137/70 (23 Oct 2019 16:47) (99/50 - 148/69)  RR: 16 (23 Oct 2019 16:39) (15 - 16)  SpO2: 96% (23 Oct 2019 16:39) (94% - 96%)  I&O's Summary    22 Oct 2019 07:01  -  23 Oct 2019 07:00  --------------------------------------------------------  IN: 200 mL / OUT: 0 mL / NET: 200 mL        PHYSICAL EXAM:  General: NAD, A/O x 3  ENT: MMM  Neck: Supple, No JVD  Lungs: diminished throughout   Cardio: RRR, S1/S2, 2/6 murmur  Abdomen: Soft, Nontender, Nondistended; Bowel sounds present  Extremities: No cyanosis, No edema    LABS:                        12.8   8.79  )-----------( 214      ( 23 Oct 2019 06:00 )             38.0     10-23    140  |  102  |  18  ----------------------------<  107  3.0   |  29  |  1.16    Ca    8.8      23 Oct 2019 06:00  Mg     1.8     10-23    TPro  6.8  /  Alb  2.9  /  TBili  1.4  /  DBili  x   /  AST  19  /  ALT  13  /  AlkPhos  88  10-23    eGFR if Non African American: 40 mL/min/1.73M2 (10-23-19 @ 06:00)  eGFR if : 46 mL/min/1.73M2 (10-23-19 @ 06:00)            10-22 Chol 138 mg/dL LDL 75 mg/dL HDL 41 mg/dL Trig 111 mg/dL              POCT Blood Glucose.: 129 mg/dL (23 Oct 2019 16:42)  POCT Blood Glucose.: 122 mg/dL (23 Oct 2019 11:27)  POCT Blood Glucose.: 112 mg/dL (23 Oct 2019 07:51)  POCT Blood Glucose.: 146 mg/dL (22 Oct 2019 21:43)    09-13 [id number] 7.3          RADIOLOGY & ADDITIONAL TESTS:    Care Discussed with Consultants/Other Providers:
Follow up for   stroke, hbp    SUBJ:   visual loss, depressed, wants to die    PMH  Dyslipidemia  Osteoarthritis  Sciatica  Diabetes  HTN (hypertension)  HTN (hypertension)  DM (diabetes mellitus)      MEDICATIONS  (STANDING):  amLODIPine   Tablet 10 milliGRAM(s) Oral daily  aspirin  chewable 81 milliGRAM(s) Oral daily  atorvastatin 80 milliGRAM(s) Oral daily  clopidogrel Tablet 75 milliGRAM(s) Oral daily  dextrose 5%. 1000 milliLiter(s) (50 mL/Hr) IV Continuous <Continuous>  dextrose 50% Injectable 12.5 Gram(s) IV Push once  dextrose 50% Injectable 25 Gram(s) IV Push once  dextrose 50% Injectable 25 Gram(s) IV Push once  docusate sodium 100 milliGRAM(s) Oral three times a day  escitalopram 5 milliGRAM(s) Oral daily  heparin  Injectable 5000 Unit(s) SubCutaneous every 8 hours  insulin lispro (HumaLOG) corrective regimen sliding scale   SubCutaneous three times a day before meals  metoprolol tartrate 25 milliGRAM(s) Oral every 12 hours  oxybutynin 5 milliGRAM(s) Oral three times a day  pantoprazole    Tablet 40 milliGRAM(s) Oral before breakfast  sodium chloride 0.9%. 1000 milliLiter(s) (50 mL/Hr) IV Continuous <Continuous>    MEDICATIONS  (PRN):  dextrose 40% Gel 15 Gram(s) Oral once PRN Blood Glucose LESS THAN 70 milliGRAM(s)/deciliter  glucagon  Injectable 1 milliGRAM(s) IntraMuscular once PRN Glucose LESS THAN 70 milligrams/deciliter        PHYSICAL EXAM:  Vital Signs Last 24 Hrs  T(C): 36.7 (25 Oct 2019 10:43), Max: 36.7 (24 Oct 2019 20:27)  T(F): 98 (25 Oct 2019 10:43), Max: 98 (24 Oct 2019 20:27)  HR: 67 (25 Oct 2019 10:43) (66 - 72)  BP: 129/54 (25 Oct 2019 10:43) (129/54 - 144/63)  BP(mean): --  RR: 16 (25 Oct 2019 10:43) (15 - 16)  SpO2: 95% (25 Oct 2019 10:43) (95% - 100%)    GENERAL: NAD, well-groomed, well-developed  HEAD:  Atraumatic, Normocephalic  EYES: EOMI, PERRLA, conjunctiva and sclera clear  ENT: Moist mucous membranes,  NECK: Supple, No JVD, no bruits  CHEST/LUNG: Clear to percussion and auscultation bilaterally; No rales, rhonchi, wheezing, or rubs  HEART: Regular rate and rhythm; DWAINE  ABDOMEN: Soft, Nontender, Nondistended; Bowel sounds present  EXTREMITIES:  decreased pulse, cyanotic fingers  SKIN: No rashes or lesions  NERVOUS SYSTEM:  Alert & Oriented X3      TELEMETRY:    sinus    ECG:    < from: 12 Lead ECG (10.24.19 @ 09:15) >    Ventricular Rate 79 BPM    Atrial Rate 79 BPM    P-R Interval 122 ms    QRS Duration 118 ms    Q-T Interval 448 ms    QTC Calculation(Bezet) 513 ms    P Axis 100 degrees    R Axis 68 degrees    T Axis -6 degrees    Diagnosis Line Sinus rhythm with premature atrial complexes with aberrant conduction  Right bundle branch block  Abnormal ECG  When compared with ECG of 21-OCT-2019 12:55,  No significant change was found  Confirmed by THANG HERNANDEZ, RENETTA SPIVEY ([id number]) on 10/25/2019 8:58:04 AM    < end of copied text >      LABS:                        12.1   7.81  )-----------( 197      ( 25 Oct 2019 07:00 )             36.3     10-25    143  |  103  |  25<H>  ----------------------------<  63<L>  3.3<L>   |  29  |  1.18    Ca    8.9      25 Oct 2019 07:00  Mg     1.5     10-24    TPro  6.5  /  Alb  2.8<L>  /  TBili  1.6<H>  /  DBili  x   /  AST  22  /  ALT  12  /  AlkPhos  85  10-24    CARDIAC MARKERS ( 25 Oct 2019 07:00 )  .060 ng/mL / x     / x     / x     / x      CARDIAC MARKERS ( 24 Oct 2019 17:05 )  .067 ng/mL / x     / x     / x     / x      CARDIAC MARKERS ( 24 Oct 2019 12:40 )  .072 ng/mL / x     / x     / x     / x      CARDIAC MARKERS ( 24 Oct 2019 09:50 )  .068 ng/mL / x     / x     / x     / x              I&O's Summary    24 Oct 2019 07:01  -  25 Oct 2019 07:00  --------------------------------------------------------  IN: 320 mL / OUT: 0 mL / NET: 320 mL      BNP    RADIOLOGY & ADDITIONAL STUDIES:    ECHO:
Follow up for  SUBJ:    no cardiac complaints      PMH  Dyslipidemia  Osteoarthritis  Sciatica  Diabetes  HTN (hypertension)  HTN (hypertension)  DM (diabetes mellitus)      MEDICATIONS  (STANDING):  amLODIPine   Tablet 10 milliGRAM(s) Oral daily  aspirin  chewable 81 milliGRAM(s) Oral daily  atorvastatin 80 milliGRAM(s) Oral daily  clopidogrel Tablet 75 milliGRAM(s) Oral daily  dextrose 5%. 1000 milliLiter(s) (50 mL/Hr) IV Continuous <Continuous>  dextrose 50% Injectable 12.5 Gram(s) IV Push once  dextrose 50% Injectable 25 Gram(s) IV Push once  dextrose 50% Injectable 25 Gram(s) IV Push once  docusate sodium 100 milliGRAM(s) Oral three times a day  escitalopram 5 milliGRAM(s) Oral daily  heparin  Injectable 5000 Unit(s) SubCutaneous every 8 hours  insulin lispro (HumaLOG) corrective regimen sliding scale   SubCutaneous three times a day before meals  losartan 25 milliGRAM(s) Oral daily  metoprolol tartrate 12.5 milliGRAM(s) Oral two times a day  oxybutynin 5 milliGRAM(s) Oral three times a day  pantoprazole    Tablet 40 milliGRAM(s) Oral before breakfast  sodium chloride 0.9%. 1000 milliLiter(s) (50 mL/Hr) IV Continuous <Continuous>    MEDICATIONS  (PRN):  dextrose 40% Gel 15 Gram(s) Oral once PRN Blood Glucose LESS THAN 70 milliGRAM(s)/deciliter  glucagon  Injectable 1 milliGRAM(s) IntraMuscular once PRN Glucose LESS THAN 70 milligrams/deciliter        PHYSICAL EXAM:  Vital Signs Last 24 Hrs  T(C): 36.7 (26 Oct 2019 10:01), Max: 36.8 (25 Oct 2019 16:45)  T(F): 98 (26 Oct 2019 10:01), Max: 98.2 (25 Oct 2019 16:45)  HR: 78 (26 Oct 2019 10:01) (71 - 78)  BP: 124/53 (26 Oct 2019 10:01) (121/59 - 147/66)  BP(mean): 88 (25 Oct 2019 21:06) (88 - 88)  RR: 16 (26 Oct 2019 10:01) (16 - 16)  SpO2: 94% (26 Oct 2019 10:01) (94% - 97%)    GENERAL: NAD, well-groomed, well-developed appears stated age better hydration  HEAD:  Atraumatic, Normocephalic  EYES: EOMI, PERRLA, conjunctiva and sclera clear  ENT: Moist mucous membranes,  NECK: Supple, No JVD, no bruits  CHEST/LUNG: Clear to percussion bilaterally; No rales, rhonchi, wheezing, or rubs  HEART: Regular rate and rhythm; No murmurs, rubs, or gallops PMI non displaced.  ABDOMEN: Soft, Nontender, Nondistended; Bowel sounds present  EXTREMITIES:  2+ Peripheral Pulses, No clubbing, cyanosis, or edema  SKIN: No rashes or lesions  NERVOUS SYSTEM:  Cranial Nerves II-XII intact      TELEMETRY:    rsr    ECG:    < from: 12 Lead ECG (10.24.19 @ 09:15) >  Ventricular Rate 79 BPM    Atrial Rate 79 BPM    P-R Interval 122 ms    QRS Duration 118 ms    Q-T Interval 448 ms    QTC Calculation(Bezet) 513 ms    P Axis 100 degrees    R Axis 68 degrees    T Axis -6 degrees    Diagnosis Line Sinus rhythm with premature atrial complexes with aberrant conduction  Right bundle branch block  Abnormal ECG  When compared with ECG of 21-OCT-2019 12:55,  No significant change was found  Confirmed by THANG HERNANDEZ, RENETTA SPIVEY ([id number]) on 10/25/2019 8:58:04 AM    < end of copied text >    ECHO:    LABS:                        14.1   5.94  )-----------( 191      ( 26 Oct 2019 05:55 )             43.1     10-26    140  |  103  |  20  ----------------------------<  115<H>  3.5   |  29  |  1.06    Ca    9.4      26 Oct 2019 05:55  Mg     1.6     10-26      CARDIAC MARKERS ( 25 Oct 2019 07:00 )  .060 ng/mL / x     / x     / x     / x      CARDIAC MARKERS ( 24 Oct 2019 17:05 )  .067 ng/mL / x     / x     / x     / x          I&O's Summary    25 Oct 2019 07:01  -  26 Oct 2019 07:00  --------------------------------------------------------  IN: 220 mL / OUT: 0 mL / NET: 220 mL      BNP    RADIOLOGY & ADDITIONAL STUDIES:    ECHO:
Neurology Progress Note    Subjective & Objective:  Seen for Dr Gould.    Patient has a left visual field cut to confrontation and no toher focal signs on exam.    Today's CT scan reveals an acute right occipital infarct.          total cholesterol 138 mg/dL  HDL 41 mg/dL  LDL 75 mg/dL                   MEDICATIONS  (STANDING):  amLODIPine   Tablet 5 milliGRAM(s) Oral daily  aspirin  chewable 81 milliGRAM(s) Oral daily  atorvastatin 80 milliGRAM(s) Oral daily  dextrose 5%. 1000 milliLiter(s) (50 mL/Hr) IV Continuous <Continuous>  dextrose 50% Injectable 12.5 Gram(s) IV Push once  dextrose 50% Injectable 25 Gram(s) IV Push once  dextrose 50% Injectable 25 Gram(s) IV Push once  docusate sodium 100 milliGRAM(s) Oral three times a day  escitalopram 5 milliGRAM(s) Oral daily  heparin  Injectable 5000 Unit(s) SubCutaneous every 8 hours  insulin glargine Injectable (LANTUS) 10 Unit(s) SubCutaneous at bedtime  insulin lispro (HumaLOG) corrective regimen sliding scale   SubCutaneous three times a day before meals  insulin lispro Injectable (HumaLOG) 3 Unit(s) SubCutaneous before breakfast  insulin lispro Injectable (HumaLOG) 3 Unit(s) SubCutaneous before lunch  insulin lispro Injectable (HumaLOG) 3 Unit(s) SubCutaneous before dinner  metoprolol tartrate 25 milliGRAM(s) Oral every 12 hours  oxybutynin 5 milliGRAM(s) Oral three times a day  pantoprazole    Tablet 40 milliGRAM(s) Oral before breakfast    MEDICATIONS  (PRN):  dextrose 40% Gel 15 Gram(s) Oral once PRN Blood Glucose LESS THAN 70 milliGRAM(s)/deciliter  glucagon  Injectable 1 milliGRAM(s) IntraMuscular once PRN Glucose LESS THAN 70 milligrams/deciliter
Patient is a 89y old  Female who presents with a chief complaint of CVA (25 Oct 2019 11:07)      Patient seen and examined at bedside. No overnight events reported. Pt was upset today, crying and said "I don't want to live anymore". Pt also has poor appetite.     ALLERGIES:  No Known Allergies    MEDICATIONS  (STANDING):  amLODIPine   Tablet 10 milliGRAM(s) Oral daily  aspirin  chewable 81 milliGRAM(s) Oral daily  atorvastatin 80 milliGRAM(s) Oral daily  clopidogrel Tablet 75 milliGRAM(s) Oral daily  dextrose 5%. 1000 milliLiter(s) (50 mL/Hr) IV Continuous <Continuous>  dextrose 50% Injectable 12.5 Gram(s) IV Push once  dextrose 50% Injectable 25 Gram(s) IV Push once  dextrose 50% Injectable 25 Gram(s) IV Push once  docusate sodium 100 milliGRAM(s) Oral three times a day  escitalopram 5 milliGRAM(s) Oral daily  heparin  Injectable 5000 Unit(s) SubCutaneous every 8 hours  insulin lispro (HumaLOG) corrective regimen sliding scale   SubCutaneous three times a day before meals  metoprolol tartrate 25 milliGRAM(s) Oral every 12 hours  oxybutynin 5 milliGRAM(s) Oral three times a day  pantoprazole    Tablet 40 milliGRAM(s) Oral before breakfast  sodium chloride 0.9%. 1000 milliLiter(s) (50 mL/Hr) IV Continuous <Continuous>    MEDICATIONS  (PRN):  dextrose 40% Gel 15 Gram(s) Oral once PRN Blood Glucose LESS THAN 70 milliGRAM(s)/deciliter  glucagon  Injectable 1 milliGRAM(s) IntraMuscular once PRN Glucose LESS THAN 70 milligrams/deciliter    Vital Signs Last 24 Hrs  T(F): 98 (25 Oct 2019 10:43), Max: 98 (24 Oct 2019 20:27)  HR: 67 (25 Oct 2019 10:43) (67 - 72)  BP: 129/54 (25 Oct 2019 10:43) (129/54 - 144/63)  RR: 16 (25 Oct 2019 10:43) (15 - 16)  SpO2: 95% (25 Oct 2019 10:43) (95% - 100%)  I&O's Summary    24 Oct 2019 07:01  -  25 Oct 2019 07:00  --------------------------------------------------------  IN: 320 mL / OUT: 0 mL / NET: 320 mL    GENERAL: NAD  HEAD:  Atraumatic, Normocephalic  EYES:  sclera clear  NECK: Supple  CHEST/LUNG: Clear to auscultation bilaterally; No wheeze  HEART: Regular rate and rhythm; No murmurs, rubs, or gallops  ABDOMEN: Soft, Nontender, Nondistended; Bowel sounds present  EXTREMITIES:  no edema  NEUROLOGY: left monocular vision loss. muscle strength equal bilaterally. sensation intact  SKIN: No rashes or lesions    LABS:                        12.1   7.81  )-----------( 197      ( 25 Oct 2019 07:00 )             36.3     10-25    143  |  103  |  25  ----------------------------<  63  3.3   |  29  |  1.18    Ca    8.9      25 Oct 2019 07:00  Mg     1.5     10-24    TPro  6.5  /  Alb  2.8  /  TBili  1.6  /  DBili  x   /  AST  22  /  ALT  12  /  AlkPhos  85  10-24        eGFR if Non African American: 39 mL/min/1.73M2 (10-25-19 @ 07:00)  eGFR if : 46 mL/min/1.73M2 (10-25-19 @ 07:00)        CARDIAC MARKERS ( 25 Oct 2019 07:00 )  .060 ng/mL / x     / x     / x     / x      CARDIAC MARKERS ( 24 Oct 2019 17:05 )  .067 ng/mL / x     / x     / x     / x      CARDIAC MARKERS ( 24 Oct 2019 12:40 )  .072 ng/mL / x     / x     / x     / x      CARDIAC MARKERS ( 24 Oct 2019 09:50 )  .068 ng/mL / x     / x     / x     / x          10-22 Chol 138 mg/dL LDL 75 mg/dL HDL 41 mg/dL Trig 111 mg/dL      POCT Blood Glucose.: 95 mg/dL (25 Oct 2019 12:14)  POCT Blood Glucose.: 100 mg/dL (25 Oct 2019 08:08)  POCT Blood Glucose.: 58 mg/dL (25 Oct 2019 07:41)  POCT Blood Glucose.: 57 mg/dL (25 Oct 2019 07:40)  POCT Blood Glucose.: 113 mg/dL (24 Oct 2019 21:01)  POCT Blood Glucose.: 132 mg/dL (24 Oct 2019 17:04)    09-13 [id number] 7.3        RADIOLOGY & ADDITIONAL TESTS:    Care Discussed with Consultants/Other Providers:
Patient is a 89y old  Female who presents with a chief complaint of CVA (26 Oct 2019 15:38)      Patient seen and examined at bedside. No overnight events reported. no complaints. Pt ambulated with walker today in the unit.    ALLERGIES:  No Known Allergies    MEDICATIONS  (STANDING):  amLODIPine   Tablet 10 milliGRAM(s) Oral daily  aspirin  chewable 81 milliGRAM(s) Oral daily  atorvastatin 80 milliGRAM(s) Oral daily  clopidogrel Tablet 75 milliGRAM(s) Oral daily  dextrose 5%. 1000 milliLiter(s) (50 mL/Hr) IV Continuous <Continuous>  dextrose 50% Injectable 12.5 Gram(s) IV Push once  dextrose 50% Injectable 25 Gram(s) IV Push once  dextrose 50% Injectable 25 Gram(s) IV Push once  docusate sodium 100 milliGRAM(s) Oral three times a day  escitalopram 5 milliGRAM(s) Oral daily  heparin  Injectable 5000 Unit(s) SubCutaneous every 8 hours  insulin lispro (HumaLOG) corrective regimen sliding scale   SubCutaneous three times a day before meals  losartan 25 milliGRAM(s) Oral daily  magnesium oxide 400 milliGRAM(s) Oral three times a day with meals  metoprolol tartrate 12.5 milliGRAM(s) Oral two times a day  oxybutynin 5 milliGRAM(s) Oral three times a day  pantoprazole    Tablet 40 milliGRAM(s) Oral before breakfast    MEDICATIONS  (PRN):  dextrose 40% Gel 15 Gram(s) Oral once PRN Blood Glucose LESS THAN 70 milliGRAM(s)/deciliter  glucagon  Injectable 1 milliGRAM(s) IntraMuscular once PRN Glucose LESS THAN 70 milligrams/deciliter    Vital Signs Last 24 Hrs  T(F): 98.3 (27 Oct 2019 09:55), Max: 98.3 (27 Oct 2019 09:55)  HR: 64 (27 Oct 2019 09:55) (64 - 85)  BP: 128/60 (27 Oct 2019 09:55) (118/51 - 157/61)  RR: 15 (27 Oct 2019 09:55) (15 - 16)  SpO2: 99% (27 Oct 2019 09:55) (99% - 100%)  I&O's Summary    26 Oct 2019 07:01  -  27 Oct 2019 07:00  --------------------------------------------------------  IN: 720 mL / OUT: 0 mL / NET: 720 mL    27 Oct 2019 07:01  -  27 Oct 2019 14:04  --------------------------------------------------------  IN: 180 mL / OUT: 0 mL / NET: 180 mL          GENERAL: NAD  HEAD:  Atraumatic, Normocephalic  EYES: EOMI, PERRLA, sclera clear  NECK: Supple  CHEST/LUNG: Clear to auscultation bilaterally; No wheeze  HEART: Regular rate and rhythm  ABDOMEN: Soft, Nontender, Nondistended; Bowel sounds present  EXTREMITIES:  no edema  NEUROLOGY: left visual field cut off  SKIN: No rashes or lesions    LABS:                        14.1   5.94  )-----------( 191      ( 26 Oct 2019 05:55 )             43.1     10-26    140  |  103  |  20  ----------------------------<  115  3.5   |  29  |  1.06    Ca    9.4      26 Oct 2019 05:55  Mg     1.6     10-26      eGFR if Non African American: 45 mL/min/1.73M2 (10-26-19 @ 05:55)  eGFR if African American: 52 mL/min/1.73M2 (10-26-19 @ 05:55)      CARDIAC MARKERS ( 25 Oct 2019 07:00 )  .060 ng/mL / x     / x     / x     / x      CARDIAC MARKERS ( 24 Oct 2019 17:05 )  .067 ng/mL / x     / x     / x     / x          10-22 Chol 138 mg/dL LDL 75 mg/dL HDL 41 mg/dL Trig 111 mg/dL              POCT Blood Glucose.: 136 mg/dL (27 Oct 2019 08:11)    09-13 [id number] 7.3        RADIOLOGY & ADDITIONAL TESTS:    Care Discussed with Consultants/Other Providers:
Patient is a 89y old  Female who presents with a chief complaint of chest pain TIA (24 Oct 2019 16:41)  HPI:  89 y/o F with PMHx of CVA (1 yr ago), CKD3, HTN, DM2, HLD, was BIB daughter for sudden onset vision loss and hearing impairment from this morning. Patient also has severe hearing loss, as per history obtained from daughter and family at bedside , she was last seen normal at night , but upon awakening this morning around 10:30 am she started c/o loss of vision of both eyes, with complete loss in left eye and partial loss in right eye , also associated with worsening of hearing impairment. Pt states she sees light but its too blurry to see anything.   She had h/o stroke last year, and was recently in the hospital for a fall, was reevaluated for another stroke with no new changes and stable findings , she underwent acute rehab following that and family states pt was ambulating fine at home with walker. She also has mild headache but denies any loc/ nausea/vomiting/ slurred speech/ weakness /numbness or loss of sensation / vomiting /chest pain/abd pain . Denies GI or  symptoms.   ROS otherwise negative. (21 Oct 2019 16:41)      Patient seen and examined at bedside.  Patient reported chest pain in the morning     ALLERGIES:  No Known Allergies    MEDICATIONS:  amLODIPine   Tablet 10 milliGRAM(s) Oral daily  aspirin  chewable 81 milliGRAM(s) Oral daily  atorvastatin 80 milliGRAM(s) Oral daily  clopidogrel Tablet 75 milliGRAM(s) Oral daily  dextrose 40% Gel 15 Gram(s) Oral once PRN  dextrose 5%. 1000 milliLiter(s) IV Continuous <Continuous>  dextrose 50% Injectable 12.5 Gram(s) IV Push once  dextrose 50% Injectable 25 Gram(s) IV Push once  dextrose 50% Injectable 25 Gram(s) IV Push once  docusate sodium 100 milliGRAM(s) Oral three times a day  escitalopram 5 milliGRAM(s) Oral daily  glucagon  Injectable 1 milliGRAM(s) IntraMuscular once PRN  heparin  Injectable 5000 Unit(s) SubCutaneous every 8 hours  insulin glargine Injectable (LANTUS) 10 Unit(s) SubCutaneous at bedtime  insulin lispro (HumaLOG) corrective regimen sliding scale   SubCutaneous three times a day before meals  insulin lispro Injectable (HumaLOG) 3 Unit(s) SubCutaneous before breakfast  insulin lispro Injectable (HumaLOG) 3 Unit(s) SubCutaneous before lunch  insulin lispro Injectable (HumaLOG) 3 Unit(s) SubCutaneous before dinner  metoprolol tartrate 25 milliGRAM(s) Oral every 12 hours  oxybutynin 5 milliGRAM(s) Oral three times a day  pantoprazole    Tablet 40 milliGRAM(s) Oral before breakfast  sodium chloride 0.9%. 1000 milliLiter(s) IV Continuous <Continuous>    Vital Signs Last 24 Hrs  T(F): 97.8 (24 Oct 2019 15:52), Max: 98.7 (23 Oct 2019 21:00)  HR: 66 (24 Oct 2019 15:52) (66 - 75)  BP: 130/56 (24 Oct 2019 15:52) (115/65 - 166/76)  RR: 16 (24 Oct 2019 15:52) (15 - 16)  SpO2: 100% (24 Oct 2019 15:52) (94% - 100%)  I&O's Summary    24 Oct 2019 07:01  -  24 Oct 2019 17:46  --------------------------------------------------------  IN: 200 mL / OUT: 0 mL / NET: 200 mL        PHYSICAL EXAM:  General: NAD, A/O x 2  ENT: MMM  Neck: Supple, No JVD  Lungs: diminished throughout  Cardio: RRR, S1/S2, 2/6 systolic murmur  Abdomen: Soft, Nontender, Nondistended; Bowel sounds present  Extremities: No cyanosis, No edema    LABS:                        12.1   7.46  )-----------( 181      ( 24 Oct 2019 05:30 )             36.2     10-24    138  |  103  |  21  ----------------------------<  83  3.6   |  26  |  1.14    Ca    8.6      24 Oct 2019 05:30  Mg     1.5     10-24    TPro  6.5  /  Alb  2.8  /  TBili  1.6  /  DBili  x   /  AST  22  /  ALT  12  /  AlkPhos  85  10-24    eGFR if Non African American: 41 mL/min/1.73M2 (10-24-19 @ 05:30)  eGFR if African American: 47 mL/min/1.73M2 (10-24-19 @ 05:30)        CARDIAC MARKERS ( 24 Oct 2019 17:05 )  .067 ng/mL / x     / x     / x     / x      CARDIAC MARKERS ( 24 Oct 2019 12:40 )  .072 ng/mL / x     / x     / x     / x      CARDIAC MARKERS ( 24 Oct 2019 09:50 )  .068 ng/mL / x     / x     / x     / x          10-22 Chol 138 mg/dL LDL 75 mg/dL HDL 41 mg/dL Trig 111 mg/dL              POCT Blood Glucose.: 132 mg/dL (24 Oct 2019 17:04)  POCT Blood Glucose.: 73 mg/dL (24 Oct 2019 11:34)  POCT Blood Glucose.: 84 mg/dL (24 Oct 2019 09:05)  POCT Blood Glucose.: 96 mg/dL (24 Oct 2019 08:32)  POCT Blood Glucose.: 102 mg/dL (23 Oct 2019 21:09)    09-13 [id number] 7.3          RADIOLOGY & ADDITIONAL TESTS:    Care Discussed with Consultants/Other Providers:
Patient is a 89y old  Female who presents with a chief complaint of s/p CVA RT occipital infarct (22 Oct 2019 11:25)  HPI:  89 y/o F with PMHx of CVA (1 yr ago), CKD3, HTN, DM2, HLD, was BIB daughter for sudden onset vision loss and hearing impairment from this morning. Patient also has severe hearing loss, as per history obtained from daughter and family at bedside , she was last seen normal at night , but upon awakening this morning around 10:30 am she started c/o loss of vision of both eyes, with complete loss in left eye and partial loss in right eye , also associated with worsening of hearing impairment. Pt states she sees light but its too blurry to see anything.   She had h/o stroke last year, and was recently in the hospital for a fall, was reevaluated for another stroke with no new changes and stable findings , she underwent acute rehab following that and family states pt was ambulating fine at home with walker. She also has mild headache but denies any loc/ nausea/vomiting/ slurred speech/ weakness /numbness or loss of sensation / vomiting /chest pain/abd pain . Denies GI or  symptoms.   ROS otherwise negative. (21 Oct 2019 16:41)    Patient seen and examined at bedside.  Patient denies any chest pain or shortness of breath.  Reports vision has improved    ALLERGIES:  No Known Allergies    MEDICATIONS:  amLODIPine   Tablet 5 milliGRAM(s) Oral once  aspirin  chewable 81 milliGRAM(s) Oral daily  atorvastatin 80 milliGRAM(s) Oral daily  clopidogrel Tablet 75 milliGRAM(s) Oral daily  dextrose 40% Gel 15 Gram(s) Oral once PRN  dextrose 5%. 1000 milliLiter(s) IV Continuous <Continuous>  dextrose 50% Injectable 12.5 Gram(s) IV Push once  dextrose 50% Injectable 25 Gram(s) IV Push once  dextrose 50% Injectable 25 Gram(s) IV Push once  docusate sodium 100 milliGRAM(s) Oral three times a day  escitalopram 5 milliGRAM(s) Oral daily  glucagon  Injectable 1 milliGRAM(s) IntraMuscular once PRN  heparin  Injectable 5000 Unit(s) SubCutaneous every 8 hours  insulin glargine Injectable (LANTUS) 10 Unit(s) SubCutaneous at bedtime  insulin lispro (HumaLOG) corrective regimen sliding scale   SubCutaneous three times a day before meals  insulin lispro Injectable (HumaLOG) 3 Unit(s) SubCutaneous before breakfast  insulin lispro Injectable (HumaLOG) 3 Unit(s) SubCutaneous before lunch  insulin lispro Injectable (HumaLOG) 3 Unit(s) SubCutaneous before dinner  magnesium sulfate  IVPB 1 Gram(s) IV Intermittent once  metoprolol tartrate 25 milliGRAM(s) Oral every 12 hours  oxybutynin 5 milliGRAM(s) Oral three times a day  pantoprazole    Tablet 40 milliGRAM(s) Oral before breakfast    Vital Signs Last 24 Hrs  T(F): 99.3 (22 Oct 2019 10:28), Max: 99.3 (22 Oct 2019 10:28)  HR: 68 (22 Oct 2019 10:28) (68 - 86)  BP: 169/73 (22 Oct 2019 10:28) (142/68 - 169/73)  RR: 16 (22 Oct 2019 10:28) (16 - 18)  SpO2: 94% (22 Oct 2019 10:28) (94% - 97%)  I&O's Summary    21 Oct 2019 07:01  -  22 Oct 2019 07:00  --------------------------------------------------------  IN: 200 mL / OUT: 0 mL / NET: 200 mL        PHYSICAL EXAM:  General: NAD, A/O x 2  ENT: MMM  Neck: Supple, No JVD  Lungs: Clear to auscultation bilaterally  Cardio: irregulare heart rate,  S1/S2, 2/6 systolic murmur  Abdomen: Soft, Nontender, Nondistended; Bowel sounds present  Extremities: No cyanosis, No edema    LABS:                        13.6   7.54  )-----------( 237      ( 22 Oct 2019 05:40 )             40.6     10-22    140  |  102  |  13  ----------------------------<  119  3.5   |  31  |  0.95    Ca    9.4      22 Oct 2019 05:40  Mg     1.5     10-22      eGFR if Non African American: 51 mL/min/1.73M2 (10-22-19 @ 05:40)  eGFR if : 59 mL/min/1.73M2 (10-22-19 @ 05:40)            10-22 Chol 138 mg/dL LDL 75 mg/dL HDL 41 mg/dL Trig 111 mg/dL              POCT Blood Glucose.: 93 mg/dL (22 Oct 2019 11:49)  POCT Blood Glucose.: 130 mg/dL (22 Oct 2019 07:57)  POCT Blood Glucose.: 133 mg/dL (21 Oct 2019 22:39)  POCT Blood Glucose.: 146 mg/dL (21 Oct 2019 19:53)    09-13 [id number] 7.3          RADIOLOGY & ADDITIONAL TESTS:  < from: CT Head No Cont (10.22.19 @ 10:22) >  IMPRESSION:    Small acute bland right PCA distribution infarct.    < end of copied text >    Care Discussed with Consultants/Other Providers:
Patient is a 89y old  Female who presents with a chief complaint of tia cp (26 Oct 2019 13:43)      Patient seen and examined at bedside. No overnight events reported. No complaints today.    ALLERGIES:  No Known Allergies    MEDICATIONS  (STANDING):  amLODIPine   Tablet 10 milliGRAM(s) Oral daily  aspirin  chewable 81 milliGRAM(s) Oral daily  atorvastatin 80 milliGRAM(s) Oral daily  clopidogrel Tablet 75 milliGRAM(s) Oral daily  dextrose 5%. 1000 milliLiter(s) (50 mL/Hr) IV Continuous <Continuous>  dextrose 50% Injectable 12.5 Gram(s) IV Push once  dextrose 50% Injectable 25 Gram(s) IV Push once  dextrose 50% Injectable 25 Gram(s) IV Push once  docusate sodium 100 milliGRAM(s) Oral three times a day  escitalopram 5 milliGRAM(s) Oral daily  heparin  Injectable 5000 Unit(s) SubCutaneous every 8 hours  insulin lispro (HumaLOG) corrective regimen sliding scale   SubCutaneous three times a day before meals  losartan 25 milliGRAM(s) Oral daily  magnesium oxide 400 milliGRAM(s) Oral three times a day with meals  metoprolol tartrate 12.5 milliGRAM(s) Oral two times a day  oxybutynin 5 milliGRAM(s) Oral three times a day  pantoprazole    Tablet 40 milliGRAM(s) Oral before breakfast  sodium chloride 0.9%. 1000 milliLiter(s) (50 mL/Hr) IV Continuous <Continuous>    MEDICATIONS  (PRN):  dextrose 40% Gel 15 Gram(s) Oral once PRN Blood Glucose LESS THAN 70 milliGRAM(s)/deciliter  glucagon  Injectable 1 milliGRAM(s) IntraMuscular once PRN Glucose LESS THAN 70 milligrams/deciliter    Vital Signs Last 24 Hrs  T(F): 98 (26 Oct 2019 10:01), Max: 98.2 (25 Oct 2019 16:45)  HR: 78 (26 Oct 2019 10:01) (71 - 78)  BP: 124/53 (26 Oct 2019 10:01) (121/59 - 147/66)  RR: 16 (26 Oct 2019 10:01) (16 - 16)  SpO2: 94% (26 Oct 2019 10:01) (94% - 97%)  I&O's Summary    25 Oct 2019 07:01  -  26 Oct 2019 07:00  --------------------------------------------------------  IN: 220 mL / OUT: 0 mL / NET: 220 mL          GENERAL: NAD  HEAD:  Atraumatic, Normocephalic  EYES: sclera clear.   NECK: Supple  CHEST/LUNG: Clear to auscultation bilaterally; No wheeze  HEART: Regular rate and rhythm; No murmurs, rubs, or gallops  ABDOMEN: Soft, Nontender, Nondistended; Bowel sounds present  EXTREMITIES:  no edema. + cyanotic fingertips  NEUROLOGY: left visual field cut off  SKIN: No rashes or lesions    LABS:                        14.1   5.94  )-----------( 191      ( 26 Oct 2019 05:55 )             43.1     10-26    140  |  103  |  20  ----------------------------<  115  3.5   |  29  |  1.06    Ca    9.4      26 Oct 2019 05:55  Mg     1.6     10-26    TPro  6.5  /  Alb  2.8  /  TBili  1.6  /  DBili  x   /  AST  22  /  ALT  12  /  AlkPhos  85  10-24        eGFR if Non African American: 45 mL/min/1.73M2 (10-26-19 @ 05:55)  eGFR if African American: 52 mL/min/1.73M2 (10-26-19 @ 05:55)      CARDIAC MARKERS ( 25 Oct 2019 07:00 )  .060 ng/mL / x     / x     / x     / x      CARDIAC MARKERS ( 24 Oct 2019 17:05 )  .067 ng/mL / x     / x     / x     / x      CARDIAC MARKERS ( 24 Oct 2019 12:40 )  .072 ng/mL / x     / x     / x     / x      CARDIAC MARKERS ( 24 Oct 2019 09:50 )  .068 ng/mL / x     / x     / x     / x          10-22 Chol 138 mg/dL LDL 75 mg/dL HDL 41 mg/dL Trig 111 mg/dL      POCT Blood Glucose.: 149 mg/dL (26 Oct 2019 12:30)  POCT Blood Glucose.: 113 mg/dL (26 Oct 2019 08:52)  POCT Blood Glucose.: 154 mg/dL (25 Oct 2019 21:14)  POCT Blood Glucose.: 163 mg/dL (25 Oct 2019 16:45)    09-13 [id number] 7.3        RADIOLOGY & ADDITIONAL TESTS:    Care Discussed with Consultants/Other Providers:

## 2019-10-27 NOTE — DISCHARGE NOTE NURSING/CASE MANAGEMENT/SOCIAL WORK - PATIENT PORTAL LINK FT
You can access the FollowMyHealth Patient Portal offered by [facility] by registering at the following website: [email or web address]. By joining [employer]’s FollowMyHealth portal, you will also be able to view your health information using other applications (apps) compatible with our system.

## 2019-10-27 NOTE — DISCHARGE NOTE PROVIDER - CARE PROVIDER_API CALL
Armando Feldman)  Family Medicine; Wound Care  [address] [place] [address]  Phone: [phone]  Fax: [phone]  Follow Up Time:

## 2019-10-27 NOTE — PROGRESS NOTE ADULT - PROVIDER SPECIALTY LIST ADULT
Cardiology
Cardiology
Hospitalist
Hospitalist
Internal Medicine
Internal Medicine
Neurology
Hospitalist
Hospitalist

## 2019-10-27 NOTE — DISCHARGE NOTE NURSING/CASE MANAGEMENT/SOCIAL WORK - NSSCTYPOFSERV_GEN_ALL_CORE
Nursing, Physical Therapy, Requested Occupational Therapy and Social work for long term planning and community resources

## 2019-10-27 NOTE — DISCHARGE NOTE PROVIDER - NSDCCPCAREPLAN_GEN_ALL_CORE_FT
PRINCIPAL DISCHARGE DIAGNOSIS  Diagnosis: Acute right PCA stroke  Assessment and Plan of Treatment: please continue to take ASA, plavix and lipitor and follow up with PMD.

## 2019-10-27 NOTE — PROGRESS NOTE ADULT - ASSESSMENT
89 y/o F with PMHx of CVA (1 yr ago), CKD3, HTN, DM2, HLD, was BIB daughter for sudden onset vision loss and hearing impairment from this morning. Patient also has severe hearing loss, as per history obtained from daughter and family at bedside , she was last seen normal at night , but upon awakening she started c/o loss of vision of both eyes, with complete loss in left eye and partial loss in right eye , also associated with worsening of hearing impairment.         # DVT ppx: Heparin s/c    Code status : DNR/DNI    Dispo: Neurologically ready for discharge. DC to home with home PT. Pt declined for JUAN.

## 2019-10-27 NOTE — PROGRESS NOTE ADULT - PROBLEM SELECTOR PLAN 8
decreased metoprolol and added losartan  appreciate cardiology    DISPO: home with home PT vs subacute rehab

## 2019-10-27 NOTE — DISCHARGE NOTE PROVIDER - HOSPITAL COURSE
3 y/o F with PMHx of CVA (1 yr ago), CKD3, HTN, DM2, HLD, was BIB daughter for sudden onset vision loss and hearing impairment. Pt was found to have acute Right PCA CVA.     no events on telemonitoring. Pt evaluated by PT/OT/speech.    Pt ambulated with walker in unit.     Pt is ready for DC to home with home PT.
none

## 2019-10-27 NOTE — PROGRESS NOTE ADULT - PROBLEM SELECTOR PLAN 1
acute R PCA CVA  Appreciate neuro consult  Started on plavix   continue tele monitoring  PT eval  lipid profile done  on aspirin and statin  Echo done - EF-55% moderate aortic stenosis  Carotids US: no hemodynamically significant stenosis  outpatient card follow up with possible loop recorder

## 2019-11-12 PROCEDURE — 85652 RBC SED RATE AUTOMATED: CPT

## 2019-11-12 PROCEDURE — 84484 ASSAY OF TROPONIN QUANT: CPT

## 2019-11-12 PROCEDURE — 71045 X-RAY EXAM CHEST 1 VIEW: CPT

## 2019-11-12 PROCEDURE — 93880 EXTRACRANIAL BILAT STUDY: CPT

## 2019-11-12 PROCEDURE — 93306 TTE W/DOPPLER COMPLETE: CPT

## 2019-11-12 PROCEDURE — 92610 EVALUATE SWALLOWING FUNCTION: CPT

## 2019-11-12 PROCEDURE — 80053 COMPREHEN METABOLIC PANEL: CPT

## 2019-11-12 PROCEDURE — 93005 ELECTROCARDIOGRAM TRACING: CPT

## 2019-11-12 PROCEDURE — 97162 PT EVAL MOD COMPLEX 30 MIN: CPT

## 2019-11-12 PROCEDURE — 82962 GLUCOSE BLOOD TEST: CPT

## 2019-11-12 PROCEDURE — 80061 LIPID PANEL: CPT

## 2019-11-12 PROCEDURE — 36415 COLL VENOUS BLD VENIPUNCTURE: CPT

## 2019-11-12 PROCEDURE — 83735 ASSAY OF MAGNESIUM: CPT

## 2019-11-12 PROCEDURE — 85027 COMPLETE CBC AUTOMATED: CPT

## 2019-11-12 PROCEDURE — 99285 EMERGENCY DEPT VISIT HI MDM: CPT | Mod: 25

## 2019-11-12 PROCEDURE — 80048 BASIC METABOLIC PNL TOTAL CA: CPT

## 2019-11-12 PROCEDURE — 70450 CT HEAD/BRAIN W/O DYE: CPT

## 2019-11-12 PROCEDURE — 97166 OT EVAL MOD COMPLEX 45 MIN: CPT

## 2019-11-12 PROCEDURE — 96374 THER/PROPH/DIAG INJ IV PUSH: CPT

## 2020-03-25 NOTE — PHYSICAL THERAPY INITIAL EVALUATION ADULT - GAIT DISTANCE, PT EVAL
50 feet/2 times(to from bathroom) Cephalexin Pregnancy And Lactation Text: This medication is Pregnancy Category B and considered safe during pregnancy.  It is also excreted in breast milk but can be used safely for shorter doses.

## 2020-07-05 ENCOUNTER — EMERGENCY (EMERGENCY)
Facility: HOSPITAL | Age: 85
LOS: 1 days | Discharge: HOME | End: 2020-07-05
Attending: EMERGENCY MEDICINE
Payer: COMMERCIAL

## 2020-07-05 VITALS
DIASTOLIC BLOOD PRESSURE: 76 MMHG | SYSTOLIC BLOOD PRESSURE: 160 MMHG | RESPIRATION RATE: 16 BRPM | HEART RATE: 97 BPM | OXYGEN SATURATION: 97 % | TEMPERATURE: 98 F

## 2020-07-05 DIAGNOSIS — Z96.643 PRESENCE OF ARTIFICIAL HIP JOINT, BILATERAL: Chronic | ICD-10-CM

## 2020-07-05 PROCEDURE — 12011 RPR F/E/E/N/L/M 2.5 CM/<: CPT

## 2020-07-05 PROCEDURE — 99284 EMERGENCY DEPT VISIT MOD MDM: CPT | Mod: 25

## 2020-07-05 PROCEDURE — 70450 CT HEAD/BRAIN W/O DYE: CPT

## 2020-07-05 PROCEDURE — 70450 CT HEAD/BRAIN W/O DYE: CPT | Mod: 26

## 2020-07-05 PROCEDURE — 90471 IMMUNIZATION ADMIN: CPT

## 2020-07-05 PROCEDURE — 72125 CT NECK SPINE W/O DYE: CPT

## 2020-07-05 PROCEDURE — 72125 CT NECK SPINE W/O DYE: CPT | Mod: 26

## 2020-07-05 RX ORDER — TETANUS TOXOID, REDUCED DIPHTHERIA TOXOID AND ACELLULAR PERTUSSIS VACCINE, ADSORBED 5; 2.5; 8; 8; 2.5 [IU]/.5ML; [IU]/.5ML; UG/.5ML; UG/.5ML; UG/.5ML
0.5 SUSPENSION INTRAMUSCULAR ONCE
Refills: 0 | Status: COMPLETED | OUTPATIENT
Start: 2020-07-05 | End: 2020-07-05

## 2020-07-05 NOTE — ED PROVIDER NOTE - PHYSICAL EXAMINATION
Head: atraumatic, normacephalic  Face: 2.5cm lac to bridge of nose no bleeding no crepitus no orbiral/maxillary/mandibular ttp no ttp to nasal bone no septal hematoma  throat: uvula midline no exudates  eyes: perrla eomi  heart: rrr s1s2  lungs: ctab  abd: soft, nt nd +bs no rebound/guarding no cva ttp  skin: warm  pelvis stable no ttp to hips  LE: no swelling, no calf ttp  back: no midline cervical/thoracic/lumbar ttp

## 2020-07-05 NOTE — ED PROVIDER NOTE - NS ED ROS FT
Denies f/c/n/v/cp/sob/palpitations/cough/rash/headache/dizziness/abd.pain/d/c/dysuria/hematuria  +mechanical fall

## 2020-07-05 NOTE — PROVIDER CONTACT NOTE (OTHER) - BACKGROUND
Pt. daughter present at bedside to provide social and functional hx. pt. left on stretcher in ED with rails present and all need sin reach. Discussed with Dr. Lazaro.

## 2020-07-05 NOTE — PHYSICAL THERAPY INITIAL EVALUATION ADULT - ADDITIONAL COMMENTS
As per daughter pt. with "difficulty speaking" due to hx of CVA. Pt. daughter reports pt. lives with her. 1 RASHAWN with 2 grab bars and no stairs inside. Pt. was independent with bed mobility and modified independent with RW with sit to stand prior to admission. Supervision with RW for ambulation household distances. Minimal assist with RW or 2 grab bars for 1 RASHAWN home. Pt. has RW, shower cgair, grab bars in shower, and W/C. Daughter her 24/7 with pt. and is caregiver.

## 2020-07-05 NOTE — ED ADULT TRIAGE NOTE - CHIEF COMPLAINT QUOTE
Pt BIBA awake and alert, per ems at her baseline mental status secondary to having 2 previous strokes. Pt tripped and fell over her rolling walker. Denies anticoagulation. Pt on ASA 81mg. Pt hit head, denies LOC. MD Lazaro called to bedside.

## 2020-07-05 NOTE — PROVIDER CONTACT NOTE (OTHER) - ASSESSMENT
Pt. educated on use of CB for all needs and transfers with assist from staff; pt. verbalized understanding. PT will not folow - pt. at functional baseline. Pre and post session pain: 0/10.

## 2020-07-05 NOTE — PHYSICAL THERAPY INITIAL EVALUATION ADULT - PERTINENT HX OF CURRENT PROBLEM, REHAB EVAL
Pt BIBA awake and alert, per ems at her baseline mental status secondary to having 2 previous strokes. Pt tripped and fell over her rolling walker.

## 2020-07-05 NOTE — ED PROVIDER NOTE - PATIENT PORTAL LINK FT
You can access the FollowMyHealth Patient Portal offered by [facility] by registering at the following website: [email or web address]. By joining [employer]’s FollowMyHealth portal, you will also be able to view your health information using other applications (apps) compatible with our system.

## 2020-07-05 NOTE — PHYSICAL THERAPY INITIAL EVALUATION ADULT - MANUAL MUSCLE TESTING RESULTS, REHAB EVAL
no strength deficits were identified/grossly 3+/5 thrhoughouit with exception to shoulder flexion 2/5

## 2020-07-05 NOTE — ED PROVIDER NOTE - OBJECTIVE STATEMENT
89yo F 89yo F hx of cva walks with a walker at baseline, htn presents with head trauma nose laceration sp mechanical fall at home earlier today. notes that sometimes looses balance (baseline). Denies f/c/n/v/cp/sob/palpitations/ cough/rash/headache/dizziness/abd.pain/d/c/dysuria/hematuria prior to fall or currently. no pain anywhere. no sick contacts no recent travel

## 2020-07-05 NOTE — ED ADULT NURSE NOTE - OBJECTIVE STATEMENT
Pt is here with c/o mechanical fall.  P/s she tripped over her walker.  States she loses her balance once in awhile d/t previous stroke.  Pt denies dizziness, denies blood thinners and LOC.  Daughter at bedside.

## 2020-12-21 NOTE — SWALLOW BEDSIDE ASSESSMENT ADULT - NS ASR SWALLOW FINDINGS DISCUS
Addended by: Aric Huynh on: 12/21/2020 11:17 AM     Modules accepted: Orders Nursing/Physician/IVANA Larson/Patient/Family

## 2021-01-01 NOTE — PHYSICAL THERAPY INITIAL EVALUATION ADULT - NAME OF CLINICIAN
Baby Neena GODINEZ and Dr. Lazaro

## 2021-05-14 NOTE — ED PROVIDER NOTE - CROS ED MUSC ALL NEG
Prep Text: The patient's skin was cleaned and prepped. Indication: skin texture Facial Steaming: steamed Consent: Written consent obtained, risks reviewed including but not limited to crusting, scabbing, blistering, scarring, darker or lighter pigmentary change, bruising, and/or incomplete response. Extraction Method: extractor Number Of Passes: 4 Treatment Number: 1 Prefacial Cleansing: The face was washed with a cleanser. Vacuum Pressure: 10 Detail Level: Zone Post-Care Instructions: I reviewed with the patient in detail post-care instructions. Patient should stay away from the sun and wear sun protection until treated areas are fully healed. - - -

## 2021-11-13 ENCOUNTER — INPATIENT (INPATIENT)
Facility: HOSPITAL | Age: 86
LOS: 6 days | Discharge: HOME | DRG: 871 | End: 2021-11-20
Attending: SURGERY | Admitting: SURGERY
Payer: COMMERCIAL

## 2021-11-13 VITALS
WEIGHT: 110.01 LBS | RESPIRATION RATE: 20 BRPM | SYSTOLIC BLOOD PRESSURE: 156 MMHG | HEIGHT: 62 IN | HEART RATE: 91 BPM | DIASTOLIC BLOOD PRESSURE: 74 MMHG | OXYGEN SATURATION: 95 % | TEMPERATURE: 98 F

## 2021-11-13 DIAGNOSIS — Z96.643 PRESENCE OF ARTIFICIAL HIP JOINT, BILATERAL: Chronic | ICD-10-CM

## 2021-11-13 PROCEDURE — 99285 EMERGENCY DEPT VISIT HI MDM: CPT

## 2021-11-13 PROCEDURE — 93010 ELECTROCARDIOGRAM REPORT: CPT

## 2021-11-13 NOTE — ED ADULT TRIAGE NOTE - CHIEF COMPLAINT QUOTE
Pt awake and alert, pt has garbled speech from prior CVA's, Daughter states woke up yelling in pain in stomach, says from neck to pelvis is burning pain.

## 2021-11-14 DIAGNOSIS — K85.10 BILIARY ACUTE PANCREATITIS WITHOUT NECROSIS OR INFECTION: ICD-10-CM

## 2021-11-14 LAB
ALBUMIN SERPL ELPH-MCNC: 3.8 G/DL — SIGNIFICANT CHANGE UP (ref 3.3–5.2)
ALBUMIN SERPL ELPH-MCNC: 4.4 G/DL — SIGNIFICANT CHANGE UP (ref 3.3–5.2)
ALP SERPL-CCNC: 222 U/L — HIGH (ref 40–120)
ALP SERPL-CCNC: 228 U/L — HIGH (ref 40–120)
ALT FLD-CCNC: 258 U/L — HIGH
ALT FLD-CCNC: 433 U/L — HIGH
ANION GAP SERPL CALC-SCNC: 14 MMOL/L — SIGNIFICANT CHANGE UP (ref 5–17)
ANION GAP SERPL CALC-SCNC: 15 MMOL/L — SIGNIFICANT CHANGE UP (ref 5–17)
ANISOCYTOSIS BLD QL: SLIGHT — SIGNIFICANT CHANGE UP
APTT BLD: 27.2 SEC — LOW (ref 27.5–35.5)
AST SERPL-CCNC: 680 U/L — HIGH
AST SERPL-CCNC: 821 U/L — HIGH
BASOPHILS # BLD AUTO: 0.11 K/UL — SIGNIFICANT CHANGE UP (ref 0–0.2)
BASOPHILS NFR BLD AUTO: 0.8 % — SIGNIFICANT CHANGE UP (ref 0–2)
BILIRUB DIRECT SERPL-MCNC: 2.6 MG/DL — HIGH (ref 0–0.3)
BILIRUB INDIRECT FLD-MCNC: 1.5 MG/DL — HIGH (ref 0.2–1)
BILIRUB SERPL-MCNC: 3.7 MG/DL — HIGH (ref 0.4–2)
BILIRUB SERPL-MCNC: 4.1 MG/DL — HIGH (ref 0.4–2)
BILIRUB SERPL-MCNC: 4.1 MG/DL — HIGH (ref 0.4–2)
BLD GP AB SCN SERPL QL: SIGNIFICANT CHANGE UP
BUN SERPL-MCNC: 22.8 MG/DL — HIGH (ref 8–20)
BUN SERPL-MCNC: 26.1 MG/DL — HIGH (ref 8–20)
BURR CELLS BLD QL SMEAR: PRESENT — SIGNIFICANT CHANGE UP
CALCIUM SERPL-MCNC: 8.7 MG/DL — SIGNIFICANT CHANGE UP (ref 8.6–10.2)
CALCIUM SERPL-MCNC: 9.4 MG/DL — SIGNIFICANT CHANGE UP (ref 8.6–10.2)
CHLORIDE SERPL-SCNC: 100 MMOL/L — SIGNIFICANT CHANGE UP (ref 98–107)
CHLORIDE SERPL-SCNC: 99 MMOL/L — SIGNIFICANT CHANGE UP (ref 98–107)
CO2 SERPL-SCNC: 25 MMOL/L — SIGNIFICANT CHANGE UP (ref 22–29)
CO2 SERPL-SCNC: 25 MMOL/L — SIGNIFICANT CHANGE UP (ref 22–29)
CREAT SERPL-MCNC: 0.94 MG/DL — SIGNIFICANT CHANGE UP (ref 0.5–1.3)
CREAT SERPL-MCNC: 0.98 MG/DL — SIGNIFICANT CHANGE UP (ref 0.5–1.3)
EOSINOPHIL # BLD AUTO: 0 K/UL — SIGNIFICANT CHANGE UP (ref 0–0.5)
EOSINOPHIL NFR BLD AUTO: 0 % — SIGNIFICANT CHANGE UP (ref 0–6)
FLUAV AG NPH QL: SIGNIFICANT CHANGE UP
FLUBV AG NPH QL: SIGNIFICANT CHANGE UP
GIANT PLATELETS BLD QL SMEAR: PRESENT — SIGNIFICANT CHANGE UP
GLUCOSE BLDC GLUCOMTR-MCNC: 109 MG/DL — HIGH (ref 70–99)
GLUCOSE BLDC GLUCOMTR-MCNC: 147 MG/DL — HIGH (ref 70–99)
GLUCOSE BLDC GLUCOMTR-MCNC: 189 MG/DL — HIGH (ref 70–99)
GLUCOSE BLDC GLUCOMTR-MCNC: 99 MG/DL — SIGNIFICANT CHANGE UP (ref 70–99)
GLUCOSE SERPL-MCNC: 115 MG/DL — HIGH (ref 70–99)
GLUCOSE SERPL-MCNC: 185 MG/DL — HIGH (ref 70–99)
HCT VFR BLD CALC: 39.3 % — SIGNIFICANT CHANGE UP (ref 34.5–45)
HGB BLD-MCNC: 13.3 G/DL — SIGNIFICANT CHANGE UP (ref 11.5–15.5)
HIV 1 & 2 AB SERPL IA.RAPID: SIGNIFICANT CHANGE UP
INR BLD: 1.12 RATIO — SIGNIFICANT CHANGE UP (ref 0.88–1.16)
LACTATE BLDV-MCNC: 1.9 MMOL/L — SIGNIFICANT CHANGE UP (ref 0.5–2)
LIDOCAIN IGE QN: >3000 U/L — HIGH (ref 22–51)
LYMPHOCYTES # BLD AUTO: 0.13 K/UL — LOW (ref 1–3.3)
LYMPHOCYTES # BLD AUTO: 0.9 % — LOW (ref 13–44)
MACROCYTES BLD QL: SLIGHT — SIGNIFICANT CHANGE UP
MANUAL SMEAR VERIFICATION: SIGNIFICANT CHANGE UP
MCHC RBC-ENTMCNC: 30.7 PG — SIGNIFICANT CHANGE UP (ref 27–34)
MCHC RBC-ENTMCNC: 33.8 GM/DL — SIGNIFICANT CHANGE UP (ref 32–36)
MCV RBC AUTO: 90.8 FL — SIGNIFICANT CHANGE UP (ref 80–100)
MONOCYTES # BLD AUTO: 0.87 K/UL — SIGNIFICANT CHANGE UP (ref 0–0.9)
MONOCYTES NFR BLD AUTO: 6.1 % — SIGNIFICANT CHANGE UP (ref 2–14)
NEUTROPHILS # BLD AUTO: 13.07 K/UL — HIGH (ref 1.8–7.4)
NEUTROPHILS NFR BLD AUTO: 91.3 % — HIGH (ref 43–77)
OVALOCYTES BLD QL SMEAR: SLIGHT — SIGNIFICANT CHANGE UP
PLAT MORPH BLD: NORMAL — SIGNIFICANT CHANGE UP
PLATELET # BLD AUTO: 189 K/UL — SIGNIFICANT CHANGE UP (ref 150–400)
POIKILOCYTOSIS BLD QL AUTO: SLIGHT — SIGNIFICANT CHANGE UP
POLYCHROMASIA BLD QL SMEAR: SLIGHT — SIGNIFICANT CHANGE UP
POTASSIUM SERPL-MCNC: 3.7 MMOL/L — SIGNIFICANT CHANGE UP (ref 3.5–5.3)
POTASSIUM SERPL-MCNC: 3.9 MMOL/L — SIGNIFICANT CHANGE UP (ref 3.5–5.3)
POTASSIUM SERPL-SCNC: 3.7 MMOL/L — SIGNIFICANT CHANGE UP (ref 3.5–5.3)
POTASSIUM SERPL-SCNC: 3.9 MMOL/L — SIGNIFICANT CHANGE UP (ref 3.5–5.3)
PROT SERPL-MCNC: 6.7 G/DL — SIGNIFICANT CHANGE UP (ref 6.6–8.7)
PROT SERPL-MCNC: 7.5 G/DL — SIGNIFICANT CHANGE UP (ref 6.6–8.7)
PROTHROM AB SERPL-ACNC: 12.9 SEC — SIGNIFICANT CHANGE UP (ref 10.6–13.6)
RBC # BLD: 4.33 M/UL — SIGNIFICANT CHANGE UP (ref 3.8–5.2)
RBC # FLD: 13.7 % — SIGNIFICANT CHANGE UP (ref 10.3–14.5)
RBC BLD AUTO: ABNORMAL
RSV RNA NPH QL NAA+NON-PROBE: SIGNIFICANT CHANGE UP
SARS-COV-2 RNA SPEC QL NAA+PROBE: SIGNIFICANT CHANGE UP
SCHISTOCYTES BLD QL AUTO: SLIGHT — SIGNIFICANT CHANGE UP
SODIUM SERPL-SCNC: 139 MMOL/L — SIGNIFICANT CHANGE UP (ref 135–145)
SODIUM SERPL-SCNC: 139 MMOL/L — SIGNIFICANT CHANGE UP (ref 135–145)
TROPONIN T SERPL-MCNC: 0.05 NG/ML — SIGNIFICANT CHANGE UP (ref 0–0.06)
VARIANT LYMPHS # BLD: 0.9 % — SIGNIFICANT CHANGE UP (ref 0–6)
WBC # BLD: 14.32 K/UL — HIGH (ref 3.8–10.5)
WBC # FLD AUTO: 14.32 K/UL — HIGH (ref 3.8–10.5)

## 2021-11-14 PROCEDURE — 99223 1ST HOSP IP/OBS HIGH 75: CPT

## 2021-11-14 PROCEDURE — 71045 X-RAY EXAM CHEST 1 VIEW: CPT | Mod: 26

## 2021-11-14 PROCEDURE — G1004: CPT

## 2021-11-14 PROCEDURE — 73502 X-RAY EXAM HIP UNI 2-3 VIEWS: CPT | Mod: 26,RT

## 2021-11-14 PROCEDURE — 76705 ECHO EXAM OF ABDOMEN: CPT | Mod: 26,RT

## 2021-11-14 PROCEDURE — 73552 X-RAY EXAM OF FEMUR 2/>: CPT | Mod: 26,RT

## 2021-11-14 PROCEDURE — 74177 CT ABD & PELVIS W/CONTRAST: CPT | Mod: 26,MG

## 2021-11-14 PROCEDURE — 99221 1ST HOSP IP/OBS SF/LOW 40: CPT | Mod: GC

## 2021-11-14 PROCEDURE — 93010 ELECTROCARDIOGRAM REPORT: CPT

## 2021-11-14 RX ORDER — ATORVASTATIN CALCIUM 80 MG/1
40 TABLET, FILM COATED ORAL AT BEDTIME
Refills: 0 | Status: DISCONTINUED | OUTPATIENT
Start: 2021-11-14 | End: 2021-11-20

## 2021-11-14 RX ORDER — DEXTROSE 50 % IN WATER 50 %
12.5 SYRINGE (ML) INTRAVENOUS ONCE
Refills: 0 | Status: DISCONTINUED | OUTPATIENT
Start: 2021-11-14 | End: 2021-11-20

## 2021-11-14 RX ORDER — SODIUM CHLORIDE 9 MG/ML
1000 INJECTION, SOLUTION INTRAVENOUS
Refills: 0 | Status: DISCONTINUED | OUTPATIENT
Start: 2021-11-14 | End: 2021-11-15

## 2021-11-14 RX ORDER — INSULIN LISPRO 100/ML
VIAL (ML) SUBCUTANEOUS EVERY 6 HOURS
Refills: 0 | Status: DISCONTINUED | OUTPATIENT
Start: 2021-11-14 | End: 2021-11-18

## 2021-11-14 RX ORDER — METOPROLOL TARTRATE 50 MG
12.5 TABLET ORAL EVERY 12 HOURS
Refills: 0 | Status: DISCONTINUED | OUTPATIENT
Start: 2021-11-14 | End: 2021-11-20

## 2021-11-14 RX ORDER — OXYBUTYNIN CHLORIDE 5 MG
5 TABLET ORAL EVERY 8 HOURS
Refills: 0 | Status: DISCONTINUED | OUTPATIENT
Start: 2021-11-14 | End: 2021-11-20

## 2021-11-14 RX ORDER — PIPERACILLIN AND TAZOBACTAM 4; .5 G/20ML; G/20ML
3.38 INJECTION, POWDER, LYOPHILIZED, FOR SOLUTION INTRAVENOUS ONCE
Refills: 0 | Status: DISCONTINUED | OUTPATIENT
Start: 2021-11-14 | End: 2021-11-14

## 2021-11-14 RX ORDER — ACETAMINOPHEN 500 MG
650 TABLET ORAL ONCE
Refills: 0 | Status: COMPLETED | OUTPATIENT
Start: 2021-11-14 | End: 2021-11-14

## 2021-11-14 RX ORDER — ONDANSETRON 8 MG/1
4 TABLET, FILM COATED ORAL ONCE
Refills: 0 | Status: COMPLETED | OUTPATIENT
Start: 2021-11-14 | End: 2021-11-14

## 2021-11-14 RX ORDER — ACETAMINOPHEN 500 MG
750 TABLET ORAL ONCE
Refills: 0 | Status: COMPLETED | OUTPATIENT
Start: 2021-11-14 | End: 2021-11-15

## 2021-11-14 RX ORDER — METOPROLOL TARTRATE 50 MG
25 TABLET ORAL DAILY
Refills: 0 | Status: DISCONTINUED | OUTPATIENT
Start: 2021-11-14 | End: 2021-11-14

## 2021-11-14 RX ORDER — PIPERACILLIN AND TAZOBACTAM 4; .5 G/20ML; G/20ML
3.38 INJECTION, POWDER, LYOPHILIZED, FOR SOLUTION INTRAVENOUS ONCE
Refills: 0 | Status: COMPLETED | OUTPATIENT
Start: 2021-11-14 | End: 2021-11-14

## 2021-11-14 RX ORDER — ACETAMINOPHEN 500 MG
1000 TABLET ORAL ONCE
Refills: 0 | Status: DISCONTINUED | OUTPATIENT
Start: 2021-11-14 | End: 2021-11-14

## 2021-11-14 RX ORDER — SODIUM CHLORIDE 9 MG/ML
2000 INJECTION, SOLUTION INTRAVENOUS ONCE
Refills: 0 | Status: COMPLETED | OUTPATIENT
Start: 2021-11-14 | End: 2021-11-14

## 2021-11-14 RX ORDER — PANTOPRAZOLE SODIUM 20 MG/1
40 TABLET, DELAYED RELEASE ORAL DAILY
Refills: 0 | Status: DISCONTINUED | OUTPATIENT
Start: 2021-11-14 | End: 2021-11-20

## 2021-11-14 RX ORDER — PIPERACILLIN AND TAZOBACTAM 4; .5 G/20ML; G/20ML
3.38 INJECTION, POWDER, LYOPHILIZED, FOR SOLUTION INTRAVENOUS EVERY 8 HOURS
Refills: 0 | Status: DISCONTINUED | OUTPATIENT
Start: 2021-11-14 | End: 2021-11-17

## 2021-11-14 RX ORDER — GLUCAGON INJECTION, SOLUTION 0.5 MG/.1ML
1 INJECTION, SOLUTION SUBCUTANEOUS ONCE
Refills: 0 | Status: DISCONTINUED | OUTPATIENT
Start: 2021-11-14 | End: 2021-11-20

## 2021-11-14 RX ORDER — INFLUENZA VIRUS VACCINE 15; 15; 15; 15 UG/.5ML; UG/.5ML; UG/.5ML; UG/.5ML
0.7 SUSPENSION INTRAMUSCULAR ONCE
Refills: 0 | Status: DISCONTINUED | OUTPATIENT
Start: 2021-11-14 | End: 2021-11-20

## 2021-11-14 RX ORDER — ENOXAPARIN SODIUM 100 MG/ML
40 INJECTION SUBCUTANEOUS AT BEDTIME
Refills: 0 | Status: DISCONTINUED | OUTPATIENT
Start: 2021-11-14 | End: 2021-11-14

## 2021-11-14 RX ORDER — ESCITALOPRAM OXALATE 10 MG/1
5 TABLET, FILM COATED ORAL DAILY
Refills: 0 | Status: DISCONTINUED | OUTPATIENT
Start: 2021-11-14 | End: 2021-11-20

## 2021-11-14 RX ORDER — DEXTROSE 50 % IN WATER 50 %
25 SYRINGE (ML) INTRAVENOUS ONCE
Refills: 0 | Status: DISCONTINUED | OUTPATIENT
Start: 2021-11-14 | End: 2021-11-20

## 2021-11-14 RX ORDER — SODIUM CHLORIDE 9 MG/ML
500 INJECTION, SOLUTION INTRAVENOUS ONCE
Refills: 0 | Status: DISCONTINUED | OUTPATIENT
Start: 2021-11-14 | End: 2021-11-14

## 2021-11-14 RX ORDER — DEXTROSE 50 % IN WATER 50 %
15 SYRINGE (ML) INTRAVENOUS ONCE
Refills: 0 | Status: DISCONTINUED | OUTPATIENT
Start: 2021-11-14 | End: 2021-11-20

## 2021-11-14 RX ORDER — ENOXAPARIN SODIUM 100 MG/ML
30 INJECTION SUBCUTANEOUS DAILY
Refills: 0 | Status: DISCONTINUED | OUTPATIENT
Start: 2021-11-14 | End: 2021-11-20

## 2021-11-14 RX ADMIN — ONDANSETRON 4 MILLIGRAM(S): 8 TABLET, FILM COATED ORAL at 01:59

## 2021-11-14 RX ADMIN — Medication 12.5 MILLIGRAM(S): at 18:11

## 2021-11-14 RX ADMIN — ATORVASTATIN CALCIUM 40 MILLIGRAM(S): 80 TABLET, FILM COATED ORAL at 21:27

## 2021-11-14 RX ADMIN — PIPERACILLIN AND TAZOBACTAM 25 GRAM(S): 4; .5 INJECTION, POWDER, LYOPHILIZED, FOR SOLUTION INTRAVENOUS at 14:06

## 2021-11-14 RX ADMIN — SODIUM CHLORIDE 3428.57 MILLILITER(S): 9 INJECTION, SOLUTION INTRAVENOUS at 05:46

## 2021-11-14 RX ADMIN — PIPERACILLIN AND TAZOBACTAM 25 GRAM(S): 4; .5 INJECTION, POWDER, LYOPHILIZED, FOR SOLUTION INTRAVENOUS at 21:27

## 2021-11-14 RX ADMIN — SODIUM CHLORIDE 100 MILLILITER(S): 9 INJECTION, SOLUTION INTRAVENOUS at 18:11

## 2021-11-14 RX ADMIN — Medication 5 MILLIGRAM(S): at 06:42

## 2021-11-14 RX ADMIN — PIPERACILLIN AND TAZOBACTAM 200 GRAM(S): 4; .5 INJECTION, POWDER, LYOPHILIZED, FOR SOLUTION INTRAVENOUS at 05:46

## 2021-11-14 RX ADMIN — ENOXAPARIN SODIUM 30 MILLIGRAM(S): 100 INJECTION SUBCUTANEOUS at 11:02

## 2021-11-14 RX ADMIN — SODIUM CHLORIDE 100 MILLILITER(S): 9 INJECTION, SOLUTION INTRAVENOUS at 07:07

## 2021-11-14 RX ADMIN — Medication 2: at 10:16

## 2021-11-14 RX ADMIN — Medication 650 MILLIGRAM(S): at 01:58

## 2021-11-14 RX ADMIN — PANTOPRAZOLE SODIUM 40 MILLIGRAM(S): 20 TABLET, DELAYED RELEASE ORAL at 11:01

## 2021-11-14 RX ADMIN — Medication 5 MILLIGRAM(S): at 21:27

## 2021-11-14 RX ADMIN — Medication 650 MILLIGRAM(S): at 02:12

## 2021-11-14 RX ADMIN — ESCITALOPRAM OXALATE 5 MILLIGRAM(S): 10 TABLET, FILM COATED ORAL at 11:02

## 2021-11-14 RX ADMIN — Medication 5 MILLIGRAM(S): at 15:52

## 2021-11-14 NOTE — H&P ADULT - HISTORY OF PRESENT ILLNESS
Patient is a 89yo F with PMH of multiple stroke with speech deficit, HTN, HLD, DM (off medications), OA presenting to the ED with 7hr-history of severe abdominal pain. Patient accompanied with daughter who provided part of history, reports sudden onset abdominal pain in epigastrium that became generalized with associated nausea but no emesis. Daughter reports that they were in a family gathering during the day, in which patient did not have any complaints, after the return home patient went straight to sleep. Around 10:30PM, daughter hears her mother screaming due to pain and was brought to the hospital. Patient complains of epigastric pain and nausea, no episode of emesis. No previous episodes. Patient usually has low appetite and usually has light meal supplemented with ensures. Denies fevers, chills, chest pain, SOB, emesis, constipation or diarrhea. Of note, patient has history a frequently falls last time was last week and has bruise in her back.

## 2021-11-14 NOTE — ED PROVIDER NOTE - EKG ADDITIONAL QUESTION - PERFORMED INDEPENDENT VISUALIZATION
What to do after your steroid shot:    · Protect the injection area for a day or two. If the shot was in your knee or hip, refrain from excessive walking/exercising. If it was in your shoulder or elbow, do not lift heavy objects.   · Apply ice to the injection site as needed to relieve pain  · Gentle return to activity such as walking and stretching is recommended after this brief period of rest.    Side effects:  · Lightening of the skin can occur at the injection site, particularly if you have darker skin  · Redness and a feeling of warmth of the chest and face immediately after the shot  · Increase in blood glucose (sugar) levels if you have diabetes    Results:  This depends on the reason for the treatment. The shots can cause a temporary flare in pain and inflammation for up to 48 hours after the injection. After that, your pain and inflammation of the affected area should decrease and this effect can last up to several months. If you have increasing pain, redness and swelling that last more than 48 hours, please call the office.    If the steroid injection wears off, it may be repeated in 3-4 months.     
Yes

## 2021-11-14 NOTE — H&P ADULT - NSHPLABSRESULTS_GEN_ALL_CORE
LABS:                        13.3   14.32 )-----------( 189      ( 14 Nov 2021 01:57 )             39.3     11-14    139  |  99  |  26.1<H>  ----------------------------<  185<H>  3.7   |  25.0  |  0.98    Ca    9.4      14 Nov 2021 01:57    TPro  7.5  /  Alb  4.4  /  TBili  3.7<H>  /  DBili  x   /  AST  680<H>  /  ALT  258<H>  /  AlkPhos  228<H>  11-14       PT/INR - ( 14 Nov 2021 01:57 )   PT: 12.9 sec;   INR: 1.12 ratio         PTT - ( 14 Nov 2021 01:57 )  PTT:27.2 sec    CARDIAC MARKERS ( 14 Nov 2021 01:57 )  x     / 0.05 ng/mL / x     / x     / x          IMAGING:    US Abdomen Upper Quadrant Right (11.14.21 @ 04:26)    FINDINGS:    Liver: 14.5 cm.  Within normal limits.  Bile ducts: Normal caliber. Common bile duct measures 5 mm.  Gallbladder: Multiple dependent mobile gallstones layering dependently.  Mild gallbladder wall thickening.  Trace pericholecystic fluid.  Sonographic Francis sign was negative.  Pancreas: Visualized portions are within normal limits.  Right kidney: 9.8 cm. No hydronephrosis. A right upper pole cyst measures 10 x 6 x 9 mm.  Ascites: None.  IVC: Visualized portions are within normal limits.    IMPRESSION:    Cholelithiasis with mild gallbladder wall thickening and trace pericholecystic fluid.    Sonographic Francis sign negative.    The findings are equivocal for acute cholecystitis.      CT Abdomen and Pelvis w/ IV Cont (11.14.21 @ 02:57)    FINDINGS:  LOWER CHEST: Subsegmental atelectasis at the lung bases.    LIVER: Within normal limits.  BILE DUCTS: Normal caliber.  GALLBLADDER: Multiple gallstones in the gallbladder. Gallbladder is distended with pericholecystic fluid.  SPLEEN: Within normal limits.  PANCREAS: Within normal limits.  ADRENALS: Within normal limits.  KIDNEYS/URETERS: Within normal limits.    BLADDER: Within normal limits.  REPRODUCTIVE ORGANS: Uterus and adnexa within normal limits.    BOWEL: No bowel obstruction. Appendix is normal. There is diverticulosis without evidence of diverticulitis.  PERITONEUM: No ascites.  VESSELS: Atherosclerotic changes.  RETROPERITONEUM/LYMPH NODES: No lymphadenopathy.  ABDOMINAL WALL: Within normal limits.  BONES: Degenerative changes. Status post bilateral hip arthroplasties.    IMPRESSION:  Multiple gallstones in the gallbladder with gallbladder distention and pericholecystic fluid, findings suspicious for acute cholecystitis. Recommend right upper quadrant ultrasound and HIDA scan for further evaluation.

## 2021-11-14 NOTE — H&P ADULT - NSICDXPASTMEDICALHX_GEN_ALL_CORE_FT
PAST MEDICAL HISTORY:  DM (diabetes mellitus) off medication    Dyslipidemia     HTN (hypertension)     HTN (hypertension)     Osteoarthritis     Sciatica     Stroke

## 2021-11-14 NOTE — H&P ADULT - NSICDXPASTSURGICALHX_GEN_ALL_CORE_FT
PAST SURGICAL HISTORY:  H/O bilateral hip replacements 2000's at [facility], no complications

## 2021-11-14 NOTE — ED PROVIDER NOTE - PROGRESS NOTE DETAILS
pain controlled per dr mittal need for emergent ct abd pelvis to rule out life threatening pathology. consent is implied in this clinical scenario pain ccontrolled spoke with surgical resident coming to evaluate

## 2021-11-14 NOTE — ED ADULT NURSE REASSESSMENT NOTE - GENERAL PATIENT STATE
Patient is lying on stretcher, awake and talking, breathing freely via room air with on sign of acute distress noted. Patient is admitted for gallstones and pancreatitis. Patient's clothing removed and given to daughter; placed in patient gown;  socks place. Safety measures in progress./comfortable appearance/cooperative/family/SO at bedside
comfortable appearance/cooperative/improvement verbalized

## 2021-11-14 NOTE — ED ADULT NURSE REASSESSMENT NOTE - AS TEMP SITE
oral Medical Necessity Statement: Based on my medical judgement, Mohs surgery is the most appropriate treatment for this cancer compared to other treatments.

## 2021-11-14 NOTE — ED PROVIDER NOTE - OBJECTIVE STATEMENT
89 y/o female with PMHx of multiple strokes with speech deficits, HTN c/o abdominal pain and nausea. Pt family states pt went to lay down to go to bed and then screamed in pain. Pt family states pt walks with walker at baseline and was fine throughout the day. Pt family states pt had fall 2 weeks ago.     denies fever. denies HA or neck pain. no chest pain or sob. no urinary f/u/d. no back pain. no motor or sensory deficits. denies illicit drug use. no recent travel. no rash. no other acute issues symptoms or concerns

## 2021-11-14 NOTE — ED PROVIDER NOTE - NSICDXPASTSURGICALHX_GEN_ALL_CORE_FT
PAST SURGICAL HISTORY:  H/O bilateral hip replacements 2000's at [facility], no complications    S/P hip replacement, bilateral

## 2021-11-14 NOTE — CONSULT NOTE ADULT - SUBJECTIVE AND OBJECTIVE BOX
Patient is a 89y old  Female who presents with a chief complaint of abdominal pain      HPI:  Patient is a 89yo F with PMH of multiple stroke with speech deficit, HTN, HLD, DM (off medications), OA presenting to the ED with 7hr-history of severe abdominal pain. Patient accompanied with daughter who provided part of history, reports sudden onset abdominal pain in epigastrium that became generalized with associated nausea but no emesis. Daughter reports that they were in a family gathering during the day, in which patient did not have any complaints, after the return home patient went straight to sleep. Around 10:30PM, daughter hears her mother screaming due to pain and was brought to the hospital. Patient complains of epigastric pain and nausea, no episode of emesis. No previous episodes. Patient usually has low appetite and usually has light meal supplemented with ensures. Denies fevers, chills, chest pain, SOB, emesis, constipation or diarrhea. Of note, patient has history a frequently falls last time was last week and has bruise in her back. Pt had U/S and CT that shows cholelithiasis with trace noy-cholecystic fluid & GB wall thickening c/w possible cholecystitis. Pancreas appears normal on CT scan but lipase is > 3000. Bile ducts are normal in caliber on both U/S and CT scan. LFT's abnormal. Pt. lives with her daughter and son-in-law.      REVIEW OF SYSTEMS:  Constitutional: No fever, weight loss or fatigue  ENMT:  No difficulty hearing, tinnitus, vertigo; No sinus or throat pain  Respiratory: No cough, wheezing, chills or hemoptysis  Cardiovascular: No chest pain, palpitations, dizziness or leg swelling  Gastrointestinal: As per HPI.  Skin: No itching, burning, rashes or lesions   Musculoskeletal: No joint pain or swelling; No muscle, back or extremity pain  Patient has no cardiopulmonary, peripheral vascular, musculoskeletal, dermatological, neurological, or psychological symptoms or complaints at this time.    PAST MEDICAL & SURGICAL HISTORY:  DM (diabetes mellitus)  off medication    HTN (hypertension)    HTN (hypertension)    Sciatica    Osteoarthritis    Dyslipidemia    Multiple CVA's    H/O bilateral hip replacements  2000&#x27;s at [facility], no complications        FAMILY HISTORY:  Family history of CVA  mother    FHx: leukemia  sister        SOCIAL HISTORY:  Smoking Status: [ ] Current, [ ] Former, x] Never  Pack Years: N/A. No ETOH or drug abuse history.    MEDICATIONS:  MEDICATIONS  (STANDING):  atorvastatin 40 milliGRAM(s) Oral at bedtime  dextrose 40% Gel 15 Gram(s) Oral once  dextrose 50% Injectable 25 Gram(s) IV Push once  dextrose 50% Injectable 12.5 Gram(s) IV Push once  dextrose 50% Injectable 25 Gram(s) IV Push once  enoxaparin Injectable 40 milliGRAM(s) SubCutaneous at bedtime  escitalopram 5 milliGRAM(s) Oral daily  glucagon  Injectable 1 milliGRAM(s) IntraMuscular once  insulin lispro (ADMELOG) corrective regimen sliding scale   SubCutaneous every 6 hours  metoprolol tartrate 12.5 milliGRAM(s) Oral every 12 hours  multiple electrolytes Injection Type 1 1000 milliLiter(s) (100 mL/Hr) IV Continuous <Continuous>  oxybutynin 5 milliGRAM(s) Oral every 8 hours  ASA 81 mg./d.  Plavix 75 mg./d.  pantoprazole  Injectable 40 milliGRAM(s) IV Push daily  piperacillin/tazobactam IVPB.. 3.375 Gram(s) IV Intermittent Once  piperacillin/tazobactam IVPB.. 3.375 Gram(s) IV Intermittent every 8 hours    MEDICATIONS  (PRN):  acetaminophen   IVPB .. 1000 milliGRAM(s) IV Intermittent once PRN Mild Pain (1 - 3), Moderate Pain (4 - 6), Severe Pain (7 - 10)      Allergies    No Known Allergies    Intolerances        Vital Signs Last 24 Hrs  T(C): 37 (14 Nov 2021 07:01), Max: 37 (14 Nov 2021 03:08)  T(F): 98.6 (14 Nov 2021 07:01), Max: 98.6 (14 Nov 2021 03:08)  HR: 86 (14 Nov 2021 07:01) (86 - 100)  BP: 124/74 (14 Nov 2021 07:01) (123/58 - 156/74)  BP(mean): 84 (14 Nov 2021 05:55) (84 - 84)  RR: 20 (14 Nov 2021 07:01) (20 - 20)  SpO2: 94% (14 Nov 2021 07:01) (92% - 95%)        PHYSICAL EXAM:    General: Well developed; elderly and frail appearing; in no acute distress  HEENT: MMM, conjunctiva pink and sclera mildly icteric.  Lungs: Clear bilaterally.  Cor: RRR S1, S2 only.  Gastrointestinal: Abdomen: Soft, mild RUQ tenderness to deep palpation, non-distended; Normal bowel sounds; No rebound or guarding or HSM.  KATERINA: Not done. Not needed.  Extremities: Normal range of motion, No clubbing, cyanosis or edema  Neurological: Alert and oriented x3  Skin: Warm and dry. No obvious rash      LABS:                        13.3   14.32 )-----------( 189      ( 14 Nov 2021 01:57 )             39.3     11-14    139  |  99  |  26.1<H>  ----------------------------<  185<H>  3.7   |  25.0  |  0.98    Ca    9.4      14 Nov 2021 01:57    TPro  7.5  /  Alb  4.4  /  TBili  3.7<H>  /  DBili  x   /  AST  680<H>  /  ALT  258<H>  /  AlkPhos  228<H>  11-14          RADIOLOGY & ADDITIONAL STUDIES:  CT and U/S results noted above.

## 2021-11-14 NOTE — H&P ADULT - ASSESSMENT
Patient is a 89yo F with hx of strokes with impaired speech, HTN, HLD, OA, DM, presenting with sudden onset severe abdominal pain. Labs and images reviewed, leukocytosis of 14, elevated LFTs and TB of 3.7, with lipase >3K with concerns of gallstone pancreatitis     -Admit to ICU, Dr. Recinos  -NPO and aggressive IVF hydration  -IV ABX: Zosyn  -[place] placement  -Strict I/O's   -Hold AC (ASA/Plavix)  -GI consult

## 2021-11-14 NOTE — CONSULT NOTE ADULT - ASSESSMENT
Probable mild gallstone pancreatitis with normal appearing pancreas on CT scan with probable acute cholecystitis. Rx conservatively given her advanced age with IV antbiotics and IVF and supportive Rx. No need to r/o choledocholithiasis or pursue further w/u of probable cholecystitis with HIDA scan in a pt. such as this given her advanced age and clinical frailty as she would unlikely be able to tolerate ERCP +/or cholecystectomy. Repeat labs ordered for the AM.

## 2021-11-14 NOTE — ED ADULT NURSE NOTE - NSICDXPASTSURGICALHX_GEN_ALL_CORE_FT
PAST SURGICAL HISTORY:  H/O bilateral hip replacements 2000's at [facility], no complications    S/P hip replacement, bilateral

## 2021-11-14 NOTE — ED ADULT NURSE REASSESSMENT NOTE - REASSESS COMMUNICATION
Report given to GRETCHEN Mesa in critical care.
MD Dipak made aware of O2 saturation. No supplemental oxygen ordered.

## 2021-11-14 NOTE — ED ADULT NURSE NOTE - OBJECTIVE STATEMENT
Received 89 year old female patient accompanied with daughter and other family member informed that patient was having generalized abdominal pain starting at 10 pm. Daughter states patient was fine all day

## 2021-11-14 NOTE — ED ADULT NURSE REASSESSMENT NOTE - NS ED NURSE REASSESS COMMENT FT1
Report received from RN at 05:30, VSS, pt resting comfortably in stretcher. No s/s of apparent distress noted. Will continue to monitor at this time.

## 2021-11-15 DIAGNOSIS — R79.89 OTHER SPECIFIED ABNORMAL FINDINGS OF BLOOD CHEMISTRY: ICD-10-CM

## 2021-11-15 LAB
A1C WITH ESTIMATED AVERAGE GLUCOSE RESULT: 6.2 % — HIGH (ref 4–5.6)
ALBUMIN SERPL ELPH-MCNC: 3.6 G/DL — SIGNIFICANT CHANGE UP (ref 3.3–5.2)
ALP SERPL-CCNC: 194 U/L — HIGH (ref 40–120)
ALT FLD-CCNC: 243 U/L — HIGH
AMYLASE P1 CFR SERPL: 63 U/L — SIGNIFICANT CHANGE UP (ref 36–128)
ANION GAP SERPL CALC-SCNC: 15 MMOL/L — SIGNIFICANT CHANGE UP (ref 5–17)
AST SERPL-CCNC: 306 U/L — HIGH
BASOPHILS # BLD AUTO: 0.05 K/UL — SIGNIFICANT CHANGE UP (ref 0–0.2)
BASOPHILS NFR BLD AUTO: 0.4 % — SIGNIFICANT CHANGE UP (ref 0–2)
BILIRUB DIRECT SERPL-MCNC: 3.4 MG/DL — HIGH (ref 0–0.3)
BILIRUB INDIRECT FLD-MCNC: 1.8 MG/DL — HIGH (ref 0.2–1)
BILIRUB SERPL-MCNC: 5.2 MG/DL — HIGH (ref 0.4–2)
BUN SERPL-MCNC: 21 MG/DL — HIGH (ref 8–20)
CALCIUM SERPL-MCNC: 8.2 MG/DL — LOW (ref 8.6–10.2)
CHLORIDE SERPL-SCNC: 102 MMOL/L — SIGNIFICANT CHANGE UP (ref 98–107)
CO2 SERPL-SCNC: 24 MMOL/L — SIGNIFICANT CHANGE UP (ref 22–29)
COVID-19 NUCLEOCAPSID GAM AB INTERP: NEGATIVE — SIGNIFICANT CHANGE UP
COVID-19 NUCLEOCAPSID TOTAL GAM ANTIBODY RESULT: 0.07 INDEX — SIGNIFICANT CHANGE UP
COVID-19 SPIKE DOMAIN AB INTERP: NEGATIVE — SIGNIFICANT CHANGE UP
COVID-19 SPIKE DOMAIN ANTIBODY RESULT: 0.4 U/ML — SIGNIFICANT CHANGE UP
CREAT SERPL-MCNC: 0.93 MG/DL — SIGNIFICANT CHANGE UP (ref 0.5–1.3)
EOSINOPHIL # BLD AUTO: 0.08 K/UL — SIGNIFICANT CHANGE UP (ref 0–0.5)
EOSINOPHIL NFR BLD AUTO: 0.7 % — SIGNIFICANT CHANGE UP (ref 0–6)
ESTIMATED AVERAGE GLUCOSE: 131 MG/DL — HIGH (ref 68–114)
GLUCOSE BLDC GLUCOMTR-MCNC: 114 MG/DL — HIGH (ref 70–99)
GLUCOSE BLDC GLUCOMTR-MCNC: 117 MG/DL — HIGH (ref 70–99)
GLUCOSE BLDC GLUCOMTR-MCNC: 146 MG/DL — HIGH (ref 70–99)
GLUCOSE BLDC GLUCOMTR-MCNC: 146 MG/DL — HIGH (ref 70–99)
GLUCOSE SERPL-MCNC: 121 MG/DL — HIGH (ref 70–99)
HCT VFR BLD CALC: 36.3 % — SIGNIFICANT CHANGE UP (ref 34.5–45)
HGB BLD-MCNC: 12.2 G/DL — SIGNIFICANT CHANGE UP (ref 11.5–15.5)
IMM GRANULOCYTES NFR BLD AUTO: 0.4 % — SIGNIFICANT CHANGE UP (ref 0–1.5)
LIDOCAIN IGE QN: 47 U/L — SIGNIFICANT CHANGE UP (ref 22–51)
LYMPHOCYTES # BLD AUTO: 1.11 K/UL — SIGNIFICANT CHANGE UP (ref 1–3.3)
LYMPHOCYTES # BLD AUTO: 9.3 % — LOW (ref 13–44)
MAGNESIUM SERPL-MCNC: 1.5 MG/DL — LOW (ref 1.6–2.6)
MCHC RBC-ENTMCNC: 30.3 PG — SIGNIFICANT CHANGE UP (ref 27–34)
MCHC RBC-ENTMCNC: 33.6 GM/DL — SIGNIFICANT CHANGE UP (ref 32–36)
MCV RBC AUTO: 90.3 FL — SIGNIFICANT CHANGE UP (ref 80–100)
MONOCYTES # BLD AUTO: 0.74 K/UL — SIGNIFICANT CHANGE UP (ref 0–0.9)
MONOCYTES NFR BLD AUTO: 6.2 % — SIGNIFICANT CHANGE UP (ref 2–14)
NEUTROPHILS # BLD AUTO: 9.87 K/UL — HIGH (ref 1.8–7.4)
NEUTROPHILS NFR BLD AUTO: 83 % — HIGH (ref 43–77)
PHOSPHATE SERPL-MCNC: 3.3 MG/DL — SIGNIFICANT CHANGE UP (ref 2.4–4.7)
PLATELET # BLD AUTO: 134 K/UL — LOW (ref 150–400)
POTASSIUM SERPL-MCNC: 3.5 MMOL/L — SIGNIFICANT CHANGE UP (ref 3.5–5.3)
POTASSIUM SERPL-SCNC: 3.5 MMOL/L — SIGNIFICANT CHANGE UP (ref 3.5–5.3)
PROT SERPL-MCNC: 6.4 G/DL — LOW (ref 6.6–8.7)
RBC # BLD: 4.02 M/UL — SIGNIFICANT CHANGE UP (ref 3.8–5.2)
RBC # FLD: 14.4 % — SIGNIFICANT CHANGE UP (ref 10.3–14.5)
SARS-COV-2 IGG+IGM SERPL QL IA: 0.07 INDEX — SIGNIFICANT CHANGE UP
SARS-COV-2 IGG+IGM SERPL QL IA: 0.4 U/ML — SIGNIFICANT CHANGE UP
SARS-COV-2 IGG+IGM SERPL QL IA: NEGATIVE — SIGNIFICANT CHANGE UP
SARS-COV-2 IGG+IGM SERPL QL IA: NEGATIVE — SIGNIFICANT CHANGE UP
SODIUM SERPL-SCNC: 141 MMOL/L — SIGNIFICANT CHANGE UP (ref 135–145)
TROPONIN T SERPL-MCNC: 0.06 NG/ML — SIGNIFICANT CHANGE UP (ref 0–0.06)
WBC # BLD: 11.9 K/UL — HIGH (ref 3.8–10.5)
WBC # FLD AUTO: 11.9 K/UL — HIGH (ref 3.8–10.5)

## 2021-11-15 PROCEDURE — 99233 SBSQ HOSP IP/OBS HIGH 50: CPT

## 2021-11-15 PROCEDURE — 71045 X-RAY EXAM CHEST 1 VIEW: CPT | Mod: 26

## 2021-11-15 PROCEDURE — 93010 ELECTROCARDIOGRAM REPORT: CPT

## 2021-11-15 RX ORDER — MAGNESIUM SULFATE 500 MG/ML
2 VIAL (ML) INJECTION ONCE
Refills: 0 | Status: COMPLETED | OUTPATIENT
Start: 2021-11-15 | End: 2021-11-15

## 2021-11-15 RX ORDER — SODIUM CHLORIDE 9 MG/ML
1000 INJECTION, SOLUTION INTRAVENOUS
Refills: 0 | Status: DISCONTINUED | OUTPATIENT
Start: 2021-11-15 | End: 2021-11-20

## 2021-11-15 RX ORDER — LANOLIN ALCOHOL/MO/W.PET/CERES
3 CREAM (GRAM) TOPICAL AT BEDTIME
Refills: 0 | Status: DISCONTINUED | OUTPATIENT
Start: 2021-11-15 | End: 2021-11-20

## 2021-11-15 RX ORDER — IPRATROPIUM/ALBUTEROL SULFATE 18-103MCG
3 AEROSOL WITH ADAPTER (GRAM) INHALATION EVERY 6 HOURS
Refills: 0 | Status: DISCONTINUED | OUTPATIENT
Start: 2021-11-15 | End: 2021-11-20

## 2021-11-15 RX ORDER — IPRATROPIUM/ALBUTEROL SULFATE 18-103MCG
3 AEROSOL WITH ADAPTER (GRAM) INHALATION ONCE
Refills: 0 | Status: COMPLETED | OUTPATIENT
Start: 2021-11-15 | End: 2021-11-15

## 2021-11-15 RX ORDER — ACETAMINOPHEN 500 MG
650 TABLET ORAL EVERY 6 HOURS
Refills: 0 | Status: DISCONTINUED | OUTPATIENT
Start: 2021-11-15 | End: 2021-11-20

## 2021-11-15 RX ORDER — HALOPERIDOL DECANOATE 100 MG/ML
2.5 INJECTION INTRAMUSCULAR ONCE
Refills: 0 | Status: COMPLETED | OUTPATIENT
Start: 2021-11-15 | End: 2021-11-15

## 2021-11-15 RX ADMIN — Medication 650 MILLIGRAM(S): at 13:55

## 2021-11-15 RX ADMIN — PANTOPRAZOLE SODIUM 40 MILLIGRAM(S): 20 TABLET, DELAYED RELEASE ORAL at 11:13

## 2021-11-15 RX ADMIN — PIPERACILLIN AND TAZOBACTAM 25 GRAM(S): 4; .5 INJECTION, POWDER, LYOPHILIZED, FOR SOLUTION INTRAVENOUS at 15:02

## 2021-11-15 RX ADMIN — ESCITALOPRAM OXALATE 5 MILLIGRAM(S): 10 TABLET, FILM COATED ORAL at 13:56

## 2021-11-15 RX ADMIN — Medication 12.5 MILLIGRAM(S): at 16:54

## 2021-11-15 RX ADMIN — PIPERACILLIN AND TAZOBACTAM 25 GRAM(S): 4; .5 INJECTION, POWDER, LYOPHILIZED, FOR SOLUTION INTRAVENOUS at 05:42

## 2021-11-15 RX ADMIN — Medication 650 MILLIGRAM(S): at 19:53

## 2021-11-15 RX ADMIN — Medication 300 MILLIGRAM(S): at 01:31

## 2021-11-15 RX ADMIN — Medication 3 MILLILITER(S): at 03:09

## 2021-11-15 RX ADMIN — Medication 750 MILLIGRAM(S): at 01:36

## 2021-11-15 RX ADMIN — PIPERACILLIN AND TAZOBACTAM 25 GRAM(S): 4; .5 INJECTION, POWDER, LYOPHILIZED, FOR SOLUTION INTRAVENOUS at 22:23

## 2021-11-15 RX ADMIN — Medication 5 MILLIGRAM(S): at 05:01

## 2021-11-15 RX ADMIN — Medication 650 MILLIGRAM(S): at 09:18

## 2021-11-15 RX ADMIN — Medication 650 MILLIGRAM(S): at 08:48

## 2021-11-15 RX ADMIN — Medication 5 MILLIGRAM(S): at 13:56

## 2021-11-15 RX ADMIN — Medication 12.5 MILLIGRAM(S): at 05:01

## 2021-11-15 RX ADMIN — SODIUM CHLORIDE 42 MILLILITER(S): 9 INJECTION, SOLUTION INTRAVENOUS at 19:55

## 2021-11-15 RX ADMIN — Medication 650 MILLIGRAM(S): at 16:26

## 2021-11-15 RX ADMIN — Medication 50 GRAM(S): at 11:14

## 2021-11-15 RX ADMIN — HALOPERIDOL DECANOATE 2.5 MILLIGRAM(S): 100 INJECTION INTRAMUSCULAR at 22:41

## 2021-11-15 NOTE — PROGRESS NOTE ADULT - PROBLEM SELECTOR PLAN 1
transaminases better- trend LFT  IV antibiotics  poor candidate for ERCP  consider cholecystostomy tube if clinical condition worsens. Now  BP stable, does not appear to have pain

## 2021-11-15 NOTE — PROGRESS NOTE ADULT - ASSESSMENT
89yo F with hx of strokes with impaired speech, HTN, HLD, OA, DM, presenting with sudden onset severe abdominal pain. Patient with probable mechanical Pancreatitis with normal appearing pancreas on CT scan with probable acute cholecystitis. U/S and CT of abdomen and pelvis revealed cholelithiasis with trace noy-cholecystic fluid & GB wall thickening c/w possible cholecystitis. Pancreas appears normal on CT scan but lipase is > 3000.  Recommend to treat conservatively given her advanced age with IV antibiotics and IVF and supportive Rx.   -Low grade fever at 100.5 this AM. Chest XR from 11/14 clear. Will recommend a set of blood cultures to be collected.  -If no improvement in condition, May want to consider Cholecystostomy tube.   -Continue to trend CBC, CMP.   -Protonix BID 89yo F with hx of strokes with impaired speech, HTN, HLD, OA, DM, presenting with sudden onset severe abdominal pain. Patient with probable mechanical Pancreatitis with normal appearing pancreas on CT scan with probable acute cholecystitis. U/S and CT of abdomen and pelvis revealed cholelithiasis with trace noy-cholecystic fluid & GB wall thickening c/w possible cholecystitis. Pancreas appears normal on CT scan but lipase is > 3000.  Recommend to treat conservatively given her advanced age with IV antibiotics and IVF and supportive Rx.   -Low grade fever at 100.5 this AM. Chest XR from 11/14 clear. Will recommend a set of blood cultures to be collected if fever greater than 101.  -If no improvement in condition, May want to consider Cholecystostomy tube.   -Continue to trend CBC, CMP.   -Protonix BID

## 2021-11-15 NOTE — PROGRESS NOTE ADULT - ASSESSMENT
Assessment and Plan:    Neuro:   - sedation / analgesia   - delirium precautions  - daily sedation holidays    CV:   - Continue hemodynamic monitoring  - Maintain MAP > 65    Pulm:   - Incentive spirometry  - Pulmonary toilet  - OOB to chair    GI/Nutrition:   - NPO with tube feeds   - continue bowel reg  - continue protonix for ppx    /Renal:   - clayton for strict I&O  - monitor kidney fxn    ID:  -   - Monitor fever curve  - Leukocytosis    Endo:  - monitor blood glucose  - continue lantus   - continue ISS    Skin:   - repositioning for DTI prevention while in bed    Heme/DVT Prophylaxis:  - SCDs  - Chemical prophylaxis with    Dispo:  - patient remains critically ill  - care per SICU Assessment and Plan:    89yo F with hx of strokes with impaired speech, HTN, HLD, OA, DM, presenting with sudden onset severe abdominal pain. Labs and images reviewed, leukocytosis of 14, elevated LFTs and TB of 3.7, with lipase >3K with concerns of gallstone pancreatitis     Neuro: Prior CVA with Dysphasia  - Multimodal pain control.  Has not required significant pain meds since admission, no narcotics received  - delirium precautions  - Optimize sleep / wake cycle    CV:   - Continue hemodynamic monitoring  - Maintain MAP > 65    Pulm:   - Incentive spirometry  - Pulmonary toilet  - OOB to chair    GI/Nutrition:  Gallstone pancreatitis  - GI consulted and state unlikely to tolerate ERCP or cholecystectomy  - Consider HIDA scan, Perc Juanjo  - Continue serial abdominal exams  - Trend LFT  - Consider clears   - continue bowel reg    /Renal:   - monitor kidney fxn  - Monitor lytes    ID:   - Continue Zosyn for possible acute juanjo  - Monitor fever curve  - Leukocytosis    Endo:  - monitor blood glucose  - continue ISS    Skin:   - repositioning for DTI prevention while in bed    Heme/DVT Prophylaxis:  - SCDs  - Chemical prophylaxis with lovenox    Dispo:  - care per SICU Assessment and Plan:    89yo F with hx of strokes with impaired speech, HTN, HLD, OA, DM, presenting with sudden onset severe abdominal pain. Concern for gallstone pancreatitis with U/S and CT that shows cholelithiasis with trace noy-cholecystic fluid & GB wall thickening c/w possible cholecystitis. Pancreas appears normal on CT scan but lipase is > 3000. Bile ducts are normal in caliber on both U/S and CT scan. LFT's abnormal.     Neuro: Prior CVA with Dysphasia  - Multimodal pain control.  Has not required significant pain meds since admission, no narcotics received  - delirium precautions  - Optimize sleep / wake cycle    CV:   - Continue hemodynamic monitoring  - Maintain MAP > 65    Pulm:   - Incentive spirometry  - Pulmonary toilet  - OOB to chair    GI/Nutrition:  Gallstone pancreatitis  - GI consulted and state unlikely to tolerate ERCP or cholecystectomy  - Consider HIDA scan, Perc Juanjo  - Continue serial abdominal exams  - Trend LFT  - Consider clears   - continue bowel reg    /Renal:   - monitor kidney fxn  - Monitor lytes    ID:   - Continue Zosyn for possible acute juanjo  - Monitor fever curve  - Leukocytosis    Endo:  - monitor blood glucose  - continue ISS    Skin:   - repositioning for DTI prevention while in bed    Heme/DVT Prophylaxis:  - SCDs  - Chemical prophylaxis with lovenox    Dispo:  - care per SICU

## 2021-11-15 NOTE — PROGRESS NOTE ADULT - SUBJECTIVE AND OBJECTIVE BOX
INTERVAL HPI/OVERNIGHT EVENTS:    Pt admitted to SICU with gallstone pancreatitis.  Pt had U/S and CT that shows cholelithiasis with trace noy-cholecystic fluid & GB wall thickening c/w possible cholecystitis. Pancreas appears normal on CT scan but lipase is > 3000. Bile ducts are normal in caliber on both U/S and CT scan. LFT's abnormal.  GI consulted and state unlikely to tolerate Cholecystectomy or ERCP.  Pt has remained asymptomatic without abdominal pain or N/V.  During overnight pt became agitated but difficult to ascertain cause of symptoms due to significant dysphasia.  Pt denies abdominal pain but stated SOB.      MEDICATIONS  (STANDING):  atorvastatin 40 milliGRAM(s) Oral at bedtime  dextrose 40% Gel 15 Gram(s) Oral once  dextrose 50% Injectable 25 Gram(s) IV Push once  dextrose 50% Injectable 12.5 Gram(s) IV Push once  dextrose 50% Injectable 25 Gram(s) IV Push once  enoxaparin Injectable 30 milliGRAM(s) SubCutaneous daily  escitalopram 5 milliGRAM(s) Oral daily  glucagon  Injectable 1 milliGRAM(s) IntraMuscular once  influenza  Vaccine (HIGH DOSE) 0.7 milliLiter(s) IntraMuscular once  insulin lispro (ADMELOG) corrective regimen sliding scale   SubCutaneous every 6 hours  metoprolol tartrate 12.5 milliGRAM(s) Oral every 12 hours  oxybutynin 5 milliGRAM(s) Oral every 8 hours  pantoprazole  Injectable 40 milliGRAM(s) IV Push daily  piperacillin/tazobactam IVPB.. 3.375 Gram(s) IV Intermittent every 8 hours    MEDICATIONS  (PRN):      Drug Dosing Weight  Height (cm): 157.5 (13 Nov 2021 23:47)  Weight (kg): 49.9 (13 Nov 2021 23:47)  BMI (kg/m2): 20.1 (13 Nov 2021 23:47)  BSA (m2): 1.48 (13 Nov 2021 23:47)      PAST MEDICAL & SURGICAL HISTORY:  DM (diabetes mellitus)  off medication    HTN (hypertension)    HTN (hypertension)    Sciatica    Osteoarthritis    Dyslipidemia    Stroke    H/O bilateral hip replacements  2000&#x27;s at [facility], no complications        ICU Vital Signs Last 24 Hrs  T(C): 37.2 (15 Nov 2021 04:00), Max: 37.8 (15 Nov 2021 01:30)  T(F): 98.9 (15 Nov 2021 04:00), Max: 100 (15 Nov 2021 01:30)  HR: 89 (15 Nov 2021 04:00) (78 - 98)  BP: 118/69 (15 Nov 2021 04:00) (116/77 - 158/82)  BP(mean): 83 (15 Nov 2021 04:00) (83 - 125)  ABP: --  ABP(mean): --  RR: 18 (15 Nov 2021 04:00) (15 - 27)  SpO2: 96% (15 Nov 2021 04:00) (93% - 99%)          I&O's Detail    14 Nov 2021 07:01  -  15 Nov 2021 04:48  --------------------------------------------------------  IN:    IV PiggyBack: 100 mL    IV PiggyBack: 75 mL    multiple electrolytes Injection Type 1.: 1300 mL    Oral Fluid: 30 mL  Total IN: 1505 mL    OUT:    Indwelling Catheter - Urethral (mL): 1105 mL  Total OUT: 1105 mL    Total NET: 400 mL              Physical Exam:    Neurological:  Intubated and sedated.  Non-focal.  Moving all extremities.  No appreciable motor deficits    HEENT: PERRLA, no drainage or redness.     Neck: Neck supple, No JVD    Respiratory:  Mech vented.  Trachea midline, equal chest rise.  Breath Sounds equal bilateral    Cardiovascular: Regular rate & rhythm, normal S1, S2    Gastrointestinal: Soft, non-tender, normal bowel sounds    Extremities: No peripheral edema, No cyanosis, clubbing     Vascular: Equal and normal pulses: 2+ peripheral pulses throughout    Skin: No rashes    LABS:  CBC Full  -  ( 14 Nov 2021 01:57 )  WBC Count : 14.32 K/uL  RBC Count : 4.33 M/uL  Hemoglobin : 13.3 g/dL  Hematocrit : 39.3 %  Platelet Count - Automated : 189 K/uL  Mean Cell Volume : 90.8 fl  Mean Cell Hemoglobin : 30.7 pg  Mean Cell Hemoglobin Concentration : 33.8 gm/dL  Auto Neutrophil # : 13.07 K/uL  Auto Lymphocyte # : 0.13 K/uL  Auto Monocyte # : 0.87 K/uL  Auto Eosinophil # : 0.00 K/uL  Auto Basophil # : 0.11 K/uL  Auto Neutrophil % : 91.3 %  Auto Lymphocyte % : 0.9 %  Auto Monocyte % : 6.1 %  Auto Eosinophil % : 0.0 %  Auto Basophil % : 0.8 %    11-14    139  |  100  |  22.8<H>  ----------------------------<  115<H>  3.9   |  25.0  |  0.94    Ca    8.7      14 Nov 2021 12:28    TPro  6.7  /  Alb  3.8  /  TBili  4.1<H>  /  DBili  2.6<H>  /  AST  821<H>  /  ALT  433<H>  /  AlkPhos  222<H>  11-14    PT/INR - ( 14 Nov 2021 01:57 )   PT: 12.9 sec;   INR: 1.12 ratio         PTT - ( 14 Nov 2021 01:57 )  PTT:27.2 sec         INTERVAL HPI/OVERNIGHT EVENTS:    Pt admitted to SICU with gallstone pancreatitis.  Pt had U/S and CT that shows cholelithiasis with trace noy-cholecystic fluid & GB wall thickening c/w possible cholecystitis. Pancreas appears normal on CT scan but lipase is > 3000. Bile ducts are normal in caliber on both U/S and CT scan. LFT's abnormal.  GI consulted and state unlikely to tolerate Cholecystectomy or ERCP.  Pt has remained asymptomatic without abdominal pain or N/V.  During overnight pt became agitated but difficult to ascertain cause of symptoms due to significant dysphasia.  Pt denies abdominal pain but stated SOB.  Mild expiratory wheezing noted to Left lung fields.  POCUS with no B-lines, appears euvolemic.  EKG unchanged and chest XR clear.  Pt received duoneb with resolution of symptoms.  Pt remains afebrile, hemodynamically stable, and afebrile.      MEDICATIONS  (STANDING):  atorvastatin 40 milliGRAM(s) Oral at bedtime  dextrose 40% Gel 15 Gram(s) Oral once  dextrose 50% Injectable 25 Gram(s) IV Push once  dextrose 50% Injectable 12.5 Gram(s) IV Push once  dextrose 50% Injectable 25 Gram(s) IV Push once  enoxaparin Injectable 30 milliGRAM(s) SubCutaneous daily  escitalopram 5 milliGRAM(s) Oral daily  glucagon  Injectable 1 milliGRAM(s) IntraMuscular once  influenza  Vaccine (HIGH DOSE) 0.7 milliLiter(s) IntraMuscular once  insulin lispro (ADMELOG) corrective regimen sliding scale   SubCutaneous every 6 hours  metoprolol tartrate 12.5 milliGRAM(s) Oral every 12 hours  oxybutynin 5 milliGRAM(s) Oral every 8 hours  pantoprazole  Injectable 40 milliGRAM(s) IV Push daily  piperacillin/tazobactam IVPB.. 3.375 Gram(s) IV Intermittent every 8 hours    MEDICATIONS  (PRN):      Drug Dosing Weight  Height (cm): 157.5 (13 Nov 2021 23:47)  Weight (kg): 49.9 (13 Nov 2021 23:47)  BMI (kg/m2): 20.1 (13 Nov 2021 23:47)  BSA (m2): 1.48 (13 Nov 2021 23:47)      PAST MEDICAL & SURGICAL HISTORY:  DM (diabetes mellitus)  off medication    HTN (hypertension)    HTN (hypertension)    Sciatica    Osteoarthritis    Dyslipidemia    Stroke    H/O bilateral hip replacements  2000&#x27;s at [facility], no complications        ICU Vital Signs Last 24 Hrs  T(C): 37.2 (15 Nov 2021 04:00), Max: 37.8 (15 Nov 2021 01:30)  T(F): 98.9 (15 Nov 2021 04:00), Max: 100 (15 Nov 2021 01:30)  HR: 89 (15 Nov 2021 04:00) (78 - 98)  BP: 118/69 (15 Nov 2021 04:00) (116/77 - 158/82)  BP(mean): 83 (15 Nov 2021 04:00) (83 - 125)  ABP: --  ABP(mean): --  RR: 18 (15 Nov 2021 04:00) (15 - 27)  SpO2: 96% (15 Nov 2021 04:00) (93% - 99%)          I&O's Detail    14 Nov 2021 07:01  -  15 Nov 2021 04:48  --------------------------------------------------------  IN:    IV PiggyBack: 100 mL    IV PiggyBack: 75 mL    multiple electrolytes Injection Type 1.: 1300 mL    Oral Fluid: 30 mL  Total IN: 1505 mL    OUT:    Indwelling Catheter - Urethral (mL): 1105 mL  Total OUT: 1105 mL    Total NET: 400 mL          Physical Exam:    Neurological:  Dysphasia.  Moving all extremities.  No appreciable motor deficits    HEENT: PERRLA, no drainage or redness.     Neck: Neck supple, No JVD    Respiratory:  Trachea midline, equal chest rise.  Breath Sounds equal bilateral    Cardiovascular: Regular rate & rhythm, normal S1, S2    Gastrointestinal: Soft, non-tender, normal bowel sounds    Extremities: No peripheral edema, No cyanosis, clubbing     Vascular: Equal and normal pulses: 2+ peripheral pulses throughout    Skin: No rashes      LABS:  CBC Full  -  ( 14 Nov 2021 01:57 )  WBC Count : 14.32 K/uL  RBC Count : 4.33 M/uL  Hemoglobin : 13.3 g/dL  Hematocrit : 39.3 %  Platelet Count - Automated : 189 K/uL  Mean Cell Volume : 90.8 fl  Mean Cell Hemoglobin : 30.7 pg  Mean Cell Hemoglobin Concentration : 33.8 gm/dL  Auto Neutrophil # : 13.07 K/uL  Auto Lymphocyte # : 0.13 K/uL  Auto Monocyte # : 0.87 K/uL  Auto Eosinophil # : 0.00 K/uL  Auto Basophil # : 0.11 K/uL  Auto Neutrophil % : 91.3 %  Auto Lymphocyte % : 0.9 %  Auto Monocyte % : 6.1 %  Auto Eosinophil % : 0.0 %  Auto Basophil % : 0.8 %    11-14    139  |  100  |  22.8<H>  ----------------------------<  115<H>  3.9   |  25.0  |  0.94    Ca    8.7      14 Nov 2021 12:28    TPro  6.7  /  Alb  3.8  /  TBili  4.1<H>  /  DBili  2.6<H>  /  AST  821<H>  /  ALT  433<H>  /  AlkPhos  222<H>  11-14    PT/INR - ( 14 Nov 2021 01:57 )   PT: 12.9 sec;   INR: 1.12 ratio         PTT - ( 14 Nov 2021 01:57 )  PTT:27.2 sec

## 2021-11-15 NOTE — PROGRESS NOTE ADULT - SUBJECTIVE AND OBJECTIVE BOX
Chief Complaint:  Patient is a 89y old  Female who presents with a chief complaint of Gallstone pancreatitis (15 Nov 2021 04:48)      Interval Events / Subjective: Patient is a 89yo F with PMH of multiple stroke with speech deficit, HTN, HLD, DM (off medications), OA admitted for a 7hr-history of severe abdominal pain. Patient had U/S and CT that shows cholelithiasis with trace noy-cholecystic fluid & GB wall thickening c/w possible cholecystitis. Pancreas appears normal on CT scan but lipase is > 3000. Bile ducts are normal in caliber on both U/S and CT scan. LFT's abnormal. Overnight the patient became agitated but difficult to ascertain cause of symptoms due to significant dysphasia. She denies abdominal pain but stated SOB. EKG unchanged and chest XR clear.  Patientt received Duoneb with resolution of symptoms.    Patient seen and evaluated at bedside, reporting no complaints. Patient febrile this AM at 100.5. Denies nausea, vomiting, abdominal pain, chest pain, shortness of breath, hematemesis, hematochezia, melena. LFTs trending down this AM , , with slight increase in bili to 5.2. Amylase 63 and lipase 47.        REVIEW OF SYSTEMS:   General: Negative  HEENT: Negative  CV: Negative  Respiratory: Negative  GI: See HPI  : Negative  MSK: Negative  Hematologic: Negative  Skin: Negative    MEDICATIONS:   MEDICATIONS  (STANDING):  acetaminophen     Tablet .. 650 milliGRAM(s) Oral every 6 hours  atorvastatin 40 milliGRAM(s) Oral at bedtime  dextrose 40% Gel 15 Gram(s) Oral once  dextrose 50% Injectable 25 Gram(s) IV Push once  dextrose 50% Injectable 12.5 Gram(s) IV Push once  dextrose 50% Injectable 25 Gram(s) IV Push once  enoxaparin Injectable 30 milliGRAM(s) SubCutaneous daily  escitalopram 5 milliGRAM(s) Oral daily  glucagon  Injectable 1 milliGRAM(s) IntraMuscular once  influenza  Vaccine (HIGH DOSE) 0.7 milliLiter(s) IntraMuscular once  insulin lispro (ADMELOG) corrective regimen sliding scale   SubCutaneous every 6 hours  melatonin 3 milliGRAM(s) Oral at bedtime  metoprolol tartrate 12.5 milliGRAM(s) Oral every 12 hours  oxybutynin 5 milliGRAM(s) Oral every 8 hours  pantoprazole  Injectable 40 milliGRAM(s) IV Push daily  piperacillin/tazobactam IVPB.. 3.375 Gram(s) IV Intermittent every 8 hours    MEDICATIONS  (PRN):  albuterol/ipratropium for Nebulization 3 milliLiter(s) Nebulizer every 6 hours PRN Shortness of Breath and/or Wheezing      ALLERGIES:   Allergies    No Known Allergies    Intolerances        VITAL SIGNS:   Vital Signs Last 24 Hrs  T(C): 38.1 (15 Nov 2021 08:00), Max: 38.1 (15 Nov 2021 08:00)  T(F): 100.5 (15 Nov 2021 08:00), Max: 100.5 (15 Nov 2021 08:00)  HR: 91 (15 Nov 2021 08:00) (78 - 95)  BP: 119/72 (15 Nov 2021 08:00) (116/77 - 158/82)  BP(mean): 87 (15 Nov 2021 08:00) (83 - 125)  RR: 22 (15 Nov 2021 08:00) (15 - 29)  SpO2: 98% (15 Nov 2021 08:00) (93% - 99%)  I&O's Summary    14 Nov 2021 07:01  -  15 Nov 2021 07:00  --------------------------------------------------------  IN: 1585 mL / OUT: 1185 mL / NET: 400 mL    15 Nov 2021 07:01  -  15 Nov 2021 08:55  --------------------------------------------------------  IN: 75 mL / OUT: 38 mL / NET: 37 mL        PHYSICAL EXAM:   GENERAL: Elderly and frail appearing, no aucte distress  HEENT:  NC/AT,  conjunctivae clear, sclera -anicteric  CHEST:  2L via NC. Full & symmetric excursion, no increased effort, breath sounds clear  HEART:  Regular rhythm, S1, S2, no murmur/rub/S3/S4,  no edema  ABDOMEN:  Soft, non-tender, non-distended, normoactive bowel sounds,  no masses, no hepatosplenomegaly,   EXTREMITIES: No cyanosis, clubbing or edema  SKIN:  No rash/erythema/ecchymoses/petechiae/wounds/abscess/warm/dry  NEURO:  Alert, oriented    LABS:  CBC Full  -  ( 15 Nov 2021 05:04 )  WBC Count : 11.90 K/uL  RBC Count : 4.02 M/uL  Hemoglobin : 12.2 g/dL  Hematocrit : 36.3 %  Platelet Count - Automated : 134 K/uL  Mean Cell Volume : 90.3 fl  Mean Cell Hemoglobin : 30.3 pg  Mean Cell Hemoglobin Concentration : 33.6 gm/dL  Auto Neutrophil # : 9.87 K/uL  Auto Lymphocyte # : 1.11 K/uL  Auto Monocyte # : 0.74 K/uL  Auto Eosinophil # : 0.08 K/uL  Auto Basophil # : 0.05 K/uL  Auto Neutrophil % : 83.0 %  Auto Lymphocyte % : 9.3 %  Auto Monocyte % : 6.2 %  Auto Eosinophil % : 0.7 %  Auto Basophil % : 0.4 %    11-15    141  |  102  |  21.0<H>  ----------------------------<  121<H>  3.5   |  24.0  |  0.93    Ca    8.2<L>      15 Nov 2021 06:27  Phos  3.3     11-15  Mg     1.5     11-15    TPro  6.4<L>  /  Alb  3.6  /  TBili  5.2<H>  /  DBili  3.4<H>  /  AST  306<H>  /  ALT  243<H>  /  AlkPhos  194<H>  11-15    LIVER FUNCTIONS - ( 15 Nov 2021 06:27 )  Alb: 3.6 g/dL / Pro: 6.4 g/dL / ALK PHOS: 194 U/L / ALT: 243 U/L / AST: 306 U/L / GGT: x           PT/INR - ( 14 Nov 2021 01:57 )   PT: 12.9 sec;   INR: 1.12 ratio         PTT - ( 14 Nov 2021 01:57 )  PTT:27.2 sec      Amylase, Serum Total (11.15.21 @ 06:27)    Amylase, Serum Total: 63 U/L    Lipase, Serum (11.15.21 @ 06:27)    Lipase, Serum: 47 U/L        RADIOLOGY & ADDITIONAL STUDIES (The following images were personally reviewed):       EXAM:  CT ABDOMEN AND PELVIS IC                          PROCEDURE DATE:  11/14/2021          INTERPRETATION:  CLINICAL INFORMATION: abd pain    COMPARISON: None.    CONTRAST/COMPLICATIONS:  IV Contrast: Omnipaque 300  95 cc administered   5 cc discarded  Oral Contrast: NONE  Complications: None reported at time of study completion    PROCEDURE:  CT of the Abdomen and Pelvis was performed.  Sagittal and coronal reformats were performed.    FINDINGS:  LOWER CHEST: Subsegmental atelectasis at the lung bases.    LIVER: Within normal limits.  BILE DUCTS: Normal caliber.  GALLBLADDER: Multiple gallstones in the gallbladder. Gallbladder is distended with pericholecystic fluid.  SPLEEN: Within normal limits.  PANCREAS: Within normal limits.  ADRENALS: Within normal limits.  KIDNEYS/URETERS: Within normal limits.    BLADDER: Within normal limits.  REPRODUCTIVE ORGANS: Uterus and adnexa within normal limits.    BOWEL: No bowel obstruction. Appendix is normal. There is diverticulosis without evidence of diverticulitis.  PERITONEUM: No ascites.  VESSELS: Atherosclerotic changes.  RETROPERITONEUM/LYMPH NODES: No lymphadenopathy.  ABDOMINAL WALL: Within normal limits.  BONES: Degenerative changes. Status post bilateral hip arthroplasties.    IMPRESSION:  Multiple gallstones in the gallbladder with gallbladder distention and pericholecystic fluid, findings suspicious for acute cholecystitis. Recommend right upper quadrant ultrasound and HIDA scan for further evaluation.        --- End of Report ---             Chief Complaint:  Patient is a 89y old  Female who presents with a chief complaint of Gallstone pancreatitis (15 Nov 2021 04:48)      Interval Events / Subjective: Patient is a 89yo F with PMH of multiple stroke with speech deficit, HTN, HLD, DM (off medications), OA admitted for a 7hr-history of severe abdominal pain. Patient had U/S and CT that shows cholelithiasis with trace noy-cholecystic fluid & GB wall thickening c/w possible cholecystitis. Pancreas appears normal on CT scan but lipase is > 3000. Bile ducts are normal in caliber on both U/S and CT scan. LFT's abnormal. Overnight the patient became agitated but difficult to ascertain cause of symptoms due to significant dysphasia. She denies abdominal pain but stated SOB. EKG unchanged and chest XR clear.  Patientt received Duoneb with resolution of symptoms.    Patient seen and evaluated at bedside, reporting no complaints. Patient  low grade temp this AM at 100.5. Denies nausea, vomiting, abdominal pain, chest pain, shortness of breath, hematemesis, hematochezia, melena. LFTs trending down this AM , , with slight increase in bili to 5.2. Amylase 63 and lipase 47.        REVIEW OF SYSTEMS:   General: Negative  HEENT: Negative  CV: Negative  Respiratory: Negative  GI: See HPI  : Negative  MSK: Negative  Hematologic: Negative  Skin: Negative    MEDICATIONS:   MEDICATIONS  (STANDING):  acetaminophen     Tablet .. 650 milliGRAM(s) Oral every 6 hours  atorvastatin 40 milliGRAM(s) Oral at bedtime  dextrose 40% Gel 15 Gram(s) Oral once  dextrose 50% Injectable 25 Gram(s) IV Push once  dextrose 50% Injectable 12.5 Gram(s) IV Push once  dextrose 50% Injectable 25 Gram(s) IV Push once  enoxaparin Injectable 30 milliGRAM(s) SubCutaneous daily  escitalopram 5 milliGRAM(s) Oral daily  glucagon  Injectable 1 milliGRAM(s) IntraMuscular once  influenza  Vaccine (HIGH DOSE) 0.7 milliLiter(s) IntraMuscular once  insulin lispro (ADMELOG) corrective regimen sliding scale   SubCutaneous every 6 hours  melatonin 3 milliGRAM(s) Oral at bedtime  metoprolol tartrate 12.5 milliGRAM(s) Oral every 12 hours  oxybutynin 5 milliGRAM(s) Oral every 8 hours  pantoprazole  Injectable 40 milliGRAM(s) IV Push daily  piperacillin/tazobactam IVPB.. 3.375 Gram(s) IV Intermittent every 8 hours    MEDICATIONS  (PRN):  albuterol/ipratropium for Nebulization 3 milliLiter(s) Nebulizer every 6 hours PRN Shortness of Breath and/or Wheezing      ALLERGIES:   Allergies    No Known Allergies    Intolerances        VITAL SIGNS:   Vital Signs Last 24 Hrs  T(C): 38.1 (15 Nov 2021 08:00), Max: 38.1 (15 Nov 2021 08:00)  T(F): 100.5 (15 Nov 2021 08:00), Max: 100.5 (15 Nov 2021 08:00)  HR: 91 (15 Nov 2021 08:00) (78 - 95)  BP: 119/72 (15 Nov 2021 08:00) (116/77 - 158/82)  BP(mean): 87 (15 Nov 2021 08:00) (83 - 125)  RR: 22 (15 Nov 2021 08:00) (15 - 29)  SpO2: 98% (15 Nov 2021 08:00) (93% - 99%)  I&O's Summary    14 Nov 2021 07:01  -  15 Nov 2021 07:00  --------------------------------------------------------  IN: 1585 mL / OUT: 1185 mL / NET: 400 mL    15 Nov 2021 07:01  -  15 Nov 2021 08:55  --------------------------------------------------------  IN: 75 mL / OUT: 38 mL / NET: 37 mL        PHYSICAL EXAM:   GENERAL: Elderly and frail appearing, no aucte distress  HEENT:  NC/AT,  conjunctivae clear, sclera -anicteric  CHEST:  2L via NC. Full & symmetric excursion, no increased effort, breath sounds clear  HEART:  Regular rhythm, S1, S2, no murmur/rub/S3/S4,  no edema  ABDOMEN:  Soft, non-tender, non-distended, normoactive bowel sounds,  no masses, no hepatosplenomegaly,   EXTREMITIES: No cyanosis, clubbing or edema  SKIN:  No rash/erythema/ecchymoses/petechiae/wounds/abscess/warm/dry  NEURO:  Alert, oriented X 1 ( name )     LABS:  CBC Full  -  ( 15 Nov 2021 05:04 )  WBC Count : 11.90 K/uL  RBC Count : 4.02 M/uL  Hemoglobin : 12.2 g/dL  Hematocrit : 36.3 %  Platelet Count - Automated : 134 K/uL  Mean Cell Volume : 90.3 fl  Mean Cell Hemoglobin : 30.3 pg  Mean Cell Hemoglobin Concentration : 33.6 gm/dL  Auto Neutrophil # : 9.87 K/uL  Auto Lymphocyte # : 1.11 K/uL  Auto Monocyte # : 0.74 K/uL  Auto Eosinophil # : 0.08 K/uL  Auto Basophil # : 0.05 K/uL  Auto Neutrophil % : 83.0 %  Auto Lymphocyte % : 9.3 %  Auto Monocyte % : 6.2 %  Auto Eosinophil % : 0.7 %  Auto Basophil % : 0.4 %    11-15    141  |  102  |  21.0<H>  ----------------------------<  121<H>  3.5   |  24.0  |  0.93    Ca    8.2<L>      15 Nov 2021 06:27  Phos  3.3     11-15  Mg     1.5     11-15    TPro  6.4<L>  /  Alb  3.6  /  TBili  5.2<H>  /  DBili  3.4<H>  /  AST  306<H>  /  ALT  243<H>  /  AlkPhos  194<H>  11-15    LIVER FUNCTIONS - ( 15 Nov 2021 06:27 )  Alb: 3.6 g/dL / Pro: 6.4 g/dL / ALK PHOS: 194 U/L / ALT: 243 U/L / AST: 306 U/L / GGT: x           PT/INR - ( 14 Nov 2021 01:57 )   PT: 12.9 sec;   INR: 1.12 ratio         PTT - ( 14 Nov 2021 01:57 )  PTT:27.2 sec      Amylase, Serum Total (11.15.21 @ 06:27)    Amylase, Serum Total: 63 U/L    Lipase, Serum (11.15.21 @ 06:27)    Lipase, Serum: 47 U/L        RADIOLOGY & ADDITIONAL STUDIES (The following images were personally reviewed):       EXAM:  CT ABDOMEN AND PELVIS IC                          PROCEDURE DATE:  11/14/2021          INTERPRETATION:  CLINICAL INFORMATION: abd pain    COMPARISON: None.    CONTRAST/COMPLICATIONS:  IV Contrast: Omnipaque 300  95 cc administered   5 cc discarded  Oral Contrast: NONE  Complications: None reported at time of study completion    PROCEDURE:  CT of the Abdomen and Pelvis was performed.  Sagittal and coronal reformats were performed.    FINDINGS:  LOWER CHEST: Subsegmental atelectasis at the lung bases.    LIVER: Within normal limits.  BILE DUCTS: Normal caliber.  GALLBLADDER: Multiple gallstones in the gallbladder. Gallbladder is distended with pericholecystic fluid.  SPLEEN: Within normal limits.  PANCREAS: Within normal limits.  ADRENALS: Within normal limits.  KIDNEYS/URETERS: Within normal limits.    BLADDER: Within normal limits.  REPRODUCTIVE ORGANS: Uterus and adnexa within normal limits.    BOWEL: No bowel obstruction. Appendix is normal. There is diverticulosis without evidence of diverticulitis.  PERITONEUM: No ascites.  VESSELS: Atherosclerotic changes.  RETROPERITONEUM/LYMPH NODES: No lymphadenopathy.  ABDOMINAL WALL: Within normal limits.  BONES: Degenerative changes. Status post bilateral hip arthroplasties.    IMPRESSION:  Multiple gallstones in the gallbladder with gallbladder distention and pericholecystic fluid, findings suspicious for acute cholecystitis. Recommend right upper quadrant ultrasound and HIDA scan for further evaluation.        --- End of Report ---

## 2021-11-15 NOTE — PHYSICAL THERAPY INITIAL EVALUATION ADULT - PERTINENT HX OF CURRENT PROBLEM, REHAB EVAL
89yo F with hx of strokes with impaired speech, HTN, HLD, OA, DM, presenting with sudden onset severe abdominal pain. Concern for gallstone pancreatitis with U/S and CT that shows cholelithiasis with trace noy-cholecystic fluid & GB wall thickening c/w possible cholecystitis.

## 2021-11-15 NOTE — PHYSICAL THERAPY INITIAL EVALUATION ADULT - ADDITIONAL COMMENTS
Pt. daughter Pat at bedside with information: Pt. lives with daughter Pat in a condo with 1 [address] and no stairs inside. Daughter at home at all times to assist as needed. Pt. was modified independent with a RW PTA and required supervision on 1 [address]. Pt. also owns shower chair, bars on bed, a bed with elevating head, a commode, and a shower chair.

## 2021-11-15 NOTE — PROGRESS NOTE ADULT - ATTENDING COMMENTS
I reviewed  the plan with NP. Pt was seen and examined with NP  trend LFT  IV antibiotics.   cholecystostomy tube if pt worse

## 2021-11-16 LAB
ALBUMIN SERPL ELPH-MCNC: 3.3 G/DL — SIGNIFICANT CHANGE UP (ref 3.3–5.2)
ALP SERPL-CCNC: 155 U/L — HIGH (ref 40–120)
ALT FLD-CCNC: 151 U/L — HIGH
ANION GAP SERPL CALC-SCNC: 14 MMOL/L — SIGNIFICANT CHANGE UP (ref 5–17)
AST SERPL-CCNC: 157 U/L — HIGH
BASOPHILS # BLD AUTO: 0.02 K/UL — SIGNIFICANT CHANGE UP (ref 0–0.2)
BASOPHILS NFR BLD AUTO: 0.2 % — SIGNIFICANT CHANGE UP (ref 0–2)
BILIRUB DIRECT SERPL-MCNC: 2.5 MG/DL — HIGH (ref 0–0.3)
BILIRUB INDIRECT FLD-MCNC: 1.4 MG/DL — HIGH (ref 0.2–1)
BILIRUB SERPL-MCNC: 3.9 MG/DL — HIGH (ref 0.4–2)
BUN SERPL-MCNC: 27.8 MG/DL — HIGH (ref 8–20)
CALCIUM SERPL-MCNC: 8.5 MG/DL — LOW (ref 8.6–10.2)
CHLORIDE SERPL-SCNC: 101 MMOL/L — SIGNIFICANT CHANGE UP (ref 98–107)
CO2 SERPL-SCNC: 25 MMOL/L — SIGNIFICANT CHANGE UP (ref 22–29)
CREAT SERPL-MCNC: 1.14 MG/DL — SIGNIFICANT CHANGE UP (ref 0.5–1.3)
EOSINOPHIL # BLD AUTO: 0 K/UL — SIGNIFICANT CHANGE UP (ref 0–0.5)
EOSINOPHIL NFR BLD AUTO: 0 % — SIGNIFICANT CHANGE UP (ref 0–6)
GLUCOSE BLDC GLUCOMTR-MCNC: 143 MG/DL — HIGH (ref 70–99)
GLUCOSE BLDC GLUCOMTR-MCNC: 144 MG/DL — HIGH (ref 70–99)
GLUCOSE BLDC GLUCOMTR-MCNC: 163 MG/DL — HIGH (ref 70–99)
GLUCOSE BLDC GLUCOMTR-MCNC: 163 MG/DL — HIGH (ref 70–99)
GLUCOSE SERPL-MCNC: 151 MG/DL — HIGH (ref 70–99)
HCT VFR BLD CALC: 35.1 % — SIGNIFICANT CHANGE UP (ref 34.5–45)
HGB BLD-MCNC: 11.5 G/DL — SIGNIFICANT CHANGE UP (ref 11.5–15.5)
IMM GRANULOCYTES NFR BLD AUTO: 0.7 % — SIGNIFICANT CHANGE UP (ref 0–1.5)
LYMPHOCYTES # BLD AUTO: 0.39 K/UL — LOW (ref 1–3.3)
LYMPHOCYTES # BLD AUTO: 4.1 % — LOW (ref 13–44)
MAGNESIUM SERPL-MCNC: 2.3 MG/DL — SIGNIFICANT CHANGE UP (ref 1.6–2.6)
MCHC RBC-ENTMCNC: 30.3 PG — SIGNIFICANT CHANGE UP (ref 27–34)
MCHC RBC-ENTMCNC: 32.8 GM/DL — SIGNIFICANT CHANGE UP (ref 32–36)
MCV RBC AUTO: 92.4 FL — SIGNIFICANT CHANGE UP (ref 80–100)
MONOCYTES # BLD AUTO: 0.65 K/UL — SIGNIFICANT CHANGE UP (ref 0–0.9)
MONOCYTES NFR BLD AUTO: 6.8 % — SIGNIFICANT CHANGE UP (ref 2–14)
NEUTROPHILS # BLD AUTO: 8.38 K/UL — HIGH (ref 1.8–7.4)
NEUTROPHILS NFR BLD AUTO: 88.2 % — HIGH (ref 43–77)
PHOSPHATE SERPL-MCNC: 2.9 MG/DL — SIGNIFICANT CHANGE UP (ref 2.4–4.7)
PLATELET # BLD AUTO: 135 K/UL — LOW (ref 150–400)
POTASSIUM SERPL-MCNC: 3.5 MMOL/L — SIGNIFICANT CHANGE UP (ref 3.5–5.3)
POTASSIUM SERPL-SCNC: 3.5 MMOL/L — SIGNIFICANT CHANGE UP (ref 3.5–5.3)
PROT SERPL-MCNC: 6.1 G/DL — LOW (ref 6.6–8.7)
RBC # BLD: 3.8 M/UL — SIGNIFICANT CHANGE UP (ref 3.8–5.2)
RBC # FLD: 14.4 % — SIGNIFICANT CHANGE UP (ref 10.3–14.5)
SODIUM SERPL-SCNC: 140 MMOL/L — SIGNIFICANT CHANGE UP (ref 135–145)
WBC # BLD: 9.51 K/UL — SIGNIFICANT CHANGE UP (ref 3.8–10.5)
WBC # FLD AUTO: 9.51 K/UL — SIGNIFICANT CHANGE UP (ref 3.8–10.5)

## 2021-11-16 PROCEDURE — 99233 SBSQ HOSP IP/OBS HIGH 50: CPT

## 2021-11-16 PROCEDURE — 93306 TTE W/DOPPLER COMPLETE: CPT | Mod: 26

## 2021-11-16 RX ORDER — IPRATROPIUM/ALBUTEROL SULFATE 18-103MCG
3 AEROSOL WITH ADAPTER (GRAM) INHALATION EVERY 6 HOURS
Refills: 0 | Status: DISCONTINUED | OUTPATIENT
Start: 2021-11-16 | End: 2021-11-20

## 2021-11-16 RX ORDER — HALOPERIDOL DECANOATE 100 MG/ML
2.5 INJECTION INTRAMUSCULAR ONCE
Refills: 0 | Status: COMPLETED | OUTPATIENT
Start: 2021-11-16 | End: 2021-11-16

## 2021-11-16 RX ORDER — POTASSIUM CHLORIDE 20 MEQ
10 PACKET (EA) ORAL
Refills: 0 | Status: COMPLETED | OUTPATIENT
Start: 2021-11-16 | End: 2021-11-16

## 2021-11-16 RX ADMIN — ENOXAPARIN SODIUM 30 MILLIGRAM(S): 100 INJECTION SUBCUTANEOUS at 13:10

## 2021-11-16 RX ADMIN — Medication 3 MILLILITER(S): at 10:12

## 2021-11-16 RX ADMIN — Medication 100 MILLIEQUIVALENT(S): at 13:09

## 2021-11-16 RX ADMIN — Medication 5 MILLIGRAM(S): at 17:20

## 2021-11-16 RX ADMIN — HALOPERIDOL DECANOATE 2.5 MILLIGRAM(S): 100 INJECTION INTRAMUSCULAR at 04:02

## 2021-11-16 RX ADMIN — Medication 5 MILLIGRAM(S): at 21:28

## 2021-11-16 RX ADMIN — PIPERACILLIN AND TAZOBACTAM 25 GRAM(S): 4; .5 INJECTION, POWDER, LYOPHILIZED, FOR SOLUTION INTRAVENOUS at 06:09

## 2021-11-16 RX ADMIN — PIPERACILLIN AND TAZOBACTAM 25 GRAM(S): 4; .5 INJECTION, POWDER, LYOPHILIZED, FOR SOLUTION INTRAVENOUS at 15:57

## 2021-11-16 RX ADMIN — PANTOPRAZOLE SODIUM 40 MILLIGRAM(S): 20 TABLET, DELAYED RELEASE ORAL at 13:11

## 2021-11-16 RX ADMIN — Medication 3 MILLILITER(S): at 21:05

## 2021-11-16 RX ADMIN — Medication 3 MILLILITER(S): at 02:50

## 2021-11-16 RX ADMIN — Medication 100 MILLIEQUIVALENT(S): at 12:16

## 2021-11-16 RX ADMIN — PIPERACILLIN AND TAZOBACTAM 25 GRAM(S): 4; .5 INJECTION, POWDER, LYOPHILIZED, FOR SOLUTION INTRAVENOUS at 21:34

## 2021-11-16 RX ADMIN — Medication 100 MILLIEQUIVALENT(S): at 17:20

## 2021-11-16 RX ADMIN — ATORVASTATIN CALCIUM 40 MILLIGRAM(S): 80 TABLET, FILM COATED ORAL at 21:28

## 2021-11-16 RX ADMIN — Medication 2: at 13:10

## 2021-11-16 RX ADMIN — Medication 3 MILLILITER(S): at 16:13

## 2021-11-16 RX ADMIN — Medication 12.5 MILLIGRAM(S): at 17:20

## 2021-11-16 NOTE — PROGRESS NOTE ADULT - PROBLEM SELECTOR PLAN 1
LFTs trending down this AM , , with slight increase in bili to 3.9. Continue to trend LFT  IV antibiotics  poor candidate for ERCP as patient may not tolerate due to her advanced age.   consider cholecystostomy tube if clinical condition worsens. Now  BP stable, does not appear to have pain.

## 2021-11-16 NOTE — PROGRESS NOTE ADULT - ASSESSMENT
89yo F with hx of strokes with impaired speech, HTN, HLD, OA, DM, now with probable mechanical Pancreatitis with normal appearing pancreas on CT scan with possible acute cholecystitis. U/S and CT of abdomen and pelvis revealed cholelithiasis with trace noy-cholecystic fluid & GB wall thickening c/w possible cholecystitis. Pancreas appears normal on CT scan but lipase is > 3000 now lipase 47.  Recommend to treat conservatively given her advanced age with IV antibiotics and IVF and supportive Rx.   If no improvement in condition, May want to consider Cholecystostomy tube.   -Continue to trend CBC, CMP.   -Protonix BID for cytoprotection.      89yo F with hx of strokes with impaired speech, HTN, HLD, OA, DM, now with probable mechanical Pancreatitis with normal appearing pancreas on CT scan with possible acute cholecystitis. U/S and CT of abdomen and pelvis revealed cholelithiasis with trace noy-cholecystic fluid & GB wall thickening c/w possible cholecystitis. Pancreas appears normal on CT scan but lipase is > 3000 now lipase 47.  Recommend to treat conservatively given her advanced age with IV antibiotics and IVF and supportive Rx.   If no improvement in condition, May want to consider Cholecystostomy tube.   -Continue to trend CBC, CMP.   -Protonix BID for cytoprotection.   -Will recommend Clear liquid diet with Ensures clears which is what patient was tolerating at home prior to admission.

## 2021-11-16 NOTE — PROGRESS NOTE ADULT - ATTENDING COMMENTS
Patient is a 89-year-old female who most likely has an acute cholecystitis accompanied by significantly abnormal liver function tests could be due to the cholecystitis itself.  Cannot exclude common bile duct stones but she is a very poor candidate for ERCP and may not even tolerate an MRCP.  It is unclear if she ever had any pancreatitis as the pancreas on CAT scan appeared normal and the lipase is quickly dropped from 3000 on antibiotic she has been afebrile for 24 hours and has not exhibited any nausea or vomiting.  There is only mild upper abdominal tenderness on physical exam.  It is possible that she may simply respond to the IV antibiotics and her liver function tests are trending down nicely.  At this time would simply recommend continued therapy as has already been instituted.  Will monitor LFTs.  The surgical team is considering a cholecystectomy and therefore I will leave any advancement of diet to their discretion.

## 2021-11-16 NOTE — DIETITIAN INITIAL EVALUATION ADULT. - PERTINENT MEDS FT
MEDICATIONS  (STANDING):  acetaminophen     Tablet .. 650 milliGRAM(s) Oral every 6 hours  albuterol/ipratropium for Nebulization 3 milliLiter(s) Nebulizer every 6 hours  atorvastatin 40 milliGRAM(s) Oral at bedtime  dextrose 40% Gel 15 Gram(s) Oral once  dextrose 50% Injectable 25 Gram(s) IV Push once  dextrose 50% Injectable 12.5 Gram(s) IV Push once  dextrose 50% Injectable 25 Gram(s) IV Push once  enoxaparin Injectable 30 milliGRAM(s) SubCutaneous daily  escitalopram 5 milliGRAM(s) Oral daily  glucagon  Injectable 1 milliGRAM(s) IntraMuscular once  influenza  Vaccine (HIGH DOSE) 0.7 milliLiter(s) IntraMuscular once  insulin lispro (ADMELOG) corrective regimen sliding scale   SubCutaneous every 6 hours  melatonin 3 milliGRAM(s) Oral at bedtime  metoprolol tartrate 12.5 milliGRAM(s) Oral every 12 hours  multiple electrolytes Injection Type 1 1000 milliLiter(s) (42 mL/Hr) IV Continuous <Continuous>  oxybutynin 5 milliGRAM(s) Oral every 8 hours  pantoprazole  Injectable 40 milliGRAM(s) IV Push daily  piperacillin/tazobactam IVPB.. 3.375 Gram(s) IV Intermittent every 8 hours  potassium chloride  10 mEq/100 mL IVPB 10 milliEquivalent(s) IV Intermittent every 1 hour    MEDICATIONS  (PRN):  albuterol/ipratropium for Nebulization 3 milliLiter(s) Nebulizer every 6 hours PRN Shortness of Breath and/or Wheezing

## 2021-11-16 NOTE — PROGRESS NOTE ADULT - ATTENDING COMMENTS
patient with cholelithiasis and likely CBD stone that is either passing or floating stone, causing fluctuations in bilirubin level  Abdomen soft  MRCP pending  patient arousable confused/somnolent  Bilateral BS  Patient is a very poor surgical candidate and every effort should be made to avoid surgery

## 2021-11-16 NOTE — PROGRESS NOTE ADULT - SUBJECTIVE AND OBJECTIVE BOX
INTERVAL HPI/OVERNIGHT EVENTS:  Patient was seen and examined at bedside this AM.  No acute events overnight. Downgraded from SICU. Nontoxic, minimal abd pain, obstructive jaundice worsen without evidence of choledocholithiasis    STATUS POST:      POST OPERATIVE DAY #:       MEDICATIONS  (STANDING):  acetaminophen     Tablet .. 650 milliGRAM(s) Oral every 6 hours  atorvastatin 40 milliGRAM(s) Oral at bedtime  dextrose 40% Gel 15 Gram(s) Oral once  dextrose 50% Injectable 25 Gram(s) IV Push once  dextrose 50% Injectable 12.5 Gram(s) IV Push once  dextrose 50% Injectable 25 Gram(s) IV Push once  enoxaparin Injectable 30 milliGRAM(s) SubCutaneous daily  escitalopram 5 milliGRAM(s) Oral daily  glucagon  Injectable 1 milliGRAM(s) IntraMuscular once  influenza  Vaccine (HIGH DOSE) 0.7 milliLiter(s) IntraMuscular once  insulin lispro (ADMELOG) corrective regimen sliding scale   SubCutaneous every 6 hours  melatonin 3 milliGRAM(s) Oral at bedtime  metoprolol tartrate 12.5 milliGRAM(s) Oral every 12 hours  multiple electrolytes Injection Type 1 1000 milliLiter(s) (42 mL/Hr) IV Continuous <Continuous>  oxybutynin 5 milliGRAM(s) Oral every 8 hours  pantoprazole  Injectable 40 milliGRAM(s) IV Push daily  piperacillin/tazobactam IVPB.. 3.375 Gram(s) IV Intermittent every 8 hours    MEDICATIONS  (PRN):  albuterol/ipratropium for Nebulization 3 milliLiter(s) Nebulizer every 6 hours PRN Shortness of Breath and/or Wheezing      Vital Signs Last 24 Hrs  T(C): 36.8 (15 Nov 2021 20:00), Max: 38.1 (15 Nov 2021 08:00)  T(F): 98.3 (15 Nov 2021 20:00), Max: 100.5 (15 Nov 2021 08:00)  HR: 84 (15 Nov 2021 20:00) (80 - 92)  BP: 146/76 (15 Nov 2021 20:00) (118/69 - 155/69)  BP(mean): 97 (15 Nov 2021 20:00) (79 - 106)  RR: 20 (15 Nov 2021 20:00) (16 - 29)  SpO2: 96% (15 Nov 2021 20:00) (88% - 100%)    Physical Exam:  GEN: patient resting comfortably in bed, in no acute distress  RESP: respirations are unlabored, no accessory muscle use, no conversational dyspnea  CVS: RRR  GI: Abdomen soft, non-tender, non-distended, no rebound tenderness / guarding, (-)Francis sign  MSK: 4/5 muscle strength bilaterally, 2+ peripheral pulses        I&O's Detail    14 Nov 2021 07:01  -  15 Nov 2021 07:00  --------------------------------------------------------  IN:    IV PiggyBack: 150 mL    IV PiggyBack: 75 mL    multiple electrolytes Injection Type 1.: 1300 mL    Oral Fluid: 60 mL  Total IN: 1585 mL    OUT:    Indwelling Catheter - Urethral (mL): 1185 mL  Total OUT: 1185 mL    Total NET: 400 mL      15 Nov 2021 07:01  -  16 Nov 2021 00:26  --------------------------------------------------------  IN:    IV PiggyBack: 50 mL    IV PiggyBack: 100 mL    multiple electrolytes Injection Type 1.: 168 mL    Oral Fluid: 150 mL  Total IN: 468 mL    OUT:    Indwelling Catheter - Urethral (mL): 93 mL    Voided (mL): 10 mL  Total OUT: 103 mL    Total NET: 365 mL          LABS:                        12.2   11.90 )-----------( 134      ( 15 Nov 2021 05:04 )             36.3     11-15    141  |  102  |  21.0<H>  ----------------------------<  121<H>  3.5   |  24.0  |  0.93    Ca    8.2<L>      15 Nov 2021 06:27  Phos  3.3     11-15  Mg     1.5     11-15    TPro  6.4<L>  /  Alb  3.6  /  TBili  5.2<H>  /  DBili  3.4<H>  /  AST  306<H>  /  ALT  243<H>  /  AlkPhos  194<H>  11-15    PT/INR - ( 14 Nov 2021 01:57 )   PT: 12.9 sec;   INR: 1.12 ratio         PTT - ( 14 Nov 2021 01:57 )  PTT:27.2 sec      RADIOLOGY & ADDITIONAL STUDIES:

## 2021-11-16 NOTE — PROGRESS NOTE ADULT - SUBJECTIVE AND OBJECTIVE BOX
Chief Complaint:  Patient is a 89y old  Female who presents with a chief complaint of Gallstone pancreatitis.      Interval Events / Subjective: Patient is a 89yo F with PMH of multiple stroke with speech deficit, HTN, HLD, DM (off medications), OA admitted for a 7hr-history of severe abdominal pain. Patient had U/S and CT that shows cholelithiasis with trace noy-cholecystic fluid & GB wall thickening c/w possible cholecystitis. Pancreas appears normal on CT scan but lipase is > 3000 on admission with most recent result 47. Bile ducts are normal in caliber on both U/S and CT scan. Patient extremely agitated and confused during night and was placed on constant observation with Haldol given. Unable to access complaints as patient is currently asleep (post Haldol) and has significant dysphasia. Patient seen and evaluated at bedside, No signs of abdominal pain and in no acute distress. LFTs trending down this AM , , with slight increase in bili to 3.9.         REVIEW OF SYSTEMS:   General: Negative  HEENT: Negative  CV: Negative  Respiratory: Negative  GI: See HPI  : Negative  MSK: Negative  Hematologic: Negative  Skin: Negative    MEDICATIONS:   MEDICATIONS  (STANDING):  acetaminophen     Tablet .. 650 milliGRAM(s) Oral every 6 hours  albuterol/ipratropium for Nebulization 3 milliLiter(s) Nebulizer every 6 hours  atorvastatin 40 milliGRAM(s) Oral at bedtime  dextrose 40% Gel 15 Gram(s) Oral once  dextrose 50% Injectable 25 Gram(s) IV Push once  dextrose 50% Injectable 12.5 Gram(s) IV Push once  dextrose 50% Injectable 25 Gram(s) IV Push once  enoxaparin Injectable 30 milliGRAM(s) SubCutaneous daily  escitalopram 5 milliGRAM(s) Oral daily  glucagon  Injectable 1 milliGRAM(s) IntraMuscular once  influenza  Vaccine (HIGH DOSE) 0.7 milliLiter(s) IntraMuscular once  insulin lispro (ADMELOG) corrective regimen sliding scale   SubCutaneous every 6 hours  melatonin 3 milliGRAM(s) Oral at bedtime  metoprolol tartrate 12.5 milliGRAM(s) Oral every 12 hours  multiple electrolytes Injection Type 1 1000 milliLiter(s) (42 mL/Hr) IV Continuous <Continuous>  oxybutynin 5 milliGRAM(s) Oral every 8 hours  pantoprazole  Injectable 40 milliGRAM(s) IV Push daily  piperacillin/tazobactam IVPB.. 3.375 Gram(s) IV Intermittent every 8 hours  potassium chloride  10 mEq/100 mL IVPB 10 milliEquivalent(s) IV Intermittent every 1 hour    MEDICATIONS  (PRN):  albuterol/ipratropium for Nebulization 3 milliLiter(s) Nebulizer every 6 hours PRN Shortness of Breath and/or Wheezing      ALLERGIES:   Allergies    No Known Allergies    Intolerances        VITAL SIGNS:   Vital Signs Last 24 Hrs  T(C): 36.9 (16 Nov 2021 04:00), Max: 36.9 (16 Nov 2021 04:00)  T(F): 98.5 (16 Nov 2021 04:00), Max: 98.5 (16 Nov 2021 04:00)  HR: 88 (16 Nov 2021 10:12) (81 - 106)  BP: 122/110 (16 Nov 2021 08:00) (121/75 - 146/76)  BP(mean): 75 (16 Nov 2021 04:00) (75 - 103)  RR: 16 (16 Nov 2021 08:00) (16 - 23)  SpO2: 99% (16 Nov 2021 10:12) (88% - 100%)  I&O's Summary    15 Nov 2021 07:01  -  16 Nov 2021 07:00  --------------------------------------------------------  IN: 988 mL / OUT: 203 mL / NET: 785 mL    16 Nov 2021 07:01  -  16 Nov 2021 11:16  --------------------------------------------------------  IN: 0 mL / OUT: 0 mL / NET: 0 mL        PHYSICAL EXAM:   GENERAL: Frail and ill appearing, no distress  HEENT:  NC/AT,  conjunctivae clear, sclera -anicteric  CHEST:  Full & symmetric excursion, no increased effort, breath sounds clear  HEART:  Regular rhythm, S1, S2, no murmur/rub/S3/S4,  no edema  ABDOMEN:  Soft, non-tender, non-distended, normoactive bowel sounds,  no masses, no hepatosplenomegaly,   EXTREMITIES: No cyanosis, clubbing or edema  SKIN:  No rash/erythema/ecchymoses/petechiae/wounds/abscess/warm/dry  NEURO:  Alert, oriented    LABS:  CBC Full  -  ( 16 Nov 2021 06:05 )  WBC Count : 9.51 K/uL  RBC Count : 3.80 M/uL  Hemoglobin : 11.5 g/dL  Hematocrit : 35.1 %  Platelet Count - Automated : 135 K/uL  Mean Cell Volume : 92.4 fl  Mean Cell Hemoglobin : 30.3 pg  Mean Cell Hemoglobin Concentration : 32.8 gm/dL  Auto Neutrophil # : 8.38 K/uL  Auto Lymphocyte # : 0.39 K/uL  Auto Monocyte # : 0.65 K/uL  Auto Eosinophil # : 0.00 K/uL  Auto Basophil # : 0.02 K/uL  Auto Neutrophil % : 88.2 %  Auto Lymphocyte % : 4.1 %  Auto Monocyte % : 6.8 %  Auto Eosinophil % : 0.0 %  Auto Basophil % : 0.2 %    11-16    140  |  101  |  27.8<H>  ----------------------------<  151<H>  3.5   |  25.0  |  1.14    Ca    8.5<L>      16 Nov 2021 06:05  Phos  2.9     11-16  Mg     2.3     11-16    TPro  6.1<L>  /  Alb  3.3  /  TBili  3.9<H>  /  DBili  2.5<H>  /  AST  157<H>  /  ALT  151<H>  /  AlkPhos  155<H>  11-16    LIVER FUNCTIONS - ( 16 Nov 2021 06:05 )  Alb: 3.3 g/dL / Pro: 6.1 g/dL / ALK PHOS: 155 U/L / ALT: 151 U/L / AST: 157 U/L / GGT: x             Amylase, Serum Total (11.15.21 @ 06:27)    Amylase, Serum Total: 63 U/L          RADIOLOGY & ADDITIONAL STUDIES (The following images were personally reviewed):       EXAM:  CT ABDOMEN AND PELVIS IC                          PROCEDURE DATE:  11/14/2021          INTERPRETATION:  CLINICAL INFORMATION: abd pain    COMPARISON: None.    CONTRAST/COMPLICATIONS:  IV Contrast: Omnipaque 300  95 cc administered   5 cc discarded  Oral Contrast: NONE  Complications: None reported at time of study completion    PROCEDURE:  CT of the Abdomen and Pelvis was performed.  Sagittal and coronal reformats were performed.    FINDINGS:  LOWER CHEST: Subsegmental atelectasis at the lung bases.    LIVER: Within normal limits.  BILE DUCTS: Normal caliber.  GALLBLADDER: Multiple gallstones in the gallbladder. Gallbladder is distended with pericholecystic fluid.  SPLEEN: Within normal limits.  PANCREAS: Within normal limits.  ADRENALS: Within normal limits.  KIDNEYS/URETERS: Within normal limits.    BLADDER: Within normal limits.  REPRODUCTIVE ORGANS: Uterus and adnexa within normal limits.    BOWEL: No bowel obstruction. Appendix is normal. There is diverticulosis without evidence of diverticulitis.  PERITONEUM: No ascites.  VESSELS: Atherosclerotic changes.  RETROPERITONEUM/LYMPH NODES: No lymphadenopathy.  ABDOMINAL WALL: Within normal limits.  BONES: Degenerative changes. Status post bilateral hip arthroplasties.    IMPRESSION:  Multiple gallstones in the gallbladder with gallbladder distention and pericholecystic fluid, findings suspicious for acute cholecystitis. Recommend right upper quadrant ultrasound and HIDA scan for further evaluation.        --- End of Report ---         Chief Complaint:  Patient is a 89y old  Female who presents with a chief complaint of acute cholecystitis with elevated LFTs and ? pancreatitis      Interval Events / Subjective: Patient is a 89yo F with PMH of multiple stroke with speech deficit, HTN, HLD, DM (off medications), OA admitted for a 7hr-history of severe abdominal pain. Patient had U/S and CT that shows cholelithiasis with trace noy-cholecystic fluid & GB wall thickening c/w possible cholecystitis. Pancreas appears normal on CT scan but lipase is > 3000 on admission with most recent result 47. Bile ducts are normal in caliber on both U/S and CT scan. Patient extremely agitated and confused during night and was placed on constant observation with Haldol given. Unable to access complaints as patient is currently asleep (post Haldol) and has significant dysphasia. Patient seen and evaluated at bedside, No signs of abdominal pain and in no acute distress. LFTs trending down this AM , , with slight increase in bili to 3.9.         REVIEW OF SYSTEMS:   General: Negative  HEENT: Negative  CV: Negative  Respiratory: Negative  GI: See HPI  : Negative  MSK: Negative  Hematologic: Negative  Skin: Negative    MEDICATIONS:   MEDICATIONS  (STANDING):  acetaminophen     Tablet .. 650 milliGRAM(s) Oral every 6 hours  albuterol/ipratropium for Nebulization 3 milliLiter(s) Nebulizer every 6 hours  atorvastatin 40 milliGRAM(s) Oral at bedtime  dextrose 40% Gel 15 Gram(s) Oral once  dextrose 50% Injectable 25 Gram(s) IV Push once  dextrose 50% Injectable 12.5 Gram(s) IV Push once  dextrose 50% Injectable 25 Gram(s) IV Push once  enoxaparin Injectable 30 milliGRAM(s) SubCutaneous daily  escitalopram 5 milliGRAM(s) Oral daily  glucagon  Injectable 1 milliGRAM(s) IntraMuscular once  influenza  Vaccine (HIGH DOSE) 0.7 milliLiter(s) IntraMuscular once  insulin lispro (ADMELOG) corrective regimen sliding scale   SubCutaneous every 6 hours  melatonin 3 milliGRAM(s) Oral at bedtime  metoprolol tartrate 12.5 milliGRAM(s) Oral every 12 hours  multiple electrolytes Injection Type 1 1000 milliLiter(s) (42 mL/Hr) IV Continuous <Continuous>  oxybutynin 5 milliGRAM(s) Oral every 8 hours  pantoprazole  Injectable 40 milliGRAM(s) IV Push daily  piperacillin/tazobactam IVPB.. 3.375 Gram(s) IV Intermittent every 8 hours  potassium chloride  10 mEq/100 mL IVPB 10 milliEquivalent(s) IV Intermittent every 1 hour    MEDICATIONS  (PRN):  albuterol/ipratropium for Nebulization 3 milliLiter(s) Nebulizer every 6 hours PRN Shortness of Breath and/or Wheezing      ALLERGIES:   Allergies    No Known Allergies    Intolerances        VITAL SIGNS:   Vital Signs Last 24 Hrs  T(C): 36.9 (16 Nov 2021 04:00), Max: 36.9 (16 Nov 2021 04:00)  T(F): 98.5 (16 Nov 2021 04:00), Max: 98.5 (16 Nov 2021 04:00)  HR: 88 (16 Nov 2021 10:12) (81 - 106)  BP: 122/110 (16 Nov 2021 08:00) (121/75 - 146/76)  BP(mean): 75 (16 Nov 2021 04:00) (75 - 103)  RR: 16 (16 Nov 2021 08:00) (16 - 23)  SpO2: 99% (16 Nov 2021 10:12) (88% - 100%)  I&O's Summary    15 Nov 2021 07:01  -  16 Nov 2021 07:00  --------------------------------------------------------  IN: 988 mL / OUT: 203 mL / NET: 785 mL    16 Nov 2021 07:01  -  16 Nov 2021 11:16  --------------------------------------------------------  IN: 0 mL / OUT: 0 mL / NET: 0 mL        PHYSICAL EXAM:   GENERAL: Frail and ill appearing, no distress  HEENT:  NC/AT,  conjunctivae clear, sclera -anicteric  CHEST:  Full & symmetric excursion, no increased effort, breath sounds clear  HEART:  Regular rhythm, S1, S2, no murmur/rub/S3/S4,  no edema  ABDOMEN:  Soft, non-tender, non-distended, normoactive bowel sounds,  no masses, no hepatosplenomegaly,   EXTREMITIES: No cyanosis, clubbing or edema  SKIN:  No rash/erythema/ecchymoses/petechiae/wounds/abscess/warm/dry  NEURO:  Alert, oriented    LABS:  CBC Full  -  ( 16 Nov 2021 06:05 )  WBC Count : 9.51 K/uL  RBC Count : 3.80 M/uL  Hemoglobin : 11.5 g/dL  Hematocrit : 35.1 %  Platelet Count - Automated : 135 K/uL  Mean Cell Volume : 92.4 fl  Mean Cell Hemoglobin : 30.3 pg  Mean Cell Hemoglobin Concentration : 32.8 gm/dL  Auto Neutrophil # : 8.38 K/uL  Auto Lymphocyte # : 0.39 K/uL  Auto Monocyte # : 0.65 K/uL  Auto Eosinophil # : 0.00 K/uL  Auto Basophil # : 0.02 K/uL  Auto Neutrophil % : 88.2 %  Auto Lymphocyte % : 4.1 %  Auto Monocyte % : 6.8 %  Auto Eosinophil % : 0.0 %  Auto Basophil % : 0.2 %    11-16    140  |  101  |  27.8<H>  ----------------------------<  151<H>  3.5   |  25.0  |  1.14    Ca    8.5<L>      16 Nov 2021 06:05  Phos  2.9     11-16  Mg     2.3     11-16    TPro  6.1<L>  /  Alb  3.3  /  TBili  3.9<H>  /  DBili  2.5<H>  /  AST  157<H>  /  ALT  151<H>  /  AlkPhos  155<H>  11-16    LIVER FUNCTIONS - ( 16 Nov 2021 06:05 )  Alb: 3.3 g/dL / Pro: 6.1 g/dL / ALK PHOS: 155 U/L / ALT: 151 U/L / AST: 157 U/L / GGT: x             Amylase, Serum Total (11.15.21 @ 06:27)    Amylase, Serum Total: 63 U/L          RADIOLOGY & ADDITIONAL STUDIES (The following images were personally reviewed):       EXAM:  CT ABDOMEN AND PELVIS IC                          PROCEDURE DATE:  11/14/2021          INTERPRETATION:  CLINICAL INFORMATION: abd pain    COMPARISON: None.    CONTRAST/COMPLICATIONS:  IV Contrast: Omnipaque 300  95 cc administered   5 cc discarded  Oral Contrast: NONE  Complications: None reported at time of study completion    PROCEDURE:  CT of the Abdomen and Pelvis was performed.  Sagittal and coronal reformats were performed.    FINDINGS:  LOWER CHEST: Subsegmental atelectasis at the lung bases.    LIVER: Within normal limits.  BILE DUCTS: Normal caliber.  GALLBLADDER: Multiple gallstones in the gallbladder. Gallbladder is distended with pericholecystic fluid.  SPLEEN: Within normal limits.  PANCREAS: Within normal limits.  ADRENALS: Within normal limits.  KIDNEYS/URETERS: Within normal limits.    BLADDER: Within normal limits.  REPRODUCTIVE ORGANS: Uterus and adnexa within normal limits.    BOWEL: No bowel obstruction. Appendix is normal. There is diverticulosis without evidence of diverticulitis.  PERITONEUM: No ascites.  VESSELS: Atherosclerotic changes.  RETROPERITONEUM/LYMPH NODES: No lymphadenopathy.  ABDOMINAL WALL: Within normal limits.  BONES: Degenerative changes. Status post bilateral hip arthroplasties.    IMPRESSION:  Multiple gallstones in the gallbladder with gallbladder distention and pericholecystic fluid, findings suspicious for acute cholecystitis. Recommend right upper quadrant ultrasound and HIDA scan for further evaluation.        --- End of Report ---

## 2021-11-16 NOTE — DIETITIAN NUTRITION RISK NOTIFICATION - ADDITIONAL COMMENTS/DIETITIAN RECOMMENDATIONS
Advance diet as medically feasible/tolerable.  Ensure Clear TID to optimize po intake and provide an additional 240 kcal, 8g protein, 0g fat per serving.  Rx: MVI and vitamin C 500mg daily.   Encourage po intake, monitor diet tolerance, and provide assistance at meals as needed.   Obtain daily weights to monitor trends.

## 2021-11-16 NOTE — DIETITIAN INITIAL EVALUATION ADULT. - ETIOLOGY
related to inability to meet sufficient protein-energy in setting of advanced age, poor skin integrity, h/o stroke, persistent poor appetite, now with acute cholecystitis

## 2021-11-16 NOTE — CDI QUERY NOTE - NSCDIOTHERTXTBX2_GEN_ALL_CORE_FT
HPI:  89 year old female presented on 11/14/2021 with abdominal pain.  Per the H&P, the patient was "AAOx3".  The 11/16/2021 Chart Note-Event Note documents "episodes of agitation and confusion throughout the night" requiring mitten management, 1:1 supervision, and Haldol.    Please clarify if there is a diagnosis that could be associated with the above findings.    A)  Metabolic Encephalopathy  B)  Other, please specify  C)  Not clinically significant          Supporting documentation:    H&P Adult [Charted Location: [facility] ER 1606 07] [Authored: 14-Nov-2021 05:18]  Physical Exam:   Physical Exam: GENERAL: Alert, fragile, in no acute distress.  MENTAL STATUS: AAOx3. Appropriate affect.  HEENT: PERRLA. EOMI. MMM.  Trachea midline.   CHEST: non-labored breathing or dyspnea  GASTROINTESTINAL: abdomen mildly distended soft and depressible, tender to palpation to epigastrium and RUQ. Non peritonitic   MUSCULOSKELETAL: No cyanosis or clubbing. No gross deformities.          Chart Note-Event Note NP [Charted Location: [address] 4225 02] [Authored: 16-Nov-2021 04:34]- for Visit: [id number], Complete, Entered, Signed in Full, General  · Note Type	Event Note  Patient has had episodes of agitation and confusion throughout the night, Dr. Elizondo aware. We discussed giving patient 2.5 of haldol earlier in the evening and continue with the restraints as long as they were not adding to the agitation as the patient constantly is taking her oxygen off, pulled out 2 iv's and failed mitten management as she managed to remove them as well. RN called to inform patient was again agitated, trying to get out of bed. I went to evaluate patient, and 2/2 to patient calmer with the presence of the RN at the bedside will order a 1:1 and an additional dose of haldol, will keep restraints until 1:1 can try to redirect and prevent patient from interfering with medical management.

## 2021-11-16 NOTE — DIETITIAN INITIAL EVALUATION ADULT. - OTHER INFO
89 year old female PMH of stroke, CVA, HTN, dyslipidemia presenting with sudden onset severe abdominal pain. CT A/P revealed multiple gallstones in the gallbladder, findings suspicious for acute cholecystitis. Pt AxOx1, spoke with family at bedside who reports pt has been with very poor appetite/po intake at home. Daughter denies pt with any chewing/swallow difficulty prior to admission. Reports pt has lost ~40 lbs since she had her stroke. Pt currently on a cons cho clear liquid diet. Limited NFPE conducted. RD to follow up.

## 2021-11-16 NOTE — PROGRESS NOTE ADULT - ASSESSMENT
89F with gallstone pancreatitis, clinically resolved, Grade II cholecystitis, choledocholithiasis vs Mirizzi syndrome for uptrending obstructive jaundice without stigmata of cholangitis, nontoxic HDnl without peritonitis, physiologically stable  -MRCP to evaluate for choledocholithiais  -Continue NPO, will ADAT pending MRCP results and GI plan regarding roll of biliary decompression  -Trend bilirubins and transaminases  -Cont Zosyn for acute cholecystitis, symptoms improving, no role for cholecystostomy tube  -NPO, will ADAT to low fat pending plan for further intervention  -MOLST, will discuss goals of care with pt and family regarding the roll/risks/benefits for same admission cholecystectomy. Pts age is not an absolute or relative contraindication as long as pt and HCP agree and understand the associated risks of cholecystectomy as well as the risk of recurrent pancreatitis without cholecystectomy.   -DVT PPx  -OOBamb PT 89F with gallstone pancreatitis, clinically resolved, Grade II cholecystitis, choledocholithiasis vs Mirizzi syndrome for uptrending obstructive jaundice without stigmata of cholangitis, nontoxic HDnl without peritonitis, physiologically stable  -MRCP to evaluate for choledocholithiais  -Continue NPO, will ADAT pending MRCP results and GI plan regarding roll of biliary decompression  -Trend bilirubins and transaminases  -Cont Zosyn for acute cholecystitis, symptoms improving, no role for cholecystostomy tube  -NPO, will ADAT to low fat pending plan for further intervention  -MOLST, will discuss goals of care with pt and family regarding the roll/risks/benefits for same admission cholecystectomy. Pts age is not an absolute or relative contraindication as long as pt and HCP agree and understand the associated risks of cholecystectomy as well as the risk of recurrent pancreatitis without cholecystectomy.   -Unable to evaluate functional status given dementia, Echocardiogram to assess cardiac function for risk stratification.   -DVT PPx  -OOBamb PT

## 2021-11-16 NOTE — DIETITIAN NUTRITION RISK NOTIFICATION - TREATMENT: THE FOLLOWING DIET HAS BEEN RECOMMENDED
Diet, Clear Liquid:   Consistent Carbohydrate {No Snacks} (CSTCHO) (11-16-21 @ 14:10) [Active]

## 2021-11-16 NOTE — CDI QUERY NOTE - NSCDI_DOCCLARIFY_GEN_ALL_CORE_FT
In responding to this request, please exercise your independent professional judgment.  The fact that a question is asked does not imply that any particular answer is desired or expected. Assessment and plan of treatment:	You were seen in the Emergency Department for priapism. It was drained. Please follow up with your regular physician this week for reevaluation. Please follow up with urology as discussed. Please return to the Emergency Department if you have any new concerning symptoms such as severe pain, weakness, or any other concerning symptoms. Principal Discharge DX:	Priapism  Assessment and plan of treatment:	You were seen in the Emergency Department for priapism. It was drained. Please follow up with your regular physician this week for reevaluation. Please follow up with urology as discussed. Please return to the Emergency Department if you have any new concerning symptoms such as severe pain, weakness, or any other concerning symptoms.

## 2021-11-16 NOTE — DIETITIAN INITIAL EVALUATION ADULT. - ORAL NUTRITION SUPPLEMENTS
Ensure Clear TID to optimize po intake and provide an additional 240 kcal, 8g protein, 0g fat per serving.

## 2021-11-16 NOTE — DIETITIAN INITIAL EVALUATION ADULT. - PERTINENT LABORATORY DATA
11-16 Na140 mmol/L Glu 151 mg/dL<H> K+ 3.5 mmol/L Cr  1.14 mg/dL BUN 27.8 mg/dL<H> Phos 2.9 mg/dL Alb 3.3 g/dL PAB n/a

## 2021-11-17 ENCOUNTER — TRANSCRIPTION ENCOUNTER (OUTPATIENT)
Age: 86
End: 2021-11-17

## 2021-11-17 LAB
ALBUMIN SERPL ELPH-MCNC: 3 G/DL — LOW (ref 3.3–5.2)
ALP SERPL-CCNC: 130 U/L — HIGH (ref 40–120)
ALT FLD-CCNC: 108 U/L — HIGH
ANION GAP SERPL CALC-SCNC: 12 MMOL/L — SIGNIFICANT CHANGE UP (ref 5–17)
AST SERPL-CCNC: 87 U/L — HIGH
BILIRUB DIRECT SERPL-MCNC: 1.3 MG/DL — HIGH (ref 0–0.3)
BILIRUB INDIRECT FLD-MCNC: 0.9 MG/DL — SIGNIFICANT CHANGE UP (ref 0.2–1)
BILIRUB SERPL-MCNC: 2.2 MG/DL — HIGH (ref 0.4–2)
BUN SERPL-MCNC: 25.6 MG/DL — HIGH (ref 8–20)
CALCIUM SERPL-MCNC: 8.2 MG/DL — LOW (ref 8.6–10.2)
CHLORIDE SERPL-SCNC: 102 MMOL/L — SIGNIFICANT CHANGE UP (ref 98–107)
CO2 SERPL-SCNC: 27 MMOL/L — SIGNIFICANT CHANGE UP (ref 22–29)
CREAT SERPL-MCNC: 1.08 MG/DL — SIGNIFICANT CHANGE UP (ref 0.5–1.3)
GLUCOSE BLDC GLUCOMTR-MCNC: 124 MG/DL — HIGH (ref 70–99)
GLUCOSE BLDC GLUCOMTR-MCNC: 125 MG/DL — HIGH (ref 70–99)
GLUCOSE BLDC GLUCOMTR-MCNC: 163 MG/DL — HIGH (ref 70–99)
GLUCOSE BLDC GLUCOMTR-MCNC: 180 MG/DL — HIGH (ref 70–99)
GLUCOSE BLDC GLUCOMTR-MCNC: 199 MG/DL — HIGH (ref 70–99)
GLUCOSE BLDC GLUCOMTR-MCNC: 234 MG/DL — HIGH (ref 70–99)
GLUCOSE SERPL-MCNC: 99 MG/DL — SIGNIFICANT CHANGE UP (ref 70–99)
HCT VFR BLD CALC: 33.4 % — LOW (ref 34.5–45)
HGB BLD-MCNC: 11.2 G/DL — LOW (ref 11.5–15.5)
MAGNESIUM SERPL-MCNC: 2 MG/DL — SIGNIFICANT CHANGE UP (ref 1.6–2.6)
MCHC RBC-ENTMCNC: 30.6 PG — SIGNIFICANT CHANGE UP (ref 27–34)
MCHC RBC-ENTMCNC: 33.5 GM/DL — SIGNIFICANT CHANGE UP (ref 32–36)
MCV RBC AUTO: 91.3 FL — SIGNIFICANT CHANGE UP (ref 80–100)
PHOSPHATE SERPL-MCNC: 2.1 MG/DL — LOW (ref 2.4–4.7)
PLATELET # BLD AUTO: 125 K/UL — LOW (ref 150–400)
POTASSIUM SERPL-MCNC: 3.2 MMOL/L — LOW (ref 3.5–5.3)
POTASSIUM SERPL-SCNC: 3.2 MMOL/L — LOW (ref 3.5–5.3)
PROT SERPL-MCNC: 5.5 G/DL — LOW (ref 6.6–8.7)
RBC # BLD: 3.66 M/UL — LOW (ref 3.8–5.2)
RBC # FLD: 14.4 % — SIGNIFICANT CHANGE UP (ref 10.3–14.5)
SODIUM SERPL-SCNC: 141 MMOL/L — SIGNIFICANT CHANGE UP (ref 135–145)
WBC # BLD: 7.87 K/UL — SIGNIFICANT CHANGE UP (ref 3.8–10.5)
WBC # FLD AUTO: 7.87 K/UL — SIGNIFICANT CHANGE UP (ref 3.8–10.5)

## 2021-11-17 PROCEDURE — 99233 SBSQ HOSP IP/OBS HIGH 50: CPT

## 2021-11-17 RX ORDER — IPRATROPIUM/ALBUTEROL SULFATE 18-103MCG
3 AEROSOL WITH ADAPTER (GRAM) INHALATION
Qty: 0 | Refills: 0 | DISCHARGE
Start: 2021-11-17

## 2021-11-17 RX ORDER — PIPERACILLIN AND TAZOBACTAM 4; .5 G/20ML; G/20ML
3.38 INJECTION, POWDER, LYOPHILIZED, FOR SOLUTION INTRAVENOUS EVERY 8 HOURS
Refills: 0 | Status: DISCONTINUED | OUTPATIENT
Start: 2021-11-17 | End: 2021-11-17

## 2021-11-17 RX ORDER — POTASSIUM CHLORIDE 20 MEQ
10 PACKET (EA) ORAL
Refills: 0 | Status: COMPLETED | OUTPATIENT
Start: 2021-11-17 | End: 2021-11-17

## 2021-11-17 RX ORDER — PIPERACILLIN AND TAZOBACTAM 4; .5 G/20ML; G/20ML
3.38 INJECTION, POWDER, LYOPHILIZED, FOR SOLUTION INTRAVENOUS EVERY 8 HOURS
Refills: 0 | Status: DISCONTINUED | OUTPATIENT
Start: 2021-11-17 | End: 2021-11-20

## 2021-11-17 RX ORDER — POTASSIUM PHOSPHATE, MONOBASIC POTASSIUM PHOSPHATE, DIBASIC 236; 224 MG/ML; MG/ML
30 INJECTION, SOLUTION INTRAVENOUS ONCE
Refills: 0 | Status: COMPLETED | OUTPATIENT
Start: 2021-11-17 | End: 2021-11-19

## 2021-11-17 RX ADMIN — Medication 3 MILLILITER(S): at 04:26

## 2021-11-17 RX ADMIN — PANTOPRAZOLE SODIUM 40 MILLIGRAM(S): 20 TABLET, DELAYED RELEASE ORAL at 12:00

## 2021-11-17 RX ADMIN — Medication 2: at 01:24

## 2021-11-17 RX ADMIN — Medication 5 MILLIGRAM(S): at 16:25

## 2021-11-17 RX ADMIN — ESCITALOPRAM OXALATE 5 MILLIGRAM(S): 10 TABLET, FILM COATED ORAL at 13:13

## 2021-11-17 RX ADMIN — SODIUM CHLORIDE 42 MILLILITER(S): 9 INJECTION, SOLUTION INTRAVENOUS at 18:57

## 2021-11-17 RX ADMIN — Medication 3 MILLIGRAM(S): at 21:33

## 2021-11-17 RX ADMIN — Medication 12.5 MILLIGRAM(S): at 17:57

## 2021-11-17 RX ADMIN — PIPERACILLIN AND TAZOBACTAM 25 GRAM(S): 4; .5 INJECTION, POWDER, LYOPHILIZED, FOR SOLUTION INTRAVENOUS at 06:03

## 2021-11-17 RX ADMIN — Medication 100 MILLIEQUIVALENT(S): at 10:19

## 2021-11-17 RX ADMIN — Medication 100 MILLIEQUIVALENT(S): at 13:12

## 2021-11-17 RX ADMIN — Medication 2: at 21:34

## 2021-11-17 RX ADMIN — Medication 3 MILLILITER(S): at 15:47

## 2021-11-17 RX ADMIN — ENOXAPARIN SODIUM 30 MILLIGRAM(S): 100 INJECTION SUBCUTANEOUS at 13:08

## 2021-11-17 RX ADMIN — Medication 3 MILLILITER(S): at 08:41

## 2021-11-17 RX ADMIN — Medication 3 MILLILITER(S): at 03:59

## 2021-11-17 RX ADMIN — Medication 12.5 MILLIGRAM(S): at 06:08

## 2021-11-17 RX ADMIN — Medication 5 MILLIGRAM(S): at 06:09

## 2021-11-17 RX ADMIN — Medication 2: at 17:58

## 2021-11-17 RX ADMIN — Medication 5 MILLIGRAM(S): at 21:33

## 2021-11-17 RX ADMIN — ATORVASTATIN CALCIUM 40 MILLIGRAM(S): 80 TABLET, FILM COATED ORAL at 21:33

## 2021-11-17 RX ADMIN — Medication 4: at 13:12

## 2021-11-17 RX ADMIN — PIPERACILLIN AND TAZOBACTAM 25 GRAM(S): 4; .5 INJECTION, POWDER, LYOPHILIZED, FOR SOLUTION INTRAVENOUS at 21:33

## 2021-11-17 RX ADMIN — PIPERACILLIN AND TAZOBACTAM 25 GRAM(S): 4; .5 INJECTION, POWDER, LYOPHILIZED, FOR SOLUTION INTRAVENOUS at 15:26

## 2021-11-17 RX ADMIN — Medication 3 MILLILITER(S): at 20:04

## 2021-11-17 RX ADMIN — Medication 100 MILLIEQUIVALENT(S): at 12:21

## 2021-11-17 NOTE — PROGRESS NOTE ADULT - SUBJECTIVE AND OBJECTIVE BOX
Chief Complaint:  Patient is a 89y old  Female being seen for follow up consultation for acute cholecystitis with elevated LFTs.       Interval Events / Subjective: Patient seen and evaluated at bedside, reporting no complaints, no overnight events. Patient appears more alert this AM and oriented to name, , location, and current president. Patient had U/S and CT that shows cholelithiasis with trace noy-cholecystic fluid & GB wall thickening c/w possible cholecystitis. Pancreas appears normal on CT scan but lipase is > 3000 on admission with most recent result 47. Bile ducts are normal in caliber on both U/S and CT scan. LFT this morning continues to down trend with AST 87, , Alk Phos 10, BIli 2.2. Denies nausea, vomiting, abdominal pain, chest pain, shortness of breath.      REVIEW OF SYSTEMS:   General: Negative  HEENT: Negative  CV: Negative  Respiratory: Negative  GI: See HPI  : Negative  MSK: Negative  Hematologic: Negative  Skin: Negative    MEDICATIONS:   MEDICATIONS  (STANDING):  acetaminophen     Tablet .. 650 milliGRAM(s) Oral every 6 hours  albuterol/ipratropium for Nebulization 3 milliLiter(s) Nebulizer every 6 hours  atorvastatin 40 milliGRAM(s) Oral at bedtime  dextrose 40% Gel 15 Gram(s) Oral once  dextrose 50% Injectable 25 Gram(s) IV Push once  dextrose 50% Injectable 12.5 Gram(s) IV Push once  dextrose 50% Injectable 25 Gram(s) IV Push once  enoxaparin Injectable 30 milliGRAM(s) SubCutaneous daily  escitalopram 5 milliGRAM(s) Oral daily  glucagon  Injectable 1 milliGRAM(s) IntraMuscular once  influenza  Vaccine (HIGH DOSE) 0.7 milliLiter(s) IntraMuscular once  insulin lispro (ADMELOG) corrective regimen sliding scale   SubCutaneous every 6 hours  melatonin 3 milliGRAM(s) Oral at bedtime  metoprolol tartrate 12.5 milliGRAM(s) Oral every 12 hours  multiple electrolytes Injection Type 1 1000 milliLiter(s) (42 mL/Hr) IV Continuous <Continuous>  oxybutynin 5 milliGRAM(s) Oral every 8 hours  pantoprazole  Injectable 40 milliGRAM(s) IV Push daily  piperacillin/tazobactam IVPB.. 3.375 Gram(s) IV Intermittent every 8 hours  potassium chloride  10 mEq/100 mL IVPB 10 milliEquivalent(s) IV Intermittent every 1 hour  potassium phosphate IVPB 30 milliMole(s) IV Intermittent once    MEDICATIONS  (PRN):  albuterol/ipratropium for Nebulization 3 milliLiter(s) Nebulizer every 6 hours PRN Shortness of Breath and/or Wheezing      ALLERGIES:   Allergies    No Known Allergies    Intolerances        VITAL SIGNS:   Vital Signs Last 24 Hrs  T(C): 37.1 (2021 08:00), Max: 37.1 (2021 08:00)  T(F): 98.7 (2021 08:00), Max: 98.7 (2021 08:00)  HR: 80 (2021 08:44) (80 - 100)  BP: 138/77 (2021 08:00) (125/100 - 150/76)  BP(mean): 85 (2021 20:00) (85 - 109)  RR: 16 (2021 08:00) (16 - 18)  SpO2: 96% (2021 08:44) (95% - 100%)  I&O's Summary    2021 07:01  -  2021 07:00  --------------------------------------------------------  IN: 614 mL / OUT: 300 mL / NET: 314 mL        PHYSICAL EXAM:   GENERAL:  Frail, Patient resting comfortably in bed, no acute distress  HEENT:  NC/AT,  conjunctivae clear, sclera -anicteric  CHEST:  Full & symmetric excursion, no increased effort, breath sounds clear  HEART:  Regular rhythm, S1, S2, no murmur/rub/S3/S4,  no edema  ABDOMEN:  Soft, non-tender, non-distended, normoactive bowel sounds,  no masses, no hepatosplenomegaly,   EXTREMITIES: No cyanosis, clubbing or edema  SKIN:  No rash/erythema/ecchymoses/petechiae/wounds/abscess/warm/dry  NEURO:  Alert, oriented    LABS:  CBC Full  -  ( 2021 06:37 )  WBC Count : 7.87 K/uL  RBC Count : 3.66 M/uL  Hemoglobin : 11.2 g/dL  Hematocrit : 33.4 %  Platelet Count - Automated : 125 K/uL  Mean Cell Volume : 91.3 fl  Mean Cell Hemoglobin : 30.6 pg  Mean Cell Hemoglobin Concentration : 33.5 gm/dL  Auto Neutrophil # : x  Auto Lymphocyte # : x  Auto Monocyte # : x  Auto Eosinophil # : x  Auto Basophil # : x  Auto Neutrophil % : x  Auto Lymphocyte % : x  Auto Monocyte % : x  Auto Eosinophil % : x  Auto Basophil % : x        141  |  102  |  25.6<H>  ----------------------------<  99  3.2<L>   |  27.0  |  1.08    Ca    8.2<L>      2021 06:37  Phos  2.1       Mg     2.0         TPro  5.5<L>  /  Alb  3.0<L>  /  TBili  2.2<H>  /  DBili  1.3<H>  /  AST  87<H>  /  ALT  108<H>  /  AlkPhos  130<H>      LIVER FUNCTIONS - ( 2021 06:37 )  Alb: 3.0 g/dL / Pro: 5.5 g/dL / ALK PHOS: 130 U/L / ALT: 108 U/L / AST: 87 U/L / GGT: x               Amylase, Serum Total (11.15.21 @ 06:27)    Amylase, Serum Total: 63 U/L    Lipase, Serum (11.15.21 @ 06:27)    Lipase, Serum: 47 U/L        RADIOLOGY & ADDITIONAL STUDIES (The following images were personally reviewed):     EXAM:  CT ABDOMEN AND PELVIS IC                          PROCEDURE DATE:  2021          INTERPRETATION:  CLINICAL INFORMATION: abd pain    COMPARISON: None.    CONTRAST/COMPLICATIONS:  IV Contrast: Omnipaque 300  95 cc administered   5 cc discarded  Oral Contrast: NONE  Complications: None reported at time of study completion    PROCEDURE:  CT of the Abdomen and Pelvis was performed.  Sagittal and coronal reformats were performed.    FINDINGS:  LOWER CHEST: Subsegmental atelectasis at the lung bases.    LIVER: Within normal limits.  BILE DUCTS: Normal caliber.  GALLBLADDER: Multiple gallstones in the gallbladder. Gallbladder is distended with pericholecystic fluid.  SPLEEN: Within normal limits.  PANCREAS: Within normal limits.  ADRENALS: Within normal limits.  KIDNEYS/URETERS: Within normal limits.    BLADDER: Within normal limits.  REPRODUCTIVE ORGANS: Uterus and adnexa within normal limits.    BOWEL: No bowel obstruction. Appendix is normal. There is diverticulosis without evidence of diverticulitis.  PERITONEUM: No ascites.  VESSELS: Atherosclerotic changes.  RETROPERITONEUM/LYMPH NODES: No lymphadenopathy.  ABDOMINAL WALL: Within normal limits.  BONES: Degenerative changes. Status post bilateral hip arthroplasties.    IMPRESSION:  Multiple gallstones in the gallbladder with gallbladder distention and pericholecystic fluid, findings suspicious for acute cholecystitis. Recommend right upper quadrant ultrasound and HIDA scan for further evaluation.        --- End of Report ---

## 2021-11-17 NOTE — DISCHARGE NOTE PROVIDER - NSDCMRMEDTOKEN_GEN_ALL_CORE_FT
amLODIPine 10 mg oral tablet: 1 tab(s) orally once a day  aspirin 81 mg oral tablet, chewable: 1 tab(s) orally once a day  atorvastatin 40 mg oral tablet: 1 tab(s) orally once a day (at bedtime)  bisacodyl 10 mg rectal suppository: 1 suppository(ies) rectal once  clopidogrel 75 mg oral tablet: 1 tab(s) orally once a day  docusate sodium 100 mg oral capsule: 1 cap(s) orally 3 times a day  escitalopram 5 mg oral tablet: 1 tab(s) orally once a day  ipratropium-albuterol 0.5 mg-2.5 mg/3 mL inhalation solution: 3 milliliter(s) inhaled every 6 hours, As needed, Shortness of Breath and/or Wheezing  ipratropium-albuterol 0.5 mg-2.5 mg/3 mL inhalation solution: 3 milliliter(s) inhaled every 6 hours  metoprolol tartrate 25 mg oral tablet: 0.5 tab(s) orally 2 times a day  oxybutynin 5 mg oral tablet: 1 tab(s) orally 3 times a day  pantoprazole 40 mg oral granule, delayed release: 1 tab(s) orally once a day (in the morning)   amLODIPine 10 mg oral tablet: 1 tab(s) orally once a day  amoxicillin-clavulanate 875 mg-125 mg oral tablet: 1 tab(s) orally 2 times a day   aspirin 81 mg oral tablet, chewable: 1 tab(s) orally once a day  atorvastatin 40 mg oral tablet: 1 tab(s) orally once a day (at bedtime)  bisacodyl 10 mg rectal suppository: 1 suppository(ies) rectal once  clopidogrel 75 mg oral tablet: 1 tab(s) orally once a day  docusate sodium 100 mg oral capsule: 1 cap(s) orally 3 times a day  escitalopram 5 mg oral tablet: 1 tab(s) orally once a day  ipratropium-albuterol 0.5 mg-2.5 mg/3 mL inhalation solution: 3 milliliter(s) inhaled every 6 hours, As needed, Shortness of Breath and/or Wheezing  ipratropium-albuterol 0.5 mg-2.5 mg/3 mL inhalation solution: 3 milliliter(s) inhaled every 6 hours  metoprolol tartrate 25 mg oral tablet: 0.5 tab(s) orally 2 times a day  oxybutynin 5 mg oral tablet: 1 tab(s) orally 3 times a day  pantoprazole 40 mg oral granule, delayed release: 1 tab(s) orally once a day (in the morning)

## 2021-11-17 NOTE — PROGRESS NOTE ADULT - ASSESSMENT
89F with gallstone pancreatitis, clinically resolved, Grade II cholecystitis, choledocholithiasis vs Mirizzi syndrome for uptrending obstructive jaundice without stigmata of cholangitis, nontoxic HDnl without peritonitis, physiologically stable    Plan  -Continue NPO, will ADAT pending MRCP results and GI plan regarding roll of biliary decompression  -Trend bilirubins and transaminases  -Cont Zosyn for acute cholecystitis, symptoms improving, will determine role for cholecystostomy tube after AM labs  -NPO, will ADAT to low fat pending plan for further intervention  -DVT PPx  -OOB amb PT  -MOLST, will discuss goals of care with pt and family regarding the roll/risks/benefits for same admission cholecystectomy. Pts age is not an absolute or relative contraindication as long as pt and HCP agree and understand the associated risks of cholecystectomy as well as the risk of recurrent pancreatitis without cholecystectomy.   -Unable to evaluate functional status given dementia, Echocardiogram to assess cardiac function for risk stratification.

## 2021-11-17 NOTE — DISCHARGE NOTE PROVIDER - DETAILS OF MALNUTRITION DIAGNOSIS/DIAGNOSES
This patient has been assessed with a concern for Malnutrition and was treated during this hospitalization for the following Nutrition diagnosis/diagnoses:     -  11/16/2021: Severe protein-calorie malnutrition

## 2021-11-17 NOTE — PROGRESS NOTE ADULT - ATTENDING COMMENTS
At this time the patient is doing well and the acute cholecystitis appears to be resolving with IV antibiotics.  She is now tolerating a diet, has no abdominal pain, nausea or vomiting.  Her white count is normal and the LFTs are trending down towards normal on a daily basis.  Therefore at this time I would not favor a cholecystostomy tube.  Instead I would recommend that she be treated with IV antibiotics for 1 week and if she continues to do well that she be discharged on another week of oral antibiotics.

## 2021-11-17 NOTE — PROGRESS NOTE ADULT - SUBJECTIVE AND OBJECTIVE BOX
Acute Care Surgery Progress Note:    Patient was downgraded from the SICU Stepdown unit.  Patient seen at bedside with daughter in evening of 11/16 and conversation held regarding AM labs and T Bilirubin levels to determine next course of action.  MRCP was cancelled after family discussion.  Patient is not a surgical candidate, therefore decision to cancel MRCP was mutual.  Patient's family made aware that prior to discharge a cholecystostomy tube placement may benefit patient.  More discussion to follow pending AM labs.    No acute overnight events. Patient afebrile, VSS. Pain well controlled. Tolerated clear liquid diet but will be NPO until AM labs and final plan for dispo. Denies n/v/f/c/cp/sob.       Scheduled Medications:   acetaminophen     Tablet .. 650 milliGRAM(s) Oral every 6 hours  albuterol/ipratropium for Nebulization 3 milliLiter(s) Nebulizer every 6 hours  atorvastatin 40 milliGRAM(s) Oral at bedtime  dextrose 40% Gel 15 Gram(s) Oral once  dextrose 50% Injectable 25 Gram(s) IV Push once  dextrose 50% Injectable 12.5 Gram(s) IV Push once  dextrose 50% Injectable 25 Gram(s) IV Push once  enoxaparin Injectable 30 milliGRAM(s) SubCutaneous daily  escitalopram 5 milliGRAM(s) Oral daily  glucagon  Injectable 1 milliGRAM(s) IntraMuscular once  influenza  Vaccine (HIGH DOSE) 0.7 milliLiter(s) IntraMuscular once  insulin lispro (ADMELOG) corrective regimen sliding scale   SubCutaneous every 6 hours  melatonin 3 milliGRAM(s) Oral at bedtime  metoprolol tartrate 12.5 milliGRAM(s) Oral every 12 hours  multiple electrolytes Injection Type 1 1000 milliLiter(s) (42 mL/Hr) IV Continuous <Continuous>  oxybutynin 5 milliGRAM(s) Oral every 8 hours  pantoprazole  Injectable 40 milliGRAM(s) IV Push daily  piperacillin/tazobactam IVPB.. 3.375 Gram(s) IV Intermittent every 8 hours    PRN Medications:  albuterol/ipratropium for Nebulization 3 milliLiter(s) Nebulizer every 6 hours PRN Shortness of Breath and/or Wheezing      Objective:   T(F): 97.4 (11-17 @ 01:00), Max: 98.6 (11-16 @ 08:00)  HR: 98 (11-17 @ 01:00) (83 - 101)  BP: 150/76 (11-17 @ 01:00) (122/110 - 150/76)  BP(mean): 85 (11-16 @ 20:00) (75 - 109)  ABP: --  ABP(mean): --  RR: 18 (11-17 @ 01:00) (16 - 18)  SpO2: 95% (11-17 @ 01:00) (95% - 100%)      Physical Exam:   GEN: patient resting comfortably in bed, in no acute distress  RESP: respirations are unlabored, no accessory muscle use, no conversational dyspnea  CVS: RRR  GI: Abdomen soft, non-tender, non-distended, no rebound tenderness / guarding    I&O's    11-15 @ 07:01  -  11-16 @ 07:00  --------------------------------------------------------  IN:    IV PiggyBack: 50 mL    IV PiggyBack: 200 mL    multiple electrolytes Injection Type 1.: 588 mL    Oral Fluid: 150 mL  Total IN: 988 mL    OUT:    Indwelling Catheter - Urethral (mL): 93 mL    Voided (mL): 110 mL  Total OUT: 203 mL    Total NET: 785 mL      11-16 @ 07:01  -  11-17 @ 02:18  --------------------------------------------------------  IN:    IV PiggyBack: 200 mL    multiple electrolytes Injection Type 1.: 294 mL    Oral Fluid: 120 mL  Total IN: 614 mL    OUT:    Voided (mL): 300 mL  Total OUT: 300 mL    Total NET: 314 mL          LABS:                        11.5   9.51  )-----------( 135      ( 16 Nov 2021 06:05 )             35.1     11-16    140  |  101  |  27.8<H>  ----------------------------<  151<H>  3.5   |  25.0  |  1.14    Ca    8.5<L>      16 Nov 2021 06:05  Phos  2.9     11-16  Mg     2.3     11-16    TPro  6.1<L>  /  Alb  3.3  /  TBili  3.9<H>  /  DBili  2.5<H>  /  AST  157<H>  /  ALT  151<H>  /  AlkPhos  155<H>  11-16          MICROBIOLOGY:       PATHOLOGY:

## 2021-11-17 NOTE — PROGRESS NOTE ADULT - ATTENDING COMMENTS
Patient clinically improved  Awake and way more alert  Hemo stable  Abdomen soft, non-tender and GI back to normal  PO diet  She is not a surgical candidate for any but the most emergent life saving operations  DC patient today Patient clinically improved  Awake and way more alert  Hemo stable  Abdomen soft, non-tender and GI back to normal  Bilirubin slowly decreasing/possible floating vs passing stone  PO diet  She is not a surgical candidate for any but the most emergent life saving operations  Will remain on IV antibiotics   GI expertise appreciated

## 2021-11-17 NOTE — PROGRESS NOTE ADULT - ASSESSMENT
89yo F with hx of strokes with impaired speech, HTN, HLD, OA, DM, now with probable mechanical Pancreatitis that has clinically resolved, Grade II cholecystitis and elevated LFTs. U/S and CT of abdomen and pelvis revealed cholelithiasis with trace noy-cholecystic fluid & GB wall thickening c/w possible cholecystitis. Continue to treat conservatively with IV antibiotics. Plan of care was discussed with family yesterday. Patient not a candidate for MRCP & ERCP due to her advance age. Patient and her family made aware that prior to discharge a cholecystostomy tube placement may be beneficial to the patient if there is no improvement to her condition.  -Continue to trend CBC, CMP.  -Protonix BID for cytoprotection.    -Diet as per Surgery's recommendation.    -GI will follow.

## 2021-11-17 NOTE — DISCHARGE NOTE PROVIDER - NSDCCPCAREPLAN_GEN_ALL_CORE_FT
PRINCIPAL DISCHARGE DIAGNOSIS  Diagnosis: Gallstone pancreatitis  Assessment and Plan of Treatment: Follow Up: Please call to make an appointment w/ GI (Dr. Sesay) 10-14 days after discharge. Also, please call to make an appointment with your primary care physician as per your usual schedule.   Activity: May return to normal activities as tolerated.  Diet: May continue regular diet.  Medications: Please take all home medications as prescribed by your primary care doctor. You are encouraged to take over-the-counter tylenol and/or ibuprofen for pain relief.   Patient is advised to RETURN TO THE EMERGENCY DEPARTMENT for any of the following - worsening pain, fever/chills, nausea/vomiting, alterned mental status, chest pain, shortness of breath, or any other new/worsening symptoms.       PRINCIPAL DISCHARGE DIAGNOSIS  Diagnosis: Gallstone pancreatitis  Assessment and Plan of Treatment: Follow Up: Please call to make an appointment w/ GI (Dr. Sesay) 10-14 days after discharge. Also, please call to make an appointment with your primary care physician as per your usual schedule.   Activity: May return to normal activities as tolerated.  Diet: May continue regular diet.  Medications: Please take all home medications as prescribed by your primary care doctor. You are encouraged to take over-the-counter tylenol and/or ibuprofen for pain relief.   Patient is advised to RETURN TO THE EMERGENCY DEPARTMENT for any of the following - worsening pain, fever/chills, nausea/vomiting, alterned mental status, chest pain, shortness of breath, or any other new/worsening symptoms.  You have been prescribed augmentin for antibiotics.

## 2021-11-17 NOTE — DISCHARGE NOTE PROVIDER - HOSPITAL COURSE
HPI: Patient is a 89yo F with PMH of multiple stroke with speech deficit, HTN, HLD, DM (off medications), OA presenting to the ED with 7hr-history of severe abdominal pain. Patient accompanied with daughter who provided part of history, reports sudden onset abdominal pain in epigastrium that became generalized with associated nausea but no emesis. Daughter reports that they were in a family gathering during the day, in which patient did not have any complaints, after the return home patient went straight to sleep. Around 10:30PM, daughter hears her mother screaming due to pain and was brought to the hospital. Patient complains of epigastric pain and nausea, no episode of emesis. No previous episodes. Patient usually has low appetite and usually has light meal supplemented with ensures. Denies fevers, chills, chest pain, SOB, emesis, constipation or diarrhea. Of note, patient has history a frequently falls last time was last week and has bruise in her back.  (14 Nov 2021 05:18)    Hospital Course: CT imaging in the ED showed cholelithiasis with distention of gallbladder. US was performed which again showed cholelithiasis, distended gallbladder, wall thickening to 6 mm, CBD 5 mm. Patient was admitted to the SICU service under Dr. Fuller. Patient was made NPO and IVF resuscitation was given. T bili continued to uptrend and GI was consulted. Patient was poor candidate for ERCP and patient was managed conservatively. Pain improved and T bili trended down throughout hospital stay. Patient was stable for transfer to the floor. Diet was advanced and well tolerated. Pain was well controlled at time of discharge. PT evaluated patient and recommended home w/ assist and home PT. Voiding spontaneously. Patient was stable for discharge home.       Length of time preparing discharge > 30 minutes   HPI: Patient is a 89yo F with PMH of multiple stroke with speech deficit, HTN, HLD, DM (off medications), OA presenting to the ED with 7hr-history of severe abdominal pain. Patient accompanied with daughter who provided part of history, reports sudden onset abdominal pain in epigastrium that became generalized with associated nausea but no emesis. Daughter reports that they were in a family gathering during the day, in which patient did not have any complaints, after the return home patient went straight to sleep. Around 10:30PM, daughter hears her mother screaming due to pain and was brought to the hospital. Patient complains of epigastric pain and nausea, no episode of emesis. No previous episodes. Patient usually has low appetite and usually has light meal supplemented with ensures. Denies fevers, chills, chest pain, SOB, emesis, constipation or diarrhea. Of note, patient has history a frequently falls last time was last week and has bruise in her back.  (14 Nov 2021 05:18)    Hospital Course: CT imaging in the ED showed cholelithiasis with distention of gallbladder. US was performed which again showed cholelithiasis, distended gallbladder, wall thickening to 6 mm, CBD 5 mm. Patient was admitted to the SICU service under Dr. Fuller. Patient was made NPO and IVF resuscitation was given. T bili continued to uptrend and GI was consulted. Patient was poor candidate for ERCP and patient was managed conservatively. Pain improved and T bili trended down throughout hospital stay. Patient was stable for transfer to the floor. Diet was advanced and well tolerated. Pain was well controlled at time of discharge. PT evaluated patient and recommended home w/ assist and home PT. Voiding spontaneously. Patient was stable for discharge home.       Length of time preparing discharge > 30 minutes    Based on recommendations from GI, patient will remain in hospital on IV antibiotics and be DC later this or next week

## 2021-11-17 NOTE — DISCHARGE NOTE PROVIDER - CARE PROVIDER_API CALL
James Sesay)  Gastroenterology; Internal Medicine  [address] [facility], [facility] [address]  [place] [address]  Phone: [phone]  Fax: [phone]  Follow Up Time:

## 2021-11-18 LAB
ALBUMIN SERPL ELPH-MCNC: 3.4 G/DL — SIGNIFICANT CHANGE UP (ref 3.3–5.2)
ALP SERPL-CCNC: 182 U/L — HIGH (ref 40–120)
ALT FLD-CCNC: 96 U/L — HIGH
ANION GAP SERPL CALC-SCNC: 13 MMOL/L — SIGNIFICANT CHANGE UP (ref 5–17)
AST SERPL-CCNC: 81 U/L — HIGH
BILIRUB DIRECT SERPL-MCNC: 1.4 MG/DL — HIGH (ref 0–0.3)
BILIRUB INDIRECT FLD-MCNC: 1.4 MG/DL — HIGH (ref 0.2–1)
BILIRUB SERPL-MCNC: 2.7 MG/DL — HIGH (ref 0.4–2)
BILIRUB SERPL-MCNC: 2.7 MG/DL — HIGH (ref 0.4–2)
BUN SERPL-MCNC: 16.7 MG/DL — SIGNIFICANT CHANGE UP (ref 8–20)
CALCIUM SERPL-MCNC: 8.4 MG/DL — LOW (ref 8.6–10.2)
CHLORIDE SERPL-SCNC: 99 MMOL/L — SIGNIFICANT CHANGE UP (ref 98–107)
CO2 SERPL-SCNC: 26 MMOL/L — SIGNIFICANT CHANGE UP (ref 22–29)
CREAT SERPL-MCNC: 0.73 MG/DL — SIGNIFICANT CHANGE UP (ref 0.5–1.3)
GLUCOSE BLDC GLUCOMTR-MCNC: 142 MG/DL — HIGH (ref 70–99)
GLUCOSE BLDC GLUCOMTR-MCNC: 198 MG/DL — HIGH (ref 70–99)
GLUCOSE BLDC GLUCOMTR-MCNC: 224 MG/DL — HIGH (ref 70–99)
GLUCOSE BLDC GLUCOMTR-MCNC: 95 MG/DL — SIGNIFICANT CHANGE UP (ref 70–99)
GLUCOSE SERPL-MCNC: 139 MG/DL — HIGH (ref 70–99)
POTASSIUM SERPL-MCNC: 3 MMOL/L — LOW (ref 3.5–5.3)
POTASSIUM SERPL-SCNC: 3 MMOL/L — LOW (ref 3.5–5.3)
PROT SERPL-MCNC: 6.1 G/DL — LOW (ref 6.6–8.7)
SODIUM SERPL-SCNC: 138 MMOL/L — SIGNIFICANT CHANGE UP (ref 135–145)

## 2021-11-18 PROCEDURE — 99233 SBSQ HOSP IP/OBS HIGH 50: CPT

## 2021-11-18 RX ORDER — INSULIN LISPRO 100/ML
VIAL (ML) SUBCUTANEOUS
Refills: 0 | Status: DISCONTINUED | OUTPATIENT
Start: 2021-11-18 | End: 2021-11-20

## 2021-11-18 RX ORDER — POTASSIUM CHLORIDE 20 MEQ
40 PACKET (EA) ORAL EVERY 4 HOURS
Refills: 0 | Status: COMPLETED | OUTPATIENT
Start: 2021-11-18 | End: 2021-11-18

## 2021-11-18 RX ORDER — SODIUM CHLORIDE 9 MG/ML
1000 INJECTION, SOLUTION INTRAVENOUS
Refills: 0 | Status: DISCONTINUED | OUTPATIENT
Start: 2021-11-18 | End: 2021-11-20

## 2021-11-18 RX ORDER — POTASSIUM CHLORIDE 20 MEQ
10 PACKET (EA) ORAL
Refills: 0 | Status: COMPLETED | OUTPATIENT
Start: 2021-11-18 | End: 2021-11-18

## 2021-11-18 RX ADMIN — PIPERACILLIN AND TAZOBACTAM 25 GRAM(S): 4; .5 INJECTION, POWDER, LYOPHILIZED, FOR SOLUTION INTRAVENOUS at 21:28

## 2021-11-18 RX ADMIN — Medication 4: at 17:35

## 2021-11-18 RX ADMIN — Medication 650 MILLIGRAM(S): at 05:35

## 2021-11-18 RX ADMIN — PIPERACILLIN AND TAZOBACTAM 25 GRAM(S): 4; .5 INJECTION, POWDER, LYOPHILIZED, FOR SOLUTION INTRAVENOUS at 05:35

## 2021-11-18 RX ADMIN — Medication 100 MILLIEQUIVALENT(S): at 12:23

## 2021-11-18 RX ADMIN — SODIUM CHLORIDE 42 MILLILITER(S): 9 INJECTION, SOLUTION INTRAVENOUS at 18:22

## 2021-11-18 RX ADMIN — Medication 650 MILLIGRAM(S): at 06:05

## 2021-11-18 RX ADMIN — Medication 2: at 12:57

## 2021-11-18 RX ADMIN — Medication 12.5 MILLIGRAM(S): at 17:34

## 2021-11-18 RX ADMIN — Medication 3 MILLILITER(S): at 21:41

## 2021-11-18 RX ADMIN — Medication 5 MILLIGRAM(S): at 05:35

## 2021-11-18 RX ADMIN — Medication 100 MILLIEQUIVALENT(S): at 11:03

## 2021-11-18 RX ADMIN — Medication 3 MILLILITER(S): at 08:38

## 2021-11-18 RX ADMIN — PIPERACILLIN AND TAZOBACTAM 25 GRAM(S): 4; .5 INJECTION, POWDER, LYOPHILIZED, FOR SOLUTION INTRAVENOUS at 13:57

## 2021-11-18 RX ADMIN — PANTOPRAZOLE SODIUM 40 MILLIGRAM(S): 20 TABLET, DELAYED RELEASE ORAL at 11:05

## 2021-11-18 RX ADMIN — Medication 3 MILLILITER(S): at 14:32

## 2021-11-18 RX ADMIN — Medication 12.5 MILLIGRAM(S): at 05:34

## 2021-11-18 RX ADMIN — ESCITALOPRAM OXALATE 5 MILLIGRAM(S): 10 TABLET, FILM COATED ORAL at 11:04

## 2021-11-18 RX ADMIN — ATORVASTATIN CALCIUM 40 MILLIGRAM(S): 80 TABLET, FILM COATED ORAL at 21:28

## 2021-11-18 RX ADMIN — Medication 650 MILLIGRAM(S): at 12:09

## 2021-11-18 RX ADMIN — ENOXAPARIN SODIUM 30 MILLIGRAM(S): 100 INJECTION SUBCUTANEOUS at 11:05

## 2021-11-18 RX ADMIN — Medication 650 MILLIGRAM(S): at 17:34

## 2021-11-18 RX ADMIN — Medication 40 MILLIEQUIVALENT(S): at 11:04

## 2021-11-18 RX ADMIN — Medication 5 MILLIGRAM(S): at 21:28

## 2021-11-18 RX ADMIN — Medication 40 MILLIEQUIVALENT(S): at 13:58

## 2021-11-18 RX ADMIN — Medication 3 MILLIGRAM(S): at 21:28

## 2021-11-18 RX ADMIN — Medication 5 MILLIGRAM(S): at 13:58

## 2021-11-18 RX ADMIN — Medication 650 MILLIGRAM(S): at 11:04

## 2021-11-18 RX ADMIN — Medication 40 MILLIEQUIVALENT(S): at 19:05

## 2021-11-18 NOTE — PROGRESS NOTE ADULT - SUBJECTIVE AND OBJECTIVE BOX
SUBJECTIVE/24 hour events:  Patient is a 89 F presenting with gallstone pancreatitis, family decided to treat conservatively and is doing well and tolerating a diet, LFTS continue to decrease, calm and rested overnight. Patient dispo pending       Vital Signs Last 24 Hrs  T(C): 37.1 (18 Nov 2021 04:03), Max: 37.1 (17 Nov 2021 08:00)  T(F): 98.7 (18 Nov 2021 04:03), Max: 98.7 (17 Nov 2021 08:00)  HR: 100 (18 Nov 2021 04:03) (80 - 100)  BP: 160/72 (18 Nov 2021 04:03) (138/77 - 161/76)  BP(mean): --  RR: 18 (18 Nov 2021 04:03) (16 - 20)  SpO2: 94% (18 Nov 2021 04:03) (94% - 96%)  Drug Dosing Weight  Height (cm): 157.5 (13 Nov 2021 23:47)  Weight (kg): 49.9 (13 Nov 2021 23:47)  BMI (kg/m2): 20.1 (13 Nov 2021 23:47)  BSA (m2): 1.48 (13 Nov 2021 23:47)  I&O's Detail    16 Nov 2021 07:01  -  17 Nov 2021 07:00  --------------------------------------------------------  IN:    IV PiggyBack: 200 mL    multiple electrolytes Injection Type 1.: 294 mL    Oral Fluid: 120 mL  Total IN: 614 mL    OUT:    Voided (mL): 300 mL  Total OUT: 300 mL    Total NET: 314 mL      17 Nov 2021 07:01  -  18 Nov 2021 05:37  --------------------------------------------------------  IN:    IV PiggyBack: 50 mL    multiple electrolytes Injection Type 1.: 420 mL    Oral Fluid: 120 mL  Total IN: 590 mL    OUT:    Voided (mL): 350 mL  Total OUT: 350 mL    Total NET: 240 mL        Allergies    No Known Allergies    Intolerances                              11.2   7.87  )-----------( 125      ( 17 Nov 2021 06:37 )             33.4   11-17    141  |  102  |  25.6<H>  ----------------------------<  99  3.2<L>   |  27.0  |  1.08    Ca    8.2<L>      17 Nov 2021 06:37  Phos  2.1     11-17  Mg     2.0     11-17    TPro  5.5<L>  /  Alb  3.0<L>  /  TBili  2.2<H>  /  DBili  1.3<H>  /  AST  87<H>  /  ALT  108<H>  /  AlkPhos  130<H>  11-17      ROS:    PHYSICAL EXAM:  GEN: patient resting comfortably in bed, in no acute distress  RESP: respirations are unlabored, no accessory muscle use, no conversational dyspnea  CVS: RRR  GI: Abdomen soft, non-tender, non-distended, no rebound tenderness / guarding        MEDICATIONS  (STANDING):  acetaminophen     Tablet .. 650 milliGRAM(s) Oral every 6 hours  albuterol/ipratropium for Nebulization 3 milliLiter(s) Nebulizer every 6 hours  atorvastatin 40 milliGRAM(s) Oral at bedtime  dextrose 40% Gel 15 Gram(s) Oral once  dextrose 5%. 1000 milliLiter(s) (100 mL/Hr) IV Continuous <Continuous>  dextrose 50% Injectable 25 Gram(s) IV Push once  dextrose 50% Injectable 12.5 Gram(s) IV Push once  dextrose 50% Injectable 25 Gram(s) IV Push once  enoxaparin Injectable 30 milliGRAM(s) SubCutaneous daily  escitalopram 5 milliGRAM(s) Oral daily  glucagon  Injectable 1 milliGRAM(s) IntraMuscular once  influenza  Vaccine (HIGH DOSE) 0.7 milliLiter(s) IntraMuscular once  insulin lispro (ADMELOG) corrective regimen sliding scale   SubCutaneous Before meals and at bedtime  melatonin 3 milliGRAM(s) Oral at bedtime  metoprolol tartrate 12.5 milliGRAM(s) Oral every 12 hours  multiple electrolytes Injection Type 1 1000 milliLiter(s) (42 mL/Hr) IV Continuous <Continuous>  oxybutynin 5 milliGRAM(s) Oral every 8 hours  pantoprazole  Injectable 40 milliGRAM(s) IV Push daily  piperacillin/tazobactam IVPB.. 3.375 Gram(s) IV Intermittent every 8 hours  potassium phosphate IVPB 30 milliMole(s) IV Intermittent once    MEDICATIONS  (PRN):  albuterol/ipratropium for Nebulization 3 milliLiter(s) Nebulizer every 6 hours PRN Shortness of Breath and/or Wheezing      RADIOLOGY STUDIES:    CULTURES:

## 2021-11-18 NOTE — PROGRESS NOTE ADULT - SUBJECTIVE AND OBJECTIVE BOX
Patient is a 89y old  Female who presents with a chief complaint of Gallstone pancreatitis (18 Nov 2021 05:37)      INTERVAL HPI/OVERNIGHT EVENTS: Patient seen and evaluated at bedside, sleepy but arousable, diminished interaction as compared to day before, not responding to questions.  Today bili slightly uptrend from 2.2 to 2.7, LFT's also with slight uptrend.                                                         .      MEDICATIONS  (STANDING):  acetaminophen     Tablet .. 650 milliGRAM(s) Oral every 6 hours  albuterol/ipratropium for Nebulization 3 milliLiter(s) Nebulizer every 6 hours  atorvastatin 40 milliGRAM(s) Oral at bedtime  dextrose 40% Gel 15 Gram(s) Oral once  dextrose 5%. 1000 milliLiter(s) (100 mL/Hr) IV Continuous <Continuous>  dextrose 50% Injectable 25 Gram(s) IV Push once  dextrose 50% Injectable 12.5 Gram(s) IV Push once  dextrose 50% Injectable 25 Gram(s) IV Push once  enoxaparin Injectable 30 milliGRAM(s) SubCutaneous daily  escitalopram 5 milliGRAM(s) Oral daily  glucagon  Injectable 1 milliGRAM(s) IntraMuscular once  influenza  Vaccine (HIGH DOSE) 0.7 milliLiter(s) IntraMuscular once  insulin lispro (ADMELOG) corrective regimen sliding scale   SubCutaneous Before meals and at bedtime  melatonin 3 milliGRAM(s) Oral at bedtime  metoprolol tartrate 12.5 milliGRAM(s) Oral every 12 hours  multiple electrolytes Injection Type 1 1000 milliLiter(s) (42 mL/Hr) IV Continuous <Continuous>  oxybutynin 5 milliGRAM(s) Oral every 8 hours  pantoprazole  Injectable 40 milliGRAM(s) IV Push daily  piperacillin/tazobactam IVPB.. 3.375 Gram(s) IV Intermittent every 8 hours  potassium chloride    Tablet ER 40 milliEquivalent(s) Oral every 4 hours  potassium phosphate IVPB 30 milliMole(s) IV Intermittent once    MEDICATIONS  (PRN):  albuterol/ipratropium for Nebulization 3 milliLiter(s) Nebulizer every 6 hours PRN Shortness of Breath and/or Wheezing      Allergies    No Known Allergies    Intolerances    Review of Systems: Unable to obtain.      Vital Signs Last 24 Hrs  T(C): 37.3 (18 Nov 2021 07:17), Max: 37.3 (18 Nov 2021 07:17)  T(F): 99.2 (18 Nov 2021 07:17), Max: 99.2 (18 Nov 2021 07:17)  HR: 100 (18 Nov 2021 07:17) (81 - 100)  BP: 154/84 (18 Nov 2021 07:17) (154/84 - 161/76)  BP(mean): --  RR: 18 (18 Nov 2021 07:17) (18 - 20)  SpO2: 88% (18 Nov 2021 07:17) (88% - 96%)    PHYSICAL EXAM:  GENERAL: Frail and ill appearing, no distress  HEENT:  NC/AT,  conjunctivae clear, sclera -anicteric  CHEST:  Full & symmetric excursion, no increased effort, breath sounds clear  HEART:  Regular rhythm, S1, S2, no murmur/rub/S3/S4,  no edema  ABDOMEN:  Soft, non-tender, non-distended, normoactive bowel sounds,  no masses, no hepatosplenomegaly,   EXTREMITIES: No cyanosis, clubbing or edema  SKIN:  No rash/erythema/ecchymoses/petechiae/wounds/abscess/warm/dry  NEURO: Awake and alert    LABS:                        11.2   7.87  )-----------( 125      ( 17 Nov 2021 06:37 )             33.4     11-18    138  |  99  |  16.7  ----------------------------<  139<H>  3.0<L>   |  26.0  |  0.73    Ca    8.4<L>      18 Nov 2021 07:27  Phos  2.1     11-17  Mg     2.0     11-17    TPro  6.1<L>  /  Alb  3.4  /  TBili  2.7<H>  /  DBili  1.4<H>  /  AST  81<H>  /  ALT  96<H>  /  AlkPhos  182<H>  11-18        LIVER FUNCTIONS - ( 18 Nov 2021 07:27 )  Alb: 3.4 g/dL / Pro: 6.1 g/dL / ALK PHOS: 182 U/L / ALT: 96 U/L / AST: 81 U/L / GGT: x             RADIOLOGY & ADDITIONAL TESTS:  < from: US Abdomen Upper Quadrant Right (11.14.21 @ 04:26) >   EXAM:  US ABDOMEN RT UPR QUADRANT                          PROCEDURE DATE:  11/14/2021          INTERPRETATION:  CLINICAL INFORMATION: Right upper quadrant pain.    COMPARISON: None available.    TECHNIQUE: Sonography of the right upper quadrant.    FINDINGS:    Liver: 14.5 cm.  Within normal limits.  Bile ducts: Normal caliber. Common bile duct measures 5 mm.  Gallbladder: Multiple dependent mobile gallstones layering dependently.  Mild gallbladder wall thickening.  Trace pericholecystic fluid.  Sonographic Francis sign was negative.  Pancreas: Visualized portions are within normal limits.  Right kidney: 9.8 cm. No hydronephrosis. A right upper pole cyst measures 10 x 6 x 9 mm.  Ascites: None.  IVC: Visualized portions are within normal limits.    IMPRESSION:    Cholelithiasis with mild gallbladder wall thickening and trace pericholecystic fluid.    Sonographic Francis sign negative.    The findings are equivocal for acute cholecystitis.  A follow-up nuclear medicine HIDA scan may be obtained for further evaluation.    --- End of Report ---          < end of copied text >   Patient is a 89y old  Female who presents with a chief complaint of abdominal and elevated LFTs (18 Nov 2021 05:37)      INTERVAL HPI/OVERNIGHT EVENTS: Patient seen and evaluated at bedside, sleepy but arousable, diminished interaction as compared to day before, not responding well to questions.  Today bili slightly uptrend from 2.2 to 2.7, LFT's also with slight uptrend.                                                         .      MEDICATIONS  (STANDING):  acetaminophen     Tablet .. 650 milliGRAM(s) Oral every 6 hours  albuterol/ipratropium for Nebulization 3 milliLiter(s) Nebulizer every 6 hours  atorvastatin 40 milliGRAM(s) Oral at bedtime  dextrose 40% Gel 15 Gram(s) Oral once  dextrose 5%. 1000 milliLiter(s) (100 mL/Hr) IV Continuous <Continuous>  dextrose 50% Injectable 25 Gram(s) IV Push once  dextrose 50% Injectable 12.5 Gram(s) IV Push once  dextrose 50% Injectable 25 Gram(s) IV Push once  enoxaparin Injectable 30 milliGRAM(s) SubCutaneous daily  escitalopram 5 milliGRAM(s) Oral daily  glucagon  Injectable 1 milliGRAM(s) IntraMuscular once  influenza  Vaccine (HIGH DOSE) 0.7 milliLiter(s) IntraMuscular once  insulin lispro (ADMELOG) corrective regimen sliding scale   SubCutaneous Before meals and at bedtime  melatonin 3 milliGRAM(s) Oral at bedtime  metoprolol tartrate 12.5 milliGRAM(s) Oral every 12 hours  multiple electrolytes Injection Type 1 1000 milliLiter(s) (42 mL/Hr) IV Continuous <Continuous>  oxybutynin 5 milliGRAM(s) Oral every 8 hours  pantoprazole  Injectable 40 milliGRAM(s) IV Push daily  piperacillin/tazobactam IVPB.. 3.375 Gram(s) IV Intermittent every 8 hours  potassium chloride    Tablet ER 40 milliEquivalent(s) Oral every 4 hours  potassium phosphate IVPB 30 milliMole(s) IV Intermittent once    MEDICATIONS  (PRN):  albuterol/ipratropium for Nebulization 3 milliLiter(s) Nebulizer every 6 hours PRN Shortness of Breath and/or Wheezing      Allergies    No Known Allergies    Intolerances    Review of Systems: Unable to obtain.      Vital Signs Last 24 Hrs  T(C): 37.3 (18 Nov 2021 07:17), Max: 37.3 (18 Nov 2021 07:17)  T(F): 99.2 (18 Nov 2021 07:17), Max: 99.2 (18 Nov 2021 07:17)  HR: 100 (18 Nov 2021 07:17) (81 - 100)  BP: 154/84 (18 Nov 2021 07:17) (154/84 - 161/76)  BP(mean): --  RR: 18 (18 Nov 2021 07:17) (18 - 20)  SpO2: 88% (18 Nov 2021 07:17) (88% - 96%)    PHYSICAL EXAM:  GENERAL: Frail and ill appearing, no distress  HEENT:  NC/AT,  conjunctivae clear, sclera -anicteric  CHEST:  Full & symmetric excursion, no increased effort, breath sounds clear  HEART:  Regular rhythm, S1, S2, no murmur/rub/S3/S4,  no edema  ABDOMEN:  Soft, non-tender, non-distended, normoactive bowel sounds,  no masses, no hepatosplenomegaly,   EXTREMITIES: No cyanosis, clubbing or edema  SKIN:  No rash/erythema/ecchymoses/petechiae/wounds/abscess/warm/dry  NEURO: Awake and alert    LABS:                        11.2   7.87  )-----------( 125      ( 17 Nov 2021 06:37 )             33.4     11-18    138  |  99  |  16.7  ----------------------------<  139<H>  3.0<L>   |  26.0  |  0.73    Ca    8.4<L>      18 Nov 2021 07:27  Phos  2.1     11-17  Mg     2.0     11-17    TPro  6.1<L>  /  Alb  3.4  /  TBili  2.7<H>  /  DBili  1.4<H>  /  AST  81<H>  /  ALT  96<H>  /  AlkPhos  182<H>  11-18        LIVER FUNCTIONS - ( 18 Nov 2021 07:27 )  Alb: 3.4 g/dL / Pro: 6.1 g/dL / ALK PHOS: 182 U/L / ALT: 96 U/L / AST: 81 U/L / GGT: x             RADIOLOGY & ADDITIONAL TESTS:  < from: US Abdomen Upper Quadrant Right (11.14.21 @ 04:26) >   EXAM:  US ABDOMEN RT UPR QUADRANT                          PROCEDURE DATE:  11/14/2021          INTERPRETATION:  CLINICAL INFORMATION: Right upper quadrant pain.    COMPARISON: None available.    TECHNIQUE: Sonography of the right upper quadrant.    FINDINGS:    Liver: 14.5 cm.  Within normal limits.  Bile ducts: Normal caliber. Common bile duct measures 5 mm.  Gallbladder: Multiple dependent mobile gallstones layering dependently.  Mild gallbladder wall thickening.  Trace pericholecystic fluid.  Sonographic Francis sign was negative.  Pancreas: Visualized portions are within normal limits.  Right kidney: 9.8 cm. No hydronephrosis. A right upper pole cyst measures 10 x 6 x 9 mm.  Ascites: None.  IVC: Visualized portions are within normal limits.    IMPRESSION:    Cholelithiasis with mild gallbladder wall thickening and trace pericholecystic fluid.    Sonographic Francis sign negative.    The findings are equivocal for acute cholecystitis.  A follow-up nuclear medicine HIDA scan may be obtained for further evaluation.    --- End of Report ---          < end of copied text >

## 2021-11-18 NOTE — PROGRESS NOTE ADULT - ASSESSMENT
89F with gallstone pancreatitis, clinically resolved, Grade II cholecystitis, choledocholithiasis vs Mirizzi syndrome for uptrending obstructive jaundice without stigmata of cholangitis, nontoxic HDnl without peritonitis, physiologically stable    - per family no mrcp wants conservative management   -Trend bilirubins and transaminases  -Cont Zosyn for acute cholecystitis, symptoms improving,  labs decreasing ? still a role for cholecystostomy tube after AM labs  - low fat pending plan   -DVT PPx  -OOB amb PT  -MOLST, will discuss goals of care with pt and family regarding the roll/risks/benefits for same admission cholecystectomy. Pts age is not an absolute or relative contraindication as long as pt and HCP agree and understand the associated risks of cholecystectomy as well as the risk of recurrent pancreatitis without cholecystectomy.

## 2021-11-18 NOTE — PROGRESS NOTE ADULT - ATTENDING COMMENTS
Unknown reason the patient is much more somnolent today than when I saw her yesterday but was still on stimulation she was able to convey that she was having no abdominal pain, nausea or vomiting.  She continues to be afebrile with a normal white count.  Her abdominal exam is unremarkable.  Nevertheless the LFTs have actually gone up somewhat since yesterday.  She continues on Zosyn.  For the present would recommend continue same therapy and we will simply repeat liver function tests, CBC and BMP in a.m.

## 2021-11-19 LAB
ALBUMIN SERPL ELPH-MCNC: 3.2 G/DL — LOW (ref 3.3–5.2)
ALP SERPL-CCNC: 175 U/L — HIGH (ref 40–120)
ALT FLD-CCNC: 92 U/L — HIGH
ANION GAP SERPL CALC-SCNC: 12 MMOL/L — SIGNIFICANT CHANGE UP (ref 5–17)
AST SERPL-CCNC: 98 U/L — HIGH
BILIRUB DIRECT SERPL-MCNC: 1.1 MG/DL — HIGH (ref 0–0.3)
BILIRUB INDIRECT FLD-MCNC: 1.4 MG/DL — HIGH (ref 0.2–1)
BILIRUB SERPL-MCNC: 2.5 MG/DL — HIGH (ref 0.4–2)
BUN SERPL-MCNC: 10.8 MG/DL — SIGNIFICANT CHANGE UP (ref 8–20)
CALCIUM SERPL-MCNC: 8.3 MG/DL — LOW (ref 8.6–10.2)
CHLORIDE SERPL-SCNC: 101 MMOL/L — SIGNIFICANT CHANGE UP (ref 98–107)
CO2 SERPL-SCNC: 26 MMOL/L — SIGNIFICANT CHANGE UP (ref 22–29)
CREAT SERPL-MCNC: 0.66 MG/DL — SIGNIFICANT CHANGE UP (ref 0.5–1.3)
GLUCOSE BLDC GLUCOMTR-MCNC: 116 MG/DL — HIGH (ref 70–99)
GLUCOSE BLDC GLUCOMTR-MCNC: 125 MG/DL — HIGH (ref 70–99)
GLUCOSE BLDC GLUCOMTR-MCNC: 129 MG/DL — HIGH (ref 70–99)
GLUCOSE BLDC GLUCOMTR-MCNC: 155 MG/DL — HIGH (ref 70–99)
GLUCOSE SERPL-MCNC: 136 MG/DL — HIGH (ref 70–99)
HCT VFR BLD CALC: 36.3 % — SIGNIFICANT CHANGE UP (ref 34.5–45)
HGB BLD-MCNC: 12.2 G/DL — SIGNIFICANT CHANGE UP (ref 11.5–15.5)
MCHC RBC-ENTMCNC: 29.8 PG — SIGNIFICANT CHANGE UP (ref 27–34)
MCHC RBC-ENTMCNC: 33.6 GM/DL — SIGNIFICANT CHANGE UP (ref 32–36)
MCV RBC AUTO: 88.5 FL — SIGNIFICANT CHANGE UP (ref 80–100)
PLATELET # BLD AUTO: 143 K/UL — LOW (ref 150–400)
POTASSIUM SERPL-MCNC: 2.8 MMOL/L — CRITICAL LOW (ref 3.5–5.3)
POTASSIUM SERPL-SCNC: 2.8 MMOL/L — CRITICAL LOW (ref 3.5–5.3)
PROT SERPL-MCNC: 6.4 G/DL — LOW (ref 6.6–8.7)
RBC # BLD: 4.1 M/UL — SIGNIFICANT CHANGE UP (ref 3.8–5.2)
RBC # FLD: 14.3 % — SIGNIFICANT CHANGE UP (ref 10.3–14.5)
SODIUM SERPL-SCNC: 139 MMOL/L — SIGNIFICANT CHANGE UP (ref 135–145)
WBC # BLD: 7.97 K/UL — SIGNIFICANT CHANGE UP (ref 3.8–10.5)
WBC # FLD AUTO: 7.97 K/UL — SIGNIFICANT CHANGE UP (ref 3.8–10.5)

## 2021-11-19 PROCEDURE — 99233 SBSQ HOSP IP/OBS HIGH 50: CPT

## 2021-11-19 RX ORDER — MAGNESIUM SULFATE 500 MG/ML
2 VIAL (ML) INJECTION ONCE
Refills: 0 | Status: COMPLETED | OUTPATIENT
Start: 2021-11-19 | End: 2021-11-19

## 2021-11-19 RX ORDER — POTASSIUM CHLORIDE 20 MEQ
40 PACKET (EA) ORAL ONCE
Refills: 0 | Status: COMPLETED | OUTPATIENT
Start: 2021-11-19 | End: 2021-11-19

## 2021-11-19 RX ADMIN — Medication 3 MILLILITER(S): at 20:20

## 2021-11-19 RX ADMIN — Medication 650 MILLIGRAM(S): at 05:45

## 2021-11-19 RX ADMIN — Medication 40 MILLIEQUIVALENT(S): at 17:53

## 2021-11-19 RX ADMIN — Medication 3 MILLIGRAM(S): at 22:29

## 2021-11-19 RX ADMIN — Medication 3 MILLILITER(S): at 08:32

## 2021-11-19 RX ADMIN — PANTOPRAZOLE SODIUM 40 MILLIGRAM(S): 20 TABLET, DELAYED RELEASE ORAL at 12:49

## 2021-11-19 RX ADMIN — Medication 3 MILLILITER(S): at 16:22

## 2021-11-19 RX ADMIN — PIPERACILLIN AND TAZOBACTAM 25 GRAM(S): 4; .5 INJECTION, POWDER, LYOPHILIZED, FOR SOLUTION INTRAVENOUS at 05:15

## 2021-11-19 RX ADMIN — Medication 5 MILLIGRAM(S): at 22:29

## 2021-11-19 RX ADMIN — Medication 650 MILLIGRAM(S): at 13:24

## 2021-11-19 RX ADMIN — POTASSIUM PHOSPHATE, MONOBASIC POTASSIUM PHOSPHATE, DIBASIC 83.33 MILLIMOLE(S): 236; 224 INJECTION, SOLUTION INTRAVENOUS at 22:30

## 2021-11-19 RX ADMIN — ENOXAPARIN SODIUM 30 MILLIGRAM(S): 100 INJECTION SUBCUTANEOUS at 12:48

## 2021-11-19 RX ADMIN — Medication 5 MILLIGRAM(S): at 05:15

## 2021-11-19 RX ADMIN — ATORVASTATIN CALCIUM 40 MILLIGRAM(S): 80 TABLET, FILM COATED ORAL at 22:29

## 2021-11-19 RX ADMIN — PIPERACILLIN AND TAZOBACTAM 25 GRAM(S): 4; .5 INJECTION, POWDER, LYOPHILIZED, FOR SOLUTION INTRAVENOUS at 15:28

## 2021-11-19 RX ADMIN — Medication 12.5 MILLIGRAM(S): at 17:52

## 2021-11-19 RX ADMIN — Medication 12.5 MILLIGRAM(S): at 05:16

## 2021-11-19 RX ADMIN — Medication 650 MILLIGRAM(S): at 17:52

## 2021-11-19 RX ADMIN — SODIUM CHLORIDE 42 MILLILITER(S): 9 INJECTION, SOLUTION INTRAVENOUS at 22:30

## 2021-11-19 RX ADMIN — PIPERACILLIN AND TAZOBACTAM 25 GRAM(S): 4; .5 INJECTION, POWDER, LYOPHILIZED, FOR SOLUTION INTRAVENOUS at 22:45

## 2021-11-19 RX ADMIN — Medication 650 MILLIGRAM(S): at 05:15

## 2021-11-19 RX ADMIN — Medication 5 MILLIGRAM(S): at 15:32

## 2021-11-19 RX ADMIN — ESCITALOPRAM OXALATE 5 MILLIGRAM(S): 10 TABLET, FILM COATED ORAL at 12:41

## 2021-11-19 RX ADMIN — Medication 650 MILLIGRAM(S): at 12:41

## 2021-11-19 RX ADMIN — Medication 50 GRAM(S): at 17:53

## 2021-11-19 RX ADMIN — Medication 3 MILLILITER(S): at 21:25

## 2021-11-19 NOTE — PROGRESS NOTE ADULT - ATTENDING COMMENTS
Patient awake alert, much improved  Hemodynamic stable  Abdomen soft  Renal wnl  Tolerated diet well  Continue IV antibiotics/ move to PO over the weekend

## 2021-11-19 NOTE — PROGRESS NOTE ADULT - SUBJECTIVE AND OBJECTIVE BOX
Chief Complaint: Patient is a 89y old  Female who presents with a chief complaint of abdominal pain, gall stone pancreatitis.     INTERVAL HPI/OVERNIGHT EVENTS: Patient seen and evaluated at bedside, no acute distress noted. Patient is awake, minimally interactive, denies  abdominal pain, nausea or vomiting. Her total bili slowly down trending, today 2.5 from 2.7 yesterday. ALK. phos down trending, today 175 from  182 yesterday. AST/ALT essentially unchanged, 98/92 respectively. Alb 3.2.     Review of Systems:  · ENMT: negative  · Respiratory and Thorax: negative  · Cardiovascular: negative  · Gastrointestinal: see above.  · Genitourinary:	negative  · Musculoskeletal: negative  · Neurological: negative  · Psychiatric: negative  · Hematology/Lymphatics: negative  · Endocrine: negative      PAST MEDICAL/SURGICAL HISTORY:  DM (diabetes mellitus)  off medication    HTN (hypertension)    HTN (hypertension)    Sciatica    Osteoarthritis    Dyslipidemia    Stroke    H/O bilateral hip replacements  2000&#x27;s at [facility], no complications      MEDICATIONS  (STANDING):  acetaminophen     Tablet .. 650 milliGRAM(s) Oral every 6 hours  albuterol/ipratropium for Nebulization 3 milliLiter(s) Nebulizer every 6 hours  atorvastatin 40 milliGRAM(s) Oral at bedtime  dextrose 40% Gel 15 Gram(s) Oral once  dextrose 5%. 1000 milliLiter(s) (100 mL/Hr) IV Continuous <Continuous>  dextrose 50% Injectable 25 Gram(s) IV Push once  dextrose 50% Injectable 12.5 Gram(s) IV Push once  dextrose 50% Injectable 25 Gram(s) IV Push once  enoxaparin Injectable 30 milliGRAM(s) SubCutaneous daily  escitalopram 5 milliGRAM(s) Oral daily  glucagon  Injectable 1 milliGRAM(s) IntraMuscular once  influenza  Vaccine (HIGH DOSE) 0.7 milliLiter(s) IntraMuscular once  insulin lispro (ADMELOG) corrective regimen sliding scale   SubCutaneous Before meals and at bedtime  melatonin 3 milliGRAM(s) Oral at bedtime  metoprolol tartrate 12.5 milliGRAM(s) Oral every 12 hours  multiple electrolytes Injection Type 1 1000 milliLiter(s) (42 mL/Hr) IV Continuous <Continuous>  oxybutynin 5 milliGRAM(s) Oral every 8 hours  pantoprazole  Injectable 40 milliGRAM(s) IV Push daily  piperacillin/tazobactam IVPB.. 3.375 Gram(s) IV Intermittent every 8 hours  potassium phosphate IVPB 30 milliMole(s) IV Intermittent once    MEDICATIONS  (PRN):  albuterol/ipratropium for Nebulization 3 milliLiter(s) Nebulizer every 6 hours PRN Shortness of Breath and/or Wheezing    No Known Allergies    T(C): 37.2 (11-19-21 @ 07:58), Max: 37.2 (11-19-21 @ 07:58)  HR: 110 (11-19-21 @ 07:58) (72 - 110)  BP: 166/94 (11-19-21 @ 07:58) (146/70 - 166/94)  RR: 20 (11-19-21 @ 07:58) (18 - 20)  SpO2: 95% (11-19-21 @ 07:58) (94% - 99%)     I&O's Summary    18 Nov 2021 07:01  -  19 Nov 2021 07:00  --------------------------------------------------------  IN: 1115 mL / OUT: 1550 mL / NET: -435 mL      PHYSICAL EXAM:  Constitutional: No acute distress  Neuro: Awake alert, oriented to person, place and situation, non-focal, speech clear and intact  HEENT: PERRL, oral mucosa pink and moist  Neck: supple, no JVD  CV: regular rate, regular rhythm, +S1S2,   Pulm/chest: lung sounds CTA and equal bilaterally, no accessory muscle use noted  Abd: soft, NT, ND, +BS  Ext: RICHARDSON x 4, no Cyanosis, clubbing or edema  Skin: warm, well perfused m  Psych: calm, cooperative       LABS:               12.2   7.97  )-----------( 143      ( 11-19 @ 08:16 )             36.3                11.2   7.87  )-----------( 125      ( 11-17 @ 06:37 )             33.4       11-19    139  |  101  |  10.8  ----------------------------<  136<H>  2.8<LL>   |  26.0  |  0.66    Ca    8.3<L>      19 Nov 2021 08:14    TPro  6.4<L>  /  Alb  3.2<L>  /  TBili  2.5<H>  /  DBili  1.1<H>  /  AST  98<H>  /  ALT  92<H>  /  AlkPhos  175<H>  11-19    LIVER FUNCTIONS - ( 19 Nov 2021 08:14 )  Alb: 3.2 g/dL / Pro: 6.4 g/dL / ALK PHOS: 175 U/L / ALT: 92 U/L / AST: 98 U/L / GGT: x               < from: US Abdomen Upper Quadrant Right (11.14.21 @ 04:26) >  INDINGS:    Liver: 14.5 cm.  Within normal limits.  Bile ducts: Normal caliber. Common bile duct measures 5 mm.  Gallbladder: Multiple dependent mobile gallstones layering dependently.  Mild gallbladder wall thickening.  Trace pericholecystic fluid.  Sonographic Francis sign was negative.  Pancreas: Visualized portions are within normal limits.  Right kidney: 9.8 cm. No hydronephrosis. A right upper pole cyst measures 10 x 6 x 9 mm.  Ascites: None.  IVC: Visualized portions are within normal limits.    IMPRESSION:    Cholelithiasis with mild gallbladder wall thickening and trace pericholecystic fluid.    Sonographic Francis sign negative.    The findings are equivocal for acute cholecystitis.  A follow-up nuclear medicine HIDA scan may be obtained for further evaluation.    < end of copied text >     Chief Complaint: Patient is a 89y old  Female who presents with a chief complaint of abdominal pain, elevated LFTs and acute cholecystitis    INTERVAL HPI/OVERNIGHT EVENTS: Patient seen and evaluated at bedside, no acute distress noted. Patient is awake, minimally interactive, denies  abdominal pain, nausea or vomiting. Her total bili slowly down trending, today 2.5 from 2.7 yesterday. ALK. phos down trending, today 175 from  182 yesterday. AST/ALT essentially unchanged, 98/92 respectively. Alb 3.2.     Review of Systems:  · ENMT: negative  · Respiratory and Thorax: negative  · Cardiovascular: negative  · Gastrointestinal: see above.  · Genitourinary:	negative  · Musculoskeletal: negative  · Neurological: negative  · Psychiatric: negative  · Hematology/Lymphatics: negative  · Endocrine: negative      PAST MEDICAL/SURGICAL HISTORY:  DM (diabetes mellitus)  off medication    HTN (hypertension)    HTN (hypertension)    Sciatica    Osteoarthritis    Dyslipidemia    Stroke    H/O bilateral hip replacements  2000&#x27;s at [facility], no complications      MEDICATIONS  (STANDING):  acetaminophen     Tablet .. 650 milliGRAM(s) Oral every 6 hours  albuterol/ipratropium for Nebulization 3 milliLiter(s) Nebulizer every 6 hours  atorvastatin 40 milliGRAM(s) Oral at bedtime  dextrose 40% Gel 15 Gram(s) Oral once  dextrose 5%. 1000 milliLiter(s) (100 mL/Hr) IV Continuous <Continuous>  dextrose 50% Injectable 25 Gram(s) IV Push once  dextrose 50% Injectable 12.5 Gram(s) IV Push once  dextrose 50% Injectable 25 Gram(s) IV Push once  enoxaparin Injectable 30 milliGRAM(s) SubCutaneous daily  escitalopram 5 milliGRAM(s) Oral daily  glucagon  Injectable 1 milliGRAM(s) IntraMuscular once  influenza  Vaccine (HIGH DOSE) 0.7 milliLiter(s) IntraMuscular once  insulin lispro (ADMELOG) corrective regimen sliding scale   SubCutaneous Before meals and at bedtime  melatonin 3 milliGRAM(s) Oral at bedtime  metoprolol tartrate 12.5 milliGRAM(s) Oral every 12 hours  multiple electrolytes Injection Type 1 1000 milliLiter(s) (42 mL/Hr) IV Continuous <Continuous>  oxybutynin 5 milliGRAM(s) Oral every 8 hours  pantoprazole  Injectable 40 milliGRAM(s) IV Push daily  piperacillin/tazobactam IVPB.. 3.375 Gram(s) IV Intermittent every 8 hours  potassium phosphate IVPB 30 milliMole(s) IV Intermittent once    MEDICATIONS  (PRN):  albuterol/ipratropium for Nebulization 3 milliLiter(s) Nebulizer every 6 hours PRN Shortness of Breath and/or Wheezing    No Known Allergies    T(C): 37.2 (11-19-21 @ 07:58), Max: 37.2 (11-19-21 @ 07:58)  HR: 110 (11-19-21 @ 07:58) (72 - 110)  BP: 166/94 (11-19-21 @ 07:58) (146/70 - 166/94)  RR: 20 (11-19-21 @ 07:58) (18 - 20)  SpO2: 95% (11-19-21 @ 07:58) (94% - 99%)     I&O's Summary    18 Nov 2021 07:01  -  19 Nov 2021 07:00  --------------------------------------------------------  IN: 1115 mL / OUT: 1550 mL / NET: -435 mL      PHYSICAL EXAM:  Constitutional: elderly ill appearing female in No acute distress  Neuro: Awake alert, oriented to person, place and situation, non-focal, speech clear and intact  HEENT: PERRL, oral mucosa pink and moist  Neck: supple, no JVD  CV: regular rate, regular rhythm, +S1S2,   Pulm/chest: lung sounds CTA and equal bilaterally, no accessory muscle use noted  Abd: soft, NT, ND, +BS  Ext: RICHARDSON x 4, no Cyanosis, clubbing or edema  Skin: warm, well perfused m  Psych: calm, cooperative       LABS:               12.2   7.97  )-----------( 143      ( 11-19 @ 08:16 )             36.3                11.2   7.87  )-----------( 125      ( 11-17 @ 06:37 )             33.4       11-19    139  |  101  |  10.8  ----------------------------<  136<H>  2.8<LL>   |  26.0  |  0.66    Ca    8.3<L>      19 Nov 2021 08:14    TPro  6.4<L>  /  Alb  3.2<L>  /  TBili  2.5<H>  /  DBili  1.1<H>  /  AST  98<H>  /  ALT  92<H>  /  AlkPhos  175<H>  11-19    LIVER FUNCTIONS - ( 19 Nov 2021 08:14 )  Alb: 3.2 g/dL / Pro: 6.4 g/dL / ALK PHOS: 175 U/L / ALT: 92 U/L / AST: 98 U/L / GGT: x               < from: US Abdomen Upper Quadrant Right (11.14.21 @ 04:26) >  INDINGS:    Liver: 14.5 cm.  Within normal limits.  Bile ducts: Normal caliber. Common bile duct measures 5 mm.  Gallbladder: Multiple dependent mobile gallstones layering dependently.  Mild gallbladder wall thickening.  Trace pericholecystic fluid.  Sonographic Francis sign was negative.  Pancreas: Visualized portions are within normal limits.  Right kidney: 9.8 cm. No hydronephrosis. A right upper pole cyst measures 10 x 6 x 9 mm.  Ascites: None.  IVC: Visualized portions are within normal limits.    IMPRESSION:    Cholelithiasis with mild gallbladder wall thickening and trace pericholecystic fluid.    Sonographic Francis sign negative.    The findings are equivocal for acute cholecystitis.  A follow-up nuclear medicine HIDA scan may be obtained for further evaluation.    < end of copied text >

## 2021-11-19 NOTE — PROGRESS NOTE ADULT - ATTENDING COMMENTS
Yesterday the patient was barely arousable but today she was sitting up in bed, awake alert and somewhat conversant.  She knows she is in the hospital and thinks is 2020.  She denies any abdominal pain, nausea or vomiting.  She is afebrile.  The white count is normal.  Her abdominal exam is unremarkable.  The bilirubin is down slightly at 2.5 today.  She continues on Zosyn.  For the present we will simply continue same medical therapy.  Follow-up LFTs in a.m.

## 2021-11-19 NOTE — PROGRESS NOTE ADULT - SUBJECTIVE AND OBJECTIVE BOX
INTERVAL HPI/OVERNIGHT EVENTS: No acute events. Patient appears at baseline mental status. Able to follow simple commands. Remains afebrile overnight. Vitals stable. Tolerating po. Tbili 3.7>2.2>2.7, awaiting this AM.           MEDICATIONS  (STANDING):  acetaminophen     Tablet .. 650 milliGRAM(s) Oral every 6 hours  albuterol/ipratropium for Nebulization 3 milliLiter(s) Nebulizer every 6 hours  atorvastatin 40 milliGRAM(s) Oral at bedtime  dextrose 40% Gel 15 Gram(s) Oral once  dextrose 5%. 1000 milliLiter(s) (100 mL/Hr) IV Continuous <Continuous>  dextrose 50% Injectable 25 Gram(s) IV Push once  dextrose 50% Injectable 12.5 Gram(s) IV Push once  dextrose 50% Injectable 25 Gram(s) IV Push once  enoxaparin Injectable 30 milliGRAM(s) SubCutaneous daily  escitalopram 5 milliGRAM(s) Oral daily  glucagon  Injectable 1 milliGRAM(s) IntraMuscular once  influenza  Vaccine (HIGH DOSE) 0.7 milliLiter(s) IntraMuscular once  insulin lispro (ADMELOG) corrective regimen sliding scale   SubCutaneous Before meals and at bedtime  melatonin 3 milliGRAM(s) Oral at bedtime  metoprolol tartrate 12.5 milliGRAM(s) Oral every 12 hours  multiple electrolytes Injection Type 1 1000 milliLiter(s) (42 mL/Hr) IV Continuous <Continuous>  oxybutynin 5 milliGRAM(s) Oral every 8 hours  pantoprazole  Injectable 40 milliGRAM(s) IV Push daily  piperacillin/tazobactam IVPB.. 3.375 Gram(s) IV Intermittent every 8 hours  potassium phosphate IVPB 30 milliMole(s) IV Intermittent once    MEDICATIONS  (PRN):  albuterol/ipratropium for Nebulization 3 milliLiter(s) Nebulizer every 6 hours PRN Shortness of Breath and/or Wheezing      Vital Signs Last 24 Hrs  T(C): 36.7 (18 Nov 2021 20:56), Max: 37.3 (18 Nov 2021 07:17)  T(F): 98 (18 Nov 2021 20:56), Max: 99.2 (18 Nov 2021 07:17)  HR: 72 (18 Nov 2021 21:41) (72 - 105)  BP: 159/71 (18 Nov 2021 20:56) (154/84 - 160/72)  BP(mean): --  RR: 20 (18 Nov 2021 20:56) (18 - 20)  SpO2: 96% (19 Nov 2021 00:27) (88% - 98%)    Physical Exam:  GEN: Pleasantly confused, in NAD   RESP: unlabored  CVS: RRR  GI: Abdomen soft, non-tender, non-distended, no rebound tenderness / guarding  : Urine clear yellow urine  Skin: no skin breakdown     I&O's Detail    17 Nov 2021 07:01  -  18 Nov 2021 07:00  --------------------------------------------------------  IN:    IV PiggyBack: 50 mL    multiple electrolytes Injection Type 1.: 504 mL    Oral Fluid: 120 mL  Total IN: 674 mL    OUT:    Voided (mL): 1050 mL  Total OUT: 1050 mL    Total NET: -376 mL      18 Nov 2021 07:01  -  19 Nov 2021 02:16  --------------------------------------------------------  IN:    IV PiggyBack: 175 mL    multiple electrolytes Injection Type 1.: 588 mL  Total IN: 763 mL    OUT:    Voided (mL): 750 mL  Total OUT: 750 mL    Total NET: 13 mL          LABS:                        11.2   7.87  )-----------( 125      ( 17 Nov 2021 06:37 )             33.4     11-18    138  |  99  |  16.7  ----------------------------<  139<H>  3.0<L>   |  26.0  |  0.73    Ca    8.4<L>      18 Nov 2021 07:27  Phos  2.1     11-17  Mg     2.0     11-17    TPro  6.1<L>  /  Alb  3.4  /  TBili  2.7<H>  /  DBili  1.4<H>  /  AST  81<H>  /  ALT  96<H>  /  AlkPhos  182<H>  11-18          RADIOLOGY & ADDITIONAL STUDIES:

## 2021-11-19 NOTE — PROGRESS NOTE ADULT - PROBLEM SELECTOR PLAN 1
Most likely has an acute cholecystitis leading to abnormal liver function tests, however underlying CBD stones could not be excluded.  but patient is a poor candidate for ERCP.   His total bili is slowly down trending , liver enzymes,   Patient is a poor candidate for ERCP     continue to monitor LFT's and bili and continue antibiotic therapy. Most likely has an acute cholecystitis leading to abnormal liver function tests, however underlying CBD stones could not be excluded.   His total bili, and alkaline phos slowly down trending , slight up trend in AST to day 98 from 81 yesterday. ALT down trending. today 92.   continue to monitor LFT's and bili, coag   Monitor renal function, replete electrolytes as needed,   Continue antibiotic therapy  Patient is a poor candidate for ERCP given her advanced age and her elevated comorbidities, family agreeing for conservative management

## 2021-11-19 NOTE — PROGRESS NOTE ADULT - ASSESSMENT
89F with gallstone pancreatitis, clinically resolved, Grade II cholecystitis, choledocholithiasis vs Mirizzi syndrome for uptrending obstructive jaundice without stigmata of cholangitis, nontoxic HDnl without peritonitis, physiologically stable    - per family no mrcp wants conservative management   -Trend bilirubins and transaminases  -F/u GI recs   - Tolerating po  -F/u AM labs possible d/c zosyn  -DVT PPx  -OOB amb PT  -F/u GOC and conservative management as above

## 2021-11-20 ENCOUNTER — TRANSCRIPTION ENCOUNTER (OUTPATIENT)
Age: 86
End: 2021-11-20

## 2021-11-20 VITALS
RESPIRATION RATE: 18 BRPM | DIASTOLIC BLOOD PRESSURE: 92 MMHG | OXYGEN SATURATION: 96 % | TEMPERATURE: 99 F | HEART RATE: 93 BPM | SYSTOLIC BLOOD PRESSURE: 163 MMHG

## 2021-11-20 LAB
ALBUMIN SERPL ELPH-MCNC: 3 G/DL — LOW (ref 3.3–5.2)
ALBUMIN SERPL ELPH-MCNC: 3.3 G/DL — SIGNIFICANT CHANGE UP (ref 3.3–5.2)
ALP SERPL-CCNC: 155 U/L — HIGH (ref 40–120)
ALP SERPL-CCNC: 167 U/L — HIGH (ref 40–120)
ALT FLD-CCNC: 72 U/L — HIGH
ALT FLD-CCNC: 76 U/L — HIGH
ANION GAP SERPL CALC-SCNC: 13 MMOL/L — SIGNIFICANT CHANGE UP (ref 5–17)
ANION GAP SERPL CALC-SCNC: 52 MMOL/L — HIGH (ref 5–17)
AST SERPL-CCNC: 66 U/L — HIGH
AST SERPL-CCNC: 85 U/L — HIGH
BILIRUB DIRECT SERPL-MCNC: 0.8 MG/DL — HIGH (ref 0–0.3)
BILIRUB INDIRECT FLD-MCNC: 1.2 MG/DL — HIGH (ref 0.2–1)
BILIRUB SERPL-MCNC: 1.9 MG/DL — SIGNIFICANT CHANGE UP (ref 0.4–2)
BILIRUB SERPL-MCNC: 2 MG/DL — SIGNIFICANT CHANGE UP (ref 0.4–2)
BUN SERPL-MCNC: 13 MG/DL — SIGNIFICANT CHANGE UP (ref 8–20)
BUN SERPL-MCNC: 13.3 MG/DL — SIGNIFICANT CHANGE UP (ref 8–20)
CALCIUM SERPL-MCNC: 8.2 MG/DL — LOW (ref 8.6–10.2)
CALCIUM SERPL-MCNC: 8.5 MG/DL — LOW (ref 8.6–10.2)
CHLORIDE SERPL-SCNC: 102 MMOL/L — SIGNIFICANT CHANGE UP (ref 98–107)
CHLORIDE SERPL-SCNC: 84 MMOL/L — LOW (ref 98–107)
CO2 SERPL-SCNC: 22 MMOL/L — SIGNIFICANT CHANGE UP (ref 22–29)
CO2 SERPL-SCNC: 25 MMOL/L — SIGNIFICANT CHANGE UP (ref 22–29)
CREAT SERPL-MCNC: 0.68 MG/DL — SIGNIFICANT CHANGE UP (ref 0.5–1.3)
CREAT SERPL-MCNC: 0.76 MG/DL — SIGNIFICANT CHANGE UP (ref 0.5–1.3)
GLUCOSE BLDC GLUCOMTR-MCNC: 123 MG/DL — HIGH (ref 70–99)
GLUCOSE BLDC GLUCOMTR-MCNC: 144 MG/DL — HIGH (ref 70–99)
GLUCOSE BLDC GLUCOMTR-MCNC: 150 MG/DL — HIGH (ref 70–99)
GLUCOSE SERPL-MCNC: 116 MG/DL — HIGH (ref 70–99)
GLUCOSE SERPL-MCNC: 157 MG/DL — HIGH (ref 70–99)
HCT VFR BLD CALC: 37.7 % — SIGNIFICANT CHANGE UP (ref 34.5–45)
HGB BLD-MCNC: 12.2 G/DL — SIGNIFICANT CHANGE UP (ref 11.5–15.5)
INR BLD: 1.06 RATIO — SIGNIFICANT CHANGE UP (ref 0.88–1.16)
MAGNESIUM SERPL-MCNC: 2.1 MG/DL — SIGNIFICANT CHANGE UP (ref 1.6–2.6)
MCHC RBC-ENTMCNC: 29.8 PG — SIGNIFICANT CHANGE UP (ref 27–34)
MCHC RBC-ENTMCNC: 32.4 GM/DL — SIGNIFICANT CHANGE UP (ref 32–36)
MCV RBC AUTO: 92 FL — SIGNIFICANT CHANGE UP (ref 80–100)
PLATELET # BLD AUTO: 169 K/UL — SIGNIFICANT CHANGE UP (ref 150–400)
POTASSIUM SERPL-MCNC: 5 MMOL/L — SIGNIFICANT CHANGE UP (ref 3.5–5.3)
POTASSIUM SERPL-MCNC: 5.3 MMOL/L — SIGNIFICANT CHANGE UP (ref 3.5–5.3)
POTASSIUM SERPL-SCNC: 5 MMOL/L — SIGNIFICANT CHANGE UP (ref 3.5–5.3)
POTASSIUM SERPL-SCNC: 5.3 MMOL/L — SIGNIFICANT CHANGE UP (ref 3.5–5.3)
PROT SERPL-MCNC: 6.3 G/DL — LOW (ref 6.6–8.7)
PROT SERPL-MCNC: 6.5 G/DL — LOW (ref 6.6–8.7)
PROTHROM AB SERPL-ACNC: 12.3 SEC — SIGNIFICANT CHANGE UP (ref 10.6–13.6)
RBC # BLD: 4.1 M/UL — SIGNIFICANT CHANGE UP (ref 3.8–5.2)
RBC # FLD: 14.6 % — HIGH (ref 10.3–14.5)
SODIUM SERPL-SCNC: 137 MMOL/L — SIGNIFICANT CHANGE UP (ref 135–145)
SODIUM SERPL-SCNC: 161 MMOL/L — CRITICAL HIGH (ref 135–145)
WBC # BLD: 7.22 K/UL — SIGNIFICANT CHANGE UP (ref 3.8–10.5)
WBC # FLD AUTO: 7.22 K/UL — SIGNIFICANT CHANGE UP (ref 3.8–10.5)

## 2021-11-20 PROCEDURE — 82248 BILIRUBIN DIRECT: CPT

## 2021-11-20 PROCEDURE — 97110 THERAPEUTIC EXERCISES: CPT

## 2021-11-20 PROCEDURE — 84484 ASSAY OF TROPONIN QUANT: CPT

## 2021-11-20 PROCEDURE — 85610 PROTHROMBIN TIME: CPT

## 2021-11-20 PROCEDURE — 85027 COMPLETE CBC AUTOMATED: CPT

## 2021-11-20 PROCEDURE — 71045 X-RAY EXAM CHEST 1 VIEW: CPT

## 2021-11-20 PROCEDURE — 96375 TX/PRO/DX INJ NEW DRUG ADDON: CPT

## 2021-11-20 PROCEDURE — 84100 ASSAY OF PHOSPHORUS: CPT

## 2021-11-20 PROCEDURE — 99285 EMERGENCY DEPT VISIT HI MDM: CPT

## 2021-11-20 PROCEDURE — 83735 ASSAY OF MAGNESIUM: CPT

## 2021-11-20 PROCEDURE — 82962 GLUCOSE BLOOD TEST: CPT

## 2021-11-20 PROCEDURE — 73552 X-RAY EXAM OF FEMUR 2/>: CPT

## 2021-11-20 PROCEDURE — 36415 COLL VENOUS BLD VENIPUNCTURE: CPT

## 2021-11-20 PROCEDURE — 86703 HIV-1/HIV-2 1 RESULT ANTBDY: CPT

## 2021-11-20 PROCEDURE — 86900 BLOOD TYPING SEROLOGIC ABO: CPT

## 2021-11-20 PROCEDURE — 85025 COMPLETE CBC W/AUTO DIFF WBC: CPT

## 2021-11-20 PROCEDURE — 94640 AIRWAY INHALATION TREATMENT: CPT

## 2021-11-20 PROCEDURE — 83690 ASSAY OF LIPASE: CPT

## 2021-11-20 PROCEDURE — 80053 COMPREHEN METABOLIC PANEL: CPT

## 2021-11-20 PROCEDURE — 76705 ECHO EXAM OF ABDOMEN: CPT

## 2021-11-20 PROCEDURE — 99231 SBSQ HOSP IP/OBS SF/LOW 25: CPT | Mod: GC

## 2021-11-20 PROCEDURE — 93005 ELECTROCARDIOGRAM TRACING: CPT

## 2021-11-20 PROCEDURE — 83036 HEMOGLOBIN GLYCOSYLATED A1C: CPT

## 2021-11-20 PROCEDURE — 86850 RBC ANTIBODY SCREEN: CPT

## 2021-11-20 PROCEDURE — 96374 THER/PROPH/DIAG INJ IV PUSH: CPT

## 2021-11-20 PROCEDURE — 82150 ASSAY OF AMYLASE: CPT

## 2021-11-20 PROCEDURE — 97116 GAIT TRAINING THERAPY: CPT

## 2021-11-20 PROCEDURE — 74177 CT ABD & PELVIS W/CONTRAST: CPT | Mod: MG

## 2021-11-20 PROCEDURE — 86901 BLOOD TYPING SEROLOGIC RH(D): CPT

## 2021-11-20 PROCEDURE — 85730 THROMBOPLASTIN TIME PARTIAL: CPT

## 2021-11-20 PROCEDURE — 80076 HEPATIC FUNCTION PANEL: CPT

## 2021-11-20 PROCEDURE — 83605 ASSAY OF LACTIC ACID: CPT

## 2021-11-20 PROCEDURE — 73502 X-RAY EXAM HIP UNI 2-3 VIEWS: CPT

## 2021-11-20 PROCEDURE — 87637 SARSCOV2&INF A&B&RSV AMP PRB: CPT

## 2021-11-20 PROCEDURE — 93306 TTE W/DOPPLER COMPLETE: CPT

## 2021-11-20 PROCEDURE — G1004: CPT

## 2021-11-20 PROCEDURE — 80048 BASIC METABOLIC PNL TOTAL CA: CPT

## 2021-11-20 PROCEDURE — 86769 SARS-COV-2 COVID-19 ANTIBODY: CPT

## 2021-11-20 PROCEDURE — 82247 BILIRUBIN TOTAL: CPT

## 2021-11-20 PROCEDURE — 97530 THERAPEUTIC ACTIVITIES: CPT

## 2021-11-20 RX ADMIN — Medication 12.5 MILLIGRAM(S): at 05:30

## 2021-11-20 RX ADMIN — Medication 3 MILLILITER(S): at 15:50

## 2021-11-20 RX ADMIN — ENOXAPARIN SODIUM 30 MILLIGRAM(S): 100 INJECTION SUBCUTANEOUS at 13:05

## 2021-11-20 RX ADMIN — ESCITALOPRAM OXALATE 5 MILLIGRAM(S): 10 TABLET, FILM COATED ORAL at 13:06

## 2021-11-20 RX ADMIN — PANTOPRAZOLE SODIUM 40 MILLIGRAM(S): 20 TABLET, DELAYED RELEASE ORAL at 13:06

## 2021-11-20 RX ADMIN — PIPERACILLIN AND TAZOBACTAM 25 GRAM(S): 4; .5 INJECTION, POWDER, LYOPHILIZED, FOR SOLUTION INTRAVENOUS at 13:05

## 2021-11-20 RX ADMIN — Medication 650 MILLIGRAM(S): at 05:30

## 2021-11-20 RX ADMIN — PIPERACILLIN AND TAZOBACTAM 25 GRAM(S): 4; .5 INJECTION, POWDER, LYOPHILIZED, FOR SOLUTION INTRAVENOUS at 05:30

## 2021-11-20 RX ADMIN — Medication 3 MILLILITER(S): at 08:41

## 2021-11-20 RX ADMIN — Medication 650 MILLIGRAM(S): at 13:06

## 2021-11-20 RX ADMIN — Medication 5 MILLIGRAM(S): at 05:30

## 2021-11-20 RX ADMIN — Medication 650 MILLIGRAM(S): at 06:10

## 2021-11-20 RX ADMIN — Medication 5 MILLIGRAM(S): at 13:06

## 2021-11-20 RX ADMIN — Medication 12.5 MILLIGRAM(S): at 17:27

## 2021-11-20 RX ADMIN — Medication 650 MILLIGRAM(S): at 17:28

## 2021-11-20 NOTE — PROGRESS NOTE ADULT - SUBJECTIVE AND OBJECTIVE BOX
Acute Care Surgery/Trauma Surgery Progress Note:    No acute overnight events. Patient afebrile, VSS. Patient endorses no pain abdomen and no report of N/V. Tolerating diet. Denies f/c/cp/sob.     Diet, Regular:   Low Fat (LOWFAT) (11-17-21 @ 09:18)      Scheduled Medications:   acetaminophen     Tablet .. 650 milliGRAM(s) Oral every 6 hours  albuterol/ipratropium for Nebulization 3 milliLiter(s) Nebulizer every 6 hours  atorvastatin 40 milliGRAM(s) Oral at bedtime  dextrose 40% Gel 15 Gram(s) Oral once  dextrose 5%. 1000 milliLiter(s) (100 mL/Hr) IV Continuous <Continuous>  dextrose 50% Injectable 25 Gram(s) IV Push once  dextrose 50% Injectable 12.5 Gram(s) IV Push once  dextrose 50% Injectable 25 Gram(s) IV Push once  enoxaparin Injectable 30 milliGRAM(s) SubCutaneous daily  escitalopram 5 milliGRAM(s) Oral daily  glucagon  Injectable 1 milliGRAM(s) IntraMuscular once  influenza  Vaccine (HIGH DOSE) 0.7 milliLiter(s) IntraMuscular once  insulin lispro (ADMELOG) corrective regimen sliding scale   SubCutaneous Before meals and at bedtime  melatonin 3 milliGRAM(s) Oral at bedtime  metoprolol tartrate 12.5 milliGRAM(s) Oral every 12 hours  multiple electrolytes Injection Type 1 1000 milliLiter(s) (42 mL/Hr) IV Continuous <Continuous>  oxybutynin 5 milliGRAM(s) Oral every 8 hours  pantoprazole  Injectable 40 milliGRAM(s) IV Push daily  piperacillin/tazobactam IVPB.. 3.375 Gram(s) IV Intermittent every 8 hours    PRN Medications:  albuterol/ipratropium for Nebulization 3 milliLiter(s) Nebulizer every 6 hours PRN Shortness of Breath and/or Wheezing      Objective:   T(F): 98.9 (11-19 @ 17:05), Max: 98.9 (11-19 @ 07:58)  HR: 99 (11-19 @ 17:05) (94 - 110)  BP: 138/66 (11-19 @ 17:05) (138/66 - 166/94)  BP(mean): --  ABP: --  ABP(mean): --  RR: 19 (11-19 @ 17:05) (19 - 20)  SpO2: 100% (11-19 @ 17:05) (95% - 100%)      Physical Exam:   GEN: patient resting comfortably in bed, in no acute distress, s/p stroke-condition with left sided facial droop  RESP: respirations are unlabored, no accessory muscle use, no conversational dyspnea  CVS: RRR  GI: Abdomen soft, non-tender, non-distended, no rebound tenderness / guarding    I&O's    11-18 @ 07:01  -  11-19 @ 07:00  --------------------------------------------------------  IN:    IV PiggyBack: 275 mL    multiple electrolytes Injection Type 1.: 840 mL  Total IN: 1115 mL    OUT:    Voided (mL): 1550 mL  Total OUT: 1550 mL    Total NET: -435 mL      11-19 @ 07:01  -  11-20 @ 01:29  --------------------------------------------------------  IN:    Oral Fluid: 790 mL  Total IN: 790 mL    OUT:    Voided (mL): 650 mL  Total OUT: 650 mL    Total NET: 140 mL          LABS:                        12.2   7.97  )-----------( 143      ( 19 Nov 2021 08:16 )             36.3     11-19    139  |  101  |  10.8  ----------------------------<  136<H>  2.8<LL>   |  26.0  |  0.66    Ca    8.3<L>      19 Nov 2021 08:14    TPro  6.4<L>  /  Alb  3.2<L>  /  TBili  2.5<H>  /  DBili  1.1<H>  /  AST  98<H>  /  ALT  92<H>  /  AlkPhos  175<H>  11-19          MICROBIOLOGY:       PATHOLOGY:

## 2021-11-20 NOTE — DISCHARGE NOTE NURSING/CASE MANAGEMENT/SOCIAL WORK - PATIENT PORTAL LINK FT
You can access the FollowMyHealth Patient Portal offered by [facility] by registering at the following website: [email or web address]. By joining [employer]’s FollowMyHealth portal, you will also be able to view your health information using other applications (apps) compatible with our system.

## 2021-11-20 NOTE — PROGRESS NOTE ADULT - REASON FOR ADMISSION
Gallstone pancreatitis

## 2021-11-20 NOTE — PROGRESS NOTE ADULT - ATTENDING COMMENTS
Agree with above assessment.  The patient was seen and examined by me.  The patient reports no abdominal pain, nausea, or vomit.  The patient has a soft, non tender, no guarding, no rebound.  Patient is surgically stable for discharge home.

## 2021-11-20 NOTE — PROGRESS NOTE ADULT - NUTRITIONAL ASSESSMENT
This patient has been assessed with a concern for Malnutrition and has been determined to have a diagnosis/diagnoses of Severe protein-calorie malnutrition.    This patient is being managed with:   Diet Regular-  Low Fat (LOWFAT)  Entered: Nov 17 2021  9:17AM    
This patient has been assessed with a concern for Malnutrition and has been determined to have a diagnosis/diagnoses of Severe protein-calorie malnutrition.    This patient is being managed with:   Diet Regular-  Low Fat (LOWFAT)  Entered: Nov 17 2021  9:17AM    
This patient has been assessed with a concern for Malnutrition and has been determined to have a diagnosis/diagnoses of Severe protein-calorie malnutrition.    This patient is being managed with:   Diet Clear Liquid-  Consistent Carbohydrate {No Snacks} (CSTCHO)  Entered: Nov 16 2021  2:10PM    
This patient has been assessed with a concern for Malnutrition and has been determined to have a diagnosis/diagnoses of Severe protein-calorie malnutrition.    This patient is being managed with:   Diet Regular-  Low Fat (LOWFAT)  Entered: Nov 17 2021  9:17AM

## 2021-11-20 NOTE — PROGRESS NOTE ADULT - PROVIDER SPECIALTY LIST ADULT
Surgery
SICU
Gastroenterology
Surgery
Trauma Surgery
Gastroenterology

## 2021-11-20 NOTE — PROGRESS NOTE ADULT - ASSESSMENT
89F with gallstone pancreatitis, clinically resolved, Grade II cholecystitis, choledocholithiasis vs Mirizzi syndrome for uptrending obstructive jaundice without stigmata of cholangitis, nontoxic HDnl without peritonitis, physiologically stable.  Family discussion with Surgery Team and decision for no surgical intervention was agreed upon.    Plan  -Per family no Mrcp or surgery; conservative management   -Trend bilirubins and transaminases  -F/u AM labs  -DC home with PO antibiotics, Augmentin   -Last day of IV Zosyn 11/20  -F/u GI recs   -Tolerating po  -DVT PPx  -OOB amb PT  -F/u GOC and conservative management as above

## 2021-11-22 NOTE — CHART NOTE - NSCHARTNOTESELECT_GEN_ALL_CORE
Chart Note
DOWNGRADE NOTE/Event Note
Event Note
Chart note
DOWNGRADE/Transfer Note
Nutrition Services

## 2021-11-22 NOTE — CHART NOTE - NSCHARTNOTEFT_GEN_A_CORE
Please note upon review of chart the following diagnosis exists for this patient:    1)  Sepsis, as evidenced by WBC 14.32 and heart rate of 91, present on admission.    Supporting Documentation:    H&P Adult [Charted Location: [facility] ERHR 1606 07] [Authored: 14-Nov-2021 05:18]  · Assessment	  Patient is a 89yo F with hx of strokes with impaired speech, HTN, HLD, OA, DM, presenting with sudden onset severe abdominal pain. Labs and images reviewed, leukocytosis of 14, elevated LFTs and TB of 3.7, with lipase >3K with concerns of gallstone pancreatitis   -Admit to ICU, Dr. Recinos  -NPO and aggressive IVF hydration  -IV ABX: Zosyn  -[place] placement  -Strict I/O's   -Hold AC (ASA/Plavix)  -GI consult     WBC Count: 9.51 K/uL (11.16.21 @ 06:05)   WBC Count: 11.90 K/uL (11.15.21 @ 05:04)   WBC Count: 14.32 K/uL (11.14.21 @ 01:57)     ED ADULT Flow Sheet [Charted Location: [facility] ED] [Authored: 13-Nov-2021 23:47]- for Visit: [id number],  Judith Acharya (RN); Jacqueline Washington (PCA), Complete, Revised, Signed in Full, General  Vital Signs  Temperature  Temp (F): 98.2 Degrees F  Temp (C) Temp (C): 36.8 Degrees C  Temp site Temp Site: oral  Heart Rate  Heart Rate Heart Rate (beats/min): 91 /min    Progress Note Adult-Trauma Surgery Resident/Attending [Charted Location: [address] 4225 02] [Authored: 16-Nov-2021 00:26]  Assessment and Plan:   · Assessment	  89F with gallstone pancreatitis, clinically resolved, Grade II cholecystitis, choledocholithiasis vs Mirizzi syndrome for uptrending obstructive jaundice without stigmata of cholangitis, nontoxic HDnl without peritonitis, physiologically stable  -MRCP to evaluate for choledocholithiais  -Continue NPO, will ADAT pending MRCP results and GI plan regarding roll of biliary decompression  -Trend bilirubins and transaminases  -Cont Zosyn for acute cholecystitis, symptoms improving, no role for cholecystostomy tube  -NPO, will ADAT to low fat pending plan for further intervention  -MOLST, will discuss goals of care with pt and family regarding the roll/risks/benefits for same admission cholecystectomy. Pts age is not an absolute or relative contraindication as long as pt and HCP agree and understand the associated risks of cholecystectomy as well as the risk of recurrent pancreatitis without cholecystectomy.   -Unable to evaluate functional status given dementia, Echocardiogram to assess cardiac function for risk stratification.   -DVT PPx  -OOBamb PT.    Chart Note-Chart Note PA [Charted Location: [address] 4225 02] [Authored: 16-Nov-2021 14:33]- for Visit: [id number], Complete, Entered, Signed in Full, General  · Note Type	Chart Note
SICU TRANSFER NOTE  -----------------------------  ICU Admission Date: 11/14/21  Transfer Date: 11-15-21 @ 13:01    Admission Diagnosis: Acute Pancreatitis     Active Problems/injuries:   Acute Pancreatitis  Cholelithiasis     Procedures: none      Consultants:  [ ] Cardiology  [ ] Endocrine  [x] Gastroenterology   [ ] Infectious Disease  [ ] Medicine  [ ] Neurosurgery  [ ] Ortho       [ ] Weight Bearing Status:  [ ] Palliative       [ ] Advanced Directives:    [ ] Physical Medicine and Rehab       [ ] Disposition :   [ ] Plastics  [ ] Pulmonary    Medications  acetaminophen     Tablet .. 650 milliGRAM(s) Oral every 6 hours  albuterol/ipratropium for Nebulization 3 milliLiter(s) Nebulizer every 6 hours PRN  atorvastatin 40 milliGRAM(s) Oral at bedtime  dextrose 40% Gel 15 Gram(s) Oral once  dextrose 50% Injectable 25 Gram(s) IV Push once  dextrose 50% Injectable 12.5 Gram(s) IV Push once  dextrose 50% Injectable 25 Gram(s) IV Push once  enoxaparin Injectable 30 milliGRAM(s) SubCutaneous daily  escitalopram 5 milliGRAM(s) Oral daily  glucagon  Injectable 1 milliGRAM(s) IntraMuscular once  influenza  Vaccine (HIGH DOSE) 0.7 milliLiter(s) IntraMuscular once  insulin lispro (ADMELOG) corrective regimen sliding scale   SubCutaneous every 6 hours  melatonin 3 milliGRAM(s) Oral at bedtime  metoprolol tartrate 12.5 milliGRAM(s) Oral every 12 hours  multiple electrolytes Injection Type 1 1000 milliLiter(s) IV Continuous <Continuous>  oxybutynin 5 milliGRAM(s) Oral every 8 hours  pantoprazole  Injectable 40 milliGRAM(s) IV Push daily  piperacillin/tazobactam IVPB.. 3.375 Gram(s) IV Intermittent every 8 hours      [x] I attest I have reviewed and reconciled all medications prior to transfer    IV Fluids  lactated ringers Bolus:   2000 milliLiter(s), IV Bolus, once, infuse over 35 Minute(s), Stop After 1 Doses  Provider's Contact #: [phone]    Indication: Gentle hydration while NPO    Antibiotics:  piperacillin/tazobactam IVPB.. 3.375 Gram(s) IV Intermittent every 8 hours    Indication: Empiric End Date: Discontinuing today.      I have discussed this case with Red Team upon transfer and all questions regarding ICU course were answered.  The following items are to be followed up:  - MRCP  - Trend LFTS  - Monitor for abdominal pain.  - Restart diet when indicated.   - DELIRIUM PRECAUTIONS
Source: Patient [ ]  Family [ ]   other [x] EMR, Aide    Current Diet:   Diet, Regular:   Low Fat (LOWFAT) (11-17-21 @ 09:18)    Patient reports [ ] nausea  [ ] vomiting [ ] diarrhea [ ] constipation  [ ]chewing problems [ ] swallowing issues  [ ] other: denies    PO intake:  < 50% [x]   50-75%  [ ]   %  [ ]  other :    Source for PO intake [ ] Patient [ ] family [x] chart [x] staff [ ] other    Current Weight:   (11/16)   125.4 lbs  (11/14)   110.2 lbs  (11/13)   110 lbs    % Weight Change: Unclear accuracy of weights, will continue to monitor     Pertinent Medications: MEDICATIONS  (STANDING):  acetaminophen     Tablet .. 650 milliGRAM(s) Oral every 6 hours  albuterol/ipratropium for Nebulization 3 milliLiter(s) Nebulizer every 6 hours  atorvastatin 40 milliGRAM(s) Oral at bedtime  dextrose 40% Gel 15 Gram(s) Oral once  dextrose 5%. 1000 milliLiter(s) (100 mL/Hr) IV Continuous <Continuous>  dextrose 50% Injectable 25 Gram(s) IV Push once  dextrose 50% Injectable 12.5 Gram(s) IV Push once  dextrose 50% Injectable 25 Gram(s) IV Push once  enoxaparin Injectable 30 milliGRAM(s) SubCutaneous daily  escitalopram 5 milliGRAM(s) Oral daily  glucagon  Injectable 1 milliGRAM(s) IntraMuscular once  influenza  Vaccine (HIGH DOSE) 0.7 milliLiter(s) IntraMuscular once  insulin lispro (ADMELOG) corrective regimen sliding scale   SubCutaneous Before meals and at bedtime  melatonin 3 milliGRAM(s) Oral at bedtime  metoprolol tartrate 12.5 milliGRAM(s) Oral every 12 hours  multiple electrolytes Injection Type 1 1000 milliLiter(s) (42 mL/Hr) IV Continuous <Continuous>  oxybutynin 5 milliGRAM(s) Oral every 8 hours  pantoprazole  Injectable 40 milliGRAM(s) IV Push daily  piperacillin/tazobactam IVPB.. 3.375 Gram(s) IV Intermittent every 8 hours  potassium phosphate IVPB 30 milliMole(s) IV Intermittent once    MEDICATIONS  (PRN):  albuterol/ipratropium for Nebulization 3 milliLiter(s) Nebulizer every 6 hours PRN Shortness of Breath and/or Wheezing    Pertinent Labs: CBC Full  -  ( 19 Nov 2021 08:16 )  WBC Count : 7.97 K/uL  RBC Count : 4.10 M/uL  Hemoglobin : 12.2 g/dL  Hematocrit : 36.3 %  Platelet Count - Automated : 143 K/uL  Mean Cell Volume : 88.5 fl  Mean Cell Hemoglobin : 29.8 pg  Mean Cell Hemoglobin Concentration : 33.6 gm/dL  11-19 Na139 mmol/L Glu 136 mg/dL<H> K+ 2.8 mmol/L<LL> Cr  0.66 mg/dL BUN 10.8 mg/dL Phos n/a   Alb 3.2 g/dL<L> PAB n/a       Skin: Stage 1 sacrum, wound to chin, IAD    Nutrition focused physical exam conducted - found signs of malnutrition [ ]absent [x]present    Subcutaneous fat loss: [x] Orbital fat pads region, [ ]Buccal fat region, [ ]Triceps region,  [ ]Ribs region    Muscle wasting: [x]Temples region, [x]Clavicle region, [x]Shoulder region, [ ]Scapula region, [ ]Interosseous region,  [ ]thigh region, [ ]Calf region    Estimated Needs:   [x] no change since previous assessment  [ ] recalculated:     Current Nutrition Diagnosis: Pt remains at high nutrition risk secondary to malnutrition (severe chronic) related to inability to meet sufficient protein-energy in setting of advanced age, poor skin integrity, h/o stroke, persistent poor appetite, now with acute cholecystitis as evidenced by meeting <75% nutrient needs >1 mo, NFPE findings, 26.7%. Pt confused unable to interview. Per aide Pt eating poorly though drinking fluids well, no chew/swallow issues reported. Aware LFTs trending up     Recommendations:   1) Add Ensure Enlive TID   2) Rx: MVI and vitamin C 500mg daily  3) Monitor weights daily for trend/accuracy   4) Provide encouragement/assistance as needed during mealtimes to inc PO     Monitoring and Evaluation:   [x] PO intake [x] Tolerance to diet prescription [X] Weights  [X] Follow up per protocol [X] Labs:
DOWNGRADE NOTE:    Patent downgraded from SICU this afternoon. Due to strokes, patient has difficulty communicating but is able to respond to questions.  She denies having abdominal pain.  Patient reports her last BM was two days ago.  She is NPO in the hopes of having an MRCP procedure later this evening 11/15/21.  She is on 4L nasal cannula.    Denies n/v/f/c/cp/sob.     Diet, NPO:   Except Medications  With Ice Chips/Sips of Water (11-14-21 @ 15:22)      Scheduled Medications:   acetaminophen     Tablet .. 650 milliGRAM(s) Oral every 6 hours  atorvastatin 40 milliGRAM(s) Oral at bedtime  dextrose 40% Gel 15 Gram(s) Oral once  dextrose 50% Injectable 25 Gram(s) IV Push once  dextrose 50% Injectable 12.5 Gram(s) IV Push once  dextrose 50% Injectable 25 Gram(s) IV Push once  enoxaparin Injectable 30 milliGRAM(s) SubCutaneous daily  escitalopram 5 milliGRAM(s) Oral daily  glucagon  Injectable 1 milliGRAM(s) IntraMuscular once  influenza  Vaccine (HIGH DOSE) 0.7 milliLiter(s) IntraMuscular once  insulin lispro (ADMELOG) corrective regimen sliding scale   SubCutaneous every 6 hours  melatonin 3 milliGRAM(s) Oral at bedtime  metoprolol tartrate 12.5 milliGRAM(s) Oral every 12 hours  multiple electrolytes Injection Type 1 1000 milliLiter(s) (42 mL/Hr) IV Continuous <Continuous>  oxybutynin 5 milliGRAM(s) Oral every 8 hours  pantoprazole  Injectable 40 milliGRAM(s) IV Push daily  piperacillin/tazobactam IVPB.. 3.375 Gram(s) IV Intermittent every 8 hours    PRN Medications:  albuterol/ipratropium for Nebulization 3 milliLiter(s) Nebulizer every 6 hours PRN Shortness of Breath and/or Wheezing      Objective:   T(F): 97.9 (11-15 @ 15:19), Max: 100.5 (11-15 @ 08:00)  HR: 83 (11-15 @ 16:00) (78 - 95)  BP: 145/100 (11-15 @ 16:00) (118/69 - 158/82)  BP(mean): 79 (11-15 @ 15:00) (79 - 125)  ABP: --  ABP(mean): --  RR: 18 (11-15 @ 16:00) (15 - 29)  SpO2: 98% (11-15 @ 16:00) (88% - 100%)      Physical Exam:   GEN: patient resting comfortably in bed, in no acute distress  RESP: respirations are unlabored, no accessory muscle use, no conversational dyspnea  CVS: RRR  GI: Abdomen soft, non-tender, non-distended, no rebound tenderness / guarding, (-)Francis sign  MSK: 4/5 muscle strength bilaterally, 2+ peripheral pulses    I&O's    11-14 @ 07:01  -  11-15 @ 07:00  --------------------------------------------------------  IN:    IV PiggyBack: 150 mL    IV PiggyBack: 75 mL    multiple electrolytes Injection Type 1.: 1300 mL    Oral Fluid: 60 mL  Total IN: 1585 mL    OUT:    Indwelling Catheter - Urethral (mL): 1185 mL  Total OUT: 1185 mL    Total NET: 400 mL      11-15 @ 07:01  -  11-15 @ 16:46  --------------------------------------------------------  IN:    IV PiggyBack: 50 mL    IV PiggyBack: 75 mL    multiple electrolytes Injection Type 1.: 84 mL    Oral Fluid: 150 mL  Total IN: 359 mL    OUT:    Indwelling Catheter - Urethral (mL): 93 mL    Voided (mL): 10 mL  Total OUT: 103 mL    Total NET: 256 mL          LABS:                        12.2   11.90 )-----------( 134      ( 15 Nov 2021 05:04 )             36.3     11-15    141  |  102  |  21.0<H>  ----------------------------<  121<H>  3.5   |  24.0  |  0.93    Ca    8.2<L>      15 Nov 2021 06:27  Phos  3.3     11-15  Mg     1.5     11-15    TPro  6.4<L>  /  Alb  3.6  /  TBili  5.2<H>  /  DBili  3.4<H>  /  AST  306<H>  /  ALT  243<H>  /  AlkPhos  194<H>  11-15    PT/INR - ( 14 Nov 2021 01:57 )   PT: 12.9 sec;   INR: 1.12 ratio         PTT - ( 14 Nov 2021 01:57 )  PTT:27.2 sec      MICROBIOLOGY:       PATHOLOGY:      Assessment and Plan:  89yo F with hx of strokes with impaired speech, HTN, HLD, OA, DM, presenting with sudden onset severe abdominal pain. Concern for gallstone pancreatitis with U/S and CT that shows cholelithiasis with trace noy-cholecystic fluid & GB wall thickening c/w possible cholecystitis. Pancreas appears normal on CT scan but lipase is > 3000. Bile ducts are normal in caliber on both U/S and CT scan. LFT's abnormal.     Neuro: Prior CVA with Dysphasia  - Multimodal pain control.  Has not required significant pain meds since admission, no narcotics received  - delirium precautions  - Optimize sleep / wake cycle    CV:   - Continue hemodynamic monitoring  - Maintain MAP > 65    Pulm:   - Incentive spirometry  - Pulmonary toilet  - OOB to chair    GI/Nutrition:  Gallstone pancreatitis  - F/u MRCP 11/15 PM  - Continue serial abdominal exams  - Trend LFT  - NPO  - Continue bowel reg    /Renal:   - monitor kidney fxn  - Monitor lytes    ID:   - Continue Zosyn for possible acute juanjo  - Monitor fever curve  - Leukocytosis    Endo:  - monitor blood glucose  - continue ISS    Skin:   - repositioning for DTI prevention while in bed    Heme/DVT Prophylaxis:  - SCDs  - Chemical prophylaxis with lovenox
Patient has had episodes of agitation and confusion throughout the night, Dr. Elizondo aware. We discussed giving patient 2.5 of haldol earlier in the evening and continue with the restraints as long as they were not adding to the agitation as the patient constantly is taking her oxygen off, pulled out 2 iv's and failed mitten management as she managed to remove them as well. RN called to inform patient was again agitated, trying to get out of bed. I went to evaluate patient, and 2/2 to patient calmer with the presence of the RN at the bedside will order a 1:1 and an additional dose of haldol, will keep restraints until 1:1 can try to redirect and prevent patient from interfering with medical management.
Please note upon review of chart the following diagnosis exists for this patient:  1)  Sepsis, as evidenced by WBC 14.32 and heart rate of 91, present on admission  2)  Metabolic Encephalopathy      Supporting Documentation:  H&P Adult [Charted Location: [facility] ERHR 1606 07] [Authored: 14-Nov-2021 05:18]  · Assessment	  Patient is a 89yo F with hx of strokes with impaired speech, HTN, HLD, OA, DM, presenting with sudden onset severe abdominal pain. Labs and images reviewed, leukocytosis of 14, elevated LFTs and TB of 3.7, with lipase >3K with concerns of gallstone pancreatitis   -Admit to ICU, Dr. Recinos  -NPO and aggressive IVF hydration  -IV ABX: Zosyn  -[place] placement  -Strict I/O's   -Hold AC (ASA/Plavix)  -GI consult     WBC Count: 9.51 K/uL (11.16.21 @ 06:05)   WBC Count: 11.90 K/uL (11.15.21 @ 05:04)   WBC Count: 14.32 K/uL (11.14.21 @ 01:57)     ED ADULT Flow Sheet [Charted Location: [facility] ED] [Authored: 13-Nov-2021 23:47]- for Visit: [id number],  Judith Acharya (RN); Jacqueline Washington (PCA), Complete, Revised, Signed in Full, General  Vital Signs  Temperature  Temp (F): 98.2 Degrees F  Temp (C) Temp (C): 36.8 Degrees C  Temp site Temp Site: oral  Heart Rate  Heart Rate Heart Rate (beats/min): 91 /min    Progress Note Adult-Trauma Surgery Resident/Attending [Charted Location: [address] 4225 02] [Authored: 16-Nov-2021 00:26]  Assessment and Plan:   · Assessment	  89F with gallstone pancreatitis, clinically resolved, Grade II cholecystitis, choledocholithiasis vs Mirizzi syndrome for uptrending obstructive jaundice without stigmata of cholangitis, nontoxic HDnl without peritonitis, physiologically stable  -MRCP to evaluate for choledocholithiais  -Continue NPO, will ADAT pending MRCP results and GI plan regarding roll of biliary decompression  -Trend bilirubins and transaminases  -Cont Zosyn for acute cholecystitis, symptoms improving, no role for cholecystostomy tube  -NPO, will ADAT to low fat pending plan for further intervention  -MOLST, will discuss goals of care with pt and family regarding the roll/risks/benefits for same admission cholecystectomy. Pts age is not an absolute or relative contraindication as long as pt and HCP agree and understand the associated risks of cholecystectomy as well as the risk of recurrent pancreatitis without cholecystectomy.   -Unable to evaluate functional status given dementia, Echocardiogram to assess cardiac function for risk stratification.   -DVT PPx  -OOBamb PT.          Supporting documentation:    H&P Adult [Charted Location: [facility] 1606 07] [Authored: 14-Nov-2021 05:18]  Physical Exam:   Physical Exam: GENERAL: Alert, fragile, in no acute distress.  MENTAL STATUS: AAOx3. Appropriate affect.  HEENT: PERRLA. EOMI. MMM.  Trachea midline.   CHEST: non-labored breathing or dyspnea  GASTROINTESTINAL: abdomen mildly distended soft and depressible, tender to palpation to epigastrium and RUQ. Non peritonitic   MUSCULOSKELETAL: No cyanosis or clubbing. No gross deformities.          Chart Note-Event Note NP [Charted Location: [address] 4225 02] [Authored: 16-Nov-2021 04:34]- for Visit: [id number], Complete, Entered, Signed in Full, General  · Note Type	Event Note  Patient has had episodes of agitation and confusion throughout the night, Dr. Elizondo aware. We discussed giving patient 2.5 of haldol earlier in the evening and continue with the restraints as long as they were not adding to the agitation as the patient constantly is taking her oxygen off, pulled out 2 iv's and failed mitten management as she managed to remove them as well. RN called to inform patient was again agitated, trying to get out of bed. I went to evaluate patient, and 2/2 to patient calmer with the presence of the RN at the bedside will order a 1:1 and an additional dose of haldol, will keep restraints until 1:1 can try to redirect and prevent patient from interfering with medical management.

## 2021-11-22 NOTE — CDI QUERY NOTE - NSCDIOTHERTXTBX_GEN_ALL_CORE_HH
HPI:  89 year old female admitted on 11/14/2021 with "...concerns of gallstone pancreatitis" per the H&P.  On the day of admission, the patient had WBC 14.32 and heart rate of 91.  The 11/16/2021 Surgery Progress Note documents "gallstone pancreatitis, clinically resolved, Grade II cholecystitis...".  The 11/16/2021 Chart Note documents "Sepsis, as evidenced by WBC 14.32 and heart rate of 91, present on admission".    Please clarify if you agree with the diagnosis of Sepsis as previously documented.    A)  Agree with Sepsis as evidenced by WBC 14.32 and heart rate of 91, present on admission, resolved  B)  Disagree  C)  Other, please specify  D)  Not clinically significant      Supporting Documentation:    H&P Adult [Charted Location: [facility] 1606 07] [Authored: 14-Nov-2021 05:18]  · Assessment	  Patient is a 89yo F with hx of strokes with impaired speech, HTN, HLD, OA, DM, presenting with sudden onset severe abdominal pain. Labs and images reviewed, leukocytosis of 14, elevated LFTs and TB of 3.7, with lipase >3K with concerns of gallstone pancreatitis   -Admit to ICU, Dr. Recinos  -NPO and aggressive IVF hydration  -IV ABX: Zosyn  -[place] placement  -Strict I/O's   -Hold AC (ASA/Plavix)  -GI consult     WBC Count: 9.51 K/uL (11.16.21 @ 06:05)   WBC Count: 11.90 K/uL (11.15.21 @ 05:04)   WBC Count: 14.32 K/uL (11.14.21 @ 01:57)     ED ADULT Flow Sheet [Charted Location: [facility] ED] [Authored: 13-Nov-2021 23:47]- for Visit: [id number],  Judith Acharya (RN); Jacqueline Washington (PCA), Complete, Revised, Signed in Full, General  Vital Signs  Temperature  Temp (F): 98.2 Degrees F  Temp (C) Temp (C): 36.8 Degrees C  Temp site Temp Site: oral  Heart Rate  Heart Rate Heart Rate (beats/min): 91 /min    Progress Note Adult-Trauma Surgery Resident/Attending [Charted Location: [address] 4225 02] [Authored: 16-Nov-2021 00:26]  Assessment and Plan:   · Assessment	  89F with gallstone pancreatitis, clinically resolved, Grade II cholecystitis, choledocholithiasis vs Mirizzi syndrome for uptrending obstructive jaundice without stigmata of cholangitis, nontoxic HDnl without peritonitis, physiologically stable  -MRCP to evaluate for choledocholithiais  -Continue NPO, will ADAT pending MRCP results and GI plan regarding roll of biliary decompression  -Trend bilirubins and transaminases  -Cont Zosyn for acute cholecystitis, symptoms improving, no role for cholecystostomy tube  -NPO, will ADAT to low fat pending plan for further intervention  -MOLST, will discuss goals of care with pt and family regarding the roll/risks/benefits for same admission cholecystectomy. Pts age is not an absolute or relative contraindication as long as pt and HCP agree and understand the associated risks of cholecystectomy as well as the risk of recurrent pancreatitis without cholecystectomy.   -Unable to evaluate functional status given dementia, Echocardiogram to assess cardiac function for risk stratification.   -DVT PPx  -OOBamb PT.    Chart Note-Chart Note PA [Charted Location: [address] 4225 02] [Authored: 16-Nov-2021 14:33]- for Visit: [id number], Complete, Entered, Signed in Full, General  · Note Type	Chart Note  Please note upon review of chart the following diagnosis exists for this patient:  1)  Sepsis, as evidenced by WBC 14.32 and heart rate of 91, present on admission
HPI:  89 year old female admitted on 11/14/2021 with "...concerns of gallstone pancreatitis" per the H&P.  On the day of admission, the patient had WBC 14.32 and heart rate of 91.  The 11/16/2021 Surgery Progress Note documents "gallstone pancreatitis, clinically resolved, Grade II cholecystitis...".    Please clarify if there is a diagnosis that could be related to the above findings.    A)  Sepsis, as evidenced by WBC 14.32 and heart rate of 91, present on admission  B)  Other, please specify  C)  Not clinically significant      Supporting Documentation:    H&P Adult [Charted Location: [facility] 1606 07] [Authored: 14-Nov-2021 05:18]  · Assessment	  Patient is a 89yo F with hx of strokes with impaired speech, HTN, HLD, OA, DM, presenting with sudden onset severe abdominal pain. Labs and images reviewed, leukocytosis of 14, elevated LFTs and TB of 3.7, with lipase >3K with concerns of gallstone pancreatitis   -Admit to ICU, Dr. Recinos  -NPO and aggressive IVF hydration  -IV ABX: Zosyn  -[place] placement  -Strict I/O's   -Hold AC (ASA/Plavix)  -GI consult     WBC Count: 9.51 K/uL (11.16.21 @ 06:05)   WBC Count: 11.90 K/uL (11.15.21 @ 05:04)   WBC Count: 14.32 K/uL (11.14.21 @ 01:57)     ED ADULT Flow Sheet [Charted Location: [facility] ED] [Authored: 13-Nov-2021 23:47]- for Visit: [id number],  Judith Acharya (RN); Jacqueline Washington (PCA), Complete, Revised, Signed in Full, General  Vital Signs  Temperature  Temp (F): 98.2 Degrees F  Temp (C) Temp (C): 36.8 Degrees C  Temp site Temp Site: oral  Heart Rate  Heart Rate Heart Rate (beats/min): 91 /min    Progress Note Adult-Trauma Surgery Resident/Attending [Charted Location: [address] 4225 02] [Authored: 16-Nov-2021 00:26]  Assessment and Plan:   · Assessment	  89F with gallstone pancreatitis, clinically resolved, Grade II cholecystitis, choledocholithiasis vs Mirizzi syndrome for uptrending obstructive jaundice without stigmata of cholangitis, nontoxic HDnl without peritonitis, physiologically stable  -MRCP to evaluate for choledocholithiais  -Continue NPO, will ADAT pending MRCP results and GI plan regarding roll of biliary decompression  -Trend bilirubins and transaminases  -Cont Zosyn for acute cholecystitis, symptoms improving, no role for cholecystostomy tube  -NPO, will ADAT to low fat pending plan for further intervention  -MOLST, will discuss goals of care with pt and family regarding the roll/risks/benefits for same admission cholecystectomy. Pts age is not an absolute or relative contraindication as long as pt and HCP agree and understand the associated risks of cholecystectomy as well as the risk of recurrent pancreatitis without cholecystectomy.   -Unable to evaluate functional status given dementia, Echocardiogram to assess cardiac function for risk stratification.   -DVT PPx  -OOBamb PT

## 2021-11-23 NOTE — ED ADULT NURSE NOTE - CHIEF COMPLAINT
Skin normal color for race, warm, dry and intact. No evidence of rash.
The patient is a 89y Female complaining of eye vision change.

## 2021-12-30 NOTE — ED ADULT NURSE REASSESSMENT NOTE - HEART RATE (BEATS/MIN)
"  Discharge Summary  Hospital Medicine / Virtual Medicine       Name: Shauna Queen  YOB: 1986 (Age: 35 y.o.)  Date of Admission: 11/30/2021  Date of Discharge: 12/31/2021  Attending Provider on Discharge: Jessei Ram MD  [facility] Medicine Team: [facility] VIRTUAL TEAM 1  Code status: Full Code    Primary Care Provider: Jorge Dennis MD    VIRTUAL TELENOTE    The patient location is: Patient admitted to [facility] MEDICAL SURGICAL UNIT* in room W433/W433 B.  Total time spent with patient: 25 mins  Present with the patient at the time of the telemed/virtual assessment: telepresenter   I have assessed findings virtually using a telemedicine platform and with assistance of the bedside nurse or telemedicine presenter.  The attending portion of this evaluation, treatment, and documentation was performed per Jessie Ram MD via audiovisual.    Discharge Diagnosis:  Active Hospital Problems    Diagnosis  POA    *Necrotizing fasciitis [M72.6]  Yes    Debility [R53.81]  Yes     PT/OT consulted       Anemia of chronic disease [D63.8]  Yes    Hypophosphatemia [E83.39]  Yes    Obesity [E66.9]  Yes    DM (diabetes mellitus) [E11.9]  Yes    DANG (obstructive sleep apnea) [G47.33]  Yes      Resolved Hospital Problems   No resolved problems to display.       HPI: Ms. Queen is a 35-year-old presents to the ED from outside hospital for abscess.  She has initially presented to outside hospital for DKA which resolved.  Patient was unaware of why she was here in July prompted her.  Per the checkout are received: "35-year-old female history of diabetes mellitus, sleep apnea on CPAP, schizophrenia, hypertension, and hyperlipidemia admitted to [facility] on November 24 from [facility] for treatment of diabetic ketoacidosis. She was initiated on an insulin infusion. DKA improved and she was transitioned off the insulin infusion. She again developed DKA and was placed back on the insulin infusion " "for a period of time. Currently she is off the insulin infusion and on subcutaneous insulin. She was noted to have right upper/inner thigh pain with erythema, induration, and tenderness to palpation. She was treated with IV clindamycin and vancomycin. Due to her size ((BMI 74.32) they are unable to perform CT or MRI. General Surgery there performed an incision and drainage with purulent material found. She was taken to the OR on November 29 for a second-look at the thigh wound. They were only able to do superficial debridement. Postprocedure she extubated to 2 L nasal cannula and was awake with oxygen saturation 100 percent. Surgery at their facility recommended transfer to a facility better able to perform procedures on a patient with her BMI. Referring provider spoke with General Surgery at [facility]. Hospital Medicine was asked to admit for further treatment. I spoke with her current attending. He noted she has no alteration in mentation. Lower extremity is intact from a neurovascular standpoint. She remains in the ICU."    Hospital Course:   Necrotizing fasciitis  - Status post I&D with General surgery at outside hospital, did not feel like they completely removed abscess.  - Underwent debridement in OR 11/30, 12/2, and 12/4, 12/5 - no extension into knee.    - OR again on 12/6, 12/8 and 12/11.   - OR again on 12/14 for debridement of necrotic skin, washout, partial closure, wound VAC change and then again on 12/17/21  - Continued on Zosyn till 12/18 for 2 weeks of treatment per ID. (estimated end date: 12/18/21). Febrile again so ID reconsulted and IV zosyn resumed. Per ID, continue IV zosyn for two weeks.   - Pain control.       - to LTAC once wound vac can be changed at bedside. Per surgery, okay to d/c to LTAC however on 12/23, multiple attempt to re-apply dressing eventually lead to another leak so patient underwent repeat wound vac change.       -wound vac changed at bedside 12/28 by general " surgery, okay to discharge to LTAC  -continue IV Zosyn for now for least 1 week.  Will need infectious disease follow-up set up a CP/consult in LTAC.           Obesity  Morbid obesity  Body mass index is 84.72 kg/m².        Tachycardia      Noted since admission, EKG with sinus tachycardia, thought likely related to underlying infection however persisted despite abx      Improved with metoprolol        Anemia of chronic disease  With anemia of acute blood loss as expected from multiple surgeries.  Transfuse for Hgb<7        Diarrhea:  Improved     Cdiff Negative, PRN lomotil      Scheduled immodium         Debility  PT/OT following, plan for LTAC        DANG (obstructive sleep apnea)  Patient likely has component of OHS as well  - outpatient sleep study        DM (diabetes mellitus)  A1c:   Lab Results   Component Value Date     HGBA1C >14.0 (H) 12/01/2021      Meds: basal bolus insulin + SSI PRN to maintain goal 140-180  ADA diet, accuchecks ACHS, hypoglycemic protocol  Continue current management.        Labs:  Recent Labs   Lab 12/27/21 0420 12/29/21 0353 12/31/21  0341   WBC 7.20 8.31 7.43   HGB 8.6* 8.4* 9.3*   HCT 27.8* 28.2* 31.1*   PLT SEE COMMENT 455* 451*     Recent Labs   Lab 12/27/21 0420 12/29/21  0353 12/31/21  0341    139 138   K 4.9 3.7 3.7    102 101   CO2 28 28 28   BUN 8 7 5*   CREATININE 0.7 0.7 0.7   GLU 86 144* 100   CALCIUM 8.3* 8.2* 8.3*     No results for input(s): ALKPHOS, ALT, AST, ALBUMIN, PROT, BILITOT, INR in the last 168 hours.   Recent Labs   Lab 12/29/21 2009 12/30/21  0757 12/30/21  1129 12/30/21  1649 12/30/21  1958 12/31/21  0727   POCTGLUCOSE 162* 103 110 113* 120* 112*     No results for input(s): CPK, CPKMB, MB, TROPONINI in the last 72 hours.    ROS (Positive in Bold, otherwise negative)  Constitutional: fever, chills, night sweats  CV: chest pain, edema, palpitations  Resp: SOB, cough, sputum production  GI: changes in appetite, NVDC, pain, melena,  hematochezia, GERD, hematemesis  : Dysuria, hematuria, urinary urgency, frequency  MSK: arthralgia/myalgia, joint swelling  Neuro/Psych: anxiety, depression    PEx   Temp:  [98.1 °F (36.7 °C)-98.6 °F (37 °C)]   Pulse:  [90-95]   Resp:  [18-20]   BP: (121-156)/(68-79)   SpO2:  [95 %-100 %]      General: no distress   Lungs: clear to ausculation anteriorly and posteriorly   Heart: regular rate and rhythm   Abdomen: normal bowel sounds, soft, no tenderness   Extremities: no edema. No clubbing or cyanosis       Procedures:  CXR, wound VAC placement    Consultants:  Infectious Disease, OT/PT, general surgery    Current Discharge Medication List      START taking these medications    Details   acetaminophen (TYLENOL) 325 MG tablet Take 2 tablets (650 mg total) by mouth every 4 (four) hours as needed.  Refills: 0      ALPRAZolam (XANAX) 0.25 MG tablet Take 1 tablet (0.25 mg total) by mouth 2 (two) times daily as needed for Anxiety.      calcium carbonate (TUMS) 200 mg calcium (500 mg) chewable tablet Take 2 tablets (1,000 mg total) by mouth once daily.  Qty: 60 tablet, Refills: 11      diphenoxylate-atropine 2.5-0.025 mg (LOMOTIL) 2.5-0.025 mg per tablet Take 1 tablet by mouth 4 (four) times daily as needed for Diarrhea.      HYDROcodone-acetaminophen (NORCO) 5-325 mg per tablet Take 1 tablet by mouth every 4 (four) hours as needed for Pain.  Qty: 10 tablet, Refills: 0    Comments: Quantity prescribed more than 7 day supply? No      insulin detemir U-100 (LEVEMIR FLEXTOUCH) 100 unit/mL (3 mL) SubQ InPn pen Inject 5 Units into the skin 2 (two) times daily.  Refills: 0      loperamide (IMODIUM) 2 mg capsule Take 1 capsule (2 mg total) by mouth 4 (four) times daily. for 10 days  Refills: 0      metoprolol succinate (TOPROL-XL) 100 MG 24 hr tablet Take 1 tablet (100 mg total) by mouth once daily.  Qty: 30 tablet, Refills: 11    Comments: .      piperacillin sodium/tazobactam (PIPERACILLIN-TAZOBACTAM 4.5G/100ML D5W IVPB,  READY TO MIX,) Inject 100 mLs (4.5 g total) into the vein every 8 (eight) hours. for 7 days  Qty: 2100 mL, Refills: 0             The relevant and important risks, side effects, and benefits of their medications were reviewed with patient during hospitalization and at discharge. The patient was given the opportunity to discuss and ask questions about their medications, including target symptoms, potential risks, side effects and benefits of their medications, as well as their expected prognosis if non-medication treatment options were chosen.  The patient expresses understanding of all these options and information and voluntarily consents to treatment.    Discharge Diet:cardiac diet and diabetic diet: 2000 calorie with Normal Fluid intake of 1500 - 2000 mL per day    Activity: activity as tolerated    Discharge Condition: Fair    Disposition: Long Term Care    Tests pending at the time of discharge: none      Time spent  on the discharge of the patient including review of hospital course with the patient. reviewing discharge medications and arranging follow-up care: 39 mins    Discharge examination Patient was seen and examined on the date of discharge and determined to be suitable for discharge.    Discharge plan and follow up:  It is critical that you make your follow-up appointment(s). If you are discharged on the weekend or after business hours, or if we are unable to schedule these appointments for you for any reason, you or a family member need to call during the next business day to schedule your appointment(s).    -Follow up with you PCP, Jorge Dennis MD within 1-2 weeks as arranged with SW and treatment team prior to discharge  -Take all medications as prescribed and listed above.  -Recommended Follow-up Tests: check CMP, CBC periodically in the outpatient setting.       - Please return to ED or call your physician if you have:         1. Fevers > 101.5 unresponsive to tylenol.       2. Abdominal pain  100 and/or distention       3. Intractable nausea, vomiting or diarrhea       4. Inability to tolerate adequate oral intake of food       5. Neurologic changes, chest pain or shortness of breath         No future appointments.    F/u with PCP, General surgery, Infectious Disease    Jessie Ram MD  Hospital Medicine Staff  [phone] pager    Patient's note was created using MModal Dictation.  Any errors in syntax may not have been identified and edited on initial review prior to signing this note.    The attending portion of this evaluation, treatment, and documentation was performed per Jessie Ram MD via Telemedicine AudioVisual using the secure [email or web address] software platform with 2 way audio/video. The provider was located off-site and the patient is located in the hospital. The aforementioned video software was utilized to document the relevant history and physical exam

## 2022-09-06 NOTE — PROGRESS NOTE ADULT - TIME BILLING
reviewed the radiology images, labs , notes
I have reviewed and confirmed nurses' notes for patient's medications, allergies, medical history, and surgical history.
No

## 2022-11-27 NOTE — ED ADULT NURSE NOTE - HOW OFTEN DO YOU HAVE A DRINK CONTAINING ALCOHOL?
Patient is Alert and Oriented times Four. Patient denies chest pain, shortness of breath, dizziness and headache. Patient complaining of Lightheadedness and Blurry Vision otherwise No Neurological Deficits noted. Patient's Heart Rhythm is Normal Sinus Rhythm on the cardiac monitor. Patient's Vital Signs: Temperature 98 F Oral, Heart Rate 61, Blood Pressure 123/67, Respiratory Rate 18 and O2 Saturation 99% Room Air. Patient states that she had lightheadedness and blurry vision when she was admitted to the hospital. Patient's Blood Glucose Fingertip Result is 94.
Never

## 2023-01-01 NOTE — ED ADULT TRIAGE NOTE - HEIGHT IN INCHES
CORY OWENS; First Name: Bryan      GA 30.4 weeks;     Age: 46d;   PMA: 37.1   BW:  1085 MRN: [id number]    COURSE: 30 weeks, maternal PEC, IUGR, Mg exposure, apnea of prematurity, respiratory failure, slow gut motility, RONNIE  s/p neutropenia     INTERVAL EVENTS:  Still with RONNIE - small volume spitups after feeds    Weight (g): 2080 +53  Intake (ml/kg/day): 150  Urine output (ml/kg/hr or frequency): x 8  Stools (frequency): x 6.   Other: open crib 8/20 11AM    Growth: 8/21  HC (cm): 32, 15%         Length (cm):  43 (1%)    Weight  1% ; ADWG (g/day) 13 gm/kg/day.   (Growth chart used _____ ) .  *******************************************************  Respiratory: RDS, respiratory failure.   Support: Comfortable on RA since 7/24. Apnea of prematurity last event 8/10 requiring stim. Still having events a/w reflux requiring stim - last 8/23 2AM  Continuous cardiorespiratory monitoring for risk of apnea of prematurity.  ·	S/p Caffeine 5mg/kg for apnea of prematurity, d/c'd 7/28.   ·	Infant was admitted with RDS and respiratory failure. Required NIMV DOL 0-1 for resp failure and apnea, likely hypermagnesemia.  Was then transitioned to bCPAP until 7/24.      CV: Hemodynamically stable.      ACCESS: None.    ·	UVC removed 7/14.    ·	RUE PICC 6/14 - 7/21    FEN: GERD, Nutritional insufficiency, dysperistalsis of prematurity, spitups after feeds  Feeding Regimen: EHM+HMF24 --> 22kcal PO ad delmar,  taking 30-40 ml q3h. 1ml LP Q3, 1ml MCT BID. Breastfeeding Qshift.    Meds: PVS, Ferinsol 2mg/kg/day, Glycerin PRN.  ·	8/15 start 1ml MCT bid and 1ml LP q3h  ·	Slow gut motility - h/o BID glycerine, weaned as tolerated to PRN.   ·	dc TPN and remove PICC 7/21.   ·	Initial hypoglycemia, resolved with bolus x 1 and initiation of fluids.     Heme: No active issues. Most recent Hct 27 on 8/14  ·	Screening CBC notable for WBC 3.7, imp to 7.3. , no immatures.   ·	s/p hyperbilirubinemia photo rx (7/10-12, 7/13-14).     ID: No clinical concerns for sepsis.  ANC < 1000 - likely secondary to IUGR/maternal preeclampsia, resolved.     Neuro:   Normal exam for GA.    ·	Serial HUS at 1 week and 1 month wnl.  ·	NRE Score 5: No EI, f/u in 6mo (Exam on 8/4).     Optho: At risk for ROP. Next exam 9/4: ______  ·	8/18: stage 0 zone II OU FU in 2 weeks   ·	8/14: R S1Z2,  L S0Z2, F/u 1 week  ·	8/7  Stage 1, Zone 2 of the R eye; Stage 0, Zone 2 of L eye - F/u 1 week.    Skin: s/p Penile abrasion, healed s/p Medir[place], dc'd 7/19. Wound care was consulted.  Nasal septum erythema noted 7/15, improved with dressing.     Thermal: Temps stable in OC since 8/20 11AM    Social: Continue to update family.     Labs/Imaging/Studies:       This patient requires ICU care including continuous monitoring and frequent vital sign assessment due to significant risk of cardiorespiratory compromise or decompensation outside of the NICU.  MARISSADAVID OWENS; First Name: Bryan      GA 30.4 weeks;     Age: 46d;   PMA: 37.1   BW:  1085 MRN: [id number]    COURSE: 30 weeks, maternal PEC, IUGR, Mg exposure, apnea of prematurity, respiratory failure, slow gut motility, RONNIE  s/p neutropenia     INTERVAL EVENTS:  Still with RONNIE - small volume spitups after feeds    Weight (g): 2080 (no change)  Intake (ml/kg/day): 117 + BF x5  Urine output (ml/kg/hr or frequency): x 8  Stools (frequency): x 6.   Other: open crib 8/20 11AM    Growth: 8/21  HC (cm): 32, 15%         Length (cm):  43 (1%)    Weight  1% ; ADWG (g/day) 13 gm/kg/day.   (Growth chart used _____ ) .  *******************************************************  Respiratory: RDS, respiratory failure.   Support: Comfortable on RA since 7/24. Apnea of prematurity last event 8/10 requiring stim. Still having events a/w reflux requiring stim - last 8/23 2AM  Continuous cardiorespiratory monitoring for risk of apnea of prematurity.  ·	S/p Caffeine 5mg/kg for apnea of prematurity, d/c'd 7/28.   ·	Infant was admitted with RDS and respiratory failure. Required NIMV DOL 0-1 for resp failure and apnea, likely hypermagnesemia.  Was then transitioned to bCPAP until 7/24.      CV: Hemodynamically stable.      ACCESS: None.    ·	UVC removed 7/14.    ·	RUE PICC 6/14 - 7/21    FEN: GERD, Nutritional insufficiency, dysperistalsis of prematurity, spitups after feeds  Feeding Regimen: EHM+HMF 22kcal PO ad delmar, taking 30-40 ml q3h. 1ml LP Q3, 1ml MCT BID. Breastfeeding Qshift.    Meds: PVS, Ferinsol 2mg/kg/day, Glycerin PRN.  ·	8/15 start 1ml MCT bid and 1ml LP q3h  ·	Slow gut motility - h/o BID glycerine, weaned as tolerated to PRN.   ·	dc TPN and remove PICC 7/21.   ·	Initial hypoglycemia, resolved with bolus x 1 and initiation of fluids.     Heme: No active issues. Most recent Hct 27 on 8/14  ·	Screening CBC notable for WBC 3.7, imp to 7.3. , no immatures.   ·	s/p hyperbilirubinemia photo rx (7/10-12, 7/13-14).     ID: No clinical concerns for sepsis.  ANC < 1000 - likely secondary to IUGR/maternal preeclampsia, resolved.     Neuro:   Normal exam for GA.    ·	Serial HUS at 1 week and 1 month wnl.  ·	NRE Score 5: No EI, f/u in 6mo (Exam on 8/4).     Optho: At risk for ROP. Next exam 9/4: ______  ·	8/18: stage 0 zone II OU FU in 2 weeks   ·	8/14: R S1Z2,  L S0Z2, F/u 1 week  ·	8/7  Stage 1, Zone 2 of the R eye; Stage 0, Zone 2 of L eye - F/u 1 week.    Skin: s/p Penile abrasion, healed s/p Medihoney, dc'd 7/19. Wound care was consulted.  Nasal septum erythema noted 7/15, improved with dressing.     Thermal: Temps stable in OC since 8/20 11AM    Social: Continue to update family. Earliest possible DC 8/29 pending consistent weight gain with sustained mature feeding and thermal patterns, and no further ABDs requiring stim.      Labs/Imaging/Studies:       This patient requires ICU care including continuous monitoring and frequent vital sign assessment due to significant risk of cardiorespiratory compromise or decompensation outside of the NICU.  2

## 2023-04-17 NOTE — ED PROVIDER NOTE - NS ED NOTE AC HIGH RISK COUNTRIES
Pt ambulates to restroom without complaints, ED tech assists. Pt  denies CP, SOB, dizziness or visual changes. Monitors reapplied, call light within reach.    No

## 2023-04-20 NOTE — CONSULT NOTE ADULT - SUBJECTIVE AND OBJECTIVE BOX
89 year-old woman who is here for initial consultation of continued right-sided weakness. 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 The patient was brought in by her daughter for increased right lower extremity weakness, worsening slurred speech for the past 2 days. Patient fell yesterday and again today.    Vital Signs Last 24 Hrs  T(C): 36.7 (13 Sep 2019 11:58), Max: 36.7 (13 Sep 2019 11:58)  T(F): 98 (13 Sep 2019 11:58), Max: 98 (13 Sep 2019 11:58)  HR: 69 (13 Sep 2019 16:11) (69 - 87)  BP: 130/73 (13 Sep 2019 16:11) (130/73 - 167/79)  BP(mean): --  RR: 19 (13 Sep 2019 16:11) (16 - 20)  SpO2: 97% (13 Sep 2019 16:11) (95% - 97%)    Gen.:  HEENT:  Abdomen:  Extremity:    Mental status:  Cranial nerves:  Motor:  Sensory:  Coordination:    MEDICATIONS  (STANDING):    MEDICATIONS  (PRN):    Left notable for increase in BUN and creatinine  Troponin elevation    EXAM:  CT BRAIN STROKE PROTOCOL      PROCEDURE DATE:  09/13/2019        INTERPRETATION:  CT brain without contrast    History injury    Comparison MRI performed 2 days ago    There is no hemorrhage, cortical edema or hydrocephalus. There is   maturing encephalomalacia related to acute infarct demonstrated on the   study performed 2 days ago without evidence of extension. There is   underlying white matter degeneration and volume loss, typical for age.    IMPRESSION:    No acute findings        EXAM: MR BRAIN     PROCEDURE DATE: 09/11/2018       INTERPRETATION: MRI brain without contrast   Comparison CT performed 3 days prior   History aphasia, right-sided paresis     There is mild to moderate diffusion restriction centered in the left centrum   semiovale white matter measuring a maximum of 4 cm in axial length. There is   slight peripheral parietal subcortical and proximal temporal lobe external   capsule involvement. There is moderate underlying chronic microvascular   ischemic change without hemorrhage, cortical edema, mass effect or   hydrocephalus. There is mild frontoparietal cortical and central volume   loss. The orbital and sellar contents and cerebellar tonsils are within   normal limits.     IMPRESSION:     Moderately sized acute bland left-sided white matter watershed infarct                   FAWAD SCHNEIDER M.D., ATTENDING RADIOLOGIST   This document has been electronically signed. Sep 11 2018 2:26PM Review of records show that in September of 2018 patient had aphasia with weakness on the right side. She was given t-PA at the time and subsequent acute rehabilitation. At that time patient also had increase in troponin levels.    Phone call was made to Michelle at [phone]. vm    89 year-old woman who is here for initial consultation of continued right-sided weakness. 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 The patient was brought in by her daughter for increased right lower extremity weakness, worsening slurred speech for the past 2 days. Patient fell yesterday and again today.    Vital Signs Last 24 Hrs  T(C): 36.7 (13 Sep 2019 11:58), Max: 36.7 (13 Sep 2019 11:58)  T(F): 98 (13 Sep 2019 11:58), Max: 98 (13 Sep 2019 11:58)  HR: 69 (13 Sep 2019 16:11) (69 - 87)  BP: 130/73 (13 Sep 2019 16:11) (130/73 - 167/79)  BP(mean): --  RR: 19 (13 Sep 2019 16:11) (16 - 20)  SpO2: 97% (13 Sep 2019 16:11) (95% - 97%)    Gen.:  HEENT:  Abdomen:  Extremity:    Mental status:  Cranial nerves:  Motor:  Sensory:  Coordination:    MEDICATIONS  (STANDING):    MEDICATIONS  (PRN):    Left notable for increase in BUN and creatinine  Troponin elevation    EXAM:  CT BRAIN STROKE PROTOCOL      PROCEDURE DATE:  09/13/2019        INTERPRETATION:  CT brain without contrast    History injury    Comparison MRI performed 2 days ago    There is no hemorrhage, cortical edema or hydrocephalus. There is   maturing encephalomalacia related to acute infarct demonstrated on the   study performed 2 days ago without evidence of extension. There is   underlying white matter degeneration and volume loss, typical for age.    IMPRESSION:    No acute findings        EXAM: MR BRAIN     PROCEDURE DATE: 09/11/2018       INTERPRETATION: MRI brain without contrast   Comparison CT performed 3 days prior   History aphasia, right-sided paresis     There is mild to moderate diffusion restriction centered in the left centrum   semiovale white matter measuring a maximum of 4 cm in axial length. There is   slight peripheral parietal subcortical and proximal temporal lobe external   capsule involvement. There is moderate underlying chronic microvascular   ischemic change without hemorrhage, cortical edema, mass effect or   hydrocephalus. There is mild frontoparietal cortical and central volume   loss. The orbital and sellar contents and cerebellar tonsils are within   normal limits.     IMPRESSION:     Moderately sized acute bland left-sided white matter watershed infarct                   FAWAD SCHNEIDER M.D., ATTENDING RADIOLOGIST   This document has been electronically signed. Sep 11 2018 2:26PM Phone call was made to Michelle at [phone]. vm      Review of records show that in September of 2018 patient had aphasia with weakness on the right side. She was given t-PA at the time and subsequent acute rehabilitation. At that time patient also had increase in troponin levels.      89 year-old woman who is here for initial consultation of continued right-sided weakness. 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 The patient was brought in by her daughter for increased right lower extremity weakness, worsening slurred speech for the past 2 days. Patient fell yesterday and again today.    Vital Signs Last 24 Hrs  T(C): 36.7 (13 Sep 2019 11:58), Max: 36.7 (13 Sep 2019 11:58)  T(F): 98 (13 Sep 2019 11:58), Max: 98 (13 Sep 2019 11:58)  HR: 69 (13 Sep 2019 16:11) (69 - 87)  BP: 130/73 (13 Sep 2019 16:11) (130/73 - 167/79)  BP(mean): --  RR: 19 (13 Sep 2019 16:11) (16 - 20)  SpO2: 97% (13 Sep 2019 16:11) (95% - 97%)    Gen.:  HEENT:  Abdomen:  Extremity:    Mental status:  Cranial nerves:  Motor:  Sensory:  Coordination:    MEDICATIONS  (STANDING):    MEDICATIONS  (PRN):    Left notable for increase in BUN and creatinine  Troponin elevation    EXAM:  CT BRAIN STROKE PROTOCOL      PROCEDURE DATE:  09/13/2019        INTERPRETATION:  CT brain without contrast    History injury    Comparison MRI performed 2 days ago    There is no hemorrhage, cortical edema or hydrocephalus. There is   maturing encephalomalacia related to acute infarct demonstrated on the   study performed 2 days ago without evidence of extension. There is   underlying white matter degeneration and volume loss, typical for age.    IMPRESSION:    No acute findings        EXAM: MR BRAIN     PROCEDURE DATE: 09/11/2018       INTERPRETATION: MRI brain without contrast   Comparison CT performed 3 days prior   History aphasia, right-sided paresis     There is mild to moderate diffusion restriction centered in the left centrum   semiovale white matter measuring a maximum of 4 cm in axial length. There is   slight peripheral parietal subcortical and proximal temporal lobe external   capsule involvement. There is moderate underlying chronic microvascular   ischemic change without hemorrhage, cortical edema, mass effect or   hydrocephalus. There is mild frontoparietal cortical and central volume   loss. The orbital and sellar contents and cerebellar tonsils are within   normal limits.     IMPRESSION:     Moderately sized acute bland left-sided white matter watershed infarct                   FAWAD SCHNEIDER M.D., ATTENDING RADIOLOGIST   This document has been electronically signed. Sep 11 2018 2:26PM Phone call was made to Michelle at [phone]. .   Review of records show that in September of 2018 patient had aphasia with weakness on the right side. She was given t-PA at the time and subsequent acute rehabilitation. At that time patient also had increase in troponin levels. spoke with pat and family by bedside.       89 year-old woman who is here for initial consultation of continued right-sided weakness. 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 The patient was brought in by her daughter for increased right lower extremity weakness, worsening slurred speech for the past 2 days. Patient fell yesterday and again today.    Vital Signs Last 24 Hrs  T(C): 36.7 (13 Sep 2019 11:58), Max: 36.7 (13 Sep 2019 11:58)  T(F): 98 (13 Sep 2019 11:58), Max: 98 (13 Sep 2019 11:58)  HR: 69 (13 Sep 2019 16:11) (69 - 87)  BP: 130/73 (13 Sep 2019 16:11) (130/73 - 167/79)  BP(mean): --  RR: 19 (13 Sep 2019 16:11) (16 - 20)  SpO2: 97% (13 Sep 2019 16:11) (95% - 97%)    Gen.: alert.   HEENT: face sym  Abdomen: soft   Extremity: no edema    Mental status :oriented to self. no slurred speech appreciated  Cranial nerves: PERRL eomi v1-3 intact face sym. t/up midline  Motor: right elbow pain limited (baseline weak. not able to abduct at shoulder as per fam), left shoulder pain  Sensory: intact lt  Coordination: intact fnf    MEDICATIONS  (STANDING):  amLODIPine   Tablet 5 milliGRAM(s) Oral daily  aspirin  chewable 81 milliGRAM(s) Oral daily  atorvastatin 40 milliGRAM(s) Oral at bedtime  clindamycin   Capsule 300 milliGRAM(s) Oral three times a day  enoxaparin Injectable 40 milliGRAM(s) SubCutaneous daily  escitalopram 5 milliGRAM(s) Oral daily  metoprolol tartrate 25 milliGRAM(s) Oral every 12 hours  oxybutynin 5 milliGRAM(s) Oral three times a day  pantoprazole    Tablet 40 milliGRAM(s) Oral before breakfast  trimethoprim  160 mG/sulfamethoxazole 800 mG 1 Tablet(s) Oral every 12 hours    MEDICATIONS  (PRN):    Left notable for increase in BUN and creatinine  Troponin elevation    EXAM:  CT BRAIN STROKE PROTOCOL      PROCEDURE DATE:  09/13/2019        INTERPRETATION:  CT brain without contrast    History injury    Comparison MRI performed 2 days ago    There is no hemorrhage, cortical edema or hydrocephalus. There is   maturing encephalomalacia related to acute infarct demonstrated on the   study performed 2 days ago without evidence of extension. There is   underlying white matter degeneration and volume loss, typical for age.    IMPRESSION:    No acute findings        EXAM: MR BRAIN     PROCEDURE DATE: 09/11/2018       INTERPRETATION: MRI brain without contrast   Comparison CT performed 3 days prior   History aphasia, right-sided paresis     There is mild to moderate diffusion restriction centered in the left centrum   semiovale white matter measuring a maximum of 4 cm in axial length. There is   slight peripheral parietal subcortical and proximal temporal lobe external   capsule involvement. There is moderate underlying chronic microvascular   ischemic change without hemorrhage, cortical edema, mass effect or   hydrocephalus. There is mild frontoparietal cortical and central volume   loss. The orbital and sellar contents and cerebellar tonsils are within   normal limits.     IMPRESSION:     Moderately sized acute bland left-sided white matter watershed infarct                   FAWAD SCHNEIDER M.D., ATTENDING RADIOLOGIST   This document has been electronically signed. Sep 11 2018 2:26PM Repair Anesthesia Method: local infiltration

## 2023-05-12 NOTE — ED ADULT TRIAGE NOTE - ACCOMPANIED BY
In an effort to ensure that our patients LiveWell, a Team Member has reviewed your chart and identified an opportunity to provide the best care possible. An attempt was made to discuss or schedule overdue Preventive or Disease Management screening.     The Outcome was Contact was not made, left message If you have any questions or need help with scheduling, contact our Health Outreach Team at [phone]. Care Gaps include Colorectal Cancer Screening and Immunizations.  Name: Rosalio Donahue    ### Patient Details  YOB: 1949  MRN: [id number]    ### Encounter Details  Arrival Date: N/A  Discharge Date: N/A  Encounter ID: [id number] 12:00:47    ### Related interaction  [facility]  [place] Comprehensive Annual Visit ([place] CAV Outreach) ([email or web address])    ### Questions     Question 1   Comprehensive Annual Visit   If you would like us to help you schedule your in home visit or if you prefer a virtual visit, press 1; If you would like to receive a call back later, press 2; If you would like more information about this program, please press 3; or if you are not interested at this time, please press 4.   Scheduling Help (Voice) (Issue Panel: CAV Live Transfer )    ### Required Interventions and Feedback     Call Status         Call Status List:     Closed Not Reached (edited by FW on 05/12/2023 10:03 AM CDT), Voicemail Left/ 2nd Attempt (edited by FW on 05/12/2023 10:03 AM CDT), Voicemail Left/1st Attempt (edited by TANO on 05/05/2023 10:07 AM CDT)      Used          Used:     Yes (edited by FW on 05/12/2023 09:59 AM CDT)    If yes, what language?:     Kinyarwanda# [id number] (edited by FW on 05/12/2023 10:00 AM CDT)   Immediate family member

## 2023-05-12 NOTE — PHYSICAL THERAPY INITIAL EVALUATION ADULT - ORIENTATION, REHAB EVAL
PATIENT INFORMATION    Anticipatory guidance discussed  Avoid potential choking hazards (large, spherical, or coin shaped foods)  Avoid small toys (choking hazard)  Car seat issues, including proper placement and transition to toddler seat at 20 pounds    Follow-Up  - Return for your 2 1/2 year (30 months) well child visit.    2 years old Health and Safety Tips - The following hyperlinks are available to access via [employer]    Parent Education from Healthy Parent    Educación para padres sobre niños sanos      Common dosing for acetaminophen and ibuprofen:   Acetaminophen (Tylenol) can be given every 4 hours.  · Infant or Children's Elixir: 160mg/5ml for  Weight 18-23 lbs 24-35 lbs 36-47 lbs   Dose 3.75 ml 5 ml 7.5 ml      Weight 48-59 lbs 60-71 lsb 72-95 lbs   Dose 10 ml 12.5 ml 15 ml     Ibuprofen (Motrin) can be given every 6 hours.  Safe for children 6 months and older.  · Infant Drops: 50mg/1.25ml  Weight 12-17 lbs 18-23 lbs   Dose 1.25 ml 1.875 ml     · Children's Elixir 100mg/5ml   Weight 12-17 lbs 18-23 lbs 24-35 lbs 36-47 lbs   Dose 2.5 ml 3.75 ml 5 ml 7.5 ml        Weight 48-59 lbs 60-71 lbs 72-95 lbs   Dose 10 ml 12.5 ml 15 ml     Additional Educational Resources:  For additional resources regarding your symptoms, diagnosis, or further health information, please visit the Discover a Healthier You section on [email or web address] or the Online Health Resources section in [employer].  
oriented to person, place, time and situation

## 2023-06-02 NOTE — ED ADULT TRIAGE NOTE - TEMPERATURE IN [place] (DEGREES F)
"Encounter Date: 2023       History     Chief Complaint   Patient presents with    Hypertension     Seen here few days ago and bp still high     54-year-old with the below past medical history complains of severe headache.  Started having intermittent headache in March when her primary provider made changes to her blood pressure medications.  Headache has been constant for the last week with associated numbness to both forearms.  Pain is bifrontal and posterior.  Describes the pain as dull and throbbing.  No improvement with acetaminophen.  Has also noticed persistently elevated blood pressure measurements for the past week.  She was evaluated here in the ED 2 days ago for these complaints and treated with IV fluids, Compazine, Benadryl, and labetalol.      Review of patient's allergies indicates:   Allergen Reactions    Aspirin Hives     Past Medical History:   Diagnosis Date    Anemia     Anxiety     Depression     History of psychiatric hospitalization     [place] x 1 week for depression    History of ventral hernia repair     Hypertension     Lupus     patient reports that she is being treated "like I have Lupus"    Mental disorder     Morbid obesity 12/15/2017    Psychiatric problem     Sjogren's syndrome 2013    Therapy     TMJ (temporomandibular joint disorder)      Past Surgical History:   Procedure Laterality Date    breast reduction      BREAST SURGERY  2005     SECTION      x 2    HYSTERECTOMY  2005    INGUINAL HERNIA REPAIR      LAPAROSCOPIC TOTAL HYSTERECTOMY  2012    ASA    TOTAL REDUCTION MAMMOPLASTY      TUBAL LIGATION      VENTRAL HERNIA REPAIR       Family History   Problem Relation Age of Onset    Heart disease Mother     Hypertension Mother     Cancer Father         colon ca    Colon cancer Father     Depression Father     Schizophrenia Father     Anxiety disorder Father     Arthritis Father     Mental illness Father     Liver cancer Sister     Cancer Sister     " Depression Sister     Anxiety disorder Sister     Heart attack Sister     COPD Sister     Cancer Sister         throat and colon    Depression Sister     Miscarriages / Stillbirths Sister     Pancreatic cancer Brother     Depression Brother     Alcohol abuse Brother     No Known Problems Daughter     Asthma Son     Pancreatic cancer Other     Liver cancer Other     Suicide Neg Hx     Ovarian cancer Neg Hx     Breast cancer Neg Hx      Social History     Tobacco Use    Smoking status: Never    Smokeless tobacco: Never   Substance Use Topics    Alcohol use: Yes     Alcohol/week: 1.0 standard drink     Types: 1 Glasses of wine per week     Comment: rarely     Drug use: Yes     Types: Hydrocodone     Comment: rx hydrocodone     Review of Systems  See HPI.  Remainder of 10 point systems review negative.    Physical Exam     Initial Vitals [06/02/23 1546]   BP Pulse Resp Temp SpO2   (!) 173/105 100 18 98.5 °F (36.9 °C) 98 %      MAP       --         Physical Exam    Nursing note and vitals reviewed.  Constitutional: She is not diaphoretic. No distress.   HENT:   Head: Normocephalic and atraumatic.   Mouth/Throat: Oropharynx is clear and moist.   No facial or scalp swelling or tenderness.   Eyes: Conjunctivae and EOM are normal. Pupils are equal, round, and reactive to light. No scleral icterus.   No nystagmus.   Neck: No JVD present.   No carotid bruits.   Cardiovascular:  Normal rate, regular rhythm and normal heart sounds.     Exam reveals no gallop and no friction rub.       No murmur heard.  Pulmonary/Chest: Effort normal. No stridor. No respiratory distress. She has no decreased breath sounds. She has no wheezes. She has no rhonchi. She has no rales.   Abdominal: Abdomen is soft. She exhibits no distension. There is no abdominal tenderness.     Neurological: She is alert and oriented to person, place, and time. No cranial nerve deficit. GCS score is 15. GCS eye subscore is 4. GCS verbal subscore is 5. GCS motor  subscore is 6.   5/5 strength throughout all extremities.  Diminished touch sensation to bilateral upper arms.  Normal coordination.   Skin: Skin is warm and dry. No pallor.       ED Course   Procedures  Labs Reviewed   CBC W/ AUTO DIFFERENTIAL - Abnormal; Notable for the following components:       Result Value    MCV 81 (*)     MCH 24.2 (*)     MCHC 29.9 (*)     RDW 15.4 (*)     All other components within normal limits   COMPREHENSIVE METABOLIC PANEL - Abnormal; Notable for the following components:    Calcium 10.9 (*)     Total Protein 8.9 (*)     All other components within normal limits          Imaging Results              MRI Brain Without Contrast (Final result)  Result time 06/03/23 01:29:28      Final result by Preet Avery MD (06/03/23 01:29:28)                   Impression:      No MR evidence of acute infarction.    No acute intracranial process.      Electronically signed by: Preet Avery MD  Date:    06/03/2023  Time:    01:29               Narrative:    EXAMINATION:  MRI BRAIN WITHOUT CONTRAST    CLINICAL HISTORY:  Stroke, follow up;    TECHNIQUE:  Multiplanar multisequence MR imaging of the brain was performed without contrast.    COMPARISON:  CT head dated 06/02/2023.    FINDINGS:  The craniocervical junction is intact.  The sellar and parasellar structures are unremarkable.  The midline structures are unremarkable.  The intracranial flow voids are within normal limits.    No diffusion-weighted signal abnormality is present.  The ventricles and sulci are within normal limits.  There are minimal T2/FLAIR signal hyperintense within the subcortical white matter.  There is no evidence of intracranial hemorrhage.  There are no extra-axial fluid collections.  There is no evidence of mass effect.    The orbits and intraorbital contents are unremarkable.  There is a mucous retention cyst within the right maxillary sinus.                                       CT Head Without Contrast (Final result)  Result  time 06/02/23 18:40:40      Final result by Sunny Morillo MD (06/02/23 18:40:40)                   Impression:      1. No acute large vascular territory infarct or intracranial hemorrhage identified.  If persistent neurologic deficit, MRI brain can be obtained.  2. Frontal scalp new 7-8 mm soft tissue nodule abutting the overlying skin.  This could reflect complex sebaceous cyst, but remains indeterminate.  Clinical correlation advised.      Electronically signed by: Sunny Morillo MD  Date:    06/02/2023  Time:    18:40               Narrative:    EXAMINATION:  CT HEAD WITHOUT CONTRAST    CLINICAL HISTORY:  Neuro deficit, acute, stroke suspected;    TECHNIQUE:  Low dose axial CT images obtained throughout the head without intravenous contrast. Sagittal and coronal reconstructions were performed.    COMPARISON:  Head CT 12/09/2011    FINDINGS:  Intracranial compartment:    Ventricles and sulci are normal in size for age without evidence of hydrocephalus, noting cavum septum pellucidum.  No extra-axial blood or fluid collections.    The brain parenchyma appears normal. No parenchymal mass, hemorrhage, edema or major vascular distribution infarct.    Skull/extracranial contents (limited evaluation): New 7-8 mm intermediate density nodule within the superficial and mid depth subcutaneous fat of the scalp overlying the left frontal calvarium which abuts the overlying skin.  No adjacent inflammatory change, calcification or internal gas foci.  No fracture. Mastoid air cells and paranasal sinuses are essentially clear.                                       Medications   butalbital-acetaminophen-caffeine -40 mg per tablet 1 tablet (1 tablet Oral Given 6/2/23 1852)   ketorolac injection 15 mg (15 mg Intravenous Given 6/2/23 1933)   prochlorperazine injection Soln 5 mg (5 mg Intravenous Given 6/2/23 1929)     Medical Decision Making:   Clinical Tests:   Lab Tests: Reviewed  Radiological Study: Reviewed    MDM:   Emergent evaluation for headache, persistent blood pressure elevation, and arm numbness.  Broad differential included migraine headache, complex migraine, tension headache, cluster headache, pseudotumor cerebri, stroke, and other conditions.  Head CT was negative for acute processes.  No white count elevation, anemia, concerning electrolyte anomalies, or renal insufficiency on lab review.  Headache improved with multiple oral and IV medications.  Blood pressure was persistently elevated, though.  MRI brain was negative for acute ischemia and other abnormalities.  Oral headache medications prescribed at discharge.           ED Course as of 06/13/23 0816 Fri Jun 02, 2023 2119 HA much improved with IV toradol, compazine. Awaiting MRI brain to r/o stroke due to bilateral arm numbness. [LP]      ED Course User Index  [LP] Roosevelt Nelson III, MD                 Clinical Impression:   Final diagnoses:  [R51.9] Nonintractable episodic headache, unspecified headache type (Primary)  [R03.0] Elevated blood pressure reading  [R20.0] Arm numbness - Bilateral        ED Disposition Condition    Discharge Stable          ED Prescriptions       Medication Sig Dispense Start Date End Date Auth. Provider    ketorolac (TORADOL) 10 mg tablet  Take 1 tablet (10 mg total) by mouth every 8 (eight) hours as needed (headache). 15 tablet 6/3/2023 6/3/2023 Roosevelt Nelson III, MD    butalbital-acetaminophen-caffeine -40 mg (FIORICET, ESGIC) -40 mg per tablet Take 1 tablet by mouth every 6 (six) hours as needed for Headaches. 20 tablet 6/3/2023 -- Roosevelt Nelson III, MD          Follow-up Information    None          Roosevelt Nelson III, MD  06/13/23 6824     98.2

## 2023-09-27 NOTE — DIETITIAN INITIAL EVALUATION ADULT. - PROBLEM SELECTOR PLAN 2
Uncertain cause, EKG sinus with no ST changes and good R wave progression.  Trend troponin, will check echocardiogram in AM, serial EKG's.  At this time no ASA or statin therapies due to TPA and NPO status, will try to maintain SBP >140 so no beta blocker at this time.
1% Lidocaine

## 2024-02-07 NOTE — DISCHARGE NOTE PROVIDER - NSDCHHENCOUNTER_GEN_ALL_CORE
[Takes medication as prescribed] : takes [None] : Patient does not have any barriers to medication adherence [Yes] : Reviewed medication list for presence of high-risk medications. [Benzodiazepines] : benzodiazepines [Opioids] : opioids [Blood Thinners] : blood thinners [Muscle Relaxants] : muscle relaxants [Sedatives] : sedatives 27-Oct-2019

## 2024-03-23 NOTE — ED PROVIDER NOTE - ALLERGIC/IMMUNOLOGIC NEGATIVE STATEMENT, MLM
Alert-The patient is alert, awake and responds to voice. The patient is oriented to time, place, and person. The triage nurse is able to obtain subjective information.
no dermatitis, no environmental allergies, no food allergies, no immunosuppressive disorder, and no pruritus.

## 2024-04-11 NOTE — DISCHARGE NOTE ADULT - RECOGNIZE DANGER SITUATIONS. FOR EXAMPLE, STRESS, DRINKING ALCOHOL, URGES TO SMOKE, SMOKING CUES, CIGARETTE AVAILABILITY
Yadi Martinez MA 4/11/2024 9:34 AM EDT    Is it okay for the Pt to go to the 7.5 mg?  ----- Message -----  From: Jose Henderson  Sent: 4/10/2024 2:35 PM EDT  To: [facility] Endocrinology Clinical Staff  Subject: Mounjaro Prescription     [facility] can't get Mounjaro. I checked with [facility] in [place]. They said they could order Mounjaro 7.5. Please send a new prescribtion for 7.5 to [facility] in [facility]. They are located at [address] [place] [id number]. Thanks for getting back with me on this issue.  Have a Great Day,  Bogdan      Statement Selected

## 2024-04-22 NOTE — ED PROVIDER NOTE - CROS ED CARDIOVAS ALL NEG
Called and spoke with patient regarding her XR results. Dr. Renner wants her to f/u with ortho and get carotid US.    Pt will call dr Rocael Thompson group and central scheduling     States she is taking tylenol and it is not working    Wondering if she can have a muscle relaxer or something else for pain    States she is in a lot of pain it was a 10/10 before, after the medrol dose pack it is now an 8/10. Having a hard time sleeping     [place] [facility]    negative...

## 2025-02-04 NOTE — PROGRESS NOTE ADULT - PROBLEM SELECTOR PLAN 1
S/P TPA  Pending MRI S/P TPA  Pending MRI  cont aspirin/statin  awaiting PMR evaluation    BP control Never smoker

## 2025-05-05 NOTE — ED ADULT NURSE NOTE - PRIMARY CARE PROVIDER
Received request via: Pharmacy    Was the patient seen in the last year in this department? Yes    Does the patient have an active prescription (recently filled or refills available) for medication(s) requested? No    Pharmacy Name:      [facility] - [address]  [address] [address]  Phone: [phone] Fax: [phone]       Does the patient have [facility] and need 100-day supply? (This applies to ALL medications) Patient does not have SCP   PCP

## 2025-06-18 NOTE — PROGRESS NOTE ADULT - PROBLEM SELECTOR PLAN 1
We did not see a foreign body on your x-rays.  Therefore it is very unlikely that you still have a retained needle in your upper arm.  However I want you to keep a close eye on the area and return if you develop increased pain, swelling, redness, or fevers.  You should also use warm wet compresses to the area to help prevent infection as well as to help facilitate the removal of any remaining material if by chance there is still something in there.   Most likely has an acute cholecystitis accompanied by significantly abnormal liver function tests could be due to the cholecystitis itself, but could not exclude CBD stones but patient is a poor candidate for ERCP.   Today LFT's as well as Bili have slightly uptrend, but unable to collect information regarding pain or discomfort as patient not responding to questions, although no facial pain. At this time we will recommend to continue to monitor LFT's and bili and continue antibiotic therapy.

## 2025-06-26 NOTE — ED PROVIDER NOTE - RESPIRATORY NEGATIVE STATEMENT, MLM
no chest pain, no cough, and no shortness of breath. states she has had surgery in the past, should be scanned in